# Patient Record
Sex: FEMALE | Race: BLACK OR AFRICAN AMERICAN | NOT HISPANIC OR LATINO | Employment: UNEMPLOYED | URBAN - METROPOLITAN AREA
[De-identification: names, ages, dates, MRNs, and addresses within clinical notes are randomized per-mention and may not be internally consistent; named-entity substitution may affect disease eponyms.]

---

## 2023-09-15 PROBLEM — M85.80 OSTEOPENIA: Status: ACTIVE | Noted: 2023-09-15

## 2023-09-15 PROBLEM — E66.01 MORBID OBESITY (MULTI): Status: ACTIVE | Noted: 2023-09-15

## 2023-09-15 PROBLEM — R06.02 SHORTNESS OF BREATH: Status: ACTIVE | Noted: 2023-09-15

## 2023-09-15 PROBLEM — M17.11 OSTEOARTHRITIS OF RIGHT KNEE: Status: ACTIVE | Noted: 2023-09-15

## 2023-09-15 PROBLEM — M10.9 GOUT: Status: ACTIVE | Noted: 2022-09-12

## 2023-09-15 PROBLEM — R00.2 PALPITATIONS: Status: ACTIVE | Noted: 2023-09-15

## 2023-09-15 PROBLEM — G47.33 OBSTRUCTIVE SLEEP APNEA SYNDROME: Status: ACTIVE | Noted: 2022-09-13

## 2023-09-15 PROBLEM — I48.91 ATRIAL FIBRILLATION (MULTI): Status: ACTIVE | Noted: 2023-09-15

## 2023-09-15 PROBLEM — N18.4 CKD (CHRONIC KIDNEY DISEASE), STAGE IV (MULTI): Status: ACTIVE | Noted: 2023-09-15

## 2023-09-15 PROBLEM — E87.6 HYPOKALEMIA: Status: ACTIVE | Noted: 2023-09-15

## 2023-09-15 PROBLEM — E78.5 HYPERLIPIDEMIA: Status: ACTIVE | Noted: 2023-09-15

## 2023-09-15 PROBLEM — I42.9 CARDIOMYOPATHY (MULTI): Status: ACTIVE | Noted: 2022-09-06

## 2023-09-15 PROBLEM — D72.819 LEUKOPENIA: Status: ACTIVE | Noted: 2023-09-15

## 2023-09-15 PROBLEM — I50.22 CHRONIC SYSTOLIC HEART FAILURE (MULTI): Status: ACTIVE | Noted: 2023-09-15

## 2023-09-15 PROBLEM — I61.9 HEMORRHAGIC CEREBROVASCULAR ACCIDENT (CVA) (MULTI): Status: ACTIVE | Noted: 2023-08-26

## 2023-09-15 PROBLEM — R29.898 BILATERAL LEG WEAKNESS: Status: ACTIVE | Noted: 2023-09-15

## 2023-09-15 PROBLEM — G47.00 INSOMNIA: Status: ACTIVE | Noted: 2023-09-15

## 2023-09-15 PROBLEM — I63.9 CVA (CEREBRAL VASCULAR ACCIDENT) (MULTI): Status: ACTIVE | Noted: 2023-09-15

## 2023-09-15 PROBLEM — K58.1 IRRITABLE BOWEL SYNDROME WITH CONSTIPATION: Status: ACTIVE | Noted: 2023-09-15

## 2023-09-15 PROBLEM — N25.81 HYPERPARATHYROIDISM, SECONDARY (MULTI): Status: ACTIVE | Noted: 2023-09-15

## 2023-09-15 PROBLEM — R32 URINARY INCONTINENCE: Status: ACTIVE | Noted: 2023-09-15

## 2023-09-15 PROBLEM — E04.1 THYROID NODULE: Status: ACTIVE | Noted: 2023-09-15

## 2023-09-15 PROBLEM — R53.82 CHRONIC FATIGUE: Status: ACTIVE | Noted: 2023-09-15

## 2023-09-15 PROBLEM — I35.9 AORTIC VALVE DISORDER: Status: ACTIVE | Noted: 2023-09-15

## 2023-09-15 PROBLEM — M25.50 ARTHRALGIA: Status: ACTIVE | Noted: 2023-03-20

## 2023-09-15 PROBLEM — M25.569 KNEE PAIN: Status: ACTIVE | Noted: 2023-09-15

## 2023-09-15 PROBLEM — Z95.810 CARDIAC DEFIBRILLATOR IN PLACE: Status: ACTIVE | Noted: 2023-09-15

## 2023-09-15 RX ORDER — ALBUTEROL SULFATE 90 UG/1
2 AEROSOL, METERED RESPIRATORY (INHALATION) EVERY 4 HOURS PRN
COMMUNITY
Start: 2021-06-03 | End: 2023-12-19 | Stop reason: SDUPTHER

## 2023-09-15 RX ORDER — AMIODARONE HYDROCHLORIDE 200 MG/1
1 TABLET ORAL DAILY
COMMUNITY
End: 2023-12-12 | Stop reason: SDUPTHER

## 2023-09-15 RX ORDER — ALLOPURINOL 300 MG/1
1 TABLET ORAL DAILY
COMMUNITY
Start: 2015-08-24 | End: 2023-12-19 | Stop reason: SDUPTHER

## 2023-09-15 RX ORDER — TORSEMIDE 20 MG/1
20 TABLET ORAL DAILY
COMMUNITY
Start: 2022-03-30 | End: 2024-04-17 | Stop reason: SDUPTHER

## 2023-09-15 RX ORDER — SPIRONOLACTONE 25 MG/1
1 TABLET ORAL DAILY
COMMUNITY
Start: 2015-03-18 | End: 2023-12-19 | Stop reason: SDUPTHER

## 2023-09-15 RX ORDER — CALCITRIOL 0.25 UG/1
0.25 CAPSULE ORAL DAILY
COMMUNITY
End: 2024-04-23 | Stop reason: SDUPTHER

## 2023-09-15 RX ORDER — WARFARIN SODIUM 5 MG/1
TABLET ORAL
COMMUNITY
Start: 2016-07-14 | End: 2023-12-19 | Stop reason: SDUPTHER

## 2023-09-15 RX ORDER — LIDOCAINE 50 MG/G
1 OINTMENT TOPICAL 3 TIMES DAILY PRN
COMMUNITY
Start: 2023-02-23

## 2023-09-15 RX ORDER — METOPROLOL SUCCINATE 100 MG/1
1 TABLET, EXTENDED RELEASE ORAL DAILY
COMMUNITY
Start: 2022-11-14 | End: 2023-12-19 | Stop reason: SDUPTHER

## 2023-09-15 RX ORDER — SACUBITRIL AND VALSARTAN 24; 26 MG/1; MG/1
1 TABLET, FILM COATED ORAL 2 TIMES DAILY
COMMUNITY
Start: 2018-03-05 | End: 2024-01-17 | Stop reason: DRUGHIGH

## 2023-09-15 RX ORDER — SIMVASTATIN 20 MG/1
20 TABLET, FILM COATED ORAL NIGHTLY
COMMUNITY
Start: 2015-08-24 | End: 2023-12-19 | Stop reason: SDUPTHER

## 2023-09-20 DIAGNOSIS — I48.91 ATRIAL FIBRILLATION, UNSPECIFIED TYPE (MULTI): Primary | ICD-10-CM

## 2023-09-20 LAB
INR IN PPP BY COAGULATION ASSAY EXTERNAL: 2.6
PROTHROMBIN TIME (PT) IN PPP BY COAGULATION ASSAY EXTERNAL: NORMAL SECONDS

## 2023-09-27 ENCOUNTER — ANTICOAGULATION - WARFARIN VISIT (OUTPATIENT)
Dept: CARDIOLOGY | Facility: CLINIC | Age: 68
End: 2023-09-27
Payer: MEDICAID

## 2023-09-29 ENCOUNTER — HOSPITAL ENCOUNTER (EMERGENCY)
Dept: DATA CONVERSION | Facility: HOSPITAL | Age: 68
Discharge: HOME | End: 2023-09-30
Payer: MEDICAID

## 2023-10-10 ENCOUNTER — TELEPHONE (OUTPATIENT)
Dept: NEPHROLOGY | Facility: HOSPITAL | Age: 68
End: 2023-10-10
Payer: MEDICAID

## 2023-10-11 ENCOUNTER — TELEPHONE (OUTPATIENT)
Dept: CARDIOLOGY | Facility: HOSPITAL | Age: 68
End: 2023-10-11
Payer: MEDICAID

## 2023-10-11 DIAGNOSIS — I50.22 CHRONIC SYSTOLIC HEART FAILURE (MULTI): ICD-10-CM

## 2023-10-11 DIAGNOSIS — Z01.818 PRE-OPERATIVE CLEARANCE: ICD-10-CM

## 2023-10-11 NOTE — TELEPHONE ENCOUNTER
Spoke with patient. Patient confirming 10/30/2023 as her choice of procedure date. Pre-procedural labs ordered.

## 2023-10-12 ENCOUNTER — LAB (OUTPATIENT)
Dept: LAB | Facility: LAB | Age: 68
End: 2023-10-12
Payer: MEDICAID

## 2023-10-12 DIAGNOSIS — N18.30 CHRONIC KIDNEY DISEASE, STAGE 3 UNSPECIFIED (MULTI): Primary | ICD-10-CM

## 2023-10-12 DIAGNOSIS — I50.22 CHRONIC SYSTOLIC HEART FAILURE (MULTI): ICD-10-CM

## 2023-10-12 DIAGNOSIS — N18.30 CHRONIC KIDNEY DISEASE, STAGE 3 UNSPECIFIED (MULTI): ICD-10-CM

## 2023-10-12 DIAGNOSIS — Z01.818 PRE-OPERATIVE CLEARANCE: ICD-10-CM

## 2023-10-12 LAB
25(OH)D3 SERPL-MCNC: 53 NG/ML (ref 30–100)
ALBUMIN SERPL BCP-MCNC: 4.2 G/DL (ref 3.4–5)
ANION GAP SERPL CALC-SCNC: 11 MMOL/L (ref 10–20)
ANION GAP SERPL CALC-SCNC: 15 MMOL/L (ref 10–20)
APPEARANCE UR: ABNORMAL
BILIRUB UR STRIP.AUTO-MCNC: NEGATIVE MG/DL
BUN SERPL-MCNC: 25 MG/DL (ref 6–23)
BUN SERPL-MCNC: 27 MG/DL (ref 6–23)
CALCIUM SERPL-MCNC: 10 MG/DL (ref 8.6–10.6)
CALCIUM SERPL-MCNC: 9.9 MG/DL (ref 8.6–10.6)
CALCIUM UR-MCNC: 4.2 MG/DL
CALCIUM/CREATINE (MG/G) IN URINE: 51 MG/G CREAT (ref 0–209)
CHLORIDE SERPL-SCNC: 104 MMOL/L (ref 98–107)
CHLORIDE SERPL-SCNC: 105 MMOL/L (ref 98–107)
CO2 SERPL-SCNC: 29 MMOL/L (ref 21–32)
CO2 SERPL-SCNC: 31 MMOL/L (ref 21–32)
COLOR UR: YELLOW
CREAT SERPL-MCNC: 1.77 MG/DL (ref 0.5–1.05)
CREAT SERPL-MCNC: 1.92 MG/DL (ref 0.5–1.05)
CREAT UR-MCNC: 81.8 MG/DL (ref 20–320)
ERYTHROCYTE [DISTWIDTH] IN BLOOD BY AUTOMATED COUNT: 13.2 % (ref 11.5–14.5)
ERYTHROCYTE [DISTWIDTH] IN BLOOD BY AUTOMATED COUNT: 13.3 % (ref 11.5–14.5)
GFR SERPL CREATININE-BSD FRML MDRD: 28 ML/MIN/1.73M*2
GFR SERPL CREATININE-BSD FRML MDRD: 31 ML/MIN/1.73M*2
GLUCOSE SERPL-MCNC: 106 MG/DL (ref 74–99)
GLUCOSE SERPL-MCNC: 109 MG/DL (ref 74–99)
GLUCOSE UR STRIP.AUTO-MCNC: NEGATIVE MG/DL
HCT VFR BLD AUTO: 40.8 % (ref 36–46)
HCT VFR BLD AUTO: 42.7 % (ref 36–46)
HGB BLD-MCNC: 12.9 G/DL (ref 12–16)
HGB BLD-MCNC: 13.6 G/DL (ref 12–16)
KETONES UR STRIP.AUTO-MCNC: NEGATIVE MG/DL
LEUKOCYTE ESTERASE UR QL STRIP.AUTO: ABNORMAL
MCH RBC QN AUTO: 29.3 PG (ref 26–34)
MCH RBC QN AUTO: 29.7 PG (ref 26–34)
MCHC RBC AUTO-ENTMCNC: 31.6 G/DL (ref 32–36)
MCHC RBC AUTO-ENTMCNC: 31.9 G/DL (ref 32–36)
MCV RBC AUTO: 93 FL (ref 80–100)
MCV RBC AUTO: 93 FL (ref 80–100)
MICROALBUMIN UR-MCNC: 7.6 MG/L
MICROALBUMIN/CREAT UR: 9.3 UG/MG CREAT
NITRITE UR QL STRIP.AUTO: NEGATIVE
NRBC BLD-RTO: 0 /100 WBCS (ref 0–0)
NRBC BLD-RTO: 0 /100 WBCS (ref 0–0)
PH UR STRIP.AUTO: 7 [PH]
PHOSPHATE SERPL-MCNC: 3.6 MG/DL (ref 2.5–4.9)
PLATELET # BLD AUTO: 159 X10*3/UL (ref 150–450)
PLATELET # BLD AUTO: 164 X10*3/UL (ref 150–450)
PMV BLD AUTO: 12 FL (ref 7.5–11.5)
PMV BLD AUTO: 12.1 FL (ref 7.5–11.5)
POTASSIUM SERPL-SCNC: 4.2 MMOL/L (ref 3.5–5.3)
POTASSIUM SERPL-SCNC: 4.6 MMOL/L (ref 3.5–5.3)
PROT UR STRIP.AUTO-MCNC: NEGATIVE MG/DL
PROT UR-ACNC: 10 MG/DL (ref 5–24)
PROT/CREAT UR: 0.12 MG/MG CREAT (ref 0–0.17)
PTH-INTACT SERPL-MCNC: 132.1 PG/ML (ref 18.5–88)
RBC # BLD AUTO: 4.4 X10*6/UL (ref 4–5.2)
RBC # BLD AUTO: 4.58 X10*6/UL (ref 4–5.2)
RBC # UR STRIP.AUTO: NEGATIVE /UL
RBC #/AREA URNS AUTO: ABNORMAL /HPF
SODIUM SERPL-SCNC: 143 MMOL/L (ref 136–145)
SODIUM SERPL-SCNC: 143 MMOL/L (ref 136–145)
SP GR UR STRIP.AUTO: 1.01
SQUAMOUS #/AREA URNS AUTO: ABNORMAL /HPF
UROBILINOGEN UR STRIP.AUTO-MCNC: 2 MG/DL
WBC # BLD AUTO: 3.7 X10*3/UL (ref 4.4–11.3)
WBC # BLD AUTO: 3.9 X10*3/UL (ref 4.4–11.3)
WBC #/AREA URNS AUTO: ABNORMAL /HPF

## 2023-10-12 PROCEDURE — 36415 COLL VENOUS BLD VENIPUNCTURE: CPT

## 2023-10-12 PROCEDURE — 82570 ASSAY OF URINE CREATININE: CPT

## 2023-10-12 PROCEDURE — 85027 COMPLETE CBC AUTOMATED: CPT

## 2023-10-12 PROCEDURE — 82043 UR ALBUMIN QUANTITATIVE: CPT

## 2023-10-12 PROCEDURE — 83970 ASSAY OF PARATHORMONE: CPT

## 2023-10-12 PROCEDURE — 80069 RENAL FUNCTION PANEL: CPT

## 2023-10-12 PROCEDURE — 82306 VITAMIN D 25 HYDROXY: CPT

## 2023-10-12 PROCEDURE — 80048 BASIC METABOLIC PNL TOTAL CA: CPT

## 2023-10-12 PROCEDURE — 82340 ASSAY OF CALCIUM IN URINE: CPT

## 2023-10-12 PROCEDURE — 81001 URINALYSIS AUTO W/SCOPE: CPT

## 2023-10-12 PROCEDURE — 84156 ASSAY OF PROTEIN URINE: CPT

## 2023-10-13 DIAGNOSIS — I42.0 DILATED CARDIOMYOPATHY (MULTI): Primary | ICD-10-CM

## 2023-10-19 ENCOUNTER — TELEMEDICINE (OUTPATIENT)
Dept: NEPHROLOGY | Facility: CLINIC | Age: 68
End: 2023-10-19
Payer: MEDICAID

## 2023-10-19 ENCOUNTER — APPOINTMENT (OUTPATIENT)
Dept: NEPHROLOGY | Facility: CLINIC | Age: 68
End: 2023-10-19
Payer: MEDICAID

## 2023-10-19 DIAGNOSIS — N18.32 HYPERTENSIVE KIDNEY DISEASE WITH STAGE 3B CHRONIC KIDNEY DISEASE (MULTI): Primary | ICD-10-CM

## 2023-10-19 DIAGNOSIS — I13.0 BENIGN HYPERTENSIVE HEART AND KIDNEY DISEASE WITH COMBINED SYSTOLIC AND DIASTOLIC CHF, NYHA CLASS 1 AND CKD STAGE 3 (MULTI): ICD-10-CM

## 2023-10-19 DIAGNOSIS — N18.30 BENIGN HYPERTENSIVE HEART AND KIDNEY DISEASE WITH COMBINED SYSTOLIC AND DIASTOLIC CHF, NYHA CLASS 1 AND CKD STAGE 3 (MULTI): ICD-10-CM

## 2023-10-19 DIAGNOSIS — I50.40 BENIGN HYPERTENSIVE HEART AND KIDNEY DISEASE WITH COMBINED SYSTOLIC AND DIASTOLIC CHF, NYHA CLASS 1 AND CKD STAGE 3 (MULTI): ICD-10-CM

## 2023-10-19 DIAGNOSIS — I12.9 HYPERTENSIVE KIDNEY DISEASE WITH STAGE 3B CHRONIC KIDNEY DISEASE (MULTI): Primary | ICD-10-CM

## 2023-10-19 NOTE — PROGRESS NOTES
Virtual phone visit with patient's request.    Heart procedure 10.30 defibrillator placement  No specific complaints  Denies nausea, vomiting, chest pain, dyspnea  No urinary symptoms    Not checking bps   Weight stable  NAD  No edema      CKD stage 3b   HTN unclear home control, controlled at other doctor's visits  Proteinuria, none significant  CHF stable   Morbid obesity, unchanged    Labs and follow up in 3 months  Discussed risks/benefits/indications of SGLT2 I  [facility] pharmacy consultation for farxiga    10 minutes spent in virtual visit

## 2023-10-21 ENCOUNTER — ANTICOAGULATION - WARFARIN VISIT (OUTPATIENT)
Dept: CARDIOLOGY | Facility: CLINIC | Age: 68
End: 2023-10-21
Payer: MEDICAID

## 2023-10-21 DIAGNOSIS — I48.91 ATRIAL FIBRILLATION, UNSPECIFIED TYPE (MULTI): ICD-10-CM

## 2023-10-21 LAB
POC INR: 2.3
POC PROTHROMBIN TIME: NORMAL

## 2023-10-21 PROCEDURE — 85610 PROTHROMBIN TIME: CPT | Mod: QW | Performed by: INTERNAL MEDICINE

## 2023-10-21 PROCEDURE — 99211 OFF/OP EST MAY X REQ PHY/QHP: CPT | Mod: 27 | Performed by: INTERNAL MEDICINE

## 2023-10-21 NOTE — PROGRESS NOTES
Patient identification verified with 2 identifiers.    Location: [facility] [address] [address] [phone] option #1     Referring Physician: JOCELYNE  Enrollment/ Re-enrollment date: 23   INR Goal: 2.0-3.0  INR monitoring is per [facility] protocol.  Anticoagulation Medication: warfarin  Indication: atrial fibrillation    Subjective   Bleeding signs/symptoms: No    Bruising: No   Major bleeding event: No  Thrombosis signs/symptoms: No  Thromboembolic event: No  Missed doses: No  Extra doses: No  Medication changes: No  Dietary changes: No  Change in health: No  Change in activity: No  Alcohol: No  Other concerns: No    Upcoming Surgeries:  Does the Patient Have any upcoming surgeries that require interruption in anticoagulation therapy? no  Does the patient require bridging? no      Anticoagulation Summary  As of 10/21/2023      INR goal:  2.0-3.0   TTR:  100.0 % (3 wk)   INR used for dosin.30 (10/21/2023)   Weekly warfarin total:  35 mg               Assessment/Plan   Therapeutic     1. New dose: no change    2. Next INR: 2 weeks      Education provided to patient during the visit:  Patient instructed to call in interim with questions, concerns and changes.

## 2023-10-27 ENCOUNTER — PHARMACY VISIT (OUTPATIENT)
Dept: PHARMACY | Facility: CLINIC | Age: 68
End: 2023-10-27
Payer: MEDICAID

## 2023-10-27 ENCOUNTER — TELEMEDICINE (OUTPATIENT)
Dept: PHARMACY | Facility: HOSPITAL | Age: 68
End: 2023-10-27
Payer: MEDICAID

## 2023-10-27 DIAGNOSIS — I12.9 HYPERTENSIVE KIDNEY DISEASE WITH STAGE 3B CHRONIC KIDNEY DISEASE (MULTI): ICD-10-CM

## 2023-10-27 DIAGNOSIS — N18.32 HYPERTENSIVE KIDNEY DISEASE WITH STAGE 3B CHRONIC KIDNEY DISEASE (MULTI): ICD-10-CM

## 2023-10-27 DIAGNOSIS — I50.40 BENIGN HYPERTENSIVE HEART AND KIDNEY DISEASE WITH COMBINED SYSTOLIC AND DIASTOLIC CHF, NYHA CLASS 1 AND CKD STAGE 3 (MULTI): ICD-10-CM

## 2023-10-27 DIAGNOSIS — I13.0 BENIGN HYPERTENSIVE HEART AND KIDNEY DISEASE WITH COMBINED SYSTOLIC AND DIASTOLIC CHF, NYHA CLASS 1 AND CKD STAGE 3 (MULTI): ICD-10-CM

## 2023-10-27 DIAGNOSIS — N18.30 BENIGN HYPERTENSIVE HEART AND KIDNEY DISEASE WITH COMBINED SYSTOLIC AND DIASTOLIC CHF, NYHA CLASS 1 AND CKD STAGE 3 (MULTI): ICD-10-CM

## 2023-10-27 PROCEDURE — RXMED WILLOW AMBULATORY MEDICATION CHARGE

## 2023-10-27 RX ORDER — DAPAGLIFLOZIN 5 MG/1
5 TABLET, FILM COATED ORAL DAILY
Qty: 30 TABLET | Refills: 5 | Status: SHIPPED | OUTPATIENT
Start: 2023-10-27 | End: 2024-03-26

## 2023-10-27 NOTE — PROGRESS NOTES
Subjective   Patient ID: Trinidad Hines is a 68 y.o. female who presents for No chief complaint on file..    Referring Provider: Minda Ricardo MD     HPI  HPI: CKD stage 3b/4. last EGFR 28 sCr 1.92    HTN: controlled at last ov  /83  Medications  Spironolactone 25mg every day  Entresto 24-26 bid  Toprol xl 100mg every day    Glucose: well controlled and monitored    HLD: controlled  On statin? Simvastatin 20mg qd    Medications reviewed for appropriate dosing in setting of CKD  Recommended changes:  - no adjustments needed at this time      Objective     There were no vitals taken for this visit.     Labs  Lab Results   Component Value Date    BILITOT 1.2 03/20/2023    CALCIUM 10.0 10/12/2023    CO2 29 10/12/2023     10/12/2023    CREATININE 1.77 (H) 10/12/2023    GLUCOSE 106 (H) 10/12/2023    ALKPHOS 45 03/20/2023    K 4.6 10/12/2023    PROT 6.6 03/20/2023     10/12/2023    AST 20 03/20/2023    ALT 10 03/20/2023    BUN 27 (H) 10/12/2023    ANIONGAP 15 10/12/2023    MG 2.34 03/20/2023    PHOS 3.6 10/12/2023    ALBUMIN 4.2 10/12/2023    LIPASE 77 03/05/2021    GFRF 31 (A) 03/20/2023     Lab Results   Component Value Date    TRIG 62 01/19/2022    CHOL 187 01/19/2022    HDL 68.3 01/19/2022     Lab Results   Component Value Date    HGBA1C 5.7 (A) 11/02/2022       Current Outpatient Medications on File Prior to Visit   Medication Sig Dispense Refill    albuterol 90 mcg/actuation inhaler Inhale 2 puffs every 4 hours if needed for wheezing.      allopurinol (Zyloprim) 300 mg tablet Take 1 tablet (300 mg) by mouth once daily.      amiodarone (Pacerone) 200 mg tablet Take 1 tablet (200 mg) by mouth once daily.      calcitriol (Rocaltrol) 0.25 mcg capsule Take 1 capsule (0.25 mcg) by mouth once daily.      lidocaine (Xylocaine) 5 % ointment Apply topically 3 times a day.      metoprolol succinate XL (Toprol-XL) 100 mg 24 hr tablet Take 1 tablet (100 mg) by mouth once daily.       sacubitriL-valsartan (Entresto) 24-26 mg tablet Take 1 tablet by mouth 2 times a day.      simvastatin (Zocor) 20 mg tablet Take 1 tablet (20 mg) by mouth once daily at bedtime.      spironolactone (Aldactone) 25 mg tablet Take 1 tablet (25 mg) by mouth once daily.      torsemide (Demadex) 20 mg tablet Take 2 tablets (40 mg) by mouth once daily.      warfarin (Coumadin) 5 mg tablet Take by mouth once daily.       No current facility-administered medications on file prior to visit.        Assessment/Plan   PATIENT EDUCATION/DISCUSSION:  - Counseled patient on MOA, expectations, side effects, duration of therapy, contraindications, administration, and monitoring parameters  - Answered all patient questions and concerns  - regular monthly copay $0  _______________________________________________________________________  PLAN  1. START farxiga 5mg qd  2. Prescription sent to [facility] pharmacy for assistance on authorization and copay. Medication will be mailed to patient.    F/up: 11/28 1030a  Ron Hightower, PharmD    Continue all meds under the continuation of care with the referring provider and clinical pharmacy team.

## 2023-10-30 ENCOUNTER — DOCUMENTATION (OUTPATIENT)
Dept: CARDIOLOGY | Facility: CLINIC | Age: 68
End: 2023-10-30

## 2023-10-30 ENCOUNTER — APPOINTMENT (OUTPATIENT)
Dept: RADIOLOGY | Facility: HOSPITAL | Age: 68
End: 2023-10-30
Payer: MEDICAID

## 2023-10-30 ENCOUNTER — APPOINTMENT (OUTPATIENT)
Dept: CARDIOLOGY | Facility: CLINIC | Age: 68
End: 2023-10-30
Payer: MEDICAID

## 2023-10-30 ENCOUNTER — ANESTHESIA EVENT (OUTPATIENT)
Dept: CARDIOLOGY | Facility: HOSPITAL | Age: 68
End: 2023-10-30
Payer: MEDICAID

## 2023-10-30 ENCOUNTER — HOSPITAL ENCOUNTER (OUTPATIENT)
Facility: HOSPITAL | Age: 68
Setting detail: OBSERVATION
Discharge: HOME | End: 2023-10-31
Attending: INTERNAL MEDICINE | Admitting: INTERNAL MEDICINE
Payer: MEDICAID

## 2023-10-30 ENCOUNTER — ANESTHESIA (OUTPATIENT)
Dept: CARDIOLOGY | Facility: HOSPITAL | Age: 68
End: 2023-10-30
Payer: MEDICAID

## 2023-10-30 DIAGNOSIS — E66.01 MORBID OBESITY (MULTI): ICD-10-CM

## 2023-10-30 DIAGNOSIS — E78.5 HYPERLIPIDEMIA: ICD-10-CM

## 2023-10-30 DIAGNOSIS — I42.0 DILATED CARDIOMYOPATHY (MULTI): Primary | ICD-10-CM

## 2023-10-30 DIAGNOSIS — D72.819 LEUKOPENIA: ICD-10-CM

## 2023-10-30 DIAGNOSIS — R29.898 BILATERAL LEG WEAKNESS: ICD-10-CM

## 2023-10-30 DIAGNOSIS — R32 URINARY INCONTINENCE: ICD-10-CM

## 2023-10-30 DIAGNOSIS — M25.569 KNEE PAIN: ICD-10-CM

## 2023-10-30 DIAGNOSIS — I48.91 ATRIAL FIBRILLATION (MULTI): ICD-10-CM

## 2023-10-30 DIAGNOSIS — I63.9 CVA (CEREBRAL VASCULAR ACCIDENT) (MULTI): ICD-10-CM

## 2023-10-30 DIAGNOSIS — I35.9 AORTIC VALVE DISORDER: ICD-10-CM

## 2023-10-30 DIAGNOSIS — Z95.810 CARDIAC DEFIBRILLATOR IN PLACE: ICD-10-CM

## 2023-10-30 DIAGNOSIS — R00.2 PALPITATIONS: ICD-10-CM

## 2023-10-30 DIAGNOSIS — M25.50 ARTHRALGIA: ICD-10-CM

## 2023-10-30 DIAGNOSIS — N18.4 CKD (CHRONIC KIDNEY DISEASE), STAGE IV (MULTI): ICD-10-CM

## 2023-10-30 DIAGNOSIS — R53.82 CHRONIC FATIGUE: ICD-10-CM

## 2023-10-30 DIAGNOSIS — I61.9 HEMORRHAGIC CEREBROVASCULAR ACCIDENT (CVA) (MULTI): ICD-10-CM

## 2023-10-30 DIAGNOSIS — K58.1 IRRITABLE BOWEL SYNDROME WITH CONSTIPATION: ICD-10-CM

## 2023-10-30 DIAGNOSIS — M10.9 GOUT: ICD-10-CM

## 2023-10-30 DIAGNOSIS — M85.80 OSTEOPENIA: ICD-10-CM

## 2023-10-30 DIAGNOSIS — M17.11 OSTEOARTHRITIS OF RIGHT KNEE: ICD-10-CM

## 2023-10-30 DIAGNOSIS — R06.02 SHORTNESS OF BREATH: ICD-10-CM

## 2023-10-30 DIAGNOSIS — G47.00 INSOMNIA: ICD-10-CM

## 2023-10-30 DIAGNOSIS — G47.33 OBSTRUCTIVE SLEEP APNEA SYNDROME: ICD-10-CM

## 2023-10-30 DIAGNOSIS — I50.22 CHRONIC SYSTOLIC HEART FAILURE (MULTI): ICD-10-CM

## 2023-10-30 DIAGNOSIS — E87.6 HYPOKALEMIA: ICD-10-CM

## 2023-10-30 DIAGNOSIS — E04.1 THYROID NODULE: ICD-10-CM

## 2023-10-30 DIAGNOSIS — N25.81 HYPERPARATHYROIDISM, SECONDARY (MULTI): ICD-10-CM

## 2023-10-30 DIAGNOSIS — I42.9 CARDIOMYOPATHY (MULTI): ICD-10-CM

## 2023-10-30 DIAGNOSIS — I10 BENIGN HYPERTENSION: ICD-10-CM

## 2023-10-30 DIAGNOSIS — Z95.810 PRESENCE OF AUTOMATIC (IMPLANTABLE) CARDIAC DEFIBRILLATOR: ICD-10-CM

## 2023-10-30 PROCEDURE — 2500000001 HC RX 250 WO HCPCS SELF ADMINISTERED DRUGS (ALT 637 FOR MEDICARE OP): Performed by: INTERNAL MEDICINE

## 2023-10-30 PROCEDURE — 0JH609Z INSERTION OF CARDIAC RESYNCHRONIZATION DEFIBRILLATOR PULSE GENERATOR INTO CHEST SUBCUTANEOUS TISSUE AND FASCIA, OPEN APPROACH: ICD-10-PCS | Performed by: FAMILY MEDICINE

## 2023-10-30 PROCEDURE — 2750000001 HC OR 275 NO HCPCS: Performed by: INTERNAL MEDICINE

## 2023-10-30 PROCEDURE — 2720000007 HC OR 272 NO HCPCS: Performed by: INTERNAL MEDICINE

## 2023-10-30 PROCEDURE — 71046 X-RAY EXAM CHEST 2 VIEWS: CPT | Mod: FY

## 2023-10-30 PROCEDURE — 33225 L VENTRIC PACING LEAD ADD-ON: CPT | Performed by: INTERNAL MEDICINE

## 2023-10-30 PROCEDURE — C1893 INTRO/SHEATH, FIXED,NON-PEEL: HCPCS | Performed by: INTERNAL MEDICINE

## 2023-10-30 PROCEDURE — 33263 RMVL & RPLCMT DFB GEN 2 LEAD: CPT | Performed by: INTERNAL MEDICINE

## 2023-10-30 PROCEDURE — C1892 INTRO/SHEATH,FIXED,PEEL-AWAY: HCPCS | Performed by: INTERNAL MEDICINE

## 2023-10-30 PROCEDURE — 02H43KZ INSERTION OF DEFIBRILLATOR LEAD INTO CORONARY VEIN, PERCUTANEOUS APPROACH: ICD-10-PCS | Performed by: FAMILY MEDICINE

## 2023-10-30 PROCEDURE — 2780000003 HC OR 278 NO HCPCS: Performed by: INTERNAL MEDICINE

## 2023-10-30 PROCEDURE — C1727 CATH, BAL TIS DIS, NON-VAS: HCPCS | Performed by: INTERNAL MEDICINE

## 2023-10-30 PROCEDURE — 7100000009 HC PHASE TWO TIME - INITIAL BASE CHARGE: Performed by: INTERNAL MEDICINE

## 2023-10-30 PROCEDURE — 71046 X-RAY EXAM CHEST 2 VIEWS: CPT | Performed by: RADIOLOGY

## 2023-10-30 PROCEDURE — 93295 DEV INTERROG REMOTE 1/2/MLT: CPT | Performed by: INTERNAL MEDICINE

## 2023-10-30 PROCEDURE — 7100000010 HC PHASE TWO TIME - EACH INCREMENTAL 1 MINUTE: Performed by: INTERNAL MEDICINE

## 2023-10-30 PROCEDURE — G0378 HOSPITAL OBSERVATION PER HR: HCPCS

## 2023-10-30 PROCEDURE — C1769 GUIDE WIRE: HCPCS | Performed by: INTERNAL MEDICINE

## 2023-10-30 PROCEDURE — 2500000002 HC RX 250 W HCPCS SELF ADMINISTERED DRUGS (ALT 637 FOR MEDICARE OP, ALT 636 FOR OP/ED): Performed by: INTERNAL MEDICINE

## 2023-10-30 PROCEDURE — C1882 AICD, OTHER THAN SING/DUAL: HCPCS | Performed by: INTERNAL MEDICINE

## 2023-10-30 PROCEDURE — 3700000002 HC GENERAL ANESTHESIA TIME - EACH INCREMENTAL 1 MINUTE: Performed by: INTERNAL MEDICINE

## 2023-10-30 PROCEDURE — C1900 LEAD, CORONARY VENOUS: HCPCS | Performed by: INTERNAL MEDICINE

## 2023-10-30 PROCEDURE — 2550000001 HC RX 255 CONTRASTS: Performed by: INTERNAL MEDICINE

## 2023-10-30 PROCEDURE — 2500000004 HC RX 250 GENERAL PHARMACY W/ HCPCS (ALT 636 FOR OP/ED): Performed by: INTERNAL MEDICINE

## 2023-10-30 PROCEDURE — 1100000001 HC PRIVATE ROOM DAILY

## 2023-10-30 PROCEDURE — 93296 REM INTERROG EVL PM/IDS: CPT | Mod: CCI

## 2023-10-30 PROCEDURE — C1894 INTRO/SHEATH, NON-LASER: HCPCS | Performed by: INTERNAL MEDICINE

## 2023-10-30 PROCEDURE — 2500000004 HC RX 250 GENERAL PHARMACY W/ HCPCS (ALT 636 FOR OP/ED): Performed by: NURSE ANESTHETIST, CERTIFIED REGISTERED

## 2023-10-30 PROCEDURE — 3700000001 HC GENERAL ANESTHESIA TIME - INITIAL BASE CHARGE: Performed by: INTERNAL MEDICINE

## 2023-10-30 DEVICE — LEFT HEART LEAD
Type: IMPLANTABLE DEVICE | Status: FUNCTIONAL
Brand: QUARTET™

## 2023-10-30 DEVICE — CARDIAC RESYNCHRONISATION THERAPY DEFIBRILLATOR
Type: IMPLANTABLE DEVICE | Status: FUNCTIONAL
Brand: GALLANT™

## 2023-10-30 RX ORDER — MIDAZOLAM HYDROCHLORIDE 1 MG/ML
INJECTION, SOLUTION INTRAMUSCULAR; INTRAVENOUS AS NEEDED
Status: DISCONTINUED | OUTPATIENT
Start: 2023-10-30 | End: 2023-10-30

## 2023-10-30 RX ORDER — WARFARIN SODIUM 5 MG/1
5 TABLET ORAL DAILY
Status: DISCONTINUED | OUTPATIENT
Start: 2023-10-30 | End: 2023-10-31 | Stop reason: HOSPADM

## 2023-10-30 RX ORDER — MIDAZOLAM HYDROCHLORIDE 1 MG/ML
INJECTION INTRAMUSCULAR; INTRAVENOUS AS NEEDED
Status: DISCONTINUED | OUTPATIENT
Start: 2023-10-30 | End: 2023-10-30

## 2023-10-30 RX ORDER — ALBUTEROL SULFATE 90 UG/1
2 AEROSOL, METERED RESPIRATORY (INHALATION) EVERY 4 HOURS PRN
Status: DISCONTINUED | OUTPATIENT
Start: 2023-10-30 | End: 2023-10-31 | Stop reason: HOSPADM

## 2023-10-30 RX ORDER — ALLOPURINOL 100 MG/1
300 TABLET ORAL DAILY
Status: DISCONTINUED | OUTPATIENT
Start: 2023-10-30 | End: 2023-10-31 | Stop reason: HOSPADM

## 2023-10-30 RX ORDER — FENTANYL CITRATE 50 UG/ML
INJECTION, SOLUTION INTRAMUSCULAR; INTRAVENOUS AS NEEDED
Status: DISCONTINUED | OUTPATIENT
Start: 2023-10-30 | End: 2023-10-30

## 2023-10-30 RX ORDER — VANCOMYCIN HYDROCHLORIDE 500 MG/10ML
INJECTION, POWDER, LYOPHILIZED, FOR SOLUTION INTRAVENOUS AS NEEDED
Status: DISCONTINUED | OUTPATIENT
Start: 2023-10-30 | End: 2023-10-30 | Stop reason: HOSPADM

## 2023-10-30 RX ORDER — PROPOFOL 10 MG/ML
INJECTION, EMULSION INTRAVENOUS CONTINUOUS PRN
Status: DISCONTINUED | OUTPATIENT
Start: 2023-10-30 | End: 2023-10-30

## 2023-10-30 RX ORDER — TORSEMIDE 20 MG/1
40 TABLET ORAL 2 TIMES DAILY
Status: DISCONTINUED | OUTPATIENT
Start: 2023-10-30 | End: 2023-10-31 | Stop reason: HOSPADM

## 2023-10-30 RX ORDER — BUPIVACAINE HYDROCHLORIDE 5 MG/ML
INJECTION, SOLUTION EPIDURAL; INTRACAUDAL AS NEEDED
Status: DISCONTINUED | OUTPATIENT
Start: 2023-10-30 | End: 2023-10-30 | Stop reason: HOSPADM

## 2023-10-30 RX ORDER — AMIODARONE HYDROCHLORIDE 200 MG/1
200 TABLET ORAL DAILY
Status: DISCONTINUED | OUTPATIENT
Start: 2023-10-30 | End: 2023-10-31 | Stop reason: HOSPADM

## 2023-10-30 RX ORDER — ACETAMINOPHEN 325 MG/1
650 TABLET ORAL EVERY 4 HOURS PRN
Status: DISCONTINUED | OUTPATIENT
Start: 2023-10-30 | End: 2023-10-31 | Stop reason: HOSPADM

## 2023-10-30 RX ORDER — METOPROLOL SUCCINATE 50 MG/1
100 TABLET, EXTENDED RELEASE ORAL DAILY
Status: DISCONTINUED | OUTPATIENT
Start: 2023-10-30 | End: 2023-10-31 | Stop reason: HOSPADM

## 2023-10-30 RX ORDER — DAPAGLIFLOZIN 5 MG/1
5 TABLET, FILM COATED ORAL DAILY
Status: DISCONTINUED | OUTPATIENT
Start: 2023-10-30 | End: 2023-10-31 | Stop reason: HOSPADM

## 2023-10-30 RX ORDER — SIMVASTATIN 20 MG/1
20 TABLET, FILM COATED ORAL NIGHTLY
Status: DISCONTINUED | OUTPATIENT
Start: 2023-10-30 | End: 2023-10-31 | Stop reason: HOSPADM

## 2023-10-30 RX ORDER — VANCOMYCIN 1.5 G/300ML
INJECTION, SOLUTION INTRAVENOUS AS NEEDED
Status: DISCONTINUED | OUTPATIENT
Start: 2023-10-30 | End: 2023-10-30

## 2023-10-30 RX ORDER — CEFAZOLIN SODIUM 2 G/100ML
INJECTION, SOLUTION INTRAVENOUS AS NEEDED
Status: DISCONTINUED | OUTPATIENT
Start: 2023-10-30 | End: 2023-10-30

## 2023-10-30 RX ORDER — CALCITRIOL 0.25 UG/1
0.25 CAPSULE ORAL DAILY
Status: DISCONTINUED | OUTPATIENT
Start: 2023-10-30 | End: 2023-10-31 | Stop reason: HOSPADM

## 2023-10-30 RX ORDER — SPIRONOLACTONE 25 MG/1
25 TABLET ORAL DAILY
Status: DISCONTINUED | OUTPATIENT
Start: 2023-10-30 | End: 2023-10-31 | Stop reason: HOSPADM

## 2023-10-30 RX ADMIN — ALLOPURINOL 300 MG: 100 TABLET ORAL at 13:07

## 2023-10-30 RX ADMIN — MIDAZOLAM HYDROCHLORIDE 0.5 MG: 1 INJECTION, SOLUTION INTRAMUSCULAR; INTRAVENOUS at 10:10

## 2023-10-30 RX ADMIN — FENTANYL CITRATE 25 MCG: 50 INJECTION, SOLUTION INTRAMUSCULAR; INTRAVENOUS at 08:32

## 2023-10-30 RX ADMIN — MIDAZOLAM HYDROCHLORIDE 1 MG: 1 INJECTION, SOLUTION INTRAMUSCULAR; INTRAVENOUS at 08:01

## 2023-10-30 RX ADMIN — SACUBITRIL AND VALSARTAN 1 TABLET: 24; 26 TABLET, FILM COATED ORAL at 13:07

## 2023-10-30 RX ADMIN — VANCOMYCIN 2000 MG: 1.5 INJECTION, SOLUTION INTRAVENOUS at 08:09

## 2023-10-30 RX ADMIN — ACETAMINOPHEN 650 MG: 325 TABLET ORAL at 17:59

## 2023-10-30 RX ADMIN — SACUBITRIL AND VALSARTAN 1 TABLET: 24; 26 TABLET, FILM COATED ORAL at 20:24

## 2023-10-30 RX ADMIN — WARFARIN SODIUM 5 MG: 5 TABLET ORAL at 17:59

## 2023-10-30 RX ADMIN — MIDAZOLAM HYDROCHLORIDE 0.5 MG: 1 INJECTION, SOLUTION INTRAMUSCULAR; INTRAVENOUS at 08:30

## 2023-10-30 RX ADMIN — SODIUM CHLORIDE: 9 INJECTION, SOLUTION INTRAVENOUS at 07:50

## 2023-10-30 RX ADMIN — FENTANYL CITRATE 25 MCG: 50 INJECTION, SOLUTION INTRAMUSCULAR; INTRAVENOUS at 10:05

## 2023-10-30 RX ADMIN — TORSEMIDE 20 MG: 20 TABLET ORAL at 16:08

## 2023-10-30 RX ADMIN — SPIRONOLACTONE 25 MG: 25 TABLET, FILM COATED ORAL at 13:07

## 2023-10-30 RX ADMIN — CALCITRIOL CAPSULES 0.25 MCG 0.25 MCG: 0.25 CAPSULE ORAL at 16:08

## 2023-10-30 RX ADMIN — CEFAZOLIN SODIUM 3 G: 2 INJECTION, SOLUTION INTRAVENOUS at 08:07

## 2023-10-30 RX ADMIN — PROPOFOL 20 MCG/KG/MIN: 10 INJECTION, EMULSION INTRAVENOUS at 10:14

## 2023-10-30 RX ADMIN — AMIODARONE HYDROCHLORIDE 200 MG: 200 TABLET ORAL at 13:07

## 2023-10-30 RX ADMIN — SIMVASTATIN 20 MG: 20 TABLET, FILM COATED ORAL at 20:24

## 2023-10-30 RX ADMIN — FENTANYL CITRATE 25 MCG: 50 INJECTION, SOLUTION INTRAMUSCULAR; INTRAVENOUS at 09:42

## 2023-10-30 RX ADMIN — TORSEMIDE 40 MG: 20 TABLET ORAL at 20:25

## 2023-10-30 RX ADMIN — FENTANYL CITRATE 25 MCG: 50 INJECTION, SOLUTION INTRAMUSCULAR; INTRAVENOUS at 10:10

## 2023-10-30 RX ADMIN — DAPAGLIFLOZIN 5 MG: 5 TABLET, FILM COATED ORAL at 16:08

## 2023-10-30 SDOH — SOCIAL STABILITY: SOCIAL INSECURITY: HAS ANYONE EVER THREATENED TO HURT YOUR FAMILY OR YOUR PETS?: NO

## 2023-10-30 SDOH — SOCIAL STABILITY: SOCIAL INSECURITY: ARE YOU OR HAVE YOU BEEN THREATENED OR ABUSED PHYSICALLY, EMOTIONALLY, OR SEXUALLY BY ANYONE?: NO

## 2023-10-30 SDOH — SOCIAL STABILITY: SOCIAL INSECURITY: DO YOU FEEL UNSAFE GOING BACK TO THE PLACE WHERE YOU ARE LIVING?: NO

## 2023-10-30 SDOH — SOCIAL STABILITY: SOCIAL INSECURITY: WERE YOU ABLE TO COMPLETE ALL THE BEHAVIORAL HEALTH SCREENINGS?: YES

## 2023-10-30 SDOH — SOCIAL STABILITY: SOCIAL INSECURITY: ARE THERE ANY APPARENT SIGNS OF INJURIES/BEHAVIORS THAT COULD BE RELATED TO ABUSE/NEGLECT?: NO

## 2023-10-30 SDOH — SOCIAL STABILITY: SOCIAL INSECURITY: ABUSE: ADULT

## 2023-10-30 SDOH — SOCIAL STABILITY: SOCIAL INSECURITY: HAVE YOU HAD THOUGHTS OF HARMING ANYONE ELSE?: NO

## 2023-10-30 SDOH — SOCIAL STABILITY: SOCIAL INSECURITY: DOES ANYONE TRY TO KEEP YOU FROM HAVING/CONTACTING OTHER FRIENDS OR DOING THINGS OUTSIDE YOUR HOME?: NO

## 2023-10-30 SDOH — SOCIAL STABILITY: SOCIAL INSECURITY: DO YOU FEEL ANYONE HAS EXPLOITED OR TAKEN ADVANTAGE OF YOU FINANCIALLY OR OF YOUR PERSONAL PROPERTY?: NO

## 2023-10-30 ASSESSMENT — ACTIVITIES OF DAILY LIVING (ADL)
ADEQUATE_TO_COMPLETE_ADL: YES
FEEDING YOURSELF: INDEPENDENT
GROOMING: INDEPENDENT
WALKS IN HOME: INDEPENDENT
JUDGMENT_ADEQUATE_SAFELY_COMPLETE_DAILY_ACTIVITIES: YES
HEARING - RIGHT EAR: FUNCTIONAL
PATIENT'S MEMORY ADEQUATE TO SAFELY COMPLETE DAILY ACTIVITIES?: YES
BATHING: INDEPENDENT
HEARING - LEFT EAR: FUNCTIONAL
LACK_OF_TRANSPORTATION: NO
DRESSING YOURSELF: INDEPENDENT
ADEQUATE_TO_COMPLETE_ADL: YES
TOILETING: INDEPENDENT
ASSISTIVE_DEVICE: WALKER;CANE

## 2023-10-30 ASSESSMENT — LIFESTYLE VARIABLES
AUDIT-C TOTAL SCORE: 0
HOW OFTEN DO YOU HAVE 6 OR MORE DRINKS ON ONE OCCASION: NEVER
SKIP TO QUESTIONS 9-10: 1
PRESCIPTION_ABUSE_PAST_12_MONTHS: NO
AUDIT-C TOTAL SCORE: 0
HOW MANY STANDARD DRINKS CONTAINING ALCOHOL DO YOU HAVE ON A TYPICAL DAY: PATIENT DOES NOT DRINK
HOW OFTEN DO YOU HAVE A DRINK CONTAINING ALCOHOL: NEVER
SUBSTANCE_ABUSE_PAST_12_MONTHS: NO

## 2023-10-30 ASSESSMENT — COGNITIVE AND FUNCTIONAL STATUS - GENERAL
MOBILITY SCORE: 23
DAILY ACTIVITIY SCORE: 24
PATIENT BASELINE BEDBOUND: NO
CLIMB 3 TO 5 STEPS WITH RAILING: A LITTLE

## 2023-10-30 ASSESSMENT — PAIN SCALES - GENERAL
PAINLEVEL_OUTOF10: 3
PAINLEVEL_OUTOF10: 3
PAIN_LEVEL: 0

## 2023-10-30 ASSESSMENT — PATIENT HEALTH QUESTIONNAIRE - PHQ9
SUM OF ALL RESPONSES TO PHQ9 QUESTIONS 1 & 2: 0
2. FEELING DOWN, DEPRESSED OR HOPELESS: NOT AT ALL
1. LITTLE INTEREST OR PLEASURE IN DOING THINGS: NOT AT ALL
1. LITTLE INTEREST OR PLEASURE IN DOING THINGS: NOT AT ALL
2. FEELING DOWN, DEPRESSED OR HOPELESS: NOT AT ALL
SUM OF ALL RESPONSES TO PHQ9 QUESTIONS 1 & 2: 0

## 2023-10-30 ASSESSMENT — COLUMBIA-SUICIDE SEVERITY RATING SCALE - C-SSRS
1. IN THE PAST MONTH, HAVE YOU WISHED YOU WERE DEAD OR WISHED YOU COULD GO TO SLEEP AND NOT WAKE UP?: NO
6. HAVE YOU EVER DONE ANYTHING, STARTED TO DO ANYTHING, OR PREPARED TO DO ANYTHING TO END YOUR LIFE?: NO
2. HAVE YOU ACTUALLY HAD ANY THOUGHTS OF KILLING YOURSELF?: NO

## 2023-10-30 NOTE — Clinical Note
Patient Clipped and Prepped: left neck and left subclavian. Prepped with ChloraPrep, a minimum of 3 minute dry time, longer if needed, no pooling noted, patient draped in sterile fashion and Betadine and draped in sterile fashion.

## 2023-10-30 NOTE — H&P
History Of Present Illness  69 yo woman, with h/o persistent AF s/p remote stroke now anticoagulated. She had cardioversion and initiation of amiodarone in 2015. In the Fall 2022 we noted that the AF had recurred and was persistent. We recommend cardioversion which was successful. It lasted about one month. She stopped the amiodarone after the cardioversion although she is not sure why. So she has not been taking it for a while.She has been in persistent AF since Oct 2022. She reports that she is short of breath with exertion. She does report mild orthopnea but no PND. She does note some leg swelling.   REcent echo shows LVEF 30%. Percent RV pacing is 90%     Past Medical History  Past Medical History:   Diagnosis Date    Cardiomyopathy (CMS/HCC)     Chronic kidney disease, stage 3 unspecified (CMS/HCC) 11/02/2022    CKD (chronic kidney disease), stage III    Encounter for screening for depression 05/06/2022    Standardized adult depression screening tool completed    Encounter for screening for malignant neoplasm of colon 01/19/2022    Colon cancer screening    Nontraumatic intracerebral hemorrhage, unspecified (CMS/HCC)     Hemorrhagic stroke    Personal history of other diseases of the respiratory system 12/14/2021    History of acute bronchitis    Personal history of other diseases of urinary system 11/02/2022    History of chronic kidney disease    Personal history of urinary (tract) infections     History of urinary tract infection    Presence of automatic (implantable) cardiac defibrillator 05/06/2022    Cardiac defibrillator in place    Urinary tract infection, site not specified 11/07/2022    Acute UTI       Surgical History  Past Surgical History:   Procedure Laterality Date    AORTIC VALVE REPLACEMENT  02/05/2015    Aortic Valve Replacement    TOTAL KNEE ARTHROPLASTY  04/29/2015    Total Knee Arthroplasty        Social History  She reports that she has never smoked. She has never used smokeless tobacco.  She reports that she does not drink alcohol and does not use drugs.    Family History  No family history on file.     Allergies  Aspirin, Diltiazem hcl, Dofetilide, Lisinopril, and Phenytoin    Review of Systems   All other systems reviewed and are negative.       Physical Exam  Constitutional:       Appearance: Normal appearance.   Cardiovascular:      Rate and Rhythm: Normal rate.   Pulmonary:      Effort: Pulmonary effort is normal.      Breath sounds: Normal breath sounds.   Neurological:      Mental Status: She is alert.          Last Recorded Vitals  There were no vitals taken for this visit.    Relevant Results             Assessment/Plan   Principal Problem:    Cardiomyopathy (CMS/HCC)             I spent 20 minutes in the professional and overall care of this patient.      Lauren Palm MD

## 2023-10-30 NOTE — ANESTHESIA POSTPROCEDURE EVALUATION
Patient: Trinidad Hines    Procedure Summary       Date: 10/30/23 Room / Location: [facility] SWING / Virtual [facility] MAT 3529 Cardiac Cath Lab    Anesthesia Start: 0752 Anesthesia Stop: 1051    Procedure: ICD UPGRADE, DUAL TO BIV Diagnosis:       Dilated cardiomyopathy (CMS/HCC)      (same)    Providers: Kendra Hong MD Responsible Provider: Deonna Day MD    Anesthesia Type: MAC ASA Status: 3            Anesthesia Type: MAC    Vitals Value Taken Time   /87 10/30/23 1043   Temp 36 °C (96.8 °F) 10/30/23 1043   Pulse 70 10/30/23 1043   Resp 14 10/30/23 1043   SpO2 100 % 10/30/23 1043       Anesthesia Post Evaluation    Patient location during evaluation: PACU  Patient participation: complete - patient participated  Level of consciousness: awake and alert  Pain score: 0  Pain management: adequate  Airway patency: patent  Cardiovascular status: acceptable  Respiratory status: acceptable  Hydration status: acceptable        No notable events documented.

## 2023-10-30 NOTE — PROGRESS NOTES
Pharmacy Medication History Review    Trinidad Hines is a 68 y.o. female admitted for Cardiomyopathy (CMS/[facility]). Pharmacy reviewed the patient's [id number] medications and allergies for accuracy.    The list below reflects the updated PTA list. Please review each medication in order reconciliation for additional clarification and justification.  Medications Prior to Admission   Medication Sig Dispense Refill Last Dose    albuterol 90 mcg/actuation inhaler Inhale 2 puffs every 4 hours if needed for wheezing.   Unknown    allopurinol (Zyloprim) 300 mg tablet Take 1 tablet (300 mg) by mouth once daily.   10/29/2023    amiodarone (Pacerone) 200 mg tablet Take 1 tablet (200 mg) by mouth once daily.   10/29/2023    calcitriol (Rocaltrol) 0.25 mcg capsule Take 1 capsule (0.25 mcg) by mouth once daily.   10/29/2023    dapagliflozin propanediol (Farxiga) 5 mg Take 1 tablet (5 mg) by mouth once daily. 30 tablet 5 not started yet    lidocaine (Xylocaine) 5 % ointment Apply 1 Application topically 3 times a day as needed for mild pain (1 - 3).   Unknown    metoprolol succinate XL (Toprol-XL) 100 mg 24 hr tablet Take 1 tablet (100 mg) by mouth once daily.   10/29/2023    sacubitriL-valsartan (Entresto) 24-26 mg tablet Take 1 tablet by mouth 2 times a day.       simvastatin (Zocor) 20 mg tablet Take 1 tablet (20 mg) by mouth once daily at bedtime.   10/29/2023    spironolactone (Aldactone) 25 mg tablet Take 1 tablet (25 mg) by mouth once daily.   10/29/2023    torsemide (Demadex) 20 mg tablet Take 2 tablets (40 mg) by mouth 2 times a day.       warfarin (Coumadin) 5 mg tablet Take by mouth once daily.   10/29/2023        The list below reflectives the updated allergy list. Please review each documented allergy for additional clarification and justification.  Allergies  Reviewed by Soniya Aguillon, PharmD on 10/30/2023        Severity Reactions Comments    Aspirin Not Specified Unknown     Diltiazem Hcl Not Specified  Itching     Dofetilide Not Specified Unknown     Lisinopril Not Specified Cough, Dry mouth     Phenytoin Low Hives, Rash           Sources used: Pharmacy dispense history, OARRs, patient interview (dalia historian- provided medication list and additional medication history), coumadin clinic note from 10/21 and care everywhere document from 10/27 (Dr. Hightower)    Below are additional concerns with the patient's PTA list.  -Patient states that she has not received her farxiga (5 mg daily) in the mail from  yet, but anticipated to start after the procedure whenever she gets it  -states that she takes all her medication daily (including entresto) Please clarify and be advised  -per note from 10/27, pt was suppose to take 40 mg twice daily, but pt takes 1 tablet (20 mg) daily. Pt is aware that she is suppose to take more    Soniya Aguillon, PharmD  Transitions of Care Pharmacist  Medication reconciliation complete  Please reach out via [employer] secure chat for questions, or if no response call [employer] or [employer]  [facility]

## 2023-10-30 NOTE — ANESTHESIA PREPROCEDURE EVALUATION
Patient: Trinidad Hines    Procedure Information       Date/Time: 10/30/23 0730    Procedure: ICD UPGRADE, DUAL TO BIV    Location: [facility] / Virtual [facility] MAT 3529 Cardiac Cath Lab    Providers: Kendra Hong MD            Relevant Problems   Cardiovascular   (+) Aortic valve disorder   (+) Atrial fibrillation (CMS/HCC)   (+) Benign hypertension   (+) Chronic systolic heart failure (CMS/HCC)   (+) Hyperlipidemia      Endocrine   (+) Hyperparathyroidism, secondary (CMS/HCC)   (+) Morbid obesity (CMS/HCC)      GI   (+) Irritable bowel syndrome with constipation      /Renal   (+) CKD (chronic kidney disease), stage IV (CMS/HCC)      Neuro/Psych   (+) CVA (cerebral vascular accident) (CMS/HCC)      Pulmonary   (+) Obstructive sleep apnea syndrome      Musculoskeletal   (+) Osteoarthritis of right knee       Clinical information reviewed:                   NPO Detail:  No data recorded     Physical Exam    Airway  Mallampati: I  TM distance: >3 FB  Neck ROM: full     Cardiovascular    Dental    Pulmonary    Abdominal            Anesthesia Plan    ASA 3     MAC     Anesthetic plan and risks discussed with patient.    Plan discussed with CRNA and attending.

## 2023-10-30 NOTE — DISCHARGE INSTRUCTIONS
Discharge Instructions for your Cardiac Device   CARDIAC DEVICE CLINIC  [phone]              Incision:   1.  Keep your incision clean and dry for 1 week.  2. May shower 7 days after the procedure. Do not submerge the incision in a tub, pool, hot tub, or lake for 4 weeks.  3. Your incision should look better each day. If you notice unusual swelling, redness, drainage or fever greater than 100 degrees, please call the Device Clinic or your Doctor's office.  4. Avoid using deodorants, powders, creams, lotions, etc on your incision for 4 weeks.   5. There are no stitches to be removed.  If you received a “glue” closure this may appear purple-gray and does not get removed but wears away slowly on its own.  Steri-strips (small white bandages) may be removed in one week or they may fall off on their own earlier.  Pain:  1. It is normal for the area around the incision to be tender for a few weeks following surgery. Pain relievers such as Tylenol or Motrin (whichever you can take) are usually sufficient for pain relief. If the pain gets worse instead of better than please call the Device Clinic or your Doctor.  Activity:  1. If you have a new device and new leads placed than avoid raising your arm above shoulder level for 4 weeks. Do no  anything weighing more than 15 pounds.   2. Avoid exercising with the arm on the side of your pacemaker.  So no golf, swimming, tennis, bowling etc for 4 weeks.      3. Driving: If you were driving prior to your procedure, you may resume driving in 1 week. If you experienced a loss of consciousness prior to your procedure, you should verify with your Doctor when you are able to resume driving.  ID CARD:  1. It is important to carry your device ID card with you at all times.   2. Inform doctors and health care providers that you have a pacemaker or defibrillator.  Electromagnetic Interference:  1. Microwave ovens are safe to use.  2. Cellular phones should be held to the  opposite ear from your device. Do not carry your phone in your shirt pocket. Some i-phones that self-charge can interfere with your device so be sure to keep it away from your pacemaker/defibrillator.   3. Read the Patient Booklet for more information. You may call either the Device Clinic [phone]  or the patient services of the device  with questions about specific electrical appliances and interference problems.    IT IS YOUR RESPONSIBILITY TO MAKE AND KEEP APPOINTMENTS.   Please refer to your Device clinic handout.

## 2023-10-31 ENCOUNTER — APPOINTMENT (OUTPATIENT)
Dept: CARDIOLOGY | Facility: HOSPITAL | Age: 68
End: 2023-10-31
Payer: MEDICAID

## 2023-10-31 VITALS
SYSTOLIC BLOOD PRESSURE: 105 MMHG | BODY MASS INDEX: 45.86 KG/M2 | OXYGEN SATURATION: 97 % | WEIGHT: 275.57 LBS | DIASTOLIC BLOOD PRESSURE: 72 MMHG | RESPIRATION RATE: 16 BRPM | TEMPERATURE: 97.7 F | HEART RATE: 89 BPM

## 2023-10-31 PROCEDURE — G0378 HOSPITAL OBSERVATION PER HR: HCPCS

## 2023-10-31 PROCEDURE — 2500000001 HC RX 250 WO HCPCS SELF ADMINISTERED DRUGS (ALT 637 FOR MEDICARE OP): Performed by: INTERNAL MEDICINE

## 2023-10-31 PROCEDURE — 2500000002 HC RX 250 W HCPCS SELF ADMINISTERED DRUGS (ALT 637 FOR MEDICARE OP, ALT 636 FOR OP/ED): Performed by: INTERNAL MEDICINE

## 2023-10-31 PROCEDURE — 2500000004 HC RX 250 GENERAL PHARMACY W/ HCPCS (ALT 636 FOR OP/ED): Performed by: INTERNAL MEDICINE

## 2023-10-31 RX ADMIN — METOPROLOL SUCCINATE 100 MG: 50 TABLET, EXTENDED RELEASE ORAL at 09:09

## 2023-10-31 RX ADMIN — TORSEMIDE 40 MG: 20 TABLET ORAL at 09:10

## 2023-10-31 RX ADMIN — AMIODARONE HYDROCHLORIDE 200 MG: 200 TABLET ORAL at 09:09

## 2023-10-31 RX ADMIN — SPIRONOLACTONE 25 MG: 25 TABLET, FILM COATED ORAL at 09:09

## 2023-10-31 RX ADMIN — ALLOPURINOL 300 MG: 100 TABLET ORAL at 09:10

## 2023-10-31 RX ADMIN — SACUBITRIL AND VALSARTAN 1 TABLET: 24; 26 TABLET, FILM COATED ORAL at 09:09

## 2023-10-31 RX ADMIN — CALCITRIOL CAPSULES 0.25 MCG 0.25 MCG: 0.25 CAPSULE ORAL at 09:10

## 2023-10-31 RX ADMIN — ACETAMINOPHEN 650 MG: 325 TABLET ORAL at 03:07

## 2023-10-31 RX ADMIN — DAPAGLIFLOZIN 5 MG: 5 TABLET, FILM COATED ORAL at 09:10

## 2023-10-31 ASSESSMENT — PAIN SCALES - GENERAL: PAINLEVEL_OUTOF10: 0 - NO PAIN

## 2023-10-31 NOTE — NURSING NOTE
0800- Assumed care of patient at this time.  No complaints noted.  Patient reports that she expects to be discharged today.  Assessment as charted.      1152-  After visit summary reviewed with the patient, she verbalizes understanding.  Waiting for daughter to arrive for .

## 2023-10-31 NOTE — DISCHARGE SUMMARY
Discharge Diagnosis  Cardiomyopathy (CMS/HCC)    Issues Requiring Follow-Up   Will need to follow up with device clinic     Test Results Pending At Discharge  Pending Labs       No current pending labs.            Hospital Course   69 yo woman, with h/o persistent AF s/p remote stroke now anticoagulated. She had cardioversion and initiation of amiodarone in 2015. In the Fall 2022 we noted that the AF had recurred and was persistent. We recommend cardioversion which was successful. It lasted about one month. She stopped the amiodarone after the cardioversion although she is not sure why. So she has not been taking it for a while.She has been in persistent AF since Oct 2022. She reports that she is short of breath with exertion. She does report mild orthopnea but no PND. She does note some leg swelling.   REcent echo shows LVEF 30%. Percent RV pacing is 90%     Presented for BiV upgrade , s/p LV lead placement  with no complication , he is in Afib 99% of the time,  but she is BiV paced.   Redy to be discharge today. Site looks normal no hematoma or infection, CXR is normal , and device check is normal.           Pertinent Physical Exam At Time of Discharge  Physical Exam  /72 (BP Location: Right arm, Patient Position: Lying)   Pulse 89   Temp 36.5 °C (97.7 °F)   Resp 16   Wt 125 kg (275 lb 9.2 oz)   SpO2 97%   BMI 45.86 kg/m²     General Appearance:    Alert, cooperative, no distress, appears stated age                               Lungs:     Clear to auscultation bilaterally, respirations unlabored        Heart:    Regular rate and rhythm, S1 and S2 normal, no murmur, rub    or gallop       Abdomen:     Soft, non-tender, bowel sounds active all four quadrants,     no masses, no organomegaly                   Skin:    Site of implant looks normal  no hematoma or infection.               Home Medications     Medication List      CONTINUE taking these medications     albuterol 90 mcg/actuation inhaler    allopurinol 300 mg tablet; Commonly known as: Zyloprim   amiodarone 200 mg tablet; Commonly known as: Pacerone   calcitriol 0.25 mcg capsule; Commonly known as: Rocaltrol   Entresto 24-26 mg tablet; Generic drug: sacubitriL-valsartan   Farxiga 5 mg; Generic drug: dapagliflozin propanediol; Take 1 tablet (5   mg) by mouth once daily.   lidocaine 5 % ointment; Commonly known as: Xylocaine   metoprolol succinate  mg 24 hr tablet; Commonly known as:   Toprol-XL   simvastatin 20 mg tablet; Commonly known as: Zocor   spironolactone 25 mg tablet; Commonly known as: Aldactone   torsemide 20 mg tablet; Commonly known as: Demadex   warfarin 5 mg tablet; Commonly known as: Coumadin; Take as directed. If   you are unsure how to take this medication, talk to your nurse or doctor.       Outpatient Follow-Up  Future Appointments   Date Time Provider Department Center   11/1/2023 11:30 AM Minda Ricardo MD [id number] [facility]   [facility]23  [id number] [id number] [facility]   11/28/2023 10:40 AM Herberth Gandara MD [id number] [facility]   11/28/2023 11:00 AM PHARMACY [facility] NEPHRO RESOURCE [id number] [facility]   1/10/2024  9:40 AM Safia Gerber MD [id number] [facility]       Lauren Palm MD

## 2023-10-31 NOTE — CARE PLAN
Problem: Fall/Injury  Goal: Not fall by end of shift  Outcome: Met  Goal: Be free from injury by end of the shift  Outcome: Met  Goal: Verbalize understanding of personal risk factors for fall in the hospital  Outcome: Met  Goal: Verbalize understanding of risk factor reduction measures to prevent injury from fall in the home  Outcome: Met  Goal: Use assistive devices by end of the shift  Outcome: Met  Goal: Pace activities to prevent fatigue by end of the shift  Outcome: Met   The patient's goals for the shift include  Patient will remain safe during this admission  Patient instructed not to use her left arm for 24 hours post procedure.   Patient goals have been met.  Patient being discharged home today.

## 2023-11-01 ENCOUNTER — DOCUMENTATION (OUTPATIENT)
Dept: CARE COORDINATION | Facility: CLINIC | Age: 68
End: 2023-11-01

## 2023-11-02 ENCOUNTER — HOSPITAL ENCOUNTER (OUTPATIENT)
Dept: CARDIOLOGY | Facility: CLINIC | Age: 68
Discharge: HOME | End: 2023-11-02
Payer: MEDICAID

## 2023-11-02 DIAGNOSIS — M10.9 GOUT: ICD-10-CM

## 2023-11-02 DIAGNOSIS — M85.80 OSTEOPENIA: ICD-10-CM

## 2023-11-02 DIAGNOSIS — E78.5 HYPERLIPIDEMIA: ICD-10-CM

## 2023-11-02 DIAGNOSIS — Z95.810 CARDIAC DEFIBRILLATOR IN PLACE: ICD-10-CM

## 2023-11-02 DIAGNOSIS — I63.9 CVA (CEREBRAL VASCULAR ACCIDENT) (MULTI): ICD-10-CM

## 2023-11-02 DIAGNOSIS — I48.91 ATRIAL FIBRILLATION (MULTI): ICD-10-CM

## 2023-11-02 DIAGNOSIS — K58.1 IRRITABLE BOWEL SYNDROME WITH CONSTIPATION: ICD-10-CM

## 2023-11-02 DIAGNOSIS — M25.50 ARTHRALGIA: ICD-10-CM

## 2023-11-02 DIAGNOSIS — E04.1 THYROID NODULE: ICD-10-CM

## 2023-11-02 DIAGNOSIS — M25.569 KNEE PAIN: ICD-10-CM

## 2023-11-02 DIAGNOSIS — I42.0 DILATED CARDIOMYOPATHY (MULTI): ICD-10-CM

## 2023-11-02 DIAGNOSIS — I50.22 CHRONIC SYSTOLIC HEART FAILURE (MULTI): ICD-10-CM

## 2023-11-02 DIAGNOSIS — R06.02 SHORTNESS OF BREATH: ICD-10-CM

## 2023-11-02 DIAGNOSIS — I35.9 AORTIC VALVE DISORDER: ICD-10-CM

## 2023-11-02 DIAGNOSIS — R53.82 CHRONIC FATIGUE: ICD-10-CM

## 2023-11-02 DIAGNOSIS — G47.33 OBSTRUCTIVE SLEEP APNEA SYNDROME: ICD-10-CM

## 2023-11-02 DIAGNOSIS — E66.01 MORBID OBESITY (MULTI): ICD-10-CM

## 2023-11-02 DIAGNOSIS — N18.4 CKD (CHRONIC KIDNEY DISEASE), STAGE IV (MULTI): ICD-10-CM

## 2023-11-02 DIAGNOSIS — R00.2 PALPITATIONS: ICD-10-CM

## 2023-11-02 DIAGNOSIS — I61.9 HEMORRHAGIC CEREBROVASCULAR ACCIDENT (CVA) (MULTI): ICD-10-CM

## 2023-11-02 DIAGNOSIS — I10 BENIGN HYPERTENSION: ICD-10-CM

## 2023-11-02 DIAGNOSIS — E87.6 HYPOKALEMIA: ICD-10-CM

## 2023-11-02 DIAGNOSIS — R32 URINARY INCONTINENCE: ICD-10-CM

## 2023-11-02 DIAGNOSIS — G47.00 INSOMNIA: ICD-10-CM

## 2023-11-02 DIAGNOSIS — D72.819 LEUKOPENIA: ICD-10-CM

## 2023-11-02 DIAGNOSIS — M17.11 OSTEOARTHRITIS OF RIGHT KNEE: ICD-10-CM

## 2023-11-02 DIAGNOSIS — N25.81 HYPERPARATHYROIDISM, SECONDARY (MULTI): ICD-10-CM

## 2023-11-02 DIAGNOSIS — I42.9 CARDIOMYOPATHY (MULTI): ICD-10-CM

## 2023-11-02 DIAGNOSIS — Z95.810 PRESENCE OF AUTOMATIC (IMPLANTABLE) CARDIAC DEFIBRILLATOR: ICD-10-CM

## 2023-11-02 DIAGNOSIS — R29.898 BILATERAL LEG WEAKNESS: ICD-10-CM

## 2023-11-03 ENCOUNTER — HOSPITAL ENCOUNTER (OUTPATIENT)
Dept: CARDIOLOGY | Facility: CLINIC | Age: 68
Discharge: HOME | End: 2023-11-03
Payer: MEDICAID

## 2023-11-03 DIAGNOSIS — R32 URINARY INCONTINENCE: ICD-10-CM

## 2023-11-03 DIAGNOSIS — I48.91 ATRIAL FIBRILLATION (MULTI): ICD-10-CM

## 2023-11-03 DIAGNOSIS — M25.569 KNEE PAIN: ICD-10-CM

## 2023-11-03 DIAGNOSIS — Z95.810 PRESENCE OF AUTOMATIC (IMPLANTABLE) CARDIAC DEFIBRILLATOR: ICD-10-CM

## 2023-11-03 DIAGNOSIS — E66.01 MORBID OBESITY (MULTI): ICD-10-CM

## 2023-11-03 DIAGNOSIS — I50.22 CHRONIC SYSTOLIC HEART FAILURE (MULTI): ICD-10-CM

## 2023-11-03 DIAGNOSIS — I63.9 CVA (CEREBRAL VASCULAR ACCIDENT) (MULTI): ICD-10-CM

## 2023-11-03 DIAGNOSIS — G47.00 INSOMNIA: ICD-10-CM

## 2023-11-03 DIAGNOSIS — M85.80 OSTEOPENIA: ICD-10-CM

## 2023-11-03 DIAGNOSIS — R53.82 CHRONIC FATIGUE: ICD-10-CM

## 2023-11-03 DIAGNOSIS — E04.1 THYROID NODULE: ICD-10-CM

## 2023-11-03 DIAGNOSIS — I61.9 HEMORRHAGIC CEREBROVASCULAR ACCIDENT (CVA) (MULTI): ICD-10-CM

## 2023-11-03 DIAGNOSIS — M25.50 ARTHRALGIA: ICD-10-CM

## 2023-11-03 DIAGNOSIS — I35.9 AORTIC VALVE DISORDER: ICD-10-CM

## 2023-11-03 DIAGNOSIS — Z95.810 CARDIAC DEFIBRILLATOR IN PLACE: ICD-10-CM

## 2023-11-03 DIAGNOSIS — I10 BENIGN HYPERTENSION: ICD-10-CM

## 2023-11-03 DIAGNOSIS — K58.1 IRRITABLE BOWEL SYNDROME WITH CONSTIPATION: ICD-10-CM

## 2023-11-03 DIAGNOSIS — M10.9 GOUT: ICD-10-CM

## 2023-11-03 DIAGNOSIS — N25.81 HYPERPARATHYROIDISM, SECONDARY (MULTI): ICD-10-CM

## 2023-11-03 DIAGNOSIS — I42.0 DILATED CARDIOMYOPATHY (MULTI): ICD-10-CM

## 2023-11-03 DIAGNOSIS — R00.2 PALPITATIONS: ICD-10-CM

## 2023-11-03 DIAGNOSIS — R06.02 SHORTNESS OF BREATH: ICD-10-CM

## 2023-11-03 DIAGNOSIS — G47.33 OBSTRUCTIVE SLEEP APNEA SYNDROME: ICD-10-CM

## 2023-11-03 DIAGNOSIS — D72.819 LEUKOPENIA: ICD-10-CM

## 2023-11-03 DIAGNOSIS — R29.898 BILATERAL LEG WEAKNESS: ICD-10-CM

## 2023-11-03 DIAGNOSIS — N18.4 CKD (CHRONIC KIDNEY DISEASE), STAGE IV (MULTI): ICD-10-CM

## 2023-11-03 DIAGNOSIS — M17.11 OSTEOARTHRITIS OF RIGHT KNEE: ICD-10-CM

## 2023-11-03 DIAGNOSIS — I42.9 CARDIOMYOPATHY (MULTI): ICD-10-CM

## 2023-11-03 DIAGNOSIS — E78.5 HYPERLIPIDEMIA: ICD-10-CM

## 2023-11-03 DIAGNOSIS — E87.6 HYPOKALEMIA: ICD-10-CM

## 2023-11-04 ENCOUNTER — ANTICOAGULATION - WARFARIN VISIT (OUTPATIENT)
Dept: CARDIOLOGY | Facility: CLINIC | Age: 68
End: 2023-11-04
Payer: MEDICAID

## 2023-11-04 DIAGNOSIS — I48.91 ATRIAL FIBRILLATION, UNSPECIFIED TYPE (MULTI): Primary | ICD-10-CM

## 2023-11-04 DIAGNOSIS — I48.91 ATRIAL FIBRILLATION, UNSPECIFIED TYPE (MULTI): ICD-10-CM

## 2023-11-04 LAB
POC INR: 1.6
POC PROTHROMBIN TIME: NORMAL

## 2023-11-04 PROCEDURE — 85610 PROTHROMBIN TIME: CPT | Mod: QW | Performed by: INTERNAL MEDICINE

## 2023-11-04 PROCEDURE — 99211 OFF/OP EST MAY X REQ PHY/QHP: CPT | Performed by: INTERNAL MEDICINE

## 2023-11-04 NOTE — PROGRESS NOTES
Patient identification verified with 2 identifiers.    Location: [facility] at [facility] [address] [address] [phone] option #1     Referring Physician: JOCELYNE  Enrollment/ Re-enrollment date: 23   INR Goal: 2.0-3.0  INR monitoring is per [facility] protocol.  Anticoagulation Medication: warfarin  Indication: Atrial Fibrillation/Atrial Flutter    Subjective   Bleeding signs/symptoms: No    Bruising: No   Major bleeding event: No  Thrombosis signs/symptoms: No  Thromboembolic event: No  Missed doses: No  HELD 2 DOSE PRIOR TO SUREGRY LAST WEEK. HAS ONLY BEEN BACK ON WARFARIN FOR 4 DAYS  Extra doses: No  Medication changes: No  Dietary changes: No  Change in health: No  Change in activity: No  Alcohol: No  Other concerns: No    Upcoming Surgeries:  Does the Patient Have any upcoming surgeries that require interruption in anticoagulation therapy? no  Does the patient require bridging? no      Anticoagulation Summary  As of 2023      INR goal:  2.0-3.0   TTR:  100.0 % (3 wk)   INR used for dosin.60 (2023)   Weekly warfarin total:  35 mg               Assessment/Plan   Subtherapeutic     1. New dose: no change    2. Next INR:  6 DAYS      Education provided to patient during the visit:  Patient instructed to call in interim with questions, concerns and changes.

## 2023-11-05 PROBLEM — I48.91 ATRIAL FIBRILLATION, UNSPECIFIED TYPE (MULTI): Status: ACTIVE | Noted: 2023-11-05

## 2023-11-06 ENCOUNTER — HOSPITAL ENCOUNTER (OUTPATIENT)
Dept: CARDIOLOGY | Facility: CLINIC | Age: 68
Discharge: HOME | End: 2023-11-06
Payer: MEDICAID

## 2023-11-06 DIAGNOSIS — I48.91 ATRIAL FIBRILLATION (MULTI): ICD-10-CM

## 2023-11-06 DIAGNOSIS — I50.22 CHRONIC SYSTOLIC HEART FAILURE (MULTI): ICD-10-CM

## 2023-11-06 DIAGNOSIS — R06.02 SHORTNESS OF BREATH: ICD-10-CM

## 2023-11-06 DIAGNOSIS — E78.5 HYPERLIPIDEMIA: ICD-10-CM

## 2023-11-06 DIAGNOSIS — R00.2 PALPITATIONS: ICD-10-CM

## 2023-11-06 DIAGNOSIS — E87.6 HYPOKALEMIA: ICD-10-CM

## 2023-11-06 DIAGNOSIS — M25.569 KNEE PAIN: ICD-10-CM

## 2023-11-06 DIAGNOSIS — G47.33 OBSTRUCTIVE SLEEP APNEA SYNDROME: ICD-10-CM

## 2023-11-06 DIAGNOSIS — N18.4 CKD (CHRONIC KIDNEY DISEASE), STAGE IV (MULTI): ICD-10-CM

## 2023-11-06 DIAGNOSIS — M17.11 OSTEOARTHRITIS OF RIGHT KNEE: ICD-10-CM

## 2023-11-06 DIAGNOSIS — Z95.810 PRESENCE OF AUTOMATIC (IMPLANTABLE) CARDIAC DEFIBRILLATOR: ICD-10-CM

## 2023-11-06 DIAGNOSIS — N25.81 HYPERPARATHYROIDISM, SECONDARY (MULTI): ICD-10-CM

## 2023-11-06 DIAGNOSIS — E04.1 THYROID NODULE: ICD-10-CM

## 2023-11-06 DIAGNOSIS — M25.50 ARTHRALGIA: ICD-10-CM

## 2023-11-06 DIAGNOSIS — E66.01 MORBID OBESITY (MULTI): ICD-10-CM

## 2023-11-06 DIAGNOSIS — R53.82 CHRONIC FATIGUE: ICD-10-CM

## 2023-11-06 DIAGNOSIS — R29.898 BILATERAL LEG WEAKNESS: ICD-10-CM

## 2023-11-06 DIAGNOSIS — I63.9 CVA (CEREBRAL VASCULAR ACCIDENT) (MULTI): ICD-10-CM

## 2023-11-06 DIAGNOSIS — I35.9 AORTIC VALVE DISORDER: ICD-10-CM

## 2023-11-06 DIAGNOSIS — R32 URINARY INCONTINENCE: ICD-10-CM

## 2023-11-06 DIAGNOSIS — K58.1 IRRITABLE BOWEL SYNDROME WITH CONSTIPATION: ICD-10-CM

## 2023-11-06 DIAGNOSIS — Z95.810 CARDIAC DEFIBRILLATOR IN PLACE: ICD-10-CM

## 2023-11-06 DIAGNOSIS — M10.9 GOUT: ICD-10-CM

## 2023-11-06 DIAGNOSIS — I61.9 HEMORRHAGIC CEREBROVASCULAR ACCIDENT (CVA) (MULTI): ICD-10-CM

## 2023-11-06 DIAGNOSIS — I42.0 DILATED CARDIOMYOPATHY (MULTI): ICD-10-CM

## 2023-11-06 DIAGNOSIS — M85.80 OSTEOPENIA: ICD-10-CM

## 2023-11-06 DIAGNOSIS — G47.00 INSOMNIA: ICD-10-CM

## 2023-11-06 DIAGNOSIS — I10 BENIGN HYPERTENSION: ICD-10-CM

## 2023-11-06 DIAGNOSIS — I42.9 CARDIOMYOPATHY (MULTI): ICD-10-CM

## 2023-11-06 DIAGNOSIS — D72.819 LEUKOPENIA: ICD-10-CM

## 2023-11-09 ENCOUNTER — HOSPITAL ENCOUNTER (OUTPATIENT)
Dept: CARDIOLOGY | Facility: CLINIC | Age: 68
Discharge: HOME | End: 2023-11-09
Payer: MEDICAID

## 2023-11-09 DIAGNOSIS — I50.22 CHRONIC SYSTOLIC HEART FAILURE (MULTI): ICD-10-CM

## 2023-11-09 DIAGNOSIS — Z95.810 PRESENCE OF AUTOMATIC (IMPLANTABLE) CARDIAC DEFIBRILLATOR: ICD-10-CM

## 2023-11-09 DIAGNOSIS — I42.0 DILATED CARDIOMYOPATHY (MULTI): ICD-10-CM

## 2023-11-10 ENCOUNTER — HOSPITAL ENCOUNTER (OUTPATIENT)
Dept: CARDIOLOGY | Facility: CLINIC | Age: 68
Discharge: HOME | End: 2023-11-10
Payer: MEDICAID

## 2023-11-10 ENCOUNTER — APPOINTMENT (OUTPATIENT)
Dept: CARDIOLOGY | Facility: CLINIC | Age: 68
End: 2023-11-10
Payer: MEDICAID

## 2023-11-10 DIAGNOSIS — M25.569 KNEE PAIN: ICD-10-CM

## 2023-11-10 DIAGNOSIS — R53.82 CHRONIC FATIGUE: ICD-10-CM

## 2023-11-10 DIAGNOSIS — R00.2 PALPITATIONS: ICD-10-CM

## 2023-11-10 DIAGNOSIS — Z95.810 PRESENCE OF AUTOMATIC (IMPLANTABLE) CARDIAC DEFIBRILLATOR: ICD-10-CM

## 2023-11-10 DIAGNOSIS — I42.0 DILATED CARDIOMYOPATHY (MULTI): ICD-10-CM

## 2023-11-10 DIAGNOSIS — M10.9 GOUT: ICD-10-CM

## 2023-11-10 DIAGNOSIS — I61.9 HEMORRHAGIC CEREBROVASCULAR ACCIDENT (CVA) (MULTI): ICD-10-CM

## 2023-11-10 DIAGNOSIS — D72.819 LEUKOPENIA: ICD-10-CM

## 2023-11-10 DIAGNOSIS — G47.33 OBSTRUCTIVE SLEEP APNEA SYNDROME: ICD-10-CM

## 2023-11-10 DIAGNOSIS — R32 URINARY INCONTINENCE: ICD-10-CM

## 2023-11-10 DIAGNOSIS — N25.81 HYPERPARATHYROIDISM, SECONDARY (MULTI): ICD-10-CM

## 2023-11-10 DIAGNOSIS — I48.91 ATRIAL FIBRILLATION (MULTI): ICD-10-CM

## 2023-11-10 DIAGNOSIS — M17.11 OSTEOARTHRITIS OF RIGHT KNEE: ICD-10-CM

## 2023-11-10 DIAGNOSIS — I35.9 AORTIC VALVE DISORDER: ICD-10-CM

## 2023-11-10 DIAGNOSIS — R29.898 BILATERAL LEG WEAKNESS: ICD-10-CM

## 2023-11-10 DIAGNOSIS — E78.5 HYPERLIPIDEMIA: ICD-10-CM

## 2023-11-10 DIAGNOSIS — E66.01 MORBID OBESITY (MULTI): ICD-10-CM

## 2023-11-10 DIAGNOSIS — E87.6 HYPOKALEMIA: ICD-10-CM

## 2023-11-10 DIAGNOSIS — I42.9 CARDIOMYOPATHY (MULTI): ICD-10-CM

## 2023-11-10 DIAGNOSIS — I10 BENIGN HYPERTENSION: ICD-10-CM

## 2023-11-10 DIAGNOSIS — G47.00 INSOMNIA: ICD-10-CM

## 2023-11-10 DIAGNOSIS — Z95.810 CARDIAC DEFIBRILLATOR IN PLACE: ICD-10-CM

## 2023-11-10 DIAGNOSIS — N18.4 CKD (CHRONIC KIDNEY DISEASE), STAGE IV (MULTI): ICD-10-CM

## 2023-11-10 DIAGNOSIS — I50.22 CHRONIC SYSTOLIC HEART FAILURE (MULTI): ICD-10-CM

## 2023-11-10 DIAGNOSIS — I63.9 CVA (CEREBRAL VASCULAR ACCIDENT) (MULTI): ICD-10-CM

## 2023-11-10 DIAGNOSIS — K58.1 IRRITABLE BOWEL SYNDROME WITH CONSTIPATION: ICD-10-CM

## 2023-11-10 DIAGNOSIS — R06.02 SHORTNESS OF BREATH: ICD-10-CM

## 2023-11-10 DIAGNOSIS — M25.50 ARTHRALGIA: ICD-10-CM

## 2023-11-10 DIAGNOSIS — E04.1 THYROID NODULE: ICD-10-CM

## 2023-11-10 DIAGNOSIS — M85.80 OSTEOPENIA: ICD-10-CM

## 2023-11-13 ENCOUNTER — HOSPITAL ENCOUNTER (OUTPATIENT)
Dept: CARDIOLOGY | Facility: CLINIC | Age: 68
Discharge: HOME | End: 2023-11-13
Payer: MEDICAID

## 2023-11-13 DIAGNOSIS — I42.0 DILATED CARDIOMYOPATHY (MULTI): ICD-10-CM

## 2023-11-13 DIAGNOSIS — I10 BENIGN HYPERTENSION: ICD-10-CM

## 2023-11-13 DIAGNOSIS — Z95.810 CARDIAC DEFIBRILLATOR IN PLACE: ICD-10-CM

## 2023-11-13 DIAGNOSIS — I61.9 HEMORRHAGIC CEREBROVASCULAR ACCIDENT (CVA) (MULTI): ICD-10-CM

## 2023-11-13 DIAGNOSIS — K58.1 IRRITABLE BOWEL SYNDROME WITH CONSTIPATION: ICD-10-CM

## 2023-11-13 DIAGNOSIS — I63.9 CVA (CEREBRAL VASCULAR ACCIDENT) (MULTI): ICD-10-CM

## 2023-11-13 DIAGNOSIS — I48.91 ATRIAL FIBRILLATION (MULTI): ICD-10-CM

## 2023-11-13 DIAGNOSIS — R53.82 CHRONIC FATIGUE: ICD-10-CM

## 2023-11-13 DIAGNOSIS — M85.80 OSTEOPENIA: ICD-10-CM

## 2023-11-13 DIAGNOSIS — M25.50 ARTHRALGIA: ICD-10-CM

## 2023-11-13 DIAGNOSIS — I42.9 CARDIOMYOPATHY (MULTI): ICD-10-CM

## 2023-11-13 DIAGNOSIS — N18.4 CKD (CHRONIC KIDNEY DISEASE), STAGE IV (MULTI): ICD-10-CM

## 2023-11-13 DIAGNOSIS — G47.33 OBSTRUCTIVE SLEEP APNEA SYNDROME: ICD-10-CM

## 2023-11-13 DIAGNOSIS — R00.2 PALPITATIONS: ICD-10-CM

## 2023-11-13 DIAGNOSIS — E87.6 HYPOKALEMIA: ICD-10-CM

## 2023-11-13 DIAGNOSIS — E78.5 HYPERLIPIDEMIA: ICD-10-CM

## 2023-11-13 DIAGNOSIS — I50.22 CHRONIC SYSTOLIC HEART FAILURE (MULTI): ICD-10-CM

## 2023-11-13 DIAGNOSIS — D72.819 LEUKOPENIA: ICD-10-CM

## 2023-11-13 DIAGNOSIS — Z95.810 PRESENCE OF AUTOMATIC (IMPLANTABLE) CARDIAC DEFIBRILLATOR: ICD-10-CM

## 2023-11-13 DIAGNOSIS — M10.9 GOUT: ICD-10-CM

## 2023-11-13 DIAGNOSIS — E66.01 MORBID OBESITY (MULTI): ICD-10-CM

## 2023-11-13 DIAGNOSIS — N25.81 HYPERPARATHYROIDISM, SECONDARY (MULTI): ICD-10-CM

## 2023-11-13 DIAGNOSIS — I35.9 AORTIC VALVE DISORDER: ICD-10-CM

## 2023-11-13 DIAGNOSIS — R29.898 BILATERAL LEG WEAKNESS: ICD-10-CM

## 2023-11-13 DIAGNOSIS — G47.00 INSOMNIA: ICD-10-CM

## 2023-11-13 DIAGNOSIS — E04.1 THYROID NODULE: ICD-10-CM

## 2023-11-13 DIAGNOSIS — M17.11 OSTEOARTHRITIS OF RIGHT KNEE: ICD-10-CM

## 2023-11-13 DIAGNOSIS — R06.02 SHORTNESS OF BREATH: ICD-10-CM

## 2023-11-13 DIAGNOSIS — R32 URINARY INCONTINENCE: ICD-10-CM

## 2023-11-13 DIAGNOSIS — M25.569 KNEE PAIN: ICD-10-CM

## 2023-11-14 ENCOUNTER — PATIENT OUTREACH (OUTPATIENT)
Dept: CARE COORDINATION | Facility: CLINIC | Age: 68
End: 2023-11-14
Payer: MEDICAID

## 2023-11-14 NOTE — PROGRESS NOTES
Follow up call after hospitalization. Patient did not make it to PCP appt as scheduled. Patient has PCP appt on 12/19/23.  At time of outreach call the patient feels as if their condition has (improved) since last visit.  Reviewed the PCP appointment with the pt and addressed any questions or concerns.  Patient with questions regarding showering, reviewed per discharge instructions with patient. Patient verbalizes understanding.

## 2023-11-16 ENCOUNTER — HOSPITAL ENCOUNTER (OUTPATIENT)
Dept: CARDIOLOGY | Facility: CLINIC | Age: 68
Discharge: HOME | End: 2023-11-16
Payer: MEDICAID

## 2023-11-16 DIAGNOSIS — I42.9 CARDIOMYOPATHY (MULTI): ICD-10-CM

## 2023-11-16 DIAGNOSIS — I35.9 AORTIC VALVE DISORDER: ICD-10-CM

## 2023-11-16 DIAGNOSIS — I42.0 DILATED CARDIOMYOPATHY (MULTI): ICD-10-CM

## 2023-11-16 DIAGNOSIS — R00.2 PALPITATIONS: ICD-10-CM

## 2023-11-16 DIAGNOSIS — M10.9 GOUT: ICD-10-CM

## 2023-11-16 DIAGNOSIS — R32 URINARY INCONTINENCE: ICD-10-CM

## 2023-11-16 DIAGNOSIS — G47.33 OBSTRUCTIVE SLEEP APNEA SYNDROME: ICD-10-CM

## 2023-11-16 DIAGNOSIS — R06.02 SHORTNESS OF BREATH: ICD-10-CM

## 2023-11-16 DIAGNOSIS — M25.50 ARTHRALGIA: ICD-10-CM

## 2023-11-16 DIAGNOSIS — E87.6 HYPOKALEMIA: ICD-10-CM

## 2023-11-16 DIAGNOSIS — I61.9 HEMORRHAGIC CEREBROVASCULAR ACCIDENT (CVA) (MULTI): ICD-10-CM

## 2023-11-16 DIAGNOSIS — N18.4 CKD (CHRONIC KIDNEY DISEASE), STAGE IV (MULTI): ICD-10-CM

## 2023-11-16 DIAGNOSIS — R53.82 CHRONIC FATIGUE: ICD-10-CM

## 2023-11-16 DIAGNOSIS — Z95.810 CARDIAC DEFIBRILLATOR IN PLACE: ICD-10-CM

## 2023-11-16 DIAGNOSIS — I63.9 CVA (CEREBRAL VASCULAR ACCIDENT) (MULTI): ICD-10-CM

## 2023-11-16 DIAGNOSIS — I10 BENIGN HYPERTENSION: ICD-10-CM

## 2023-11-16 DIAGNOSIS — G47.00 INSOMNIA: ICD-10-CM

## 2023-11-16 DIAGNOSIS — M17.11 OSTEOARTHRITIS OF RIGHT KNEE: ICD-10-CM

## 2023-11-16 DIAGNOSIS — K58.1 IRRITABLE BOWEL SYNDROME WITH CONSTIPATION: ICD-10-CM

## 2023-11-16 DIAGNOSIS — R29.898 BILATERAL LEG WEAKNESS: ICD-10-CM

## 2023-11-16 DIAGNOSIS — M25.569 KNEE PAIN: ICD-10-CM

## 2023-11-16 DIAGNOSIS — N25.81 HYPERPARATHYROIDISM, SECONDARY (MULTI): ICD-10-CM

## 2023-11-16 DIAGNOSIS — Z95.810 PRESENCE OF AUTOMATIC (IMPLANTABLE) CARDIAC DEFIBRILLATOR: ICD-10-CM

## 2023-11-16 DIAGNOSIS — D72.819 LEUKOPENIA: ICD-10-CM

## 2023-11-16 DIAGNOSIS — E04.1 THYROID NODULE: ICD-10-CM

## 2023-11-16 DIAGNOSIS — I48.91 ATRIAL FIBRILLATION (MULTI): ICD-10-CM

## 2023-11-16 DIAGNOSIS — I50.22 CHRONIC SYSTOLIC HEART FAILURE (MULTI): ICD-10-CM

## 2023-11-16 DIAGNOSIS — M85.80 OSTEOPENIA: ICD-10-CM

## 2023-11-16 DIAGNOSIS — E78.5 HYPERLIPIDEMIA: ICD-10-CM

## 2023-11-16 DIAGNOSIS — E66.01 MORBID OBESITY (MULTI): ICD-10-CM

## 2023-11-17 ENCOUNTER — ANTICOAGULATION - WARFARIN VISIT (OUTPATIENT)
Dept: CARDIOLOGY | Facility: CLINIC | Age: 68
End: 2023-11-17
Payer: MEDICAID

## 2023-11-17 DIAGNOSIS — I48.91 ATRIAL FIBRILLATION, UNSPECIFIED TYPE (MULTI): ICD-10-CM

## 2023-11-17 LAB
POC INR: 2
POC PROTHROMBIN TIME: NORMAL

## 2023-11-17 PROCEDURE — 99211 OFF/OP EST MAY X REQ PHY/QHP: CPT | Performed by: INTERNAL MEDICINE

## 2023-11-17 PROCEDURE — 85610 PROTHROMBIN TIME: CPT | Mod: QW | Performed by: INTERNAL MEDICINE

## 2023-11-22 ENCOUNTER — HOSPITAL ENCOUNTER (OUTPATIENT)
Dept: CARDIOLOGY | Facility: CLINIC | Age: 68
Discharge: HOME | End: 2023-11-22
Payer: MEDICAID

## 2023-11-22 DIAGNOSIS — E87.6 HYPOKALEMIA: ICD-10-CM

## 2023-11-22 DIAGNOSIS — I50.22 CHRONIC SYSTOLIC HEART FAILURE (MULTI): ICD-10-CM

## 2023-11-22 DIAGNOSIS — E66.01 MORBID OBESITY (MULTI): ICD-10-CM

## 2023-11-22 DIAGNOSIS — N25.81 HYPERPARATHYROIDISM, SECONDARY (MULTI): ICD-10-CM

## 2023-11-22 DIAGNOSIS — M85.80 OSTEOPENIA: ICD-10-CM

## 2023-11-22 DIAGNOSIS — M10.9 GOUT: ICD-10-CM

## 2023-11-22 DIAGNOSIS — D72.819 LEUKOPENIA: ICD-10-CM

## 2023-11-22 DIAGNOSIS — N18.4 CKD (CHRONIC KIDNEY DISEASE), STAGE IV (MULTI): ICD-10-CM

## 2023-11-22 DIAGNOSIS — R53.82 CHRONIC FATIGUE: ICD-10-CM

## 2023-11-22 DIAGNOSIS — E78.5 HYPERLIPIDEMIA: ICD-10-CM

## 2023-11-22 DIAGNOSIS — I48.91 ATRIAL FIBRILLATION (MULTI): ICD-10-CM

## 2023-11-22 DIAGNOSIS — Z95.810 PRESENCE OF AUTOMATIC (IMPLANTABLE) CARDIAC DEFIBRILLATOR: ICD-10-CM

## 2023-11-22 DIAGNOSIS — R00.2 PALPITATIONS: ICD-10-CM

## 2023-11-22 DIAGNOSIS — R32 URINARY INCONTINENCE: ICD-10-CM

## 2023-11-22 DIAGNOSIS — I42.0 DILATED CARDIOMYOPATHY (MULTI): ICD-10-CM

## 2023-11-22 DIAGNOSIS — M25.50 ARTHRALGIA: ICD-10-CM

## 2023-11-22 DIAGNOSIS — K58.1 IRRITABLE BOWEL SYNDROME WITH CONSTIPATION: ICD-10-CM

## 2023-11-22 DIAGNOSIS — Z95.810 CARDIAC DEFIBRILLATOR IN PLACE: ICD-10-CM

## 2023-11-22 DIAGNOSIS — G47.33 OBSTRUCTIVE SLEEP APNEA SYNDROME: ICD-10-CM

## 2023-11-22 DIAGNOSIS — G47.00 INSOMNIA: ICD-10-CM

## 2023-11-22 DIAGNOSIS — I35.9 AORTIC VALVE DISORDER: ICD-10-CM

## 2023-11-22 DIAGNOSIS — I10 BENIGN HYPERTENSION: ICD-10-CM

## 2023-11-22 DIAGNOSIS — I42.9 CARDIOMYOPATHY (MULTI): ICD-10-CM

## 2023-11-22 DIAGNOSIS — E04.1 THYROID NODULE: ICD-10-CM

## 2023-11-22 DIAGNOSIS — M25.569 KNEE PAIN: ICD-10-CM

## 2023-11-22 DIAGNOSIS — I63.9 CVA (CEREBRAL VASCULAR ACCIDENT) (MULTI): ICD-10-CM

## 2023-11-22 DIAGNOSIS — R06.02 SHORTNESS OF BREATH: ICD-10-CM

## 2023-11-22 DIAGNOSIS — R29.898 BILATERAL LEG WEAKNESS: ICD-10-CM

## 2023-11-22 DIAGNOSIS — I61.9 HEMORRHAGIC CEREBROVASCULAR ACCIDENT (CVA) (MULTI): ICD-10-CM

## 2023-11-22 DIAGNOSIS — M17.11 OSTEOARTHRITIS OF RIGHT KNEE: ICD-10-CM

## 2023-11-28 ENCOUNTER — APPOINTMENT (OUTPATIENT)
Dept: CARDIOLOGY | Facility: CLINIC | Age: 68
End: 2023-11-28
Payer: MEDICAID

## 2023-11-28 ENCOUNTER — TELEMEDICINE (OUTPATIENT)
Dept: PHARMACY | Facility: HOSPITAL | Age: 68
End: 2023-11-28
Payer: MEDICAID

## 2023-11-28 ENCOUNTER — PHARMACY VISIT (OUTPATIENT)
Dept: PHARMACY | Facility: CLINIC | Age: 68
End: 2023-11-28
Payer: MEDICAID

## 2023-11-28 ENCOUNTER — TELEPHONE (OUTPATIENT)
Dept: CARDIOLOGY | Facility: CLINIC | Age: 68
End: 2023-11-28

## 2023-11-28 DIAGNOSIS — N18.4 CKD (CHRONIC KIDNEY DISEASE), STAGE IV (MULTI): Primary | ICD-10-CM

## 2023-11-28 PROCEDURE — RXMED WILLOW AMBULATORY MEDICATION CHARGE

## 2023-11-28 NOTE — PROGRESS NOTES
Subjective   Patient ID: Trinidad Hines is a 68 y.o. female who presents for Chronic Kidney Disease (Sglt2 follow up/).    Referring Provider: Dr Smith    HPI  HPI: CKD stage 3b/4. last EGFR 28 sCr 1.92. now 1 month on farxiga. Tolerating well. No issues with dizziness/lightheadedness. No painful difficult urination. Will continue current dose until follow up with dr smith in January.    HTN: controlled at last ov  /83  Medications  Spironolactone 25mg every day  Entresto 24-26 bid  Toprol xl 100mg every day    Glucose: well controlled and monitored    HLD: controlled  On statin? Simvastatin 20mg qd    Medications reviewed for appropriate dosing in setting of CKD  Recommended changes:  - no adjustments needed at this time      Objective     There were no vitals taken for this visit.     Labs  Lab Results   Component Value Date    BILITOT 1.2 03/20/2023    CALCIUM 10.0 10/12/2023    CO2 29 10/12/2023     10/12/2023    CREATININE 1.77 (H) 10/12/2023    GLUCOSE 106 (H) 10/12/2023    ALKPHOS 45 03/20/2023    K 4.6 10/12/2023    PROT 6.6 03/20/2023     10/12/2023    AST 20 03/20/2023    ALT 10 03/20/2023    BUN 27 (H) 10/12/2023    ANIONGAP 15 10/12/2023    MG 2.34 03/20/2023    PHOS 3.6 10/12/2023    ALBUMIN 4.2 10/12/2023    LIPASE 77 03/05/2021    GFRF 31 (A) 03/20/2023     Lab Results   Component Value Date    TRIG 62 01/19/2022    CHOL 187 01/19/2022    HDL 68.3 01/19/2022     Lab Results   Component Value Date    HGBA1C 5.7 (A) 11/02/2022       Current Outpatient Medications on File Prior to Visit   Medication Sig Dispense Refill    albuterol 90 mcg/actuation inhaler Inhale 2 puffs every 4 hours if needed for wheezing.      allopurinol (Zyloprim) 300 mg tablet Take 1 tablet (300 mg) by mouth once daily.      amiodarone (Pacerone) 200 mg tablet Take 1 tablet (200 mg) by mouth once daily.      calcitriol (Rocaltrol) 0.25 mcg capsule Take 1 capsule (0.25 mcg) by mouth once daily.       dapagliflozin propanediol (Farxiga) 5 mg Take 1 tablet (5 mg) by mouth once daily. 30 tablet 5    lidocaine (Xylocaine) 5 % ointment Apply 1 Application topically 3 times a day as needed for mild pain (1 - 3).      metoprolol succinate XL (Toprol-XL) 100 mg 24 hr tablet Take 1 tablet (100 mg) by mouth once daily.      sacubitriL-valsartan (Entresto) 24-26 mg tablet Take 1 tablet by mouth 2 times a day.      simvastatin (Zocor) 20 mg tablet Take 1 tablet (20 mg) by mouth once daily at bedtime.      spironolactone (Aldactone) 25 mg tablet Take 1 tablet (25 mg) by mouth once daily.      torsemide (Demadex) 20 mg tablet Take 2 tablets (40 mg) by mouth 2 times a day.      warfarin (Coumadin) 5 mg tablet Take by mouth once daily.       No current facility-administered medications on file prior to visit.        Assessment/Plan   PATIENT EDUCATION/DISCUSSION:  - Counseled patient on MOA, expectations, side effects, duration of therapy, contraindications, administration, and monitoring parameters  - Answered all patient questions and concerns  - regular monthly copay $0  _______________________________________________________________________  PLAN  1. CONTINUE farxiga 5mg qd  2. Prescription sent to [facility] pharmacy for assistance on authorization and copay. Medication will be mailed to patient.      Ron Hightower, PharmD    Continue all meds under the continuation of care with the referring provider and clinical pharmacy team.

## 2023-11-30 ENCOUNTER — PATIENT OUTREACH (OUTPATIENT)
Dept: PRIMARY CARE | Facility: CLINIC | Age: 68
End: 2023-11-30
Payer: MEDICAID

## 2023-11-30 NOTE — PROGRESS NOTES
Call placed regarding one month post discharge follow up call.  At time of outreach call the patient feels as if their condition has improved since initial visit with PCP or specialist.    Spoke with Trinidad.  States doing good.  No questions or concerns at this time.

## 2023-12-02 ENCOUNTER — APPOINTMENT (OUTPATIENT)
Dept: CARDIOLOGY | Facility: CLINIC | Age: 68
End: 2023-12-02
Payer: MEDICAID

## 2023-12-02 ENCOUNTER — ANTICOAGULATION - WARFARIN VISIT (OUTPATIENT)
Dept: CARDIOLOGY | Facility: CLINIC | Age: 68
End: 2023-12-02
Payer: MEDICAID

## 2023-12-02 DIAGNOSIS — I48.91 ATRIAL FIBRILLATION, UNSPECIFIED TYPE (MULTI): ICD-10-CM

## 2023-12-02 LAB
POC INR: 2.5
POC PROTHROMBIN TIME: NORMAL

## 2023-12-02 PROCEDURE — 99211 OFF/OP EST MAY X REQ PHY/QHP: CPT | Performed by: INTERNAL MEDICINE

## 2023-12-02 PROCEDURE — 85610 PROTHROMBIN TIME: CPT | Mod: QW

## 2023-12-02 NOTE — PROGRESS NOTES
Patient identification verified with 2 identifiers.    Location: [facility] at [facility] [address] [address] [phone] option #1     Referring Physician: JOCELYNE  Enrollment/ Re-enrollment date: 24  INR Goal: 2.0-3.0  INR monitoring is per [facility] protocol.  Anticoagulation Medication: warfarin  Indication: Atrial Fibrillation/Atrial Flutter    Subjective   Bleeding signs/symptoms: No    Bruising: No   Major bleeding event: No  Thrombosis signs/symptoms: No  Thromboembolic event: No  Missed doses: No  Extra doses: No  Medication changes: No  Dietary changes: No  Change in health: No  Change in activity: No  Alcohol: No  Other concerns: No    Upcoming Surgeries:  Does the Patient Have any upcoming surgeries that require interruption in anticoagulation therapy? no  Does the patient require bridging? no      Anticoagulation Summary  As of 2023      INR goal:  2.0-3.0   TTR:  66.9 % (2.1 mo)   INR used for dosin.50 (2023)   Weekly warfarin total:  35 mg               Assessment/Plan   Subtherapeutic     1. New dose: no change    2. Next INR: 4 WEEKS      Education provided to patient during the visit:  Patient instructed to call in interim with questions, concerns and changes.

## 2023-12-06 ENCOUNTER — HOSPITAL ENCOUNTER (OUTPATIENT)
Dept: CARDIOLOGY | Facility: CLINIC | Age: 68
Discharge: HOME | End: 2023-12-06
Payer: MEDICAID

## 2023-12-06 DIAGNOSIS — I42.0 DILATED CARDIOMYOPATHY (MULTI): ICD-10-CM

## 2023-12-12 DIAGNOSIS — I48.91 ATRIAL FIBRILLATION, UNSPECIFIED TYPE (MULTI): Primary | ICD-10-CM

## 2023-12-12 RX ORDER — AMIODARONE HYDROCHLORIDE 200 MG/1
200 TABLET ORAL DAILY
Qty: 90 TABLET | Refills: 3 | Status: SHIPPED | OUTPATIENT
Start: 2023-12-12 | End: 2024-04-17 | Stop reason: SDUPTHER

## 2023-12-19 ENCOUNTER — OFFICE VISIT (OUTPATIENT)
Dept: PRIMARY CARE | Facility: CLINIC | Age: 68
End: 2023-12-19
Payer: MEDICAID

## 2023-12-19 VITALS
TEMPERATURE: 98 F | SYSTOLIC BLOOD PRESSURE: 101 MMHG | HEART RATE: 71 BPM | WEIGHT: 286 LBS | DIASTOLIC BLOOD PRESSURE: 67 MMHG | BODY MASS INDEX: 47.59 KG/M2

## 2023-12-19 DIAGNOSIS — E78.00 PURE HYPERCHOLESTEROLEMIA: ICD-10-CM

## 2023-12-19 DIAGNOSIS — I42.9 CARDIOMYOPATHY, UNSPECIFIED TYPE (MULTI): ICD-10-CM

## 2023-12-19 DIAGNOSIS — Z12.11 COLON CANCER SCREENING: ICD-10-CM

## 2023-12-19 DIAGNOSIS — M10.00 IDIOPATHIC GOUT, UNSPECIFIED CHRONICITY, UNSPECIFIED SITE: ICD-10-CM

## 2023-12-19 DIAGNOSIS — Z78.0 ASYMPTOMATIC MENOPAUSE: ICD-10-CM

## 2023-12-19 DIAGNOSIS — Z11.4 ENCOUNTER FOR SCREENING FOR HIV: ICD-10-CM

## 2023-12-19 DIAGNOSIS — G89.29 CHRONIC PAIN OF BOTH KNEES: ICD-10-CM

## 2023-12-19 DIAGNOSIS — Z13.1 DIABETES MELLITUS SCREENING: ICD-10-CM

## 2023-12-19 DIAGNOSIS — Z12.31 VISIT FOR SCREENING MAMMOGRAM: ICD-10-CM

## 2023-12-19 DIAGNOSIS — Z11.59 ENCOUNTER FOR HEPATITIS C SCREENING TEST FOR LOW RISK PATIENT: ICD-10-CM

## 2023-12-19 DIAGNOSIS — I48.91 ATRIAL FIBRILLATION, UNSPECIFIED TYPE (MULTI): ICD-10-CM

## 2023-12-19 DIAGNOSIS — Z00.00 HEALTH CARE MAINTENANCE: Primary | ICD-10-CM

## 2023-12-19 DIAGNOSIS — Z13.220 SCREENING CHOLESTEROL LEVEL: ICD-10-CM

## 2023-12-19 DIAGNOSIS — M25.562 CHRONIC PAIN OF BOTH KNEES: ICD-10-CM

## 2023-12-19 DIAGNOSIS — Z23 IMMUNIZATION DUE: ICD-10-CM

## 2023-12-19 DIAGNOSIS — M85.89 OSTEOPENIA OF MULTIPLE SITES: ICD-10-CM

## 2023-12-19 DIAGNOSIS — M25.561 CHRONIC PAIN OF BOTH KNEES: ICD-10-CM

## 2023-12-19 PROCEDURE — 99397 PER PM REEVAL EST PAT 65+ YR: CPT | Performed by: FAMILY MEDICINE

## 2023-12-19 PROCEDURE — 3008F BODY MASS INDEX DOCD: CPT | Performed by: FAMILY MEDICINE

## 2023-12-19 PROCEDURE — 1126F AMNT PAIN NOTED NONE PRSNT: CPT | Performed by: FAMILY MEDICINE

## 2023-12-19 PROCEDURE — 1036F TOBACCO NON-USER: CPT | Performed by: FAMILY MEDICINE

## 2023-12-19 PROCEDURE — 1159F MED LIST DOCD IN RCRD: CPT | Performed by: FAMILY MEDICINE

## 2023-12-19 PROCEDURE — 3078F DIAST BP <80 MM HG: CPT | Performed by: FAMILY MEDICINE

## 2023-12-19 PROCEDURE — 3074F SYST BP LT 130 MM HG: CPT | Performed by: FAMILY MEDICINE

## 2023-12-19 RX ORDER — SIMVASTATIN 20 MG/1
20 TABLET, FILM COATED ORAL NIGHTLY
Qty: 90 TABLET | Refills: 1 | Status: SHIPPED | OUTPATIENT
Start: 2023-12-19 | End: 2024-04-17 | Stop reason: SDUPTHER

## 2023-12-19 RX ORDER — ALLOPURINOL 300 MG/1
300 TABLET ORAL DAILY
Qty: 90 TABLET | Refills: 1 | Status: SHIPPED | OUTPATIENT
Start: 2023-12-19

## 2023-12-19 RX ORDER — SPIRONOLACTONE 25 MG/1
25 TABLET ORAL DAILY
Qty: 90 TABLET | Refills: 1 | Status: SHIPPED | OUTPATIENT
Start: 2023-12-19 | End: 2024-04-17 | Stop reason: SDUPTHER

## 2023-12-19 RX ORDER — ALBUTEROL SULFATE 90 UG/1
2 AEROSOL, METERED RESPIRATORY (INHALATION) EVERY 4 HOURS PRN
Qty: 18 G | Refills: 2 | Status: SHIPPED | OUTPATIENT
Start: 2023-12-19 | End: 2024-03-14

## 2023-12-19 RX ORDER — METOPROLOL SUCCINATE 100 MG/1
100 TABLET, EXTENDED RELEASE ORAL DAILY
Qty: 90 TABLET | Refills: 1 | Status: SHIPPED | OUTPATIENT
Start: 2023-12-19 | End: 2024-04-17 | Stop reason: SDUPTHER

## 2023-12-19 RX ORDER — VIT C/E/ZN/COPPR/LUTEIN/ZEAXAN 250MG-90MG
25 CAPSULE ORAL DAILY
Qty: 90 CAPSULE | Refills: 3
Start: 2023-12-19 | End: 2023-12-19 | Stop reason: WASHOUT

## 2023-12-19 RX ORDER — WARFARIN SODIUM 5 MG/1
5 TABLET ORAL SEE ADMIN INSTRUCTIONS
Qty: 90 TABLET | Refills: 1 | Status: SHIPPED | OUTPATIENT
Start: 2023-12-19 | End: 2024-01-12

## 2023-12-19 NOTE — PROGRESS NOTES
"Subjective   Patient ID: Trinidad Hines is a 68 y.o. female who presents for Annual Exam.  HPI    The patient is a 67-year-old female with morbid obesity, paroxysmal atrial fibrillation on anticoagulation, dual-chamber ICD,non ischemic cardiomyopathy, heart failure with reduced EF, nonobstructive CAD, CVA \"94, CKD, HTN, QI on CPAP machine, who is here for a CPE.    -mammogram 2/22/2022: wnl.  -blood test due.  -colonoscopy 4/2/21: repeat in 5y.   -dexa scan 2/9/2022: osteopenia.  Ca w/ D recommended.   -Immunization: Shingrix, COVID 19, Flu recommended.   -eye exam was 2 months ago - 2023  -Hearing screening recommended.      A review of system was completed.  All systems were reviewed and were normal, except for the ones that are listed in the HPI.    Objective   Physical Exam    Assessment/Plan   Problem List Items Addressed This Visit       Knee pain    Relevant Orders    Disability Placard    Cardiomyopathy (CMS/HCC)    Relevant Medications    albuterol 90 mcg/actuation inhaler    metoprolol succinate XL (Toprol-XL) 100 mg 24 hr tablet    simvastatin (Zocor) 20 mg tablet    spironolactone (Aldactone) 25 mg tablet    Other Relevant Orders    TSH with reflex to Free T4 if abnormal    Comprehensive Metabolic Panel    Disability Placard    Gout    Relevant Medications    allopurinol (Zyloprim) 300 mg tablet    Hyperlipidemia    Osteopenia    Atrial fibrillation, unspecified type (CMS/HCC)    Relevant Medications    metoprolol succinate XL (Toprol-XL) 100 mg 24 hr tablet    warfarin (Coumadin) 5 mg tablet    Health care maintenance - Primary     -mammogram 2/22/2022: wnl.  Ordered.   -blood test due.  -colonoscopy 4/2/21: repeat in 5y.   -dexa scan 2/9/2022: osteopenia.  Ca w/ D recommended.   -Immunization: Shingrix, COVID 19, Flu recommended.  Ordered.   -eye exam was 2 months ago - 2023  -Hearing screening recommended.         Visit for screening mammogram    Relevant Orders    BI mammo bilateral screening " tomosynthesis    Colon cancer screening    Encounter for screening for HIV    Encounter for hepatitis C screening test for low risk patient    Asymptomatic menopause    Relevant Orders    Hepatitis C Antibody    Screening cholesterol level    Relevant Orders    Lipid Panel    Diabetes mellitus screening    Relevant Orders    Hemoglobin A1C    Immunization due    Relevant Medications    flu vacc [id number], 65yr up,-PF (Fluzone High-Dose Quad) syringe    zoster vaccine-recombinant adjuvanted (Shingrix) 50 mcg/0.5 mL vaccine      Patient to return to office in 6 months.

## 2023-12-19 NOTE — ASSESSMENT & PLAN NOTE
-mammogram 2/22/2022: wnl.  Ordered.   -blood test due.  -colonoscopy 4/2/21: repeat in 5y.   -dexa scan 2/9/2022: osteopenia.  Ca w/ D recommended.   -Immunization: Shingrix, COVID 19, Flu recommended.  Ordered.   -eye exam was 2 months ago - 2023  -Hearing screening recommended.

## 2023-12-30 ENCOUNTER — ANTICOAGULATION - WARFARIN VISIT (OUTPATIENT)
Dept: CARDIOLOGY | Facility: CLINIC | Age: 68
End: 2023-12-30
Payer: MEDICAID

## 2023-12-30 DIAGNOSIS — I48.91 ATRIAL FIBRILLATION, UNSPECIFIED TYPE (MULTI): ICD-10-CM

## 2023-12-30 LAB
POC INR: 2.5
POC PROTHROMBIN TIME: NORMAL

## 2023-12-30 PROCEDURE — 99211 OFF/OP EST MAY X REQ PHY/QHP: CPT | Performed by: INTERNAL MEDICINE

## 2023-12-30 PROCEDURE — 85610 PROTHROMBIN TIME: CPT | Mod: QW

## 2023-12-30 NOTE — PROGRESS NOTES
Patient identification verified with 2 identifiers.    Location: [facility] at [facility] [address] [address] [phone] option #1     Referring Physician: JOCELYNE  Enrollment/ Re-enrollment date: 24  INR Goal: 2.0-3.0  INR monitoring is per [facility] protocol.  Anticoagulation Medication: warfarin  Indication: Atrial Fibrillation/Atrial Flutter    Subjective   Bleeding signs/symptoms: No    Bruising: No   Major bleeding event: No  Thrombosis signs/symptoms: No  Thromboembolic event: No  Missed doses: No  Extra doses: No  Medication changes: No  Dietary changes: No  Change in health: No  Change in activity: No  Alcohol: No  Other concerns: No    Upcoming Surgeries:  Does the Patient Have any upcoming surgeries that require interruption in anticoagulation therapy? no  Does the patient require bridging? no      Anticoagulation Summary  As of 2023      INR goal:  2.0-3.0   TTR:  77.0 % (3 mo)   INR used for dosin.50 (2023)   Weekly warfarin total:  35 mg               Assessment/Plan   Subtherapeutic     1. New dose: no change    2. Next INR: 4 WEEKS      Education provided to patient during the visit:  Patient instructed to call in interim with questions, concerns and changes.

## 2023-12-31 ENCOUNTER — ANCILLARY PROCEDURE (OUTPATIENT)
Dept: EMERGENCY MEDICINE | Facility: HOSPITAL | Age: 68
End: 2023-12-31
Payer: MEDICAID

## 2023-12-31 ENCOUNTER — APPOINTMENT (OUTPATIENT)
Dept: RADIOLOGY | Facility: HOSPITAL | Age: 68
End: 2023-12-31
Payer: MEDICAID

## 2023-12-31 ENCOUNTER — HOSPITAL ENCOUNTER (EMERGENCY)
Facility: HOSPITAL | Age: 68
Discharge: HOME | End: 2023-12-31
Attending: STUDENT IN AN ORGANIZED HEALTH CARE EDUCATION/TRAINING PROGRAM
Payer: MEDICAID

## 2023-12-31 VITALS
OXYGEN SATURATION: 94 % | HEART RATE: 85 BPM | BODY MASS INDEX: 45.98 KG/M2 | WEIGHT: 276 LBS | RESPIRATION RATE: 18 BRPM | HEIGHT: 65 IN | DIASTOLIC BLOOD PRESSURE: 69 MMHG | SYSTOLIC BLOOD PRESSURE: 109 MMHG | TEMPERATURE: 98.6 F

## 2023-12-31 DIAGNOSIS — J11.1 INFLUENZA: Primary | ICD-10-CM

## 2023-12-31 DIAGNOSIS — N39.0 UTI (URINARY TRACT INFECTION), UNCOMPLICATED: ICD-10-CM

## 2023-12-31 LAB
ALBUMIN SERPL BCP-MCNC: 3.9 G/DL (ref 3.4–5)
ALP SERPL-CCNC: 45 U/L (ref 33–136)
ALT SERPL W P-5'-P-CCNC: 8 U/L (ref 7–45)
ANION GAP SERPL CALC-SCNC: 13 MMOL/L (ref 10–20)
APPEARANCE UR: ABNORMAL
AST SERPL W P-5'-P-CCNC: 15 U/L (ref 9–39)
BACTERIA #/AREA URNS AUTO: ABNORMAL /HPF
BASOPHILS # BLD AUTO: 0.01 X10*3/UL (ref 0–0.1)
BASOPHILS NFR BLD AUTO: 0.2 %
BILIRUB SERPL-MCNC: 1.6 MG/DL (ref 0–1.2)
BILIRUB UR STRIP.AUTO-MCNC: NEGATIVE MG/DL
BUN SERPL-MCNC: 25 MG/DL (ref 6–23)
CALCIUM SERPL-MCNC: 9.3 MG/DL (ref 8.6–10.6)
CHLORIDE SERPL-SCNC: 108 MMOL/L (ref 98–107)
CO2 SERPL-SCNC: 23 MMOL/L (ref 21–32)
COLOR UR: YELLOW
CREAT SERPL-MCNC: 1.83 MG/DL (ref 0.5–1.05)
EOSINOPHIL # BLD AUTO: 0.02 X10*3/UL (ref 0–0.7)
EOSINOPHIL NFR BLD AUTO: 0.4 %
ERYTHROCYTE [DISTWIDTH] IN BLOOD BY AUTOMATED COUNT: 13.2 % (ref 11.5–14.5)
FLUAV RNA RESP QL NAA+PROBE: DETECTED
FLUBV RNA RESP QL NAA+PROBE: NOT DETECTED
GFR SERPL CREATININE-BSD FRML MDRD: 30 ML/MIN/1.73M*2
GLUCOSE SERPL-MCNC: 87 MG/DL (ref 74–99)
GLUCOSE UR STRIP.AUTO-MCNC: ABNORMAL MG/DL
HCT VFR BLD AUTO: 35.2 % (ref 36–46)
HGB BLD-MCNC: 12 G/DL (ref 12–16)
HOLD SPECIMEN: NORMAL
IMM GRANULOCYTES # BLD AUTO: 0.01 X10*3/UL (ref 0–0.7)
IMM GRANULOCYTES NFR BLD AUTO: 0.2 % (ref 0–0.9)
KETONES UR STRIP.AUTO-MCNC: ABNORMAL MG/DL
LEUKOCYTE ESTERASE UR QL STRIP.AUTO: ABNORMAL
LYMPHOCYTES # BLD AUTO: 0.58 X10*3/UL (ref 1.2–4.8)
LYMPHOCYTES NFR BLD AUTO: 13 %
MCH RBC QN AUTO: 29.6 PG (ref 26–34)
MCHC RBC AUTO-ENTMCNC: 34.1 G/DL (ref 32–36)
MCV RBC AUTO: 87 FL (ref 80–100)
MONOCYTES # BLD AUTO: 0.63 X10*3/UL (ref 0.1–1)
MONOCYTES NFR BLD AUTO: 14.2 %
NEUTROPHILS # BLD AUTO: 3.2 X10*3/UL (ref 1.2–7.7)
NEUTROPHILS NFR BLD AUTO: 72 %
NITRITE UR QL STRIP.AUTO: NEGATIVE
NRBC BLD-RTO: 0 /100 WBCS (ref 0–0)
PH UR STRIP.AUTO: 7 [PH]
PLATELET # BLD AUTO: 95 X10*3/UL (ref 150–450)
POTASSIUM SERPL-SCNC: 3.7 MMOL/L (ref 3.5–5.3)
PROT SERPL-MCNC: 6.2 G/DL (ref 6.4–8.2)
PROT UR STRIP.AUTO-MCNC: NEGATIVE MG/DL
RBC # BLD AUTO: 4.06 X10*6/UL (ref 4–5.2)
RBC # UR STRIP.AUTO: NEGATIVE /UL
RBC #/AREA URNS AUTO: ABNORMAL /HPF
RBC MORPH BLD: NORMAL
SARS-COV-2 RNA RESP QL NAA+PROBE: NOT DETECTED
SODIUM SERPL-SCNC: 140 MMOL/L (ref 136–145)
SP GR UR STRIP.AUTO: 1.01
SQUAMOUS #/AREA URNS AUTO: ABNORMAL /HPF
TARGETS BLD QL SMEAR: NORMAL
URATE CRY #/AREA UR COMP ASSIST: ABNORMAL /HPF
UROBILINOGEN UR STRIP.AUTO-MCNC: 4 MG/DL
WBC # BLD AUTO: 4.5 X10*3/UL (ref 4.4–11.3)
WBC #/AREA URNS AUTO: >50 /HPF

## 2023-12-31 PROCEDURE — 99284 EMERGENCY DEPT VISIT MOD MDM: CPT | Performed by: STUDENT IN AN ORGANIZED HEALTH CARE EDUCATION/TRAINING PROGRAM

## 2023-12-31 PROCEDURE — 81001 URINALYSIS AUTO W/SCOPE: CPT

## 2023-12-31 PROCEDURE — 84132 ASSAY OF SERUM POTASSIUM: CPT

## 2023-12-31 PROCEDURE — 99283 EMERGENCY DEPT VISIT LOW MDM: CPT | Mod: 25

## 2023-12-31 PROCEDURE — 85025 COMPLETE CBC W/AUTO DIFF WBC: CPT

## 2023-12-31 PROCEDURE — 93010 ELECTROCARDIOGRAM REPORT: CPT | Performed by: STUDENT IN AN ORGANIZED HEALTH CARE EDUCATION/TRAINING PROGRAM

## 2023-12-31 PROCEDURE — 71046 X-RAY EXAM CHEST 2 VIEWS: CPT

## 2023-12-31 PROCEDURE — 71046 X-RAY EXAM CHEST 2 VIEWS: CPT | Mod: FOREIGN READ | Performed by: RADIOLOGY

## 2023-12-31 PROCEDURE — 93005 ELECTROCARDIOGRAM TRACING: CPT

## 2023-12-31 PROCEDURE — 36415 COLL VENOUS BLD VENIPUNCTURE: CPT

## 2023-12-31 PROCEDURE — 2500000001 HC RX 250 WO HCPCS SELF ADMINISTERED DRUGS (ALT 637 FOR MEDICARE OP): Mod: SE

## 2023-12-31 PROCEDURE — 80053 COMPREHEN METABOLIC PANEL: CPT

## 2023-12-31 PROCEDURE — 87636 SARSCOV2 & INF A&B AMP PRB: CPT

## 2023-12-31 RX ORDER — ACETAMINOPHEN 325 MG/1
975 TABLET ORAL ONCE
Status: COMPLETED | OUTPATIENT
Start: 2023-12-31 | End: 2023-12-31

## 2023-12-31 RX ORDER — CEPHALEXIN 250 MG/1
500 CAPSULE ORAL ONCE
Status: COMPLETED | OUTPATIENT
Start: 2023-12-31 | End: 2023-12-31

## 2023-12-31 RX ORDER — OSELTAMIVIR PHOSPHATE 75 MG/1
75 CAPSULE ORAL 2 TIMES DAILY
Qty: 10 CAPSULE | Refills: 0 | Status: SHIPPED | OUTPATIENT
Start: 2023-12-31 | End: 2024-01-05

## 2023-12-31 RX ORDER — CEPHALEXIN 500 MG/1
500 CAPSULE ORAL 2 TIMES DAILY
Qty: 14 CAPSULE | Refills: 0 | Status: SHIPPED | OUTPATIENT
Start: 2023-12-31 | End: 2024-01-07

## 2023-12-31 RX ADMIN — ACETAMINOPHEN 975 MG: 325 TABLET ORAL at 16:29

## 2023-12-31 RX ADMIN — CEPHALEXIN 500 MG: 250 CAPSULE ORAL at 18:15

## 2023-12-31 ASSESSMENT — COLUMBIA-SUICIDE SEVERITY RATING SCALE - C-SSRS
2. HAVE YOU ACTUALLY HAD ANY THOUGHTS OF KILLING YOURSELF?: NO
6. HAVE YOU EVER DONE ANYTHING, STARTED TO DO ANYTHING, OR PREPARED TO DO ANYTHING TO END YOUR LIFE?: NO
1. IN THE PAST MONTH, HAVE YOU WISHED YOU WERE DEAD OR WISHED YOU COULD GO TO SLEEP AND NOT WAKE UP?: NO

## 2023-12-31 ASSESSMENT — LIFESTYLE VARIABLES
HAVE YOU EVER FELT YOU SHOULD CUT DOWN ON YOUR DRINKING: NO
HAVE PEOPLE ANNOYED YOU BY CRITICIZING YOUR DRINKING: NO
EVER FELT BAD OR GUILTY ABOUT YOUR DRINKING: NO
REASON UNABLE TO ASSESS: NO
EVER HAD A DRINK FIRST THING IN THE MORNING TO STEADY YOUR NERVES TO GET RID OF A HANGOVER: NO

## 2023-12-31 ASSESSMENT — PAIN SCALES - GENERAL: PAINLEVEL_OUTOF10: 6

## 2023-12-31 ASSESSMENT — PAIN - FUNCTIONAL ASSESSMENT: PAIN_FUNCTIONAL_ASSESSMENT: 0-10

## 2023-12-31 NOTE — Clinical Note
Trinidad Hines was seen and treated in our emergency department on 12/31/2023.  She may return to work on 01/05/2024.       If you have any questions or concerns, please don't hesitate to call.      Emmy Garcia MD

## 2023-12-31 NOTE — ED PROVIDER NOTES
CC: Shortness of Breath     HPI: Trinidad Hines is an 68 y.o. female with history including persistent a fib s/p stroke on anticoagulation, CKD stage 3,  presenting to the emergency department for viral symptoms.  patient reports having fevers, chills and back diffuse body pain.  They report that it started for the last 2 days.  They also note they have been having a burning and sending this patient for therapy urination.  They also state having incontinence that started earlier this afternoon.  Has not taken any medication for a UTI.  Also has not taken any medication for fevers.  Denies back pain.  Denies abdominal tenderness.    Limitations to History: None  Additional History Obtained from: Patient    PMHx/PSHx:  Per HPI.   - has a past medical history of Cardiomyopathy (CMS/HCC), Chronic kidney disease, stage 3 unspecified (CMS/HCC) (11/02/2022), Encounter for screening for depression (05/06/2022), Encounter for screening for malignant neoplasm of colon (01/19/2022), Nontraumatic intracerebral hemorrhage, unspecified (CMS/HCC), Personal history of other diseases of the respiratory system (12/14/2021), Personal history of other diseases of urinary system (11/02/2022), Personal history of urinary (tract) infections, Presence of automatic (implantable) cardiac defibrillator (05/06/2022), and Urinary tract infection, site not specified (11/07/2022).  - has a past surgical history that includes Aortic valve replacement (02/05/2015); Total knee arthroplasty (04/29/2015); and Cardiac electrophysiology procedure (N/A, 10/30/2023).    Social History:  - Tobacco:  reports that she has never smoked. She has never used smokeless tobacco.   - Alcohol:  reports no history of alcohol use.   - Drugs:  reports no history of drug use.     Medications: Reviewed in EMR.     Allergies:  Aspirin, Diltiazem hcl, Dofetilide, Lisinopril, and Phenytoin    ???????????????????????????????????????????????????????????????  Triage  Vitals:  T 38 °C (100.4 °F)  HR 95  /71  RR 18  O2 95 % None (Room air)    Physical Exam  Constitutional:       General: She is not in acute distress.  HENT:      Head: Normocephalic.   Cardiovascular:      Rate and Rhythm: Normal rate.   Pulmonary:      Effort: Pulmonary effort is normal. No respiratory distress.      Breath sounds: No decreased breath sounds, wheezing or rhonchi.   Chest:      Chest wall: No tenderness.   Abdominal:      General: Abdomen is flat.      Tenderness: There is no abdominal tenderness.      Comments: No CVA tenderness   Musculoskeletal:         General: Normal range of motion.   Skin:     General: Skin is dry.   Neurological:      Mental Status: She is alert and oriented to person, place, and time. Mental status is at baseline.       ???????????????????????????????????????????????????????????????  EKG (per my interpretation):  EKG was independently interpreted and shows atrial fibrillation at a normal rate. No ST segment elevations.     ED Course/Medical Decision Making:    ED Course as of 01/01/24 1845   Sun Dec 31, 2023   1654 Cr elevated at patient's baseline [LUCÍA]   1654 T Bili was elevated however, patient had no abdominal tenderness [LUCÍA]   1732 Flu positive [LUCÍA]      ED Course User Index  [LUCÍA] Emmy Garcia MD         Diagnoses as of 01/01/24 1845   Influenza   UTI (urinary tract infection), uncomplicated        Patient is a 68-year-old female with past medical history of bursitis of A-fib, CKD stage III who is presenting today for fevers, chills, body aches and urinary symptoms.  Differential at this time included a UTI with the urinary symptoms. Differential includes viral infection vs pneumonia. Chest xray was obtained and negative for a pneumonia. Viral testing and basic lab work was sent.  Viral testing was significant for influenza.  Lab work was otherwise unremarkable.  UA was positive for UTI.  Patient was given initial dose of Keflex and Tylenol in the ED.   Patient's fever improved after the dose of Tylenol.  Patient was started on Tamiflu and Keflex.  Patient was discharged home in stable condition.    External records reviewed: recent inpatient, clinic, and prior ED notes  Diagnostic imaging independently reviewed/interpreted by me (as reflected in MDM) includes: chest xray  Social Determinants Affecting Care:   Discussion of management with other providers: none  Prescription Drug Consideration: tamiflu, keflex  Escalation of Care: none    Impression:   Influenza  UTi    Disposition: discharge      Procedures ? SmartLinks last updated 12/31/2023 6:03 PM        Emmy Garcia MD  Resident  01/01/24 [id number]

## 2023-12-31 NOTE — DISCHARGE INSTRUCTIONS
You were seen today for a fever. You were found to have a flu and UTI. Please take antibiotics as prescribed. Return if having increased work of breathing.

## 2023-12-31 NOTE — ED TRIAGE NOTES
Pt arrived to ED via EMS from home with c/o SOB/cough x2 days, headache, and burning with urination. Per pt started new med 3 days ago, was told can cause UTI.

## 2024-01-01 LAB
HOLD SPECIMEN: NORMAL
Q ONSET: 218 MS
QRS COUNT: 16 BEATS
QRS DURATION: 112 MS
QT INTERVAL: 380 MS
QTC CALCULATION(BAZETT): 477 MS
QTC FREDERICIA: 442 MS
R AXIS: 0 DEGREES
T AXIS: 179 DEGREES
T OFFSET: 408 MS
VENTRICULAR RATE: 95 BPM

## 2024-01-02 ENCOUNTER — HOSPITAL ENCOUNTER (OUTPATIENT)
Dept: CARDIOLOGY | Facility: CLINIC | Age: 69
Discharge: HOME | End: 2024-01-02
Payer: MEDICAID

## 2024-01-02 DIAGNOSIS — I42.0 DILATED CARDIOMYOPATHY (MULTI): ICD-10-CM

## 2024-01-02 LAB
ANION GAP BLDV CALCULATED.4IONS-SCNC: 7 MMOL/L (ref 10–25)
BASE EXCESS BLDV CALC-SCNC: 3.1 MMOL/L (ref -2–3)
BODY TEMPERATURE: 37 DEGREES CELSIUS
CA-I BLDV-SCNC: 1.22 MMOL/L (ref 1.1–1.33)
CHLORIDE BLDV-SCNC: 107 MMOL/L (ref 98–107)
GLUCOSE BLDV-MCNC: 90 MG/DL (ref 74–99)
HCO3 BLDV-SCNC: 27 MMOL/L (ref 22–26)
HCT VFR BLD EST: 38 % (ref 36–46)
HGB BLDV-MCNC: 12.6 G/DL (ref 12–16)
LACTATE BLDV-SCNC: 0.8 MMOL/L (ref 0.4–2)
OXYHGB MFR BLDV: 90.6 % (ref 45–75)
PCO2 BLDV: 38 MM HG (ref 41–51)
PH BLDV: 7.46 PH (ref 7.33–7.43)
PO2 BLDV: 63 MM HG (ref 35–45)
POTASSIUM BLDV-SCNC: 3.8 MMOL/L (ref 3.5–5.3)
SAO2 % BLDV: 93 % (ref 45–75)
SODIUM BLDV-SCNC: 137 MMOL/L (ref 136–145)

## 2024-01-08 ENCOUNTER — HOSPITAL ENCOUNTER (OUTPATIENT)
Dept: CARDIOLOGY | Facility: CLINIC | Age: 69
Discharge: HOME | End: 2024-01-08
Payer: MEDICAID

## 2024-01-08 DIAGNOSIS — I42.0 DILATED CARDIOMYOPATHY (MULTI): ICD-10-CM

## 2024-01-11 DIAGNOSIS — I48.91 ATRIAL FIBRILLATION, UNSPECIFIED TYPE (MULTI): ICD-10-CM

## 2024-01-12 RX ORDER — WARFARIN SODIUM 5 MG/1
TABLET ORAL
Qty: 135 TABLET | Refills: 1 | Status: SHIPPED | OUTPATIENT
Start: 2024-01-12

## 2024-01-15 ENCOUNTER — HOSPITAL ENCOUNTER (OUTPATIENT)
Dept: CARDIOLOGY | Facility: CLINIC | Age: 69
Discharge: HOME | End: 2024-01-15
Payer: MEDICAID

## 2024-01-15 DIAGNOSIS — I42.0 DILATED CARDIOMYOPATHY (MULTI): ICD-10-CM

## 2024-01-16 PROBLEM — J11.1 INFLUENZA: Status: ACTIVE | Noted: 2024-01-16

## 2024-01-16 PROBLEM — S20.219A CONTUSION OF RIB: Status: ACTIVE | Noted: 2024-01-16

## 2024-01-16 PROBLEM — I12.9 HYPERTENSIVE RENAL DISEASE: Status: ACTIVE | Noted: 2023-03-20
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
## 2024-01-16 RX ORDER — TRAMADOL HYDROCHLORIDE 50 MG/1
50 TABLET ORAL EVERY 6 HOURS PRN
COMMUNITY
Start: 2024-01-13

## 2024-01-17 ENCOUNTER — ANTICOAGULATION - WARFARIN VISIT (OUTPATIENT)
Dept: CARDIOLOGY | Facility: CLINIC | Age: 69
End: 2024-01-17
Payer: MEDICAID

## 2024-01-17 ENCOUNTER — OFFICE VISIT (OUTPATIENT)
Dept: CARDIOLOGY | Facility: CLINIC | Age: 69
End: 2024-01-17
Payer: MEDICAID

## 2024-01-17 VITALS
SYSTOLIC BLOOD PRESSURE: 111 MMHG | BODY MASS INDEX: 43.43 KG/M2 | HEIGHT: 65 IN | WEIGHT: 260.7 LBS | OXYGEN SATURATION: 98 % | HEART RATE: 78 BPM | DIASTOLIC BLOOD PRESSURE: 78 MMHG

## 2024-01-17 DIAGNOSIS — I48.91 ATRIAL FIBRILLATION, UNSPECIFIED TYPE (MULTI): ICD-10-CM

## 2024-01-17 DIAGNOSIS — I10 BENIGN HYPERTENSION: Primary | ICD-10-CM

## 2024-01-17 LAB
POC INR: 3.4
POC PROTHROMBIN TIME: NORMAL

## 2024-01-17 PROCEDURE — 85610 PROTHROMBIN TIME: CPT | Mod: QW

## 2024-01-17 PROCEDURE — 3008F BODY MASS INDEX DOCD: CPT | Performed by: INTERNAL MEDICINE

## 2024-01-17 PROCEDURE — 1159F MED LIST DOCD IN RCRD: CPT | Performed by: INTERNAL MEDICINE

## 2024-01-17 PROCEDURE — 3074F SYST BP LT 130 MM HG: CPT | Performed by: INTERNAL MEDICINE

## 2024-01-17 PROCEDURE — 1036F TOBACCO NON-USER: CPT | Performed by: INTERNAL MEDICINE

## 2024-01-17 PROCEDURE — 99214 OFFICE O/P EST MOD 30 MIN: CPT | Performed by: INTERNAL MEDICINE

## 2024-01-17 PROCEDURE — 3078F DIAST BP <80 MM HG: CPT | Performed by: INTERNAL MEDICINE

## 2024-01-17 PROCEDURE — 99211 OFF/OP EST MAY X REQ PHY/QHP: CPT | Performed by: INTERNAL MEDICINE

## 2024-01-17 PROCEDURE — 1126F AMNT PAIN NOTED NONE PRSNT: CPT | Performed by: INTERNAL MEDICINE

## 2024-01-17 ASSESSMENT — PATIENT HEALTH QUESTIONNAIRE - PHQ9
1. LITTLE INTEREST OR PLEASURE IN DOING THINGS: NOT AT ALL
2. FEELING DOWN, DEPRESSED OR HOPELESS: NOT AT ALL
SUM OF ALL RESPONSES TO PHQ9 QUESTIONS 1 AND 2: 0

## 2024-01-17 ASSESSMENT — ENCOUNTER SYMPTOMS
LOSS OF SENSATION IN FEET: 0
OCCASIONAL FEELINGS OF UNSTEADINESS: 1
DEPRESSION: 0

## 2024-01-17 ASSESSMENT — COLUMBIA-SUICIDE SEVERITY RATING SCALE - C-SSRS
2. HAVE YOU ACTUALLY HAD ANY THOUGHTS OF KILLING YOURSELF?: NO
1. IN THE PAST MONTH, HAVE YOU WISHED YOU WERE DEAD OR WISHED YOU COULD GO TO SLEEP AND NOT WAKE UP?: NO
6. HAVE YOU EVER DONE ANYTHING, STARTED TO DO ANYTHING, OR PREPARED TO DO ANYTHING TO END YOUR LIFE?: NO

## 2024-01-17 ASSESSMENT — PAIN SCALES - GENERAL: PAINLEVEL: 0-NO PAIN

## 2024-01-17 NOTE — PROGRESS NOTES
Patient identification verified with 2 identifiers.    Location: [facility] at [facility] - [address] [address] [phone] option #1     Referring Physician: JOCELYNE  Enrollment/ Re-enrollment date: 11/4/24  INR Goal: 2.0-3.0  INR monitoring is per [facility] protocol.  Anticoagulation Medication: warfarin  Indication: Atrial Fibrillation/Atrial Flutter    Subjective   Bleeding signs/symptoms: No    Bruising: No   Major bleeding event: No  Thrombosis signs/symptoms: No  Thromboembolic event: No  Missed doses: No  Extra doses: No  Medication changes: Yes  JUST FINISHED ANTIBIOTICS  Dietary changes: No  Change in health: No  Change in activity: No  Alcohol: No  Other concerns: No    Upcoming Surgeries:  Does the Patient Have any upcoming surgeries that require interruption in anticoagulation therapy? no  Does the patient require bridging? no      Anticoagulation Summary  As of 1/17/2024      INR goal:  2.0-3.0   TTR:  73.5 % (3.6 mo)   INR used for dosing:  3.40 (1/17/2024)   Weekly warfarin total:  35 mg               Assessment/Plan   Supra therapeutic     1. New dose: will hold today's dose then no change  as pt was on antibiotics and finished them 4 days ago.  2. Next INR: 10 days pt requests a Saturday appointment      Education provided to patient during the visit:  Patient instructed to call in interim with questions, concerns and changes.

## 2024-01-17 NOTE — PATIENT INSTRUCTIONS
To your cardiology visit today.  You are doing well.  Please get your updated labs at your convenience.  I would like to increase her Entresto dosing, the prescription has been sent to your pharmacy just started when your current dosing runs out.  Please make an appointment to see me in 3 months.

## 2024-01-17 NOTE — PROGRESS NOTES
Primary Care Physician: Minda Ricardo MD  Date of Visit: 01/17/2024 11:20 AM EST  [place] [facility] [place] [address]     Chief Complaint:   Chief Complaint   Patient presents with    Follow-up        HPI / Summary:   Trinidad Hines is a 68 y.o. female presents for followup.     History of LV dysfunction last echo in August EF of 30% LV end-diastolic dimension of 5.4 cm, serial left atrial enlargement, prosthetic aortic valve no deterioration mean gradient of 3 mmHg, trace MR, moderate TR RVSP 41, RV mildly enlarged with reduced function.  Recent upgrade to BiV ICD in October of last year  Initial cardioversion and initiation of amiodarone in 2015, full 2022 had A-fib recurrence that was persistent had a cardioversion she stopped the amiodarone.  Has been in persistent A-fib.  History of stroke.  ER visit with viral symptoms at the end of December.  Flu positive.    Less winded post biV upgrade.  Weight  is done around 20 pounds.  Watches diet poorly.  Recent UTI, opted for Farxiga 5 mg.    Specialty Problems          Cardiology Problems    Benign hypertension    Cardiomyopathy (CMS/HCC)     Comment on above: Added by Problem List Migration; 2013-7-21; Moved to Suppressed Nov 29 2013 9:07PM;         Aortic valve disorder    Atrial fibrillation (CMS/HCC)    Cardiac defibrillator in place    Chronic systolic heart failure (CMS/HCC)    Hyperlipidemia    Palpitations    Dilated cardiomyopathy (CMS/HCC)    Screening cholesterol level    Atrial fibrillation, unspecified type (CMS/HCC)        Past Medical History:   Diagnosis Date    Cardiomyopathy (CMS/HCC)     Chronic kidney disease, stage 3 unspecified (CMS/HCC) 11/02/2022    CKD (chronic kidney disease), stage III    Encounter for screening for depression 05/06/2022    Standardized adult depression screening tool completed    Encounter for screening for malignant neoplasm of colon 01/19/2022    Colon cancer screening    Nontraumatic intracerebral hemorrhage,  unspecified (CMS/[facility])     Hemorrhagic stroke    Personal history of other diseases of the respiratory system 12/14/2021    History of acute bronchitis    Personal history of other diseases of urinary system 11/02/2022    History of chronic kidney disease    Personal history of urinary (tract) infections     History of urinary tract infection    Presence of automatic (implantable) cardiac defibrillator 05/06/2022    Cardiac defibrillator in place    Urinary tract infection, site not specified 11/07/2022    Acute UTI          Past Surgical History:   Procedure Laterality Date    AORTIC VALVE REPLACEMENT  02/05/2015    Aortic Valve Replacement    CARDIAC ELECTROPHYSIOLOGY PROCEDURE N/A 10/30/2023    Procedure: ICD UPGRADE, DUAL TO BIV;  Surgeon: Kendra Hong MD;  Location: [place] [address] Cardiac Cath Lab;  Service: Electrophysiology;  Laterality: N/A;    TOTAL KNEE ARTHROPLASTY  04/29/2015    Total Knee Arthroplasty          Social History:   reports that she has never smoked. She has never used smokeless tobacco. She reports that she does not drink alcohol and does not use drugs.      Allergies:  Allergies   Allergen Reactions    Aspirin Unknown    Diltiazem Hcl Itching    Dofetilide Unknown    Lisinopril Cough and Dry mouth    Phenytoin Hives and Rash       Outpatient Medications:  Current Outpatient Medications   Medication Instructions    albuterol 90 mcg/actuation inhaler 2 puffs, inhalation, Every 4 hours PRN    allopurinol (ZYLOPRIM) 300 mg, oral, Daily    amiodarone (PACERONE) 200 mg, oral, Daily    calcitriol (ROCALTROL) 0.25 mcg, oral, Daily    dapagliflozin propanediol (FARXIGA) 5 mg, oral, Daily    lidocaine (Xylocaine) 5 % ointment 1 Application, Topical, 3 times daily PRN    metoprolol succinate XL (TOPROL-XL) 100 mg, oral, Daily    sacubitriL-valsartan (Entresto) 24-26 mg tablet 1 tablet, oral, 2 times daily    simvastatin (ZOCOR) 20 mg, oral, Nightly    spironolactone (ALDACTONE) 25 mg, oral, Daily  "   torsemide (DEMADEX) 40 mg, oral, 2 times daily    traMADol (ULTRAM) 50 mg, oral, Every 6 hours PRN    warfarin (Coumadin) 5 mg tablet TAKE 1.5 TABLET DAILY AS DIRECTED BY COUMADIN CLINIC       ROS     Physical Exam:  Vitals:    01/17/24 1140   BP: 111/78   BP Location: Right arm   Patient Position: Sitting   BP Cuff Size: Large adult   Pulse: 78   SpO2: 98%   Weight: 118 kg (260 lb 11.2 oz)   Height: 1.651 m (5' 5\")     Wt Readings from Last 5 Encounters:   01/17/24 118 kg (260 lb 11.2 oz)   12/31/23 125 kg (276 lb)   12/19/23 130 kg (286 lb)   10/30/23 125 kg (275 lb 9.2 oz)   08/02/23 125 kg (276 lb 3 oz)     Body mass index is 43.38 kg/m².   Female in no acute distress.  No distention of neck veins.  Distant heart sounds.  Clear lungs.  Soft abdomen.  No dependent edema     Last Labs:  CMP:  Recent Labs     12/31/23  1626 10/12/23  1150 10/12/23  1116 03/20/23  0346 02/03/23  1105    143 143 141 143   K 3.7 4.6 4.2 4.8 4.2   * 104 105 104 106   CO2 23 29 31 29 32   ANIONGAP 13 15 11 13 9*   BUN 25* 27* 25* 21 28*   CREATININE 1.83* 1.77* 1.92* 1.74* 1.91*   EGFR 30* 31* 28*  --   --    GLUCOSE 87 106* 109* 105* 103*     Recent Labs     12/31/23  1626 10/12/23  1116 03/20/23  0346 12/24/22  2045 11/02/22  1321 09/05/22  2132 01/19/22  1223 03/05/21  2221 11/08/19  1501 08/06/19  1111   ALBUMIN 3.9 4.2 4.2 4.6 4.1 3.4 4.0 3.8   < > 4.2   ALKPHOS 45  --  45  --  55 40 51 72   < > 78   ALT 8  --  10  --  11 9 10 62*   < > 14   AST 15  --  20  --  11 11 16 51*   < > 23   BILITOT 1.6*  --  1.2  --  1.0 0.8 0.9 0.7   < > 1.6*   LIPASE  --   --   --   --   --   --   --  77  --  15    < > = values in this interval not displayed.     CBC:  Recent Labs     12/31/23  1626 10/12/23  1150 10/12/23  1116 03/20/23  0346 02/03/23  1105   WBC 4.5 3.9* 3.7* 3.4* 2.5*   HGB 12.0 13.6 12.9 12.2 11.9*   HCT 35.2* 42.7 40.8 36.9 37.7   PLT 95* 159 164 87* 124*   MCV 87 93 93 90 94     COAG:   Recent Labs     " 01/17/24  1111 12/30/23  0941 12/02/23  1122 11/17/23  0950 11/04/23  0941   INR 3.40 2.50 2.50 2.00 1.60     HEME/ENDO:  Recent Labs     11/02/22  1321 01/19/22  1223 10/25/20  0746 03/25/20  0708 08/10/19  1015   TSH 4.17* 2.72  --  4.63* 3.06   HGBA1C 5.7*  --  5.6  --   --       CARDIAC:   Recent Labs     03/20/23  1919 03/20/23  0545 03/20/23  0346 09/05/22  2214 09/05/22  2133 07/20/21  1039 02/25/21  1159 10/25/20  0746 10/24/20  1435   TROPHS CANCELED  CANCELED 12 13 11 13  --   --   --   --    BNP  --   --  380*  --   --  223* 181* 381* 209*     Recent Labs     01/19/22  1223 03/14/19  1228   CHOL 187 146   LDLF 106* 77   HDL 68.3 55.3   TRIG 62 68       Last Cardiology Tests:  ECG:      Echo:  Echo Results:  No results found for this or any previous visit from the past 3650 days.       Cath:      Stress Test:  Stress Results:  No results found for this or any previous visit from the past 365 days.         Cardiac Imaging:  ECG 12 lead  Atrial fibrillation  Left ventricular hypertrophy with repolarization abnormality ( R in aVL , [place] Ari product )  Abnormal ECG  When compared with ECG of 20-SEP-2023 08:50,  Atrial fibrillation has replaced Electronic ventricular pacemaker  See ED provider note for full interpretation and clinical correlation  Confirmed by Saige Bergeron ([id number]) on 1/1/2024 2:26:25 PM        Assessment/Plan       We will uptitrate her Entresto, at her next 3-month follow-up anticipate increasing Farxiga, no change in other drugs for now.  Might benefit from phase 2 cardiac rehab.  Overall seems to have improved after BiV upgrade.  Thank you for your referral    Orders:  No orders of the defined types were placed in this encounter.     Followup Appts:  Future Appointments   Date Time Provider Department Center   1/27/2024  9:30 AM [facility] [id number] CARD1 [facility]AG CLINIC [id number]   2/12/2024 10:45 AM [facility] 5 [id number] U [facility]   2/19/2024 10:00 AM [facility] ROOM 2 [id number]  Academic   5/8/2024 10:00 AM Safia Gerber MD [id number] [facility]   6/19/2024  9:00 AM Minda Ricardo MD [id number] [facility]           ____________________________________________________________  Herberth Gandara MD    Senior Attending Physician  [facility]  [facility]

## 2024-01-27 ENCOUNTER — APPOINTMENT (OUTPATIENT)
Dept: CARDIOLOGY | Facility: CLINIC | Age: 69
End: 2024-01-27
Payer: MEDICAID

## 2024-01-27 ENCOUNTER — ANTICOAGULATION - WARFARIN VISIT (OUTPATIENT)
Dept: CARDIOLOGY | Facility: CLINIC | Age: 69
End: 2024-01-27
Payer: MEDICAID

## 2024-01-27 DIAGNOSIS — I48.91 ATRIAL FIBRILLATION, UNSPECIFIED TYPE (MULTI): ICD-10-CM

## 2024-01-27 LAB
POC INR: 2.6
POC PROTHROMBIN TIME: NORMAL

## 2024-01-27 PROCEDURE — 99211 OFF/OP EST MAY X REQ PHY/QHP: CPT | Performed by: INTERNAL MEDICINE

## 2024-01-27 PROCEDURE — 85610 PROTHROMBIN TIME: CPT | Mod: QW

## 2024-01-27 NOTE — PROGRESS NOTES
Patient identification verified with 2 identifiers.    Location: [facility] - [address] [address] [phone] option #1     Referring Physician: JOCELYNE  Enrollment/ Re-enrollment date: 24  INR Goal: 2.0-3.0  INR monitoring is per [facility] protocol.  Anticoagulation Medication: warfarin  Indication: Atrial Fibrillation/Atrial Flutter    Subjective   Bleeding signs/symptoms:      Bruising:     Major bleeding event:    Thrombosis signs/symptoms:    Thromboembolic event:    Missed doses:    Extra doses:    Medication changes:    Dietary changes:    Change in health:    Change in activity:    Alcohol:    Other concerns:      Upcoming Surgeries:  Does the Patient Have any upcoming surgeries that require interruption in anticoagulation therapy? no  Does the patient require bridging? no      Anticoagulation Summary  As of 2024      INR goal:  2.0-3.0   TTR:  71.5 % (4 mo)   INR used for dosin.60 (2024)   Weekly warfarin total:  35 mg               Assessment/Plan    therapeutic     1. New dose:  no change    2. Next INR: 2 weeks      Education provided to patient during the visit:  Patient instructed to call in interim with questions, concerns and changes.

## 2024-01-29 ENCOUNTER — PATIENT OUTREACH (OUTPATIENT)
Dept: PRIMARY CARE | Facility: CLINIC | Age: 69
End: 2024-01-29
Payer: MEDICAID

## 2024-01-29 NOTE — PROGRESS NOTES
Patient has met target of no readmission for (90) days post hospital discharge and is graduated from Transitional Care Management program at this time.    Spoke with adelina.  States doing good.  Aware to follow up with PCP as needed/directed.

## 2024-02-05 ENCOUNTER — HOSPITAL ENCOUNTER (OUTPATIENT)
Dept: CARDIOLOGY | Facility: CLINIC | Age: 69
Discharge: HOME | End: 2024-02-05
Payer: MEDICAID

## 2024-02-05 DIAGNOSIS — I42.0 DILATED CARDIOMYOPATHY (MULTI): ICD-10-CM

## 2024-02-09 ENCOUNTER — ANTICOAGULATION - WARFARIN VISIT (OUTPATIENT)
Dept: CARDIOLOGY | Facility: CLINIC | Age: 69
End: 2024-02-09
Payer: MEDICAID

## 2024-02-09 DIAGNOSIS — I48.91 ATRIAL FIBRILLATION, UNSPECIFIED TYPE (MULTI): ICD-10-CM

## 2024-02-10 ENCOUNTER — APPOINTMENT (OUTPATIENT)
Dept: CARDIOLOGY | Facility: CLINIC | Age: 69
End: 2024-02-10
Payer: MEDICAID

## 2024-02-12 ENCOUNTER — APPOINTMENT (OUTPATIENT)
Dept: RADIOLOGY | Facility: CLINIC | Age: 69
End: 2024-02-12
Payer: MEDICAID

## 2024-02-14 ENCOUNTER — ANTICOAGULATION - WARFARIN VISIT (OUTPATIENT)
Dept: CARDIOLOGY | Facility: CLINIC | Age: 69
End: 2024-02-14
Payer: MEDICAID

## 2024-02-14 DIAGNOSIS — I48.91 ATRIAL FIBRILLATION, UNSPECIFIED TYPE (MULTI): ICD-10-CM

## 2024-02-14 DIAGNOSIS — Z00.6 RESEARCH STUDY PATIENT: ICD-10-CM

## 2024-02-14 LAB
POC INR: 2.6
POC PROTHROMBIN TIME: NORMAL

## 2024-02-14 PROCEDURE — 85610 PROTHROMBIN TIME: CPT | Mod: QW

## 2024-02-14 NOTE — PROGRESS NOTES
Patient identification verified with 2 identifiers.    Location: [facility] [address] [place] [address] [phone] option #1     Referring Physician: JOCELYNE  Enrollment/ Re-enrollment date: 24  INR Goal: 2.0-3.0  INR monitoring is per [facility] protocol.  Anticoagulation Medication: warfarin  Indication: Atrial Fibrillation/Atrial Flutter    Subjective   Bleeding signs/symptoms: No    Bruising: No   Major bleeding event: No  Thrombosis signs/symptoms: No  Thromboembolic event: No  Missed doses: No  Extra doses: No  Medication changes: No  Dietary changes: No  Change in health: No  Change in activity: No  Alcohol: No  Other concerns: No    Upcoming Surgeries:  Does the Patient Have any upcoming surgeries that require interruption in anticoagulation therapy? no  Does the patient require bridging? no      Anticoagulation Summary  As of 2024      INR goal:  2.0-3.0   TTR:  75.2 % (4.6 mo)   INR used for dosin.60 (2024)   Weekly warfarin total:  35 mg               Assessment/Plan    therapeutic     1. New dose:  no change    2. Next INR: 4 weeks      Education provided to patient during the visit:  Patient instructed to call in interim with questions, concerns and changes.

## 2024-02-19 ENCOUNTER — HOSPITAL ENCOUNTER (OUTPATIENT)
Dept: RESEARCH | Facility: HOSPITAL | Age: 69
Discharge: HOME | End: 2024-02-19
Payer: MEDICAID

## 2024-02-19 ENCOUNTER — LAB (OUTPATIENT)
Dept: LAB | Facility: LAB | Age: 69
End: 2024-02-19
Payer: MEDICAID

## 2024-02-19 VITALS
DIASTOLIC BLOOD PRESSURE: 64 MMHG | WEIGHT: 258.16 LBS | BODY MASS INDEX: 42.96 KG/M2 | SYSTOLIC BLOOD PRESSURE: 85 MMHG | HEART RATE: 67 BPM

## 2024-02-19 DIAGNOSIS — Z00.6 RESEARCH STUDY PATIENT: ICD-10-CM

## 2024-02-19 LAB
CREAT SERPL-MCNC: 2.14 MG/DL (ref 0.5–1.05)
EGFRCR SERPLBLD CKD-EPI 2021: 25 ML/MIN/1.73M*2

## 2024-02-19 PROCEDURE — 82565 ASSAY OF CREATININE: CPT

## 2024-02-19 PROCEDURE — 36415 COLL VENOUS BLD VENIPUNCTURE: CPT

## 2024-02-19 NOTE — PROGRESS NOTES
Ppt arrived alone today for Year 20 [facility] Study visit and was transported via w/c to [facility] to be consented to Phase 5 protocol.  A copy of the consent form had been sent to the ppt prior to today's visit, to read at his/her leisure.  Consent form was reviewed today with participant.  Informed Consent Documentation Checklist  Protocol Title:  Chronic Renal Insufficiency Cohort (CRIC) Study Phase 5      IRB Number:  [id number]         :  Timothy Santana MD    Patient Name & MRN: Trinidad Hines [id number] Study Visit Date: 02/19/2024                                   Date of first contact with participant regarding study:      Informed Consent (ICF) Obtained by: Maria Teresa French RN     Date Signed: 02/19/2024 (Month/Day/Year)  Time Signed: 1059 ( time format)    Individuals present during the informed consent process: participant only    Date and Time research activities began: 02/19/2024 1106    Did the participant verbalize an understanding of the main purpose of the study, procedures, follow-up, risks, etc.) Yes   *If no, what additional procedures were used to ensure understanding?   (Please list):        The participant was consented in a private location, e.g. exam room, private office. Yes          *If no, what additional procedures were used to ensure privacy?   (Please list):             Were the participant's questions answered to his/her satisfaction? Yes     Were others involved in the decision making? No      If yes, who? / Relationship:      Was the participant given a current, stamped copy of the ICF? Yes     Version Date/Number: 1.1  Approval Date: 02/09/2024    ADDITIONAL DOCUMENTATION     The participant signed and dated the ICF before any study procedures were performed. Yes     The participant was given adequate time to review the ICF and ask questions. Yes         The participant was entered on the Enrollment Log. Yes     A copy of the consent form is filed in the   medical record. Yes           The current [facility] IRB consent template version is being used and appropriate signature blocks are present (i.e., LAR, next of kin, one parent, two parents, etc.) Yes              FORM COMPLETED BY: Maria Teresa French RN  DATE: 02/19/2024         Study procedures were initiated.  Contact info, HCP info and Medications were reviewed and updated.  Med Hx and Events questionnaires were completed.  Four events occurred since last study contact. Blood pressures and weight were obtained. Marcell  did not take BP meds prior to obtaining BP readings.     Vitals:    02/19/24 1212 02/19/24 1214 02/19/24 1216 02/19/24 1220   BP: 113/81 112/78 110/76 85/64   BP Location: Right arm Right arm Right arm Right arm   Patient Position: Sitting Sitting Sitting Standing   BP Cuff Size: Large adult long Large adult long Large adult long Large adult long   Pulse: 78 77 78 67   Weight:    117 kg (258 lb 2.5 oz)        Urine sample (approx. 140ml) was obtained at 1145 and placed on ice. Marcell commented about changes in her urine appearance.  She stated her toilet turns black whenever she voids.  She denies any burning or itching.  When asked if marcell was on any new medication, she did state that she recently was placed on Farxiga by her nephrologist.  Dr. Gerber was contacted and recommended the ppt see her PCP and to stop taking Farxiga until further notice.      Marcell signed medical SHER and was compensated (via cash) for today's visit.  Marcell was given token study gift, and then escorted to the outpatient lab for study bloodwork. S. creatinine and 5ml red top research tube were drawn at 1253. Marcell's last food consumption was at 2100 on 02/18/2024. Marcell was then discharged home and [facility]

## 2024-02-21 ENCOUNTER — OFFICE VISIT (OUTPATIENT)
Dept: PRIMARY CARE | Facility: CLINIC | Age: 69
End: 2024-02-21
Payer: MEDICAID

## 2024-02-21 VITALS
HEART RATE: 84 BPM | BODY MASS INDEX: 43.43 KG/M2 | WEIGHT: 261 LBS | SYSTOLIC BLOOD PRESSURE: 108 MMHG | DIASTOLIC BLOOD PRESSURE: 73 MMHG | TEMPERATURE: 96 F

## 2024-02-21 DIAGNOSIS — Z95.810 CARDIAC DEFIBRILLATOR IN PLACE: ICD-10-CM

## 2024-02-21 DIAGNOSIS — I48.91 ATRIAL FIBRILLATION, UNSPECIFIED TYPE (MULTI): ICD-10-CM

## 2024-02-21 DIAGNOSIS — R82.90 ABNORMAL URINE: Primary | ICD-10-CM

## 2024-02-21 LAB
POC APPEARANCE, URINE: ABNORMAL
POC BILIRUBIN, URINE: NEGATIVE
POC BLOOD, URINE: ABNORMAL
POC COLOR, URINE: YELLOW
POC GLUCOSE, URINE: ABNORMAL MG/DL
POC KETONES, URINE: NEGATIVE MG/DL
POC LEUKOCYTES, URINE: ABNORMAL
POC NITRITE,URINE: NEGATIVE
POC PH, URINE: 7 PH
POC PROTEIN, URINE: NEGATIVE MG/DL
POC SPECIFIC GRAVITY, URINE: 1.01
POC UROBILINOGEN, URINE: 1 EU/DL

## 2024-02-21 PROCEDURE — 1159F MED LIST DOCD IN RCRD: CPT | Performed by: FAMILY MEDICINE

## 2024-02-21 PROCEDURE — 1126F AMNT PAIN NOTED NONE PRSNT: CPT | Performed by: FAMILY MEDICINE

## 2024-02-21 PROCEDURE — 99214 OFFICE O/P EST MOD 30 MIN: CPT | Performed by: FAMILY MEDICINE

## 2024-02-21 PROCEDURE — 3074F SYST BP LT 130 MM HG: CPT | Performed by: FAMILY MEDICINE

## 2024-02-21 PROCEDURE — 1036F TOBACCO NON-USER: CPT | Performed by: FAMILY MEDICINE

## 2024-02-21 PROCEDURE — 87186 SC STD MICRODIL/AGAR DIL: CPT

## 2024-02-21 PROCEDURE — 3078F DIAST BP <80 MM HG: CPT | Performed by: FAMILY MEDICINE

## 2024-02-21 PROCEDURE — 87086 URINE CULTURE/COLONY COUNT: CPT

## 2024-02-21 PROCEDURE — 3008F BODY MASS INDEX DOCD: CPT | Performed by: FAMILY MEDICINE

## 2024-02-21 PROCEDURE — 81003 URINALYSIS AUTO W/O SCOPE: CPT | Performed by: FAMILY MEDICINE

## 2024-02-21 ASSESSMENT — PAIN SCALES - GENERAL: PAINLEVEL: 0-NO PAIN

## 2024-02-21 NOTE — PROGRESS NOTES
"Subjective   Patient ID: Trinidad Hines is a 68 y.o. female who presents for a urine check.    HPI    The patient is a 67-year-old female with morbid obesity, paroxysmal atrial fibrillation on anticoagulation, dual-chamber ICD,non ischemic cardiomyopathy, heart failure with reduced EF, nonobstructive CAD, CVA \"94, CKD, HTN, QI on CPAP machine, who is here for the evaluation of her urine.  She states that the urine looks weird according to her.  No associated foul smell.     A review of system was completed.  All systems were reviewed and were normal, except for the ones that are listed in the HPI.    Objective   Physical Exam  Constitutional:       Appearance: Normal appearance.   HENT:      Head: Normocephalic and atraumatic.      Right Ear: Tympanic membrane, ear canal and external ear normal.      Left Ear: Tympanic membrane, ear canal and external ear normal.      Nose: Nose normal.      Mouth/Throat:      Mouth: Mucous membranes are moist.      Pharynx: Oropharynx is clear.   Eyes:      Extraocular Movements: Extraocular movements intact.      Conjunctiva/sclera: Conjunctivae normal.      Pupils: Pupils are equal, round, and reactive to light.   Cardiovascular:      Rate and Rhythm: Normal rate and regular rhythm.      Pulses: Normal pulses.   Pulmonary:      Effort: Pulmonary effort is normal.      Breath sounds: Normal breath sounds.   Abdominal:      General: Abdomen is flat. Bowel sounds are normal.      Palpations: Abdomen is soft.   Musculoskeletal:         General: Normal range of motion.      Cervical back: Normal range of motion and neck supple.   Skin:     General: Skin is warm.   Neurological:      General: No focal deficit present.      Mental Status: She is alert and oriented to person, place, and time. Mental status is at baseline.   Psychiatric:         Mood and Affect: Mood normal.         Behavior: Behavior normal.         Thought Content: Thought content normal.         Judgment: Judgment " normal.     Assessment/Plan   Problem List Items Addressed This Visit       Atrial fibrillation (CMS/[facility])    Cardiac defibrillator in place     -Letter stating that the patient has a defibrillator in place given to her today.         Abnormal urine - Primary     -UA with C+S ordered.          Relevant Orders    POCT UA Automated manually resulted    Urine Culture      Patient to return to office IN 4- 6 MONTHS FOR ROUTINE CARE.

## 2024-02-23 ENCOUNTER — HOSPITAL ENCOUNTER (EMERGENCY)
Facility: HOSPITAL | Age: 69
End: 2024-02-23
Payer: MEDICAID

## 2024-02-23 ENCOUNTER — HOSPITAL ENCOUNTER (OUTPATIENT)
Dept: RADIOLOGY | Facility: CLINIC | Age: 69
Discharge: HOME | End: 2024-02-23
Payer: MEDICAID

## 2024-02-23 VITALS — BODY MASS INDEX: 43.34 KG/M2 | WEIGHT: 260.14 LBS | HEIGHT: 65 IN

## 2024-02-23 DIAGNOSIS — I50.22 CHRONIC SYSTOLIC HEART FAILURE (MULTI): Primary | ICD-10-CM

## 2024-02-23 DIAGNOSIS — I48.19 PERSISTENT ATRIAL FIBRILLATION (MULTI): ICD-10-CM

## 2024-02-23 DIAGNOSIS — Z12.31 VISIT FOR SCREENING MAMMOGRAM: ICD-10-CM

## 2024-02-23 LAB — BACTERIA UR CULT: ABNORMAL

## 2024-02-23 PROCEDURE — 77067 SCR MAMMO BI INCL CAD: CPT | Performed by: RADIOLOGY

## 2024-02-23 PROCEDURE — 77063 BREAST TOMOSYNTHESIS BI: CPT | Performed by: RADIOLOGY

## 2024-02-23 PROCEDURE — 77063 BREAST TOMOSYNTHESIS BI: CPT

## 2024-02-25 DIAGNOSIS — R82.90 ABNORMAL URINE: Primary | ICD-10-CM

## 2024-02-25 RX ORDER — CIPROFLOXACIN 250 MG/1
250 TABLET, FILM COATED ORAL 2 TIMES DAILY
Qty: 14 TABLET | Refills: 0 | Status: SHIPPED | OUTPATIENT
Start: 2024-02-25 | End: 2024-03-03

## 2024-02-26 DIAGNOSIS — R92.8 ABNORMAL MAMMOGRAM OF RIGHT BREAST: Primary | ICD-10-CM

## 2024-02-26 DIAGNOSIS — R92.8 ABNORMAL MAMMOGRAM: ICD-10-CM

## 2024-02-27 ENCOUNTER — TELEPHONE (OUTPATIENT)
Dept: PRIMARY CARE | Facility: CLINIC | Age: 69
End: 2024-02-27
Payer: MEDICAID

## 2024-02-27 DIAGNOSIS — N30.00 ACUTE CYSTITIS WITHOUT HEMATURIA: Primary | ICD-10-CM

## 2024-02-27 NOTE — TELEPHONE ENCOUNTER
Patient called in stating that her insurance wouldn't cover ciprofloxacin and wanted to know if she can get something her insurance would cover. Thank you

## 2024-02-28 PROBLEM — N30.00 ACUTE CYSTITIS WITHOUT HEMATURIA: Status: ACTIVE | Noted: 2024-02-28

## 2024-02-28 RX ORDER — NITROFURANTOIN 25; 75 MG/1; MG/1
100 CAPSULE ORAL 2 TIMES DAILY
Qty: 20 CAPSULE | Refills: 0 | Status: SHIPPED | OUTPATIENT
Start: 2024-02-28 | End: 2024-03-09

## 2024-03-01 ENCOUNTER — APPOINTMENT (OUTPATIENT)
Dept: RADIOLOGY | Facility: CLINIC | Age: 69
End: 2024-03-01
Payer: MEDICAID

## 2024-03-01 ENCOUNTER — HOSPITAL ENCOUNTER (OUTPATIENT)
Dept: RADIOLOGY | Facility: CLINIC | Age: 69
Discharge: HOME | End: 2024-03-01
Payer: MEDICAID

## 2024-03-01 DIAGNOSIS — R92.8 ABNORMAL MAMMOGRAM OF RIGHT BREAST: ICD-10-CM

## 2024-03-01 PROCEDURE — 76642 ULTRASOUND BREAST LIMITED: CPT | Mod: RIGHT SIDE | Performed by: STUDENT IN AN ORGANIZED HEALTH CARE EDUCATION/TRAINING PROGRAM

## 2024-03-01 PROCEDURE — 76642 ULTRASOUND BREAST LIMITED: CPT | Mod: RT

## 2024-03-01 PROCEDURE — 77065 DX MAMMO INCL CAD UNI: CPT | Mod: RIGHT SIDE | Performed by: STUDENT IN AN ORGANIZED HEALTH CARE EDUCATION/TRAINING PROGRAM

## 2024-03-01 PROCEDURE — 76982 USE 1ST TARGET LESION: CPT | Mod: RT

## 2024-03-01 PROCEDURE — G0279 TOMOSYNTHESIS, MAMMO: HCPCS | Mod: RIGHT SIDE | Performed by: STUDENT IN AN ORGANIZED HEALTH CARE EDUCATION/TRAINING PROGRAM

## 2024-03-01 PROCEDURE — 77061 BREAST TOMOSYNTHESIS UNI: CPT | Mod: RT

## 2024-03-04 ENCOUNTER — HOSPITAL ENCOUNTER (OUTPATIENT)
Dept: CARDIOLOGY | Facility: CLINIC | Age: 69
Discharge: HOME | End: 2024-03-04
Payer: MEDICAID

## 2024-03-04 ENCOUNTER — ANTICOAGULATION - WARFARIN VISIT (OUTPATIENT)
Dept: CARDIOLOGY | Facility: CLINIC | Age: 69
End: 2024-03-04
Payer: MEDICAID

## 2024-03-04 DIAGNOSIS — I48.19 PERSISTENT ATRIAL FIBRILLATION (MULTI): ICD-10-CM

## 2024-03-04 DIAGNOSIS — I50.22 CHRONIC SYSTOLIC HEART FAILURE (MULTI): ICD-10-CM

## 2024-03-04 DIAGNOSIS — I48.91 ATRIAL FIBRILLATION, UNSPECIFIED TYPE (MULTI): Primary | ICD-10-CM

## 2024-03-04 LAB
AORTIC VALVE MEAN GRADIENT: 3.9 MMHG
AORTIC VALVE PEAK VELOCITY: 1.38 M/S
AV PEAK GRADIENT: 7.6 MMHG
AVA (PEAK VEL): 2.2 CM2
AVA (VTI): 2.07 CM2
EJECTION FRACTION APICAL 4 CHAMBER: 31.5
EJECTION FRACTION: 34 %
LEFT ATRIUM VOLUME AREA LENGTH INDEX BSA: 41.2 ML/M2
LEFT VENTRICLE INTERNAL DIMENSION DIASTOLE: 5.47 CM (ref 3.5–6)
LEFT VENTRICULAR OUTFLOW TRACT DIAMETER: 2.11 CM
MITRAL VALVE E/A RATIO: 3.69
MITRAL VALVE E/E' RATIO: 10.04
POC INR: 2.7
POC PROTHROMBIN TIME: NORMAL
RIGHT VENTRICLE FREE WALL PEAK S': 9 CM/S
RIGHT VENTRICLE PEAK SYSTOLIC PRESSURE: 39.9 MMHG
TRICUSPID ANNULAR PLANE SYSTOLIC EXCURSION: 1.6 CM

## 2024-03-04 PROCEDURE — 93306 TTE W/DOPPLER COMPLETE: CPT | Performed by: INTERNAL MEDICINE

## 2024-03-04 PROCEDURE — 93306 TTE W/DOPPLER COMPLETE: CPT

## 2024-03-04 PROCEDURE — 85610 PROTHROMBIN TIME: CPT | Mod: QW

## 2024-03-04 PROCEDURE — 99211 OFF/OP EST MAY X REQ PHY/QHP: CPT | Performed by: INTERNAL MEDICINE

## 2024-03-04 NOTE — PROGRESS NOTES
Patient identification verified with 2 identifiers.    Location: [facility] - [address] [address] [phone] option #1     Referring Physician: JOCELYNE  Enrollment/ Re-enrollment date: 24  INR Goal: 2.0-3.0  INR monitoring is per [facility] protocol.  Anticoagulation Medication: warfarin  Indication: Atrial Fibrillation/Atrial Flutter    Subjective   Bleeding signs/symptoms: No    Bruising: No   Major bleeding event: No  Thrombosis signs/symptoms: No  Thromboembolic event: No  Missed doses: No  Extra doses: No  Medication changes: Yes  started antibiotic 4 days ago.  Dietary changes: No  Change in health: No  Change in activity: No  Alcohol: No  Other concerns: No    Upcoming Surgeries:  Does the Patient Have any upcoming surgeries that require interruption in anticoagulation therapy? no  Does the patient require bridging? no      Anticoagulation Summary  As of 3/4/2024      INR goal:  2.0-3.0   TTR:  78.2 % (5.2 mo)   INR used for dosin.70 (3/4/2024)   Weekly warfarin total:  35 mg               Assessment/Plan    therapeutic     Bloodshot eye. No injury. No visual disturbance. No pain. Seeing eye doctor tomorrow.  1. New dose:  no change    2. Next INR: 2 weeks  Pt prefers a Saturday appointment      Education provided to patient during the visit:  Patient instructed to call in interim with questions, concerns and changes.

## 2024-03-07 ENCOUNTER — TELEPHONE (OUTPATIENT)
Dept: PRIMARY CARE | Facility: CLINIC | Age: 69
End: 2024-03-07

## 2024-03-09 ENCOUNTER — APPOINTMENT (OUTPATIENT)
Dept: CARDIOLOGY | Facility: CLINIC | Age: 69
End: 2024-03-09
Payer: MEDICAID

## 2024-03-11 DIAGNOSIS — I42.9 CARDIOMYOPATHY, UNSPECIFIED TYPE (MULTI): ICD-10-CM

## 2024-03-14 RX ORDER — ALBUTEROL SULFATE 90 UG/1
AEROSOL, METERED RESPIRATORY (INHALATION)
Qty: 18 G | Refills: 2 | Status: SHIPPED | OUTPATIENT
Start: 2024-03-14

## 2024-03-21 DIAGNOSIS — N18.32 HYPERTENSIVE KIDNEY DISEASE WITH STAGE 3B CHRONIC KIDNEY DISEASE (MULTI): ICD-10-CM

## 2024-03-21 DIAGNOSIS — I12.9 HYPERTENSIVE KIDNEY DISEASE WITH STAGE 3B CHRONIC KIDNEY DISEASE (MULTI): ICD-10-CM

## 2024-03-23 ENCOUNTER — ANTICOAGULATION - WARFARIN VISIT (OUTPATIENT)
Dept: CARDIOLOGY | Facility: CLINIC | Age: 69
End: 2024-03-23
Payer: MEDICAID

## 2024-03-23 DIAGNOSIS — I48.91 ATRIAL FIBRILLATION, UNSPECIFIED TYPE (MULTI): ICD-10-CM

## 2024-03-23 LAB
POC INR: 2.3
POC PROTHROMBIN TIME: NORMAL

## 2024-03-23 PROCEDURE — 85610 PROTHROMBIN TIME: CPT | Mod: QW

## 2024-03-23 NOTE — PROGRESS NOTES
Patient identification verified with 2 identifiers.    Location: [facility] - [address] [address] [phone] option #1     Referring Physician: DR. KASH CASANOVA  Enrollment/ Re-enrollment date: 2024   INR Goal: 2.0-3.0  INR monitoring is per [facility] protocol.  Anticoagulation Medication: warfarin  Indication: Atrial Fibrillation/Atrial Flutter    Subjective   Bleeding signs/symptoms: No    Bruising: No   Major bleeding event: No  Thrombosis signs/symptoms: No  Thromboembolic event: No  Missed doses: No  Extra doses: No  Medication changes: No  Dietary changes: No  Change in health: No  Change in activity: No  Alcohol: No  Other concerns: No    Upcoming Procedures:  Does the Patient Have any upcoming procedures that require interruption in anticoagulation therapy? no  Does the patient require bridging? no      Anticoagulation Summary  As of 3/23/2024      INR goal:  2.0-3.0   TTR:  80.5 % (5.8 mo)   INR used for dosin.30 (3/23/2024)   Weekly warfarin total:  35 mg               Assessment/Plan   Therapeutic     1. New dose: no change    2. Next INR: 1 month      Education provided to patient during the visit:  Patient instructed to call in interim with questions, concerns and changes.   Patient educated on compliance with dosing, follow up appointments, and prescribed plan of care.

## 2024-03-25 NOTE — PROGRESS NOTES
[facility]  Trinidad Hines female   1955 69 y.o.  [id number]      Chief Complaint  New patient, biopsy consultation.    History Of Present Illness  Trinidad Hines is a very pleasant 69 y.o. AA female seen in the breast center for biopsy consultation. She denies breast surgery or biopsy. She has no family history of breast cancer.     BREAST IMAGING: 3/7/2024 Right diagnostic mammogram with ultrasound, indicates BI-RADS Category 4. Suspicious right breast mass without axillary lymphadenopathy. Further evaluation with surgical consultation and ultrasound-guided biopsy is recommended.     Probably benign right breast calcifications, for which six-month mammographic follow-up is recommended.    REPRODUCTIVE HISTORY: menarche age 12, , first birth age 14, did not breastfeed, OCP's x 0-5 years, natural menopause age 39, no HRT, scattered fibroglandular tissue    FAMILY CANCER HISTORY:   Daughter: cancer unknown type, age 38  Sister: cancer unknown type  Brother: cancer unknown type  Niece: cancer unknown type    Review of Systems  Constitutional:  Negative for appetite change, fatigue, fever and unexpected weight change.   HENT:  Negative for ear pain, hearing loss, nosebleeds, sore throat and trouble swallowing.    Eyes:  Negative for discharge, itching and visual disturbance.   Breast: As stated in HPI.  Respiratory:  Negative for cough, chest tightness and shortness of breath.    Cardiovascular:  Negative for chest pain, palpitations and leg swelling.   Gastrointestinal:  Negative for abdominal pain, constipation, diarrhea and nausea.   Endocrine: Negative for cold intolerance and heat intolerance.   Genitourinary:  Negative for dysuria, frequency, hematuria, pelvic pain and vaginal bleeding.   Musculoskeletal:  Negative for arthralgias, back pain, gait problem, joint swelling and myalgias.   Skin:  Negative for color change and rash.   Allergic/Immunologic: Negative for environmental  allergies and food allergies.   Neurological:  Negative for dizziness, tremors, speech difficulty, weakness, numbness and headaches.   Hematological:  Does not bruise/bleed easily.   Psychiatric/Behavioral:  Negative for agitation, dysphoric mood and sleep disturbance. The patient is not nervous/anxious.       Past Medical History  She has a past medical history of Cardiomyopathy (CMS/[facility]), Chronic kidney disease, stage 3 unspecified (CMS/[facility]) (11/02/2022), Encounter for screening for depression (05/06/2022), Encounter for screening for malignant neoplasm of colon (01/19/2022), Nontraumatic intracerebral hemorrhage, unspecified (CMS/[facility]), Personal history of other diseases of the respiratory system (12/14/2021), Personal history of other diseases of urinary system (11/02/2022), Personal history of urinary (tract) infections, Presence of automatic (implantable) cardiac defibrillator (05/06/2022), and Urinary tract infection, site not specified (11/07/2022).    Surgical History  She has a past surgical history that includes Aortic valve replacement (02/05/2015); Total knee arthroplasty (04/29/2015); and Cardiac electrophysiology procedure (N/A, 10/30/2023).    Family History  Cancer-related family history is not on file.     Social History  She reports that she has never smoked. She has never used smokeless tobacco. She reports that she does not drink alcohol and does not use drugs.    Allergies  Aspirin, Diltiazem hcl, Dofetilide, Phenytoin, and Lisinopril    Medications  Current Outpatient Medications   Medication Instructions    albuterol 90 mcg/actuation inhaler INAHEL 2 PUFFS EVERY 4 HOURS AS NEEDED FOR WHEEZING    allopurinol (ZYLOPRIM) 300 mg, oral, Daily    amiodarone (PACERONE) 200 mg, oral, Daily    calcitriol (ROCALTROL) 0.25 mcg, oral, Daily    Farxiga 5 mg, oral, Daily    lidocaine (Xylocaine) 5 % ointment 1 Application, Topical, 3 times daily PRN    metoprolol succinate XL (TOPROL-XL) 100 mg, oral, Daily     sacubitriL-valsartan (Entresto) 49-51 mg tablet 1 tablet, oral, 2 times daily    simvastatin (ZOCOR) 20 mg, oral, Nightly    spironolactone (ALDACTONE) 25 mg, oral, Daily    torsemide (DEMADEX) 20 mg, oral, Daily    traMADol (ULTRAM) 50 mg, oral, Every 6 hours PRN    warfarin (Coumadin) 5 mg tablet TAKE 1.5 TABLET DAILY AS DIRECTED BY COUMADIN CLINIC       Last Recorded Vitals  Vitals:    03/27/24 0850   BP: 116/80   Pulse: 78       Physical Exam  Patient is alert and oriented x3 and in a relaxed and appropriate mood. Her gait is unsteady and uses a wheeled walker. Sclera is clear. The breasts are nearly symmetrical. The tissue is soft without palpable abnormalities, discrete nodules or masses. The skin and nipples appear normal. There is no cervical, supraclavicular or axillary lymphadenopathy. Heart rate and rhythm normal, S1 and S2 appreciated. The lungs are clear to auscultation bilaterally. Abdomen is soft and non-tender.      Relevant Results and Imaging  Study Result    Narrative & Impression   Interpreted By:  Paty Montes,  and Duarte Fuller   STUDY:  BI US BREAST LIMITED RIGHT; BI MAMMO RIGHT DIAGNOSTIC TOMOSYNTHESIS;  3/1/2024 10:54 am; 3/1/2024 10:14 am      ACCESSION NUMBER(S):  [id number]; [id number]      ORDERING CLINICIAN:  ALLA ANGELES      INDICATION:  Patient was recalled from screening mammogram dated 02/23/2024 for a  right breast mass and right breast calcifications.      COMPARISON:  02/23/2024, 2/2/2022.      FINDINGS:  MAMMOGRAPHY: 2D and tomosynthesis images of the right breast were  reviewed at 1 mm slice thickness.      Density:  There are areas of scattered fibroglandular tissue.      A spiculated mass is noted in the inferolateral right breast at mid  depth.      Grouped calcifications are noted of the mid central right breast at  mid to posterior depth, which appear coarse on additional spot  magnification views. These calcifications appear slightly  increased  when compared to screening mammogram in 2022.      ULTRASOUND: Targeted ultrasound was performed of the inferolateral  right breast by a registered sonographer with elastography. At the 8  o'clock position 5 cm from the nipple, an irregular, anti parallel,  microlobulated mass is noted with posterior shadowing. It measures  0.4 x 0.4 x 0.4 cm. It is hard on elastography and has a small amount  of internal vascularity.      Targeted ultrasound of the right axilla demonstrates 2  morphologically normal lymph nodes.      IMPRESSION:  1. Suspicious right breast mass without axillary lymphadenopathy.  Further evaluation with surgical consultation and ultrasound-guided  biopsy is recommended. Dr. Paty Montes explained the findings and  recommendations to the patient and the patient's daughter at the time  of exam. A message was sent to the referring practitioner at the time  of this dictation regarding these findings using the epic critical  findings reporting system. A pre-procedure form was filled out.  2. Probably benign right breast calcifications, for which six-month  mammographic follow-up is recommended.      BI-RADS CATEGORY:      BI-RADS Category:  4 Suspicious.  Recommendation:  Surgical Consultation and Biopsy.  Recommended Date:  Immediate.  Laterality:  Right.      For any future breast imaging appointments, please call [phone] ([phone]).      I personally reviewed the images/study and Dr. Ramachandran's  interpretation and I agree with the findings as stated. This study  was interpreted at [facility],  [place], [place].      Method of Detection: Category Sdbt - 3D Screening      MACRO:  Critical Finding:  See findings. Notification was initiated on  3/1/2024 at 11:28 am by Dr. Alina Ramachandran.  (**-YCF-**)  Instructions:  See Impression for specific recommendations.      Signed by: Paty Montes 3/1/2024 11:42 AM     I explained the results in  depth, along with suggested explanation for follow up recommendations based on the testing results. BI-RADS Category 4    Visit Diagnosis  1. Abnormal finding on breast imaging          Assessment/Plan  Abnormal mammogram, indeterminate right breast mass, probably benign right breast calcifications, no breast surgery or biopsy, no family history of breast cancer, scattered fibroglandular tissue    Plan:  Right breast stereotactic guided core biopsy. Return September 2024 for right diagnostic mammogram and office visit.     Patient Discussion/Summary  Proceed to biopsy. A breast radiology physician will perform the biopsy. Results are usually available in about 7 business days. I will call patient with results and instruct on next steps and plan.     IMPORTANT INFORMATION REGARDING YOUR RESULTS    If you receive medical information from My [facility] Personal Health Record (online chart) your results will be released into your chart. This means you may view or see results of your biopsy or procedure before I contact you directly. If this occurs, please call the office and we will discuss your results over the phone.    You can see your health information, review clinical summaries from office visits & test results online when you follow your health with MY  Chart, a personal health record. To sign up go to [email or web address]. If you need assistance with signing up or trouble getting into your account call [employer] Patient Line 24/7 at [phone].    My office phone number is [phone]  if you need to get in touch with me or have additional questions or concerns. Thank you for choosing [facility] and trusting me as your healthcare provider. I look forward to seeing you again at your next office visit. I am honored to be a provider on your health care team and I remain dedicated to helping you achieve your health goals.       Crystal Washington, DOMINICK-CNP

## 2024-03-26 RX ORDER — DAPAGLIFLOZIN 5 MG/1
5 TABLET, FILM COATED ORAL DAILY
Qty: 90 TABLET | Refills: 1 | Status: SHIPPED | OUTPATIENT
Start: 2024-03-26 | End: 2024-04-17 | Stop reason: SDUPTHER

## 2024-03-27 ENCOUNTER — HOSPITAL ENCOUNTER (OUTPATIENT)
Dept: RADIOLOGY | Facility: CLINIC | Age: 69
Discharge: HOME | End: 2024-03-27
Payer: MEDICAID

## 2024-03-27 ENCOUNTER — OFFICE VISIT (OUTPATIENT)
Dept: SURGICAL ONCOLOGY | Facility: CLINIC | Age: 69
End: 2024-03-27
Payer: MEDICAID

## 2024-03-27 VITALS
HEART RATE: 78 BPM | BODY MASS INDEX: 45.73 KG/M2 | SYSTOLIC BLOOD PRESSURE: 116 MMHG | WEIGHT: 267.86 LBS | HEIGHT: 64 IN | DIASTOLIC BLOOD PRESSURE: 80 MMHG

## 2024-03-27 DIAGNOSIS — R92.8 OTHER ABNORMAL AND INCONCLUSIVE FINDINGS ON DIAGNOSTIC IMAGING OF BREAST: ICD-10-CM

## 2024-03-27 DIAGNOSIS — R92.8 ABNORMAL FINDING ON BREAST IMAGING: Primary | ICD-10-CM

## 2024-03-27 PROCEDURE — 19083 BX BREAST 1ST LESION US IMAG: CPT | Mod: RT

## 2024-03-27 PROCEDURE — 1036F TOBACCO NON-USER: CPT | Performed by: NURSE PRACTITIONER

## 2024-03-27 PROCEDURE — 1159F MED LIST DOCD IN RCRD: CPT | Performed by: NURSE PRACTITIONER

## 2024-03-27 PROCEDURE — 88305 TISSUE EXAM BY PATHOLOGIST: CPT | Performed by: PATHOLOGY

## 2024-03-27 PROCEDURE — 96372 THER/PROPH/DIAG INJ SC/IM: CPT | Performed by: STUDENT IN AN ORGANIZED HEALTH CARE EDUCATION/TRAINING PROGRAM

## 2024-03-27 PROCEDURE — C1819 TISSUE LOCALIZATION-EXCISION: HCPCS

## 2024-03-27 PROCEDURE — 88360 TUMOR IMMUNOHISTOCHEM/MANUAL: CPT | Mod: TC,AHULAB | Performed by: NURSE PRACTITIONER

## 2024-03-27 PROCEDURE — 88342 IMHCHEM/IMCYTCHM 1ST ANTB: CPT | Performed by: PATHOLOGY

## 2024-03-27 PROCEDURE — 3074F SYST BP LT 130 MM HG: CPT | Performed by: NURSE PRACTITIONER

## 2024-03-27 PROCEDURE — 3079F DIAST BP 80-89 MM HG: CPT | Performed by: NURSE PRACTITIONER

## 2024-03-27 PROCEDURE — 88341 IMHCHEM/IMCYTCHM EA ADD ANTB: CPT | Performed by: PATHOLOGY

## 2024-03-27 PROCEDURE — 99214 OFFICE O/P EST MOD 30 MIN: CPT | Mod: 25 | Performed by: NURSE PRACTITIONER

## 2024-03-27 PROCEDURE — 2720000007 HC OR 272 NO HCPCS

## 2024-03-27 PROCEDURE — 1126F AMNT PAIN NOTED NONE PRSNT: CPT | Performed by: NURSE PRACTITIONER

## 2024-03-27 PROCEDURE — 3008F BODY MASS INDEX DOCD: CPT | Performed by: NURSE PRACTITIONER

## 2024-03-27 PROCEDURE — A4648 IMPLANTABLE TISSUE MARKER: HCPCS

## 2024-03-27 PROCEDURE — 99204 OFFICE O/P NEW MOD 45 MIN: CPT | Performed by: NURSE PRACTITIONER

## 2024-03-27 PROCEDURE — 2500000005 HC RX 250 GENERAL PHARMACY W/O HCPCS: Performed by: STUDENT IN AN ORGANIZED HEALTH CARE EDUCATION/TRAINING PROGRAM

## 2024-03-27 PROCEDURE — 88360 TUMOR IMMUNOHISTOCHEM/MANUAL: CPT | Performed by: PATHOLOGY

## 2024-03-27 PROCEDURE — 77065 DX MAMMO INCL CAD UNI: CPT | Mod: RT

## 2024-03-27 PROCEDURE — 19083 BX BREAST 1ST LESION US IMAG: CPT | Mod: RIGHT SIDE | Performed by: STUDENT IN AN ORGANIZED HEALTH CARE EDUCATION/TRAINING PROGRAM

## 2024-03-27 PROCEDURE — 77065 DX MAMMO INCL CAD UNI: CPT | Mod: RIGHT SIDE | Performed by: STUDENT IN AN ORGANIZED HEALTH CARE EDUCATION/TRAINING PROGRAM

## 2024-03-27 RX ADMIN — Medication 10 ML: at 09:51

## 2024-03-27 ASSESSMENT — PAIN SCALES - GENERAL
PAINLEVEL_OUTOF10: 0 - NO PAIN
PAINLEVEL: 0-NO PAIN
PAINLEVEL_OUTOF10: 0 - NO PAIN

## 2024-03-27 NOTE — DISCHARGE INSTRUCTIONS
AFTER THE TEST  A steri-strip and bandage will be placed over the incision. You may shower after 24 hours. Remove bandage after 24 hours. Remove bandage after the shower. Leave the steri-strips in place to fall off on their own. If after 1 week the steri-strips are still on, you may remove them. Avoid swimming or soaking in tub for 3 days.     You may have mild discomfort at the test site. If needed, you may take Tylenol (Acetaminophen) for pain. Please avoid taking NSAIDs, Motrin, Advil, Aleve, or ibuprofen for 24 hours following the biopsy. After 24 hours you may resume NSAIDSs.     If you take aspirin, Plavix, Coumadin, Xarelto or Eliquis please tell us. If these medications were stopped by your provider, please ask them when to resume.     You may have some tenderness, bruising or slight bleeding at the site. Please apply ice packs to the site for 15 minutes on and 15 minutes off for a 2 hour minimum.     Most people can return to their usual routine after the procedure. Avoid Strenuous activity for 24 hours.     Sleep in a bra the night after your biopsy. Continue to do so for comfort.     Call your provider if you have any of the following symptoms :  Fever  Increased pain  Increased bleeding  Redness  Increased swelling  Yellowish drainage  Your provider will get the biopsy results within 5 - 7 days. Call your provider with any questions.     Patient education brochure and pain/comfort measures have been reviewed.   Phone number provided to contact Breast Center if problems arise.     Patient verbalized understanding of home going instructions.    Patient left breast center per walker accompanied by her daughter at 1025.

## 2024-04-03 ENCOUNTER — TELEPHONE (OUTPATIENT)
Dept: SURGICAL ONCOLOGY | Facility: HOSPITAL | Age: 69
End: 2024-04-03
Payer: MEDICAID

## 2024-04-03 LAB
LAB AP ASR DISCLAIMER: NORMAL
LABORATORY COMMENT REPORT: NORMAL
PATH REPORT.ADDENDUM SPEC: NORMAL
PATH REPORT.FINAL DX SPEC: NORMAL
PATH REPORT.GROSS SPEC: NORMAL
PATH REPORT.TOTAL CANCER: NORMAL

## 2024-04-03 NOTE — TELEPHONE ENCOUNTER
Result Communication    Spoke with Trinidad Hines and daughter Tessy regarding breast biopsy results showing cancer. Surgical consult is recommended and office will call to schedule the pt.       Resulted Orders   Surgical Pathology Exam   Result Value Ref Range    Case Report       Surgical Pathology                                Case: [id number]                                  Authorizing Provider:  MARCELO Ramirez    Collected:           03/27/2024 0958              Ordering Location:     [facility]       Received:            03/27/2024 1221                                     Center                                                                       Pathologist:           Sobeida Knight MD                                                           Specimen:    BREAST CORE BIOPSY RIGHT, Right Breast 8:00 5CM FN                                         FINAL DIAGNOSIS       A. Right breast mass, 8:00, 5 cm from nipple, core needle biopsy:  -- Invasive ductal carcinoma, grade 1, see note.    Note: Immnostains for p63 and SMMHC show absent myoepithelial cell layer in the invasive carcinoma, supporting the diagnosis. In this limited sample, the invasive carcinoma measures up to 3 mm in greatest dimension.  ER, GA and HER2 will be reported in an addendum.    This case was shown at the breast consensus conference via zoom meeting.                    By the signature on this report, the individual or group listed as making the Final Interpretation/Diagnosis certifies that they have reviewed this case.       Addendum       Surgical/Block Number: [id number]  Specimen Site: Right breast mass 8:00, 5 cm from nipple  Specimen Type: biopsy    Estrogen Receptor (clone SP1):  Positive         Percentage with nuclear staining: >95%        Intensity of staining: Strong    Progesterone Receptor (clone SP2): Positive                                                                         Percentage with  nuclear stainin%        Intensity of staining: Strong    HER2 (clone 4B5):    Negative (1+)          Comment:   Internal positive controls were identified in this specimen.   The time the tissue was put in formalin was not provided for this sample.        For ER: Ranges for interpretation: Invasive carcinoma cells exhibiting greater than or equal to 10% nuclear staining are considered POSITIVE. Invasive carcinoma cells exhibiting less than 10%, but greater than or equal to 1% are considered LOW POSITIVE. Invasive carcinoma cells exhibiting less than 1% staining are considered NEGATIVE. (Reference: Arch Pathol Lab Med. doi:10.5858/arpa. 2641-2189-7L) The stated steroid receptor activity for ER was derived from rabbit monoclonal antibody staining (clone SP1) on formalin fixed, paraffin embedded specimens, unless otherwise noted.  Each assay is performed using appropriate positive and negative internal controls.    For ND: Ranges for interpretation: Invasive carcinoma cells exhibiting greater than or equal to 1% nuclear staining are considered POSITIVE.  Invasive carcinoma cells exhibiting less than 1% staining are considered NEGATIVE. (Reference: Arch Pathol Lab Med. doi:10.5858/arpa. [id number]) The stated steroid receptor activity for ND was derived from rabbit monoclonal antibody staining (clone 1E2) on formalin fixed, paraffin embedded specimens, unless otherwise noted.  The method employed was a standard peroxidase labeled polymer detection system. Each assay is performed using appropriate positive and negative internal controls.    For HER2: Ranges for interpretation: POSITIVE (3+): greater than or equal to 10% tumor cells with intense and uniform staining; EQUIVOCAL (2+): weak to moderate complete immunoreactivity in >10% of tumor cells or circumferential intense staining in less than or equal to 10% of cells; and NEGATIVE (1+): Faint weak immunoreactivity in >10% of tumor cells, but only a portion of  "the membrane is positive; NEGATIVE (0):No immunoreactivity or immunoreactivity in less than or equal to 10% of tumor cells. All tests are performed using a [place] [employer] HER-2/yue (4B5) rabbit monoclonal primary antibody on formalin fixed, paraffin embedded tissue, unless otherwise noted. Only invasive carcinoma is evaluated using the ASCO/CAP scoring system (Arch Pathol Lab Med 2018; 142: 6078-2026) unless otherwise specified.  External cell culture and tissue controls stain appropriately.       Gross Description       A: Received in formalin, labeled with the patient´s name and hospital number and \" \"right breast 8:00 5 cm FN\", are multiple irregular/cylindrical segments of yellow-white fatty soft tissue aggregating to 1.7 x 0.7 x 0.2 cm.  The specimen is submitted in toto in one cassette.  DMB    Ischemia time: 3/27/2024 958  This specimen was placed into formalin at: 3/27/2024 no time provided on the requisition.         Disclaimer       One or more of the reagents used to perform assays on this specimen MAY have contained components considered to be analyte specific reagents (ASR's).  ASR's have not been cleared or approved by the U.S. Food and Drug Administration.  These assays were developed and their performance characteristics determined by the Department of Pathology at [facility] [place]. The FDA does not require this test to go through premarket FDA review. This test is used for clinical purposes. It should not be regarded as investigational or for research. This laboratory is certified under the Clinical Laboratory Improvement Amendments (CLIA) as qualified to perform high complexity clinical laboratory testing.  The assays were performed with appropriate positive and negative controls which stained appropriately.         2:09 PM    "

## 2024-04-15 NOTE — PROGRESS NOTES
Chief Complaint  New right breast cancer  Additional right breast calcifications    History of Present Illness    Referring Provider:   CLAUDIA Washington  PCP A Grey Granados    69-year-old -American female here with new right breast cancer  cT1 cN0 Grade 1 invasive ductal carcinoma.  ER greater than 95, MS 95, HER2 negative.   Additional right breast calcifications  Here w daughter.     History:  1) No abnormal mammograms, breast biopsies, or breast surgeries.  2) 2/23/2024.  Bilateral screening mammogram.  Left cardiac device.  Scattered fibroglandular tissue bilaterally.  Left breast negative.  Indeterminate right breast mass.  Indeterminate right breast calcifications.  BI-RADS 0.  3) March 1, 2024.  Right breast diagnostic imaging.  Right breast calcifications are probably benign.  6-month follow-up is recommended.  Right breast ultrasound.  At 8:00 5 cm from the nipple a 0.4 x 0.4 x 0.4 cm indeterminate mass is noted, BI-RADS 4.  Right axillary ultrasound is negative.  4) 3/27/2024.  Right breast mass 8:00 5 cm from the nipple biopsy.  Grade 1 invasive ductal carcinoma.  ER greater than 95, MS 95, HER2 negative.  Open coil HydroMARK.  5) reviewed films. Calcs are low suspicion. 6 mo FU vs can be localized for excision    She presents today for further management and surgical discussion.    Ob/gyn history:  Menarche 12  Menopause 39  G 3 P 3, age of first delivery 14  Brief OCPs, no fertility treatments, no HRT    Daughter with cancer unknown type  Sister with cancer  Brother with cancer  Niece with cancer      Review of systems  A comprehensive ROS was taken on the patient intake form and reviewed with the patient. This form is scanned into the electronic medical record.    Constitutional symptoms: Denies generalized fatigue. Denies weight change, fevers/chills, difficulty sleeping   Eyes: Denies double vision, glaucoma, cataracts.  Ear/nose/throat/mouth: Denies hearing changes, sore throat, sinus  problems.  Cardiovascular: No chest pain. Denies irregular heartbeat. Denies ankle swelling.  Respiratory: No wheezing, cough, or shortness of breath.  Gastrointestinal: No abdominal pain, No nausea/vomiting. No indigestion/heartburn. No change in bowel habits. No constipation or diarrhea.   Genitourinary: No urinary incontinence. No urinary frequency. No painful urination.  Musculoskeletal: No bone pain, no muscle pain, no joint pain.   Integumentary: No rash. No masses. No changes in moles. No easy bruising.  Neurological: No headaches. No tremors. No numbness/tingling.  Psychiatric: No anxiety. No depression.  Endocrine: No excessive thirst. Not too hot or too cold. Not tired or fatigued.   Hematological/lymphatic: No swollen glands or blood clotting problems. No bruising.     Physical exam  A chaperone was offered for all portions of the physical exam. The patient declined.     General appearance: appears stated age, alert and oriented x 3  Head: Normocephalic, atraumatic  Eyes: conjunctivae/corneas clear.  Ears: External ears are normal, hearing is grossly intact.  Lungs: normal breathing  Heart: irregular  Abdomen: Soft, nontender, nondistended.  Neurologic: grossly normal  Lymph nodes: No cervical, supraclavicular or axillary lymphadenopathy bilaterally    Breast: A comprehensive breast exam was performed in the seated and supine positions. Breasts are symmetrical. Bilateral nipples are everted. There are no skin changes on arm maneuvers. Bra size: 52D   Right: no masses   Left: no masses. Left upper chest wall port    Medial sternotomy incision.    Results  Imaging  All breast imaging personally reviewed by me.  See HPI    Pathology   3/27/2024.  Right breast mass 8:00 5 cm from the nipple biopsy.  Grade 1 invasive ductal carcinoma.  ER greater than 95, UT 95, HER2 negative.  Open coil HydroMARK.    Impression:   1) 69-year-old -American female here with new right breast cancer  cT1 cN0 Grade 1  invasive ductal carcinoma.  ER greater than 95, [place] 95, HER2 negative.   Additional right breast calcifications  2) extensive cardiac co morbidities include Afib. S/p AVR. Pacemaker in place.  EF 30%. Prior stroke. On coumadin  Non-smoker  3) FH of cancer      Plan:   I had a long discussion with Ms. Hines and her daughter  today. We reviewed her imaging and work-up to date and the results.  She has a new  right breast cancer.  There are additional probably benign right calcifications.  We reviewed the multidisciplinary treatment of breast cancer.  We reviewed the individualization of treatment based on patient preferences and co morbidities.     Her cancer can be effectively treated with lumpectomy.  We reviewed that lumpectomy with radiation treatment was equivalent to mastectomy in terms of overall survival and distant recurrence, with more than 20 years of follow-up data.  We discussed that lumpectomy with radiation has a slightly higher local recurrence rate, at about 5% in 10 years, versus mastectomy at 1% in 10 years.  The risks of lumpectomy including bleeding, infection, and need for reexcision of margins (approximately 20%), was discussed.  She was told that lumpectomy is usually a day surgery, and the postoperative recovery was discussed. She was informed that decisions regarding lumpectomy versus mastectomy would not impact need for chemotherapy.  We reviewed the need for preoperative mag seed placement as this is a nonpalpable lesion. If she opted for a lumpectomy, the additional area of calcs could be observed with imaging or localized and excised.     We discussed the requirements of radiation treatment, and the short term risks (mild burn, tiredness, swelling or shrinking of breast) and the long term risks (skin changes, angiosarcoma).    She is clinically node negative.  We discussed management of the axilla.  The rationale behind sentinel node biopsy was explained, including the smaller risk of  lymphedema.   Additional risks of surgery, including nerve damage, were discussed.  We reviewed choosing wisely guidelines.  In women over 70 with small low-grade hormone receptor positive breast cancers axillary staging can be safely omitted without impact on treatment recommendations and survival.  Given her comorbidities I do not think there is a role for axillary staging with surgery.    She has many medical comorbidities including cardiac comorbidities and an ejection fraction of 30%.  Though she could tolerate surgery she is high risk for anesthesia.  We could proceed with surgery requiring medical and cardiac clearance.  If she would prefer to avoid an invasive procedure this is a hormone receptor positive cancer and could consider endocrine therapy to avoid surgery    The patient would like to consider her options.  Again we discussed 1) right Magseed localized lumpectomy with separate right Magseed localized excision of calcifications 2) right Magseed localized lumpectomy with continued observation of right breast calcifications 3) endocrine therapy to avoid surgery    She will consider her options and talk to her family.  She will call us back and let us know what she decides.  She should call our office with any sooner concerns.

## 2024-04-16 PROBLEM — R07.9 CHEST PAIN: Status: ACTIVE | Noted: 2024-04-16

## 2024-04-16 PROBLEM — J06.9 ACUTE UPPER RESPIRATORY INFECTION: Status: ACTIVE | Noted: 2024-04-16

## 2024-04-16 PROBLEM — Z79.01 LONG TERM (CURRENT) USE OF ANTICOAGULANTS: Status: ACTIVE | Noted: 2024-04-16

## 2024-04-17 ENCOUNTER — OFFICE VISIT (OUTPATIENT)
Dept: CARDIOLOGY | Facility: CLINIC | Age: 69
End: 2024-04-17
Payer: MEDICAID

## 2024-04-17 ENCOUNTER — ANTICOAGULATION - WARFARIN VISIT (OUTPATIENT)
Dept: CARDIOLOGY | Facility: CLINIC | Age: 69
End: 2024-04-17
Payer: MEDICAID

## 2024-04-17 VITALS
SYSTOLIC BLOOD PRESSURE: 80 MMHG | BODY MASS INDEX: 43.82 KG/M2 | HEART RATE: 77 BPM | WEIGHT: 263 LBS | DIASTOLIC BLOOD PRESSURE: 50 MMHG | OXYGEN SATURATION: 98 % | HEIGHT: 65 IN

## 2024-04-17 DIAGNOSIS — I48.91 ATRIAL FIBRILLATION, UNSPECIFIED TYPE (MULTI): ICD-10-CM

## 2024-04-17 DIAGNOSIS — N18.32 HYPERTENSIVE KIDNEY DISEASE WITH STAGE 3B CHRONIC KIDNEY DISEASE (MULTI): ICD-10-CM

## 2024-04-17 DIAGNOSIS — I42.9 CARDIOMYOPATHY, UNSPECIFIED TYPE (MULTI): ICD-10-CM

## 2024-04-17 DIAGNOSIS — I12.9 HYPERTENSIVE KIDNEY DISEASE WITH STAGE 3B CHRONIC KIDNEY DISEASE (MULTI): ICD-10-CM

## 2024-04-17 DIAGNOSIS — I10 BENIGN HYPERTENSION: ICD-10-CM

## 2024-04-17 LAB
POC INR: 3.1
POC PROTHROMBIN TIME: NORMAL

## 2024-04-17 PROCEDURE — 99214 OFFICE O/P EST MOD 30 MIN: CPT | Performed by: INTERNAL MEDICINE

## 2024-04-17 PROCEDURE — 85610 PROTHROMBIN TIME: CPT

## 2024-04-17 PROCEDURE — 99211 OFF/OP EST MAY X REQ PHY/QHP: CPT

## 2024-04-17 RX ORDER — AMIODARONE HYDROCHLORIDE 200 MG/1
200 TABLET ORAL DAILY
Qty: 90 TABLET | Refills: 3 | Status: SHIPPED | OUTPATIENT
Start: 2024-04-17 | End: 2024-04-17

## 2024-04-17 RX ORDER — TORSEMIDE 20 MG/1
20 TABLET ORAL DAILY
Qty: 90 TABLET | Refills: 3 | Status: SHIPPED | OUTPATIENT
Start: 2024-04-17 | End: 2024-06-11 | Stop reason: SDUPTHER

## 2024-04-17 RX ORDER — DAPAGLIFLOZIN 5 MG/1
5 TABLET, FILM COATED ORAL DAILY
Qty: 90 TABLET | Refills: 1 | Status: SHIPPED | OUTPATIENT
Start: 2024-04-17 | End: 2024-05-08 | Stop reason: SDUPTHER

## 2024-04-17 RX ORDER — METOPROLOL SUCCINATE 100 MG/1
100 TABLET, EXTENDED RELEASE ORAL DAILY
Qty: 90 TABLET | Refills: 1 | Status: SHIPPED | OUTPATIENT
Start: 2024-04-17

## 2024-04-17 RX ORDER — SIMVASTATIN 20 MG/1
20 TABLET, FILM COATED ORAL NIGHTLY
Qty: 90 TABLET | Refills: 1 | Status: SHIPPED | OUTPATIENT
Start: 2024-04-17

## 2024-04-17 RX ORDER — SPIRONOLACTONE 25 MG/1
25 TABLET ORAL DAILY
Qty: 90 TABLET | Refills: 1 | Status: SHIPPED | OUTPATIENT
Start: 2024-04-17

## 2024-04-17 ASSESSMENT — ENCOUNTER SYMPTOMS
LOSS OF SENSATION IN FEET: 0
DEPRESSION: 0
OCCASIONAL FEELINGS OF UNSTEADINESS: 1

## 2024-04-17 ASSESSMENT — PATIENT HEALTH QUESTIONNAIRE - PHQ9
SUM OF ALL RESPONSES TO PHQ9 QUESTIONS 1 AND 2: 0
1. LITTLE INTEREST OR PLEASURE IN DOING THINGS: NOT AT ALL
2. FEELING DOWN, DEPRESSED OR HOPELESS: NOT AT ALL

## 2024-04-17 ASSESSMENT — PAIN SCALES - GENERAL: PAINLEVEL: 0-NO PAIN

## 2024-04-17 NOTE — PROGRESS NOTES
Patient identification verified with 2 identifiers.    Location: [facility] - [id number] [address] [phone] option #1     Referring Physician: DR. KASH CASANOVA  Enrollment/ Re-enrollment date: 11/4/2024   INR Goal: 2.0-3.0  INR monitoring is per [facility] protocol.  Anticoagulation Medication: warfarin  Indication: Atrial Fibrillation/Atrial Flutter    Subjective   Bleeding signs/symptoms: No    Bruising: No   Major bleeding event: No  Thrombosis signs/symptoms: No  Thromboembolic event: No  Missed doses: No  Extra doses: No  Medication changes: No  Dietary changes: No  PT WAS ON VACATION AND HADN'T EATEN ANY GREEN VEGETABLES  Change in health: No  Change in activity: No  Alcohol: No  Other concerns: No    Upcoming Procedures:  Does the Patient Have any upcoming procedures that require interruption in anticoagulation therapy? no  Does the patient require bridging? no      Anticoagulation Summary  As of 4/17/2024      INR goal:  2.0-3.0   TTR:  81.4% (6.7 mo)   INR used for dosing:  3.10 (4/17/2024)   Weekly warfarin total:  35 mg               Assessment/Plan   Supratherapeutic     1. New dose: no change  PT WILL GO BACK TO EATING HER USUAL AMOUNT OF GREEN VEGETABLES  2. Next INR: 2 weeks PT REQUESTS SATURDAY      Education provided to patient during the visit:  Patient instructed to call in interim with questions, concerns and changes.   Patient educated on dietary consistency in vitamin k consumption.

## 2024-04-17 NOTE — PROGRESS NOTES
.Primary Care Physician: Minda Ricardo MD  Date of Visit: 04/17/2024  9:00 AM EDT  Location of visit[place] [facility] [place] [address]     Chief Complaint:   Chief Complaint   Patient presents with    Follow-up    Atrial Fibrillation    Hypertension    Cardiomyopathy        HPI / Summary:   Trinidad Hines is a 69 y.o. female presents for followup.   New York Heart Association class II stage C HFrEF August 2023 EF 30% with LV end-diastolic mention 5.4 cm, prosthetic aortic valve function was normal mild RVSP elevation.  October 2023 BiV upgrade.  On Amio since 2015 last cardioversion in 2022 has been in persistent A-fib.  History of stroke.  Reasonable GDMT  Creatinine has been uptrending estimated GFR on February 19 was 25 creatinine 2.143 months earlier creatinine 1.83 estimated GFR 30    Elevated BMI.  Warfarin will need to be held 5 days before an anticipated breast biopsy. This worries her.Had an abnormal mammogram on right breast.      BNP last April of 380.    Follows w/nephro,   No c/o lightheadedness, CP or worsening MADRIGAL.  Transfers very slowly  No amio so removed from med list  Manages her on own meds  Clear CXR 12/2023INR ahs been in a good range, mostly    She has appointment with nephrology and heart failure in May  Specialty Problems          Cardiology Problems    Benign hypertension    Cardiomyopathy (Multi)     Comment on above: Added by Problem List Migration; 2013-7-21; Moved to [facility] Nov 29 2013 9:07PM;         Aortic valve disorder    Atrial fibrillation (Multi)    Cardiac defibrillator in place    Chronic systolic heart failure (Multi)    Hyperlipidemia    Palpitations    Dilated cardiomyopathy (Multi)    Screening cholesterol level    Chest pain     Comment on above: CHEST PAIN         Long term (current) use of anticoagulants    Atrial fibrillation, unspecified type (Multi)        Past Medical History:   Diagnosis Date    Cardiomyopathy (Multi)     Chronic kidney disease, stage 3 unspecified  (Multi) 11/02/2022    CKD (chronic kidney disease), stage III    Encounter for screening for depression 05/06/2022    Standardized adult depression screening tool completed    Encounter for screening for malignant neoplasm of colon 01/19/2022    Colon cancer screening    Nontraumatic intracerebral hemorrhage, unspecified (Multi)     Hemorrhagic stroke    Personal history of other diseases of the respiratory system 12/14/2021    History of acute bronchitis    Personal history of other diseases of urinary system 11/02/2022    History of chronic kidney disease    Personal history of urinary (tract) infections     History of urinary tract infection    Presence of automatic (implantable) cardiac defibrillator 05/06/2022    Cardiac defibrillator in place    Urinary tract infection, site not specified 11/07/2022    Acute UTI          Past Surgical History:   Procedure Laterality Date    AORTIC VALVE REPLACEMENT  02/05/2015    Aortic Valve Replacement    CARDIAC ELECTROPHYSIOLOGY PROCEDURE N/A 10/30/2023    Procedure: ICD UPGRADE, DUAL TO BIV;  Surgeon: Kendra Hong MD;  Location: [place] [address] Cardiac Cath Lab;  Service: Electrophysiology;  Laterality: N/A;    TOTAL KNEE ARTHROPLASTY  04/29/2015    Total Knee Arthroplasty          Social History:   reports that she has never smoked. She has never used smokeless tobacco. She reports that she does not drink alcohol and does not use drugs.      Allergies:  Allergies   Allergen Reactions    Aspirin Rash and Unknown    Diltiazem Hcl Itching    Dofetilide Other and Unknown    Phenytoin Hives and Rash    Lisinopril Cough and Dry mouth       Outpatient Medications:  Current Outpatient Medications   Medication Instructions    albuterol 90 mcg/actuation inhaler INAHEL 2 PUFFS EVERY 4 HOURS AS NEEDED FOR WHEEZING    allopurinol (ZYLOPRIM) 300 mg, oral, Daily    amiodarone (PACERONE) 200 mg, oral, Daily    calcitriol (ROCALTROL) 0.25 mcg, oral, Daily    Farxiga 5 mg, oral, Daily  "   lidocaine (Xylocaine) 5 % ointment 1 Application, Topical, 3 times daily PRN    metoprolol succinate XL (TOPROL-XL) 100 mg, oral, Daily    sacubitriL-valsartan (Entresto) 49-51 mg tablet 1 tablet, oral, 2 times daily    simvastatin (ZOCOR) 20 mg, oral, Nightly    spironolactone (ALDACTONE) 25 mg, oral, Daily    torsemide (DEMADEX) 20 mg, oral, Daily    traMADol (ULTRAM) 50 mg, oral, Every 6 hours PRN    warfarin (Coumadin) 5 mg tablet TAKE 1.5 TABLET DAILY AS DIRECTED BY COUMADIN CLINIC       ROS     Physical Exam:  Vitals:    04/17/24 0825   BP: 80/50   BP Location: Right arm   Patient Position: Sitting   BP Cuff Size: Large adult   Pulse: 77   SpO2: 98%   Weight: 119 kg (263 lb)   Height: 1.651 m (5' 5\")     Wt Readings from Last 5 Encounters:   04/17/24 119 kg (263 lb)   03/27/24 121 kg (267 lb 13.7 oz)   02/23/24 118 kg (260 lb 2.3 oz)   02/21/24 118 kg (261 lb)   02/19/24 117 kg (258 lb 2.5 oz)     Body mass index is 43.77 kg/m².   Estimated JVP is elevated.  Rhythm was regular but I could not appreciate gallop.  Her lungs are clear large pannus some dependent edema.       Last Labs:  CMP:  Recent Labs     02/19/24  1255 12/31/23  1626 10/12/23  1150 10/12/23  1116 03/20/23  0346 02/03/23  1105   NA  --  140 143 143 141 143   K  --  3.7 4.6 4.2 4.8 4.2   CL  --  108* 104 105 104 106   CO2  --  23 29 31 29 32   ANIONGAP  --  13 15 11 13 9*   BUN  --  25* 27* 25* 21 28*   CREATININE 2.14* 1.83* 1.77* 1.92* 1.74* 1.91*   EGFR 25* 30* 31* 28*  --   --    GLUCOSE  --  87 106* 109* 105* 103*     Recent Labs     12/31/23  1626 10/12/23  1116 03/20/23  0346 12/24/22  2045 11/02/22  1321 09/05/22  2132 01/19/22  1223 03/05/21  2221 11/08/19  1501 08/06/19  1111   ALBUMIN 3.9 4.2 4.2 4.6 4.1 3.4 4.0 3.8   < > 4.2   ALKPHOS 45  --  45  --  55 40 51 72   < > 78   ALT 8  --  10  --  11 9 10 62*   < > 14   AST 15  --  20  --  11 11 16 51*   < > 23   BILITOT 1.6*  --  1.2  --  1.0 0.8 0.9 0.7   < > 1.6*   LIPASE  --   --   " --   --   --   --   --  77  --  15    < > = values in this interval not displayed.     CBC:  Recent Labs     12/31/23  1626 10/12/23  1150 10/12/23  1116 03/20/23  0346 02/03/23  1105   WBC 4.5 3.9* 3.7* 3.4* 2.5*   HGB 12.0 13.6 12.9 12.2 11.9*   HCT 35.2* 42.7 40.8 36.9 37.7   PLT 95* 159 164 87* 124*   MCV 87 93 93 90 94     COAG:   Recent Labs     04/17/24  0000 03/23/24  0000 03/04/24  0930 02/14/24  0953 01/27/24  1310   INR 3.10 2.30 2.70 2.60 2.60     HEME/ENDO:  Recent Labs     11/02/22  1321 01/19/22  1223 10/25/20  0746 03/25/20  0708 08/10/19  1015   TSH 4.17* 2.72  --  4.63* 3.06   HGBA1C 5.7*  --  5.6  --   --       CARDIAC:   Recent Labs     03/20/23  1919 03/20/23  0545 03/20/23  0346 09/05/22  2214 09/05/22  2133 07/20/21  1039 02/25/21  1159 10/25/20  0746 10/24/20  1435   TROPHS CANCELED  CANCELED 12 13 11 13  --   --   --   --    BNP  --   --  380*  --   --  223* 181* 381* 209*     Recent Labs     01/19/22  1223 03/14/19  1228   CHOL 187 146   LDLF 106* 77   HDL 68.3 55.3   TRIG 62 68       Last Cardiology Tests:  ECG:      Echo:  Echo Results:  Transthoracic Echo (TTE) Complete 03/04/2024    [facility] at [place] [address]  Tel [phone] and Fax [phone]    TRANSTHORACIC ECHOCARDIOGRAM REPORT      Patient Name:      MALIK AGUILAR      Reading Physician:    [id number] Ulysses Alarcon MD  Study Date:        3/4/2024             Ordering Provider:    [id number] TOPETE  MRN/PID:           [id number]             Fellow:  Accession#:        [id number]         Nurse:  Date of Birth/Age: 1955 / 68 years Sonographer:          Belkys Perrin RDCS  Gender:            F                    Additional Staff:  Height:            165.10 cm            Admit Date:  Weight:            117.93 kg            Admission Status:     Outpatient  BSA / BMI:         2.21 m2 / 43.27      Encounter#:           [id number]  kg/m2  Department Location:   [facility]  Echo Lab  Blood Pressure: 108 /73 mmHg    Study Type:    TRANSTHORACIC ECHO (TTE) COMPLETE  Diagnosis/ICD: Chronic systolic (congestive) heart failure (CHF)-I50.22  Indication:    Congestive Heart Failure  CPT Code:      Echo Complete w Full Doppler-26206    Patient History:  BMI:               Obese >30  Pertinent History: A-Fib, Cardiomyopathy, HTN, CHF, CVA and Palpitations. AVR  1986 and 2007, #21 Bhaork-Shiley,CKD,QI,Rheurmatic  fever,Cardioversion.    Study Detail: The following Echo studies were performed: 2D, M-Mode, Doppler and  color flow. Technically challenging study due to body habitus.  Patient has a defibrillator.      PHYSICIAN INTERPRETATION:  Left Ventricle: The left ventricular systolic function is moderately decreased, with an estimated ejection fraction of 30-35%. The patient is in atrial fibrillation which may influence the estimate of left ventricular function and transvalvular flows. There is global hypokinesis of the left ventricle with minor regional variations. The left ventricular cavity size is mildly dilated. The left ventricular septal wall thickness is mildly increased. There is mild eccentric left ventricular hypertrophy. Abnormal (paradoxical) septal motion consistent with post-operative status. Left ventricular diastolic filling was indeterminate.  Left Atrium: The left atrium is mild to moderately dilated.  Right Ventricle: The right ventricle is slightly enlarged. There is mildly reduced right ventricular systolic function. A device is visualized in the right ventricle.  Right Atrium: The right atrium is moderately dilated.  Aortic Valve: There is a prosthetic aortic valve present. The aortic valve dimensionless index is 0.59. There is a Fernie-Shiley bioprosthetic aortic valve, with a 21 mm reported size. There is no aditi-prosthetic aortic valve regurgitation. Echo findings are consistent with normal aortic valve prosthesis structure and function. There is no  evidence of aortic valve regurgitation. The peak instantaneous gradient of the aortic valve is 7.6 mmHg. The mean gradient of the aortic valve is 3.9 mmHg.  Mitral Valve: The mitral valve is mildly thickened. There is mild mitral valve regurgitation.  Tricuspid Valve: The tricuspid valve is structurally normal. There is mild to moderate tricuspid regurgitation. The Doppler estimated RVSP is mildly elevated at 39.9 mmHg.  Pulmonic Valve: The pulmonic valve is not well visualized. The pulmonic valve regurgitation was not well visualized.  Pericardium: There is a trivial pericardial effusion.  Aorta: The aortic root is normal.  Systemic Veins: The inferior vena cava appears dilated. There is less than 50% IVC collapse with inspiration.  In comparison to the previous echocardiogram(s): Compared with study from 8/28/2023, no significant change.      CONCLUSIONS:  1. Left ventricular systolic function is moderately decreased with a 30-35% estimated ejection fraction.  2. Abnormal septal motion consistent with post-operative status.  3. There is mild eccentric left ventricular hypertrophy.  4. There is mildly reduced right ventricular systolic function.  5. The left atrium is mild to moderately dilated.  6. The right atrium is moderately dilated.  7. Mild to moderate tricuspid regurgitation visualized.  8. Mildly elevated RVSP.  9. There is a bioprosthetic aortic valve.  10. Patient refused Definity.  11. The patient is in atrial fibrillation which may influence the estimate of left ventricular function and transvalvular flows.  12. There is global hypokinesis of the left ventricle with minor regional variations.    QUANTITATIVE DATA SUMMARY:  2D MEASUREMENTS:  Normal Ranges:  IVSd:          1.25 cm    (0.6-1.1cm)  LVPWd:         0.91 cm    (0.6-1.1cm)  LVIDd:         5.47 cm    (3.9-5.9cm)  LVIDs:         4.68 cm  LV Mass Index: 105.7 g/m2  LV % FS        14.4 %    LA VOLUME:  Normal Ranges:  LA Vol A4C:        85.8 ml     (22+/-6mL/m2)  LA Vol A2C:        91.2 ml  LA Vol BP:         91.2 ml  LA Vol Index A4C:  38.8 ml/m2  LA Vol Index A2C:  41.2 ml/m2  LA Vol Index BP:   41.2 ml/m2  LA Area A4C:       26.0 cm2  LA Area A2C:       26.0 cm2  LA Major Axis A4C: 6.7 cm  LA Major Axis A2C: 6.3 cm  LA Volume Index:   40.0 ml/m2  LA Vol A4C:        76.4 ml  LA Vol A2C:        84.1 ml    RA VOLUME BY A/L METHOD:  Normal Ranges:  RA Vol A4C:        104.8 ml   (8.3-19.5ml)  RA Vol Index A4C:  47.4 ml/m2  RA Area A4C:       30.0 cm2  RA Major Axis A4C: 7.3 cm    M-MODE MEASUREMENTS:  Normal Ranges:  Ao Root: 2.90 cm (2.0-3.7cm)  LAs:     6.30 cm (2.7-4.0cm)    AORTA MEASUREMENTS:  Normal Ranges:  Ao Arch: 2.90 cm (2.0-3.6cm)    LV SYSTOLIC FUNCTION BY 2D PLANIMETRY (MOD):  Normal Ranges:  EF-A4C View: 31.5 % (>=55%)  EF-A2C View: 35.1 %  EF-Biplane:  33.6 %    LV DIASTOLIC FUNCTION:  Normal Ranges:  MV Peak E:    1.05 m/s    (0.7-1.2 m/s)  MV Peak A:    0.29 m/s    (0.42-0.7 m/s)  E/A Ratio:    3.69        (1.0-2.2)  MV e'         0.10 m/s    (>8.0)  MV lateral e' 0.10 m/s  MV medial e'  0.11 m/s  MV A Dur:     112.27 msec  E/e' Ratio:   10.04       (<8.0)    MITRAL VALVE:  Normal Ranges:  MV DT: 133 msec (150-240msec)    AORTIC VALVE:  Normal Ranges:  AoV Vmax:                1.38 m/s (<=1.7m/s)  AoV Peak P.6 mmHg (<20mmHg)  AoV Mean PG:             3.9 mmHg (1.7-11.5mmHg)  LVOT Max Wing:            0.86 m/s (<=1.1m/s)  AoV VTI:                 25.70 cm (18-25cm)  LVOT VTI:                15.19 cm  LVOT Diameter:           2.11 cm  (1.8-2.4cm)  AoV Area, VTI:           2.07 cm2 (2.5-5.5cm2)  AoV Area,Vmax:           2.20 cm2 (2.5-4.5cm2)  AoV Dimensionless Index: 0.59      RIGHT VENTRICLE:  RV Basal 4.20 cm  RV Mid   2.50 cm  RV Major 6.9 cm  TAPSE:   15.9 mm  RV s'    0.09 m/s    TRICUSPID VALVE/RVSP:  Normal Ranges:  Peak TR Velocity: 2.50 m/s  RV Syst Pressure: 39.9 mmHg (< 30mmHg)  IVC Diam:         2.90 cm    PULMONIC  VALVE:  Normal Ranges:  PV Accel Time: 94 msec  (>120ms)  PV Max Wing:    0.8 m/s  (0.6-0.9m/s)  PV Max P.5 mmHg      [id number] Ulysses Alarcon MD  Electronically signed on 3/4/2024 at 11:06:08 AM        ** Final **       Cath:      Stress Test:  Stress Results:  No results found for this or any previous visit from the past 365 days.         Cardiac Imaging:  BI US guided breast localization and biopsy right, BI breast biopsy clip imaging  Addendum: Interpreted By:  Leonidas Mann,    ADDENDUM:   The final pathology for right breast mass at 8 o'clock, 5 cm from the   nipple demonstrates invasive ductal carcinoma, grade 1. This is   concordant with the imaging findings.  Follow-up with patient's   surgical provider is recommended.        Estimated size of mass/extent of disease:  The mass measures 4 x 3 x   3 mm on US dated 2024.        Ipsilateral lymph nodes:  Were unremarkable on ultrasound of   2024.        MRI recommendation: At the discretion of the referring surgeon.        Method of Detection: Category Sdbt - 3D Screening        Signed by: Leonidas Mann 4/3/2024 12:54 PM        -------- ORIGINAL REPORT --------   Dictation workstation:   [id number]  Narrative: Interpreted By:  Leonidas Mann,   STUDY:  BI BREAST BIOPSY CLIP IMAGING; BI US GUIDED BREAST LOCALIZATION AND  BIOPSY RIGHT;  3/27/2024 10:21 am; 3/27/2024 10:11 am      ACCESSION NUMBER(S):  [id number]; [id number]      ORDERING CLINICIAN:  JOANIE BEAR      INDICATION:  Right breast mass.      FINDINGS:  PREPROCEDURAL CONSULTATION: The history and physical exam pertinent  to the procedure were reviewed and no updates were made.      The procedure was explained to the patient including the risks,  benefits, and alternatives. Medications were discussed, including any  prior use of blood thinning medications by the patient. Patient  allergies were reviewed. The risks, including but not limited to  infection and bleeding, were reviewed by the  performing physician and  the patient agreed to undergo the procedure.      Prior to the procedure, an audible timeout was done to verify patient  identification, site and type of procedure. Dr. Leonidas Mann, a  radiology nurse and an ultrasound technologist were present.      PROCEDURE: Scanning of the right breast redemonstrated the mass of  concern in the 8 o'clock position, 5 cm from the nipple. The right  breast was marked under ultrasound guidance and cleansed.  7 mL of  buffered 1% lidocaine was injected subcutaneously and then into the  deeper tissues surrounding the mass. A small incision was made.  3  tissue core specimens were then obtained with a 12-gauge  spring-loaded biopsy needle with minimal bleeding (estimated blood  loss less than 10 mL).  A open coil Hydromark tissue marker was then  placed into the biopsy site.  Hemostasis was achieved with manual  compression. The patient tolerated the procedure without difficulty.      The postbiopsy mammogram demonstrates satisfactory placement of the  tissue marker.      The patient experienced no complications during the procedure.  Home-going/follow-up instructions were reviewed with the patient  before she left the department.      Impression: Status post ultrasound guided core needle biopsy of a right breast  mass followed by tissue marker placement. Pathology is pending.      POST PROCEDURE MAMMOGRAM FOR MARKER PLACEMENT.          Signed by: Leonidas Mann 3/27/2024 1:17 PM  Dictation workstation:   [id number]        Assessment/Plan     Nonischemic cardiomyopathy with 30% EF based on last echo in March of this year, number 21 mm Fernie Shiley, AVR functioning normally, first AVR surgery 1986-second in 2007, history of rheumatic valve disease, multiple prior cardioversions now with permanent atrial fibrillation, advanced CKD stage IV, upgraded ICD to a BiV last year, she is on reasonable GDMT.  Although does appear somewhat volume up albeit  asymptomatic.  With tenuous renal function relatively low blood pressure I am hesitant to try to decongest her further just recommended continuing maintenance on her current med regimen.  May consider right heart cath at some point.  Will defer decision to her heart failure cardiologist.  I will see her back in 4 months.  I think she should be okay for having a breast biopsy assuming this is not done under general she will need to hold anticoagulation for 5 days.  Requested meds were renewed    Orders:  No orders of the defined types were placed in this encounter.     Followup Appts:  Future Appointments   Date Time Provider Department [place]   4/17/2024  9:00 AM Herberth Gandara MD [id number] [facility]   4/17/2024  3:15 PM Zoie Ortega RN [id number] [facility]   4/18/2024  8:00 AM Mary Sosa MD [id number] [facility]   5/4/2024  9:45 AM [facility] [facility] [id number] [facility] [id number] [facility]   5/8/2024 10:00 AM Safia Gerber MD [id number] [facility]   5/24/2024  9:00 AM Ranulfo Licona MD [facility] [id number] [facility]   6/19/2024  9:00 AM Minda Ricardo MD [id number] [facility]   9/27/2024 10:00 AM [facility]HU PROSPER 4 [facility]   10/2/2024 10:00 AM Crystal Washington APRN-CNP [id number] [facility]           ____________________________________________________________  Herberth Gandara MD    Senior Attending Physician  [facility]  [facility]

## 2024-04-18 ENCOUNTER — OFFICE VISIT (OUTPATIENT)
Dept: SURGICAL ONCOLOGY | Facility: CLINIC | Age: 69
End: 2024-04-18
Payer: MEDICAID

## 2024-04-18 VITALS
BODY MASS INDEX: 43.83 KG/M2 | HEART RATE: 90 BPM | WEIGHT: 263.4 LBS | RESPIRATION RATE: 18 BRPM | TEMPERATURE: 97 F | DIASTOLIC BLOOD PRESSURE: 82 MMHG | SYSTOLIC BLOOD PRESSURE: 112 MMHG

## 2024-04-18 DIAGNOSIS — Z17.0 MALIGNANT NEOPLASM OF LOWER-OUTER QUADRANT OF RIGHT BREAST OF FEMALE, ESTROGEN RECEPTOR POSITIVE (MULTI): Primary | ICD-10-CM

## 2024-04-18 DIAGNOSIS — C50.511 MALIGNANT NEOPLASM OF LOWER-OUTER QUADRANT OF RIGHT BREAST OF FEMALE, ESTROGEN RECEPTOR POSITIVE (MULTI): Primary | ICD-10-CM

## 2024-04-18 DIAGNOSIS — R92.1 CALCIFICATION OF RIGHT BREAST: ICD-10-CM

## 2024-04-18 PROCEDURE — 1159F MED LIST DOCD IN RCRD: CPT | Performed by: SURGERY

## 2024-04-18 PROCEDURE — 99215 OFFICE O/P EST HI 40 MIN: CPT | Performed by: SURGERY

## 2024-04-18 PROCEDURE — 1126F AMNT PAIN NOTED NONE PRSNT: CPT | Performed by: SURGERY

## 2024-04-18 PROCEDURE — 1160F RVW MEDS BY RX/DR IN RCRD: CPT | Performed by: SURGERY

## 2024-04-18 PROCEDURE — 3074F SYST BP LT 130 MM HG: CPT | Performed by: SURGERY

## 2024-04-18 PROCEDURE — 3079F DIAST BP 80-89 MM HG: CPT | Performed by: SURGERY

## 2024-04-18 PROCEDURE — 3008F BODY MASS INDEX DOCD: CPT | Performed by: SURGERY

## 2024-04-18 PROCEDURE — 99205 OFFICE O/P NEW HI 60 MIN: CPT | Performed by: SURGERY

## 2024-04-18 ASSESSMENT — PAIN SCALES - GENERAL: PAINLEVEL: 0-NO PAIN

## 2024-04-18 NOTE — PATIENT INSTRUCTIONS
You received your breast cancer binder today. Please review and contact the office if you have any questions or concerns.

## 2024-04-20 ENCOUNTER — APPOINTMENT (OUTPATIENT)
Dept: CARDIOLOGY | Facility: CLINIC | Age: 69
End: 2024-04-20
Payer: MEDICAID

## 2024-04-23 DIAGNOSIS — Z00.00 ENCOUNTER FOR GENERAL ADULT MEDICAL EXAMINATION WITHOUT ABNORMAL FINDINGS: ICD-10-CM

## 2024-04-23 DIAGNOSIS — N18.4 CKD (CHRONIC KIDNEY DISEASE), STAGE IV (MULTI): Primary | ICD-10-CM

## 2024-04-23 RX ORDER — CALCITRIOL 0.25 UG/1
0.25 CAPSULE ORAL DAILY
Qty: 90 CAPSULE | Refills: 3 | Status: SHIPPED | OUTPATIENT
Start: 2024-04-23 | End: 2024-06-03 | Stop reason: WASHOUT

## 2024-04-25 ENCOUNTER — TELEPHONE (OUTPATIENT)
Dept: SURGICAL ONCOLOGY | Facility: CLINIC | Age: 69
End: 2024-04-25
Payer: MEDICAID

## 2024-04-25 DIAGNOSIS — Z17.0 MALIGNANT NEOPLASM OF LOWER-OUTER QUADRANT OF RIGHT BREAST OF FEMALE, ESTROGEN RECEPTOR POSITIVE (MULTI): ICD-10-CM

## 2024-04-25 DIAGNOSIS — C50.511 MALIGNANT NEOPLASM OF LOWER-OUTER QUADRANT OF RIGHT BREAST OF FEMALE, ESTROGEN RECEPTOR POSITIVE (MULTI): ICD-10-CM

## 2024-04-25 NOTE — TELEPHONE ENCOUNTER
Spoke with Tessy Ms. Hines's daughter at her request. Patient would like to meet with medical oncology to discuss primary endocrine therapy and hold off on surgery. Referral placed for medical oncology. They understand we are available if they have any questions or concerns.

## 2024-05-03 ENCOUNTER — LAB (OUTPATIENT)
Dept: LAB | Facility: LAB | Age: 69
End: 2024-05-03
Payer: MEDICAID

## 2024-05-03 ENCOUNTER — OFFICE VISIT (OUTPATIENT)
Dept: HEMATOLOGY/ONCOLOGY | Facility: CLINIC | Age: 69
End: 2024-05-03
Payer: MEDICAID

## 2024-05-03 VITALS
DIASTOLIC BLOOD PRESSURE: 74 MMHG | BODY MASS INDEX: 43.66 KG/M2 | HEART RATE: 76 BPM | OXYGEN SATURATION: 99 % | SYSTOLIC BLOOD PRESSURE: 108 MMHG | WEIGHT: 262.35 LBS

## 2024-05-03 DIAGNOSIS — Z13.1 DIABETES MELLITUS SCREENING: ICD-10-CM

## 2024-05-03 DIAGNOSIS — Z17.0 MALIGNANT NEOPLASM OF LOWER-OUTER QUADRANT OF RIGHT BREAST OF FEMALE, ESTROGEN RECEPTOR POSITIVE (MULTI): ICD-10-CM

## 2024-05-03 DIAGNOSIS — C50.511 MALIGNANT NEOPLASM OF LOWER-OUTER QUADRANT OF RIGHT BREAST OF FEMALE, ESTROGEN RECEPTOR POSITIVE (MULTI): ICD-10-CM

## 2024-05-03 DIAGNOSIS — I42.9 CARDIOMYOPATHY, UNSPECIFIED TYPE (MULTI): ICD-10-CM

## 2024-05-03 DIAGNOSIS — Z78.0 ASYMPTOMATIC MENOPAUSE: ICD-10-CM

## 2024-05-03 DIAGNOSIS — N18.4 CHRONIC KIDNEY DISEASE, STAGE 4 (SEVERE) (MULTI): ICD-10-CM

## 2024-05-03 DIAGNOSIS — Z13.220 SCREENING CHOLESTEROL LEVEL: ICD-10-CM

## 2024-05-03 LAB
ALBUMIN SERPL BCP-MCNC: 4.1 G/DL (ref 3.4–5)
ALP SERPL-CCNC: 59 U/L (ref 33–136)
ALT SERPL W P-5'-P-CCNC: 8 U/L (ref 7–45)
ANION GAP SERPL CALC-SCNC: 15 MMOL/L (ref 10–20)
APPEARANCE UR: ABNORMAL
AST SERPL W P-5'-P-CCNC: 11 U/L (ref 9–39)
BACTERIA #/AREA URNS AUTO: ABNORMAL /HPF
BILIRUB SERPL-MCNC: 0.8 MG/DL (ref 0–1.2)
BILIRUB UR STRIP.AUTO-MCNC: NEGATIVE MG/DL
BUN SERPL-MCNC: 27 MG/DL (ref 6–23)
CALCIUM SERPL-MCNC: 9.8 MG/DL (ref 8.6–10.6)
CHLORIDE SERPL-SCNC: 107 MMOL/L (ref 98–107)
CHOLEST SERPL-MCNC: 158 MG/DL (ref 0–199)
CHOLESTEROL/HDL RATIO: 2.5
CO2 SERPL-SCNC: 30 MMOL/L (ref 21–32)
COLOR UR: ABNORMAL
CREAT SERPL-MCNC: 2.64 MG/DL (ref 0.5–1.05)
CREAT UR-MCNC: 120.9 MG/DL (ref 20–320)
CREAT UR-MCNC: 120.9 MG/DL (ref 20–320)
EGFRCR SERPLBLD CKD-EPI 2021: 19 ML/MIN/1.73M*2
ERYTHROCYTE [DISTWIDTH] IN BLOOD BY AUTOMATED COUNT: 13.4 % (ref 11.5–14.5)
EST. AVERAGE GLUCOSE BLD GHB EST-MCNC: 120 MG/DL
GLUCOSE SERPL-MCNC: 96 MG/DL (ref 74–99)
GLUCOSE UR STRIP.AUTO-MCNC: ABNORMAL MG/DL
HBA1C MFR BLD: 5.8 %
HCT VFR BLD AUTO: 39.9 % (ref 36–46)
HCV AB SER QL: NONREACTIVE
HDLC SERPL-MCNC: 62.8 MG/DL
HGB BLD-MCNC: 12.9 G/DL (ref 12–16)
HYALINE CASTS #/AREA URNS AUTO: ABNORMAL /LPF
KETONES UR STRIP.AUTO-MCNC: NEGATIVE MG/DL
LDLC SERPL CALC-MCNC: 73 MG/DL
LEUKOCYTE ESTERASE UR QL STRIP.AUTO: ABNORMAL
MCH RBC QN AUTO: 29.9 PG (ref 26–34)
MCHC RBC AUTO-ENTMCNC: 32.3 G/DL (ref 32–36)
MCV RBC AUTO: 93 FL (ref 80–100)
MICROALBUMIN UR-MCNC: 31.9 MG/L
MICROALBUMIN/CREAT UR: 26.4 UG/MG CREAT
MUCOUS THREADS #/AREA URNS AUTO: ABNORMAL /LPF
NITRITE UR QL STRIP.AUTO: NEGATIVE
NON HDL CHOLESTEROL: 95 MG/DL (ref 0–149)
NRBC BLD-RTO: 0 /100 WBCS (ref 0–0)
PH UR STRIP.AUTO: 6 [PH]
PHOSPHATE SERPL-MCNC: 3.7 MG/DL (ref 2.5–4.9)
PLATELET # BLD AUTO: 169 X10*3/UL (ref 150–450)
POTASSIUM SERPL-SCNC: 4.7 MMOL/L (ref 3.5–5.3)
PROT SERPL-MCNC: 6.5 G/DL (ref 6.4–8.2)
PROT UR STRIP.AUTO-MCNC: NEGATIVE MG/DL
PROT UR-ACNC: 26 MG/DL (ref 5–24)
PROT/CREAT UR: 0.22 MG/MG CREAT (ref 0–0.17)
RBC # BLD AUTO: 4.31 X10*6/UL (ref 4–5.2)
RBC # UR STRIP.AUTO: ABNORMAL /UL
RBC #/AREA URNS AUTO: ABNORMAL /HPF
SODIUM SERPL-SCNC: 147 MMOL/L (ref 136–145)
SP GR UR STRIP.AUTO: 1.01
SQUAMOUS #/AREA URNS AUTO: ABNORMAL /HPF
T4 FREE SERPL-MCNC: 1.42 NG/DL (ref 0.78–1.48)
TRIGL SERPL-MCNC: 109 MG/DL (ref 0–149)
TSH SERPL-ACNC: 4.92 MIU/L (ref 0.44–3.98)
UROBILINOGEN UR STRIP.AUTO-MCNC: ABNORMAL MG/DL
VLDL: 22 MG/DL (ref 0–40)
WBC # BLD AUTO: 3.9 X10*3/UL (ref 4.4–11.3)
WBC #/AREA URNS AUTO: >50 /HPF
WBC CLUMPS #/AREA URNS AUTO: ABNORMAL /HPF

## 2024-05-03 PROCEDURE — 81001 URINALYSIS AUTO W/SCOPE: CPT

## 2024-05-03 PROCEDURE — 80053 COMPREHEN METABOLIC PANEL: CPT

## 2024-05-03 PROCEDURE — 85027 COMPLETE CBC AUTOMATED: CPT

## 2024-05-03 PROCEDURE — 1160F RVW MEDS BY RX/DR IN RCRD: CPT | Performed by: INTERNAL MEDICINE

## 2024-05-03 PROCEDURE — 3074F SYST BP LT 130 MM HG: CPT | Performed by: INTERNAL MEDICINE

## 2024-05-03 PROCEDURE — 99215 OFFICE O/P EST HI 40 MIN: CPT | Performed by: INTERNAL MEDICINE

## 2024-05-03 PROCEDURE — 3008F BODY MASS INDEX DOCD: CPT | Performed by: INTERNAL MEDICINE

## 2024-05-03 PROCEDURE — 84156 ASSAY OF PROTEIN URINE: CPT

## 2024-05-03 PROCEDURE — 83036 HEMOGLOBIN GLYCOSYLATED A1C: CPT

## 2024-05-03 PROCEDURE — 99205 OFFICE O/P NEW HI 60 MIN: CPT | Performed by: INTERNAL MEDICINE

## 2024-05-03 PROCEDURE — 84439 ASSAY OF FREE THYROXINE: CPT

## 2024-05-03 PROCEDURE — 1159F MED LIST DOCD IN RCRD: CPT | Performed by: INTERNAL MEDICINE

## 2024-05-03 PROCEDURE — 84100 ASSAY OF PHOSPHORUS: CPT

## 2024-05-03 PROCEDURE — 36415 COLL VENOUS BLD VENIPUNCTURE: CPT

## 2024-05-03 PROCEDURE — 82570 ASSAY OF URINE CREATININE: CPT

## 2024-05-03 PROCEDURE — 86803 HEPATITIS C AB TEST: CPT

## 2024-05-03 PROCEDURE — 82043 UR ALBUMIN QUANTITATIVE: CPT

## 2024-05-03 PROCEDURE — 84443 ASSAY THYROID STIM HORMONE: CPT

## 2024-05-03 PROCEDURE — 3078F DIAST BP <80 MM HG: CPT | Performed by: INTERNAL MEDICINE

## 2024-05-03 PROCEDURE — 80061 LIPID PANEL: CPT

## 2024-05-03 RX ORDER — CALCITRIOL 0.25 UG/1
0.25 CAPSULE ORAL DAILY
Qty: 30 CAPSULE | Refills: 5 | Status: SHIPPED | OUTPATIENT
Start: 2024-05-03

## 2024-05-03 NOTE — PROGRESS NOTES
New Patient Visit Information:   Patient name: Trinidad Hines  : 1955  Date of Service: 5/3/2024  Referring Provider: Dr. Mary Sosa    Visit Type: New Visit      Cancer History:          Breast         AJCC Edition: 8th (AJCC), Diagnosis Date:          Cancer Staging   Malignant neoplasm of lower-outer quadrant of right breast of female, estrogen receptor positive (Multi), Staging form: Breast, AJCC 8th Edition, Clinical stage from 2024: Stage IA (cT1, cN0, cM0, G1, ER+, NC+, HER2-) - Signed by Mary Sosa MD on 2024     Treatment Synopsis:       Trinidad Hines is a 69 y.o. post-menopausal AA female with right-sided invasive ductal carcinoma, stage IA (cT1, cN0, cM0, G1, ER+, NC+, HER2-).  The patient's breast cancer was diagnosed on 3/27/2024 and is grade 1, estrogen receptor positive at >95%, progesterone receptor positive at 95%, and HER-2/yue negative.    PMHx - A-fib, Cadiomyopathy (EF 30%), s/p AVR, CKD (stage 3), non-traumatic intracerebral hemorrhage, depression, cardiac defibrillator.      CURRENT THERAPY: None (Pending after surgery)       ONCOLOGIC HISTORY:  Malignant neoplasm of right/left breast in female, Pathologic: Stage IA (cT1, cN0, cM0, G1, ER+, NC+, HER2-).       Details of her breast cancer history are as follows:     1.  No abnormal mammograms, breast biopsies, or breast surgeries.    2.  2024 - B/L screening mammogram.  Left cardiac device.  Scattered fibroglandular tissue bilaterally.  Left breast negative.  Indeterminate right breast mass.  Indeterminate right breast calcifications.  BI-RADS 0.    3. 3/1/2024 -  Right breast dx imaging.  Right breast calcifications are probably benign.  6-month follow-up is recommended.  Right breast ultrasound.  At 8:00 5 cm from the nipple a 0.4 x 0.4 x 0.4 cm indeterminate mass is noted, BI-RADS 4.  Right axillary ultrasound is negative.    4. 3/27/2024 - Right breast mass 8:00 5 cm from the nipple biopsy.  Grade 1 invasive  ductal carcinoma.  ER greater than 95, SC 95, HER2 negative.     5. 4/18/2024 - Office visit with Dr. Sosa. Per Dr. Sosa's note on 4/18/2024 - Calcs are low suspicion. 6 mo FU vs can be localized for excision. Lumpectomy +/- RT was discussed        History of Present Illness: Patient ID:   Trinidad Hines is a 69 y.o. y.o. female.    Chief Complaint and/or reason for visit: New patient. Here for discussion of her recent breast cancer diagnosis and future plan for systemic therapies. Because of multiple significant co-morbidities, patient and daughter would like to discuss an option of primary endocrine therapy.     Trinidad Hines is a pleasant  69 y.o.  y.o. woman with history of newly diagnosed invasive ductal carcinoma of the right breast. She reports no new pain today. Her arthritis pain (mainly in her knees) is chronic and stable (pain upon moving). Her baseline SOA (over 10 yrs) is stable. Overall doing well with no fever, CP, headaches, abdominal or joint pain, shortness of breath, N/V, diarrhea, constipation.       She is here for reviewing the current status of her [facility] cancer treatment plan as a new patient.   Her daughter accompanies with her in clinic today.     Performance:   ECOG Performance Status: 3  - Capable of limited self-care     Review of Systems:   A 14-point review of system was completed and was negative except for what is noted in HPI.      Allergies and Intolerances:       Allergies:   Allergies   Allergen Reactions    Aspirin Rash and Unknown    Diltiazem Hcl Itching    Dofetilide Other and Unknown    Phenytoin Hives and Rash    Lisinopril Cough and Dry mouth             Outpatient Medication Profile:   Current Outpatient Medications on File Prior to Visit   Medication Sig Dispense Refill    albuterol 90 mcg/actuation inhaler INAHEL 2 PUFFS EVERY 4 HOURS AS NEEDED FOR WHEEZING 18 g 2    allopurinol (Zyloprim) 300 mg tablet Take 1 tablet (300 mg) by mouth once daily. 90 tablet 1    calcitriol  (Rocaltrol) 0.25 mcg capsule Take 1 capsule (0.25 mcg) by mouth once daily. 90 capsule 3    dapagliflozin propanediol (Farxiga) 5 mg Take 1 tablet (5 mg) by mouth once daily. 90 tablet 1    lidocaine (Xylocaine) 5 % ointment Apply 1 Application topically 3 times a day as needed for mild pain (1 - 3).      metoprolol succinate XL (Toprol-XL) 100 mg 24 hr tablet Take 1 tablet (100 mg) by mouth once daily. 90 tablet 1    sacubitriL-valsartan (Entresto) 49-51 mg tablet Take 1 tablet by mouth 2 times a day. 180 tablet 3    simvastatin (Zocor) 20 mg tablet Take 1 tablet (20 mg) by mouth once daily at bedtime. 90 tablet 1    spironolactone (Aldactone) 25 mg tablet Take 1 tablet (25 mg) by mouth once daily. 90 tablet 1    torsemide (Demadex) 20 mg tablet Take 1 tablet (20 mg) by mouth once daily. 90 tablet 3    traMADol (Ultram) 50 mg tablet Take 1 tablet (50 mg) by mouth every 6 hours if needed for moderate pain (4 - 6).      warfarin (Coumadin) 5 mg tablet TAKE 1.5 TABLET DAILY AS DIRECTED BY COUMADIN CLINIC (Patient taking differently: Take 1 tablet (5 mg) by mouth once daily.) 135 tablet 1     No current facility-administered medications on file prior to visit.        Medical History:    Past Medical History:   Diagnosis Date    Cardiomyopathy (Multi)     Chronic kidney disease, stage 3 unspecified (Multi) 11/02/2022    CKD (chronic kidney disease), stage III    Encounter for screening for depression 05/06/2022    Standardized adult depression screening tool completed    Encounter for screening for malignant neoplasm of colon 01/19/2022    Colon cancer screening    Nontraumatic intracerebral hemorrhage, unspecified (Multi)     Hemorrhagic stroke    Personal history of other diseases of the respiratory system 12/14/2021    History of acute bronchitis    Personal history of other diseases of urinary system 11/02/2022    History of chronic kidney disease    Personal history of urinary (tract) infections     History of  urinary tract infection    Presence of automatic (implantable) cardiac defibrillator 2022    Cardiac defibrillator in place    Urinary tract infection, site not specified 2022    Acute UTI       Surgical History:   Past Surgical History:   Procedure Laterality Date    AORTIC VALVE REPLACEMENT  2015    Aortic Valve Replacement    CARDIAC ELECTROPHYSIOLOGY PROCEDURE N/A 10/30/2023    Procedure: ICD UPGRADE, DUAL TO BIV;  Surgeon: Kendra Hong MD;  Location: [place] [address] Cardiac Cath Lab;  Service: Electrophysiology;  Laterality: N/A;    TOTAL KNEE ARTHROPLASTY  2015    Total Knee Arthroplasty       Social Substance History:   Social History     Tobacco Use    Smoking status: Never    Smokeless tobacco: Never   Substance Use Topics    Alcohol use: Never     Social History     Substance and Sexual Activity   Drug Use Never       Breast Cancer Risk Factors:  Age of menarche was 12. Last menstrual period was at age 39. Patient denies ever using hormonal therapy. Patient is , Age of first live birth was 14. Patient did not breast feed. Patient denies nipple discharge. Patient denies having a personal history of breast cancer prior to this diagnosis.  Birth control medications in her 30s (for brief period).    Genetic testing - never but interested  Ovaries and uterus intact     Family History:   No family history on file.    Family Oncology History:  Cancer-related family history includes:  Sister - Breat cancer in 40s  Brother - Stomach cancer in 40s  Niece- Breast cancer in 40s  Daughter - rare abdominal cancer Dx 42    OBJECTIVE:     Visit Vitals      2024    12:20 PM 2024    10:25 AM 2024    11:44 AM 3/27/2024     8:50 AM 2024     8:25 AM 2024     8:06 AM 5/3/2024     8:08 AM   Vitals   Systolic 85 108  116 80 112 108   Diastolic 64 73  80 50 82 74   Heart Rate 67 84  78 77 90 76   Temp  35.6 °C (96 °F)    36.1 °C (97 °F)    Resp      18    Height (in)   1.651 m  "(5' 5\") 1.626 m (5' 4\") 1.651 m (5' 5\")     Weight (lb) 258.16 261 260.14 267.86 263 263.4 262.35   BMI 42.96 kg/m2 43.43 kg/m2 43.29 kg/m2 45.98 kg/m2 43.77 kg/m2 43.83 kg/m2 43.66 kg/m2   BSA (m2) 2.32 m2 2.33 m2 2.33 m2 2.35 m2 2.34 m2 2.34 m2 2.34 m2   Visit Report  Report  Report Report Report Report       Pain Scale: 0 (at rest)      The ECOG performance scale today is 3      Physical Exam:      Constitutional: Well developed, awake/alert/oriented  x3, no distress, alert and cooperative   Eyes: PERRL, EOMI, clear sclera   ENMT: mucous membranes moist, no apparent injury,  no lesions seen   Head/Neck: Neck supple, no apparent injury, thyroid  without mass or tenderness, No JVD, trachea midline, no bruits   Respiratory/Thorax: Patent airways, CTAB, normal  breath sounds with good chest expansion, thorax symmetric   Cardiovascular: Regular, rate and rhythm, no murmurs,  2+ equal pulses of the extremities, normal S 1and S 2   Gastrointestinal: Nondistended, soft, non-tender,  no rebound tenderness or guarding, no masses palpable, no organomegaly, +BS, no bruits   Musculoskeletal: ROM intact, no joint swelling, normal  strength   Extremities: Left upper extremity with hyperpigmentation  tracking along a vein starting in antecubital fossa   Neurological: alert and oriented x3, intact senses,  motor, response and reflexes, normal strength   Breast: s/p rt sided mastectomy with well  healed surgical incision. No palpable mass in the left breast. No palpable axillary or supraclavicular lymphadenopathies bilaterally. No swelling of either upper extremity which had free range of motion.   Lymphatic: No significant lymphadenopathy   Psychological: Appropriate mood and behavior   Skin: Warm and dry, no lesions, no rashes        LAB RESULTS    Lab Results   Component Value Date    WBC 4.5 12/31/2023    HGB 12.0 12/31/2023    HCT 35.2 (L) 12/31/2023    MCV 87 12/31/2023    PLT 95 (L) 12/31/2023     Lab Results   Component " "Value Date    NEUTROABS 3.20 2023       No results for input(s): \"CREATININE\", \"BUN\", \"NA\", \"K\", \"CL\", \"CO2\" in the last 72 hours.  No components found for: \"CALCGFR\"  Lab Results   Component Value Date    GLUCOSE 87 2023     Lab Results   Component Value Date     2023    K 3.7 2023     (H) 2023    CO2 23 2023    BUN 25 (H) 2023    CREATININE 2.14 (H) 2024    ALT 8 2023    AST 15 2023    ALKPHOS 45 2023    BILITOT 1.6 (H) 2023     No results found for: \"FOLATE\"  No results found for: \"[id number]\"  Lab Results   Component Value Date    TSH 4.17 (H) 2022       BIOPSY RESULTS:      3/27/2024: Core needle bipsy ([id number])  FINAL DIAGNOSIS   A. Right breast mass, 8:00, 5 cm from nipple, core needle biopsy:  -- Invasive ductal carcinoma, grade 1, see note.     Note: Immnostains for p63 and SMMHC show absent myoepithelial cell layer in the invasive carcinoma, supporting the diagnosis. In this limited sample, the invasive carcinoma measures up to 3 mm in greatest dimension.  ER, [place] and HER2 will be reported in an addendum.        Addendum   Surgical/Block Number:        [id number] A1  Specimen Site:         Right breast mass 8:00, 5 cm from nipple  Specimen Type:       biopsy     Estrogen Receptor (clone SP1):                     Positive                                                                           Percentage with nuclear staining: >95%                                                                          Intensity of staining: Strong     Progesterone Receptor (clone SP2): Positive                                                                         Percentage with nuclear stainin%                                                                          Intensity of staining: Strong     HER2 (clone 4B5):                                          Negative (1+)                                                      "                       Comment:   Internal positive controls were identified in this specimen.   The time the tissue was put in formalin was not provided for this sample.        Imaging:   No images are attached to the encounter.      Assessment and Plan:   Assessment     Trinidad Hines is a 69 y.o. post-menopausal AA female with right-sided invasive ductal carcinoma, stage IA (cT1, cN0, cM0, G1, ER+, [place]+, HER2-).  The patient's breast cancer was diagnosed on 3/27/2024 and is grade 1, estrogen receptor positive at >95%, progesterone receptor positive at 95%, and HER-2/yue negative.    Predict tool: Predicted overall survival at 5 yrs   Surgery only:  92%  + Hormone therapy : 93%  + Chemotherapy: 92%  + Bisphosphonate: 92%    PMHx - A-fib, Cadiomyopathy (EF 30%), s/p AVR, CKD (stage 3), non-traumatic intracerebral hemorrhage, depression, cardiac defibrillator.     I explained to her that the primary endocrine therapy (AI +/- zometa) will only control the pre-existing disease and won't cure it. Since this is a very small (3mm), slow growing breast cancer, it is difficult to say when the breast cancer becomes a problem to her health without surgery. I told her the only way to cure her breast cancer is to have surgery. With endocrine therapy without surgery, the disease will be controlled but won't eliminate her breast cancer: it slows it down but eventually progress and may affect more clinically. Patient and daughter expressed understanding and opted for proceeding with surgery.    Side effects of AI were discussed which included but were not limited to osteoporosis, and arthritis arthralgia.  I also recommended Zometa, if she has osteopenia/osteoporosis. Since benefits from Zometa in terms of breast cancer prevention is low, I will check early DEXA if she has no osteopenia/osteoporosis.  Note - she has no teeth left. Do not wear dentures, either.     I recommended AI (+/- Zometa if osteopenia/osteoporosis)  before/after/without surgery.      Plan     Pt to go back to surgery clinic (Dr. Mary Sosa)   Pt is going back to see me after surgery is complete or if she choose not to have surgery   Genetics referral placed  Will order DEXA at the next visit. If osteopenia/osteoporosis, will recommend Zometa.   MD visit AWA Middleton MD, PhD   Hematology/Oncology  [facility]

## 2024-05-03 NOTE — PATIENT INSTRUCTIONS
Go back to Dr. Sosa's office to discuss surgery  [phone]  Call Dr. Middleton (medical oncology) after surgery to start the hormone pill  [phone]  3.   Genetics (referral placed)

## 2024-05-04 ENCOUNTER — APPOINTMENT (OUTPATIENT)
Dept: CARDIOLOGY | Facility: CLINIC | Age: 69
End: 2024-05-04
Payer: MEDICAID

## 2024-05-04 ENCOUNTER — ANTICOAGULATION - WARFARIN VISIT (OUTPATIENT)
Dept: CARDIOLOGY | Facility: CLINIC | Age: 69
End: 2024-05-04
Payer: MEDICAID

## 2024-05-04 DIAGNOSIS — I48.91 ATRIAL FIBRILLATION, UNSPECIFIED TYPE (MULTI): Primary | ICD-10-CM

## 2024-05-06 ENCOUNTER — HOSPITAL ENCOUNTER (OUTPATIENT)
Dept: CARDIOLOGY | Facility: CLINIC | Age: 69
Discharge: HOME | End: 2024-05-06
Payer: MEDICAID

## 2024-05-06 DIAGNOSIS — I42.0 DILATED CARDIOMYOPATHY (MULTI): ICD-10-CM

## 2024-05-06 PROCEDURE — 93296 REM INTERROG EVL PM/IDS: CPT

## 2024-05-08 ENCOUNTER — OFFICE VISIT (OUTPATIENT)
Dept: NEPHROLOGY | Facility: CLINIC | Age: 69
End: 2024-05-08
Payer: MEDICAID

## 2024-05-08 ENCOUNTER — ANTICOAGULATION - WARFARIN VISIT (OUTPATIENT)
Dept: CARDIOLOGY | Facility: CLINIC | Age: 69
End: 2024-05-08
Payer: MEDICAID

## 2024-05-08 VITALS
DIASTOLIC BLOOD PRESSURE: 63 MMHG | HEIGHT: 65 IN | SYSTOLIC BLOOD PRESSURE: 91 MMHG | TEMPERATURE: 96.6 F | BODY MASS INDEX: 43.82 KG/M2 | HEART RATE: 78 BPM | WEIGHT: 263 LBS

## 2024-05-08 DIAGNOSIS — I12.9 HYPERTENSIVE KIDNEY DISEASE WITH STAGE 3B CHRONIC KIDNEY DISEASE (MULTI): ICD-10-CM

## 2024-05-08 DIAGNOSIS — I12.9 HYPERTENSIVE KIDNEY DISEASE WITH STAGE 4 CHRONIC KIDNEY DISEASE (MULTI): Primary | ICD-10-CM

## 2024-05-08 DIAGNOSIS — N18.32 HYPERTENSIVE KIDNEY DISEASE WITH STAGE 3B CHRONIC KIDNEY DISEASE (MULTI): ICD-10-CM

## 2024-05-08 DIAGNOSIS — N18.4 HYPERTENSIVE KIDNEY DISEASE WITH STAGE 4 CHRONIC KIDNEY DISEASE (MULTI): Primary | ICD-10-CM

## 2024-05-08 DIAGNOSIS — I48.91 ATRIAL FIBRILLATION, UNSPECIFIED TYPE (MULTI): Primary | ICD-10-CM

## 2024-05-08 LAB
POC INR: 3.4
POC PROTHROMBIN TIME: NORMAL

## 2024-05-08 PROCEDURE — 3008F BODY MASS INDEX DOCD: CPT | Performed by: INTERNAL MEDICINE

## 2024-05-08 PROCEDURE — 1036F TOBACCO NON-USER: CPT | Performed by: INTERNAL MEDICINE

## 2024-05-08 PROCEDURE — 1159F MED LIST DOCD IN RCRD: CPT | Performed by: INTERNAL MEDICINE

## 2024-05-08 PROCEDURE — 99213 OFFICE O/P EST LOW 20 MIN: CPT | Performed by: INTERNAL MEDICINE

## 2024-05-08 PROCEDURE — 99211 OFF/OP EST MAY X REQ PHY/QHP: CPT

## 2024-05-08 PROCEDURE — 3074F SYST BP LT 130 MM HG: CPT | Performed by: INTERNAL MEDICINE

## 2024-05-08 PROCEDURE — 3078F DIAST BP <80 MM HG: CPT | Performed by: INTERNAL MEDICINE

## 2024-05-08 PROCEDURE — 1160F RVW MEDS BY RX/DR IN RCRD: CPT | Performed by: INTERNAL MEDICINE

## 2024-05-08 PROCEDURE — 85610 PROTHROMBIN TIME: CPT | Mod: QW

## 2024-05-08 RX ORDER — DAPAGLIFLOZIN 10 MG/1
10 TABLET, FILM COATED ORAL DAILY
Qty: 90 TABLET | Refills: 3 | Status: SHIPPED | OUTPATIENT
Start: 2024-05-08 | End: 2025-05-08

## 2024-05-08 NOTE — PROGRESS NOTES
Follow up CKD    No new complaints  No recent hospitalizations  Denies nausea, vomiting, chest pain, dyspnea  No urinary symptoms     NAD  Sclera AI s inj  MMM, no sores  Deferred secondary to COVID  No edema  No tremor  No rash  Appropriate     Still taking amiodarone    Stage 4 CKD; variable sCr with hemodynamics  Minimal proteinuria  HTN well controlled, BP low in clinic today  CHF, well controlled    No changes to medications  Labs and follow up in 3 months  Increase SGLT2 to 10 mg daily  Reached out to Dr. Juliocesar Norris, ? If she still needs to be on amiodarone    Addendum  Patient getting surgery, chemo for breast cancer  Labs and follow up in 3 months

## 2024-05-08 NOTE — PROGRESS NOTES
Patient identification verified with 2 identifiers.    Location: [facility] at [facility] - [address] [place] [address] [phone] option #1     Referring Physician: DR. KASH CASANOVA  Enrollment/ Re-enrollment date: 11/4/2024   INR Goal: 2.0-3.0  INR monitoring is per [facility] protocol.  Anticoagulation Medication: warfarin  Indication: Atrial Fibrillation/Atrial Flutter    Subjective   Bleeding signs/symptoms: No    Bruising: Yes  bruising   Major bleeding event: No  Thrombosis signs/symptoms: No  Thromboembolic event: No  Missed doses: No  Extra doses: No  Medication changes: No  Dietary changes: No  Change in health: No  Change in activity: No  Alcohol: No  Other concerns: No    Upcoming Procedures:  Does the Patient Have any upcoming procedures that require interruption in anticoagulation therapy? no  Does the patient require bridging? no      Anticoagulation Summary  As of 5/8/2024      INR goal:  2.0-3.0   TTR:  73.5% (7.4 mo)   INR used for dosing:  3.40 (5/8/2024)   Weekly warfarin total:  32.5 mg               Assessment/Plan   Supratherapeutic   Decrease dose  RTC 5/18/24 as patient needs a Saturday appointment        Education provided to patient during the visit:  Patient instructed to call in interim with questions, concerns and changes.   Patient educated on dietary consistency in vitamin k consumption.

## 2024-05-09 ENCOUNTER — TELEPHONE (OUTPATIENT)
Dept: CARDIOLOGY | Facility: CLINIC | Age: 69
End: 2024-05-09
Payer: MEDICAID

## 2024-05-09 NOTE — TELEPHONE ENCOUNTER
----- Message from Herberth Gandara MD sent at 5/9/2024 12:21 PM EDT -----  Regarding: RE: [place] patient  We will make sure amio is not being given, I not sure why it was continued  [place] please make sure she knows not to be on amio  ----- Message -----  From: Kendra Hong MD  Sent: 5/8/2024   5:56 PM EDT  To: Herberth Gandara MD  Subject: RE: [place] patient                               Well I thought she was off it already as she is in Afib all of the time. And who is ordering it?   J  ----- Message -----  From: Herberth Gandara MD  Sent: 5/8/2024  11:25 AM EDT  To: Kendra Hong MD  Subject: FW: [place] patient                               I think this lady is persistently in afib, should we stop her amio, herberth guerra  ----- Message -----  From: Safia Gerber MD  Sent: 5/8/2024  10:27 AM EDT  To: Herberth Gandara MD  Subject: [place] patient                                   She also is wondering if she needs to be on the amiodarone 200 mg daily (she is still taking, don't know why it's not on her list)    Best,   Safia

## 2024-05-14 ENCOUNTER — TELEPHONE (OUTPATIENT)
Dept: PRIMARY CARE | Facility: CLINIC | Age: 69
End: 2024-05-14
Payer: MEDICAID

## 2024-05-17 ENCOUNTER — ANTICOAGULATION - WARFARIN VISIT (OUTPATIENT)
Dept: CARDIOLOGY | Facility: CLINIC | Age: 69
End: 2024-05-17
Payer: MEDICAID

## 2024-05-17 DIAGNOSIS — I48.91 ATRIAL FIBRILLATION, UNSPECIFIED TYPE (MULTI): Primary | ICD-10-CM

## 2024-05-17 LAB
POC INR: 2.1
POC PROTHROMBIN TIME: NORMAL

## 2024-05-17 PROCEDURE — 85610 PROTHROMBIN TIME: CPT | Mod: QW

## 2024-05-17 PROCEDURE — 99211 OFF/OP EST MAY X REQ PHY/QHP: CPT

## 2024-05-17 NOTE — PROGRESS NOTES
Patient identification verified with 2 identifiers.    Location: [facility] at [facility] - [address] [place] [address] [phone] option #1     Referring Physician: KASH CASANOVA  Enrollment/ Re-enrollment date: 2024   INR Goal: 2.0-3.0  INR monitoring is per [facility] protocol.  Anticoagulation Medication: warfarin  Indication: Atrial Fibrillation/Atrial Flutter    Subjective   Bleeding signs/symptoms: No    Bruising: No   Major bleeding event: No  Thrombosis signs/symptoms: No  Thromboembolic event: No  Missed doses: No  Extra doses: No  Medication changes: Yes  Dietary changes: No  Change in health: No  Change in activity: No  Alcohol: No  Other concerns: No    Upcoming Procedures:  Does the Patient Have any upcoming procedures that require interruption in anticoagulation therapy? no  Does the patient require bridging? no      Anticoagulation Summary  As of 2024      INR goal:  2.0-3.0   TTR:  73.4% (7.7 mo)   INR used for dosin.10 (2024)   Weekly warfarin total:  35 mg               Assessment/Plan   Therapeutic     1. New dose: INCREASE TO PRIOR DOSING LEVEL   2. Next INR: 1 week      Education provided to patient during the visit:  Patient instructed to call in interim with questions, concerns and changes.   Patient educated on interactions between medications and warfarin.   Patient educated on dietary consistency in vitamin k consumption.   Patient educated on affects of alcohol consumption while taking warfarin.   Patient educated on signs of bleeding/clotting.   Patient educated on compliance with dosing, follow up appointments, and prescribed plan of care.

## 2024-05-17 NOTE — PROGRESS NOTES
Chief Complaint  New right breast cancer  Additional right breast calcifications  Pre op    History of Present Illness    Referring Provider:   CLAUDIA Washington  PCP A Grey Granados    69-year-old -American female here with new right breast cancer  cT1 cN0 Grade 1 invasive ductal carcinoma.  ER greater than 95, [place] 95, HER2 negative.   Additional right breast calcifications  Here w daughter.     History:  1) No abnormal mammograms, breast biopsies, or breast surgeries.  2) 2/23/2024.  Bilateral screening mammogram.  Left cardiac device.  Scattered fibroglandular tissue bilaterally.  Left breast negative.  Indeterminate right breast mass.  Indeterminate right breast calcifications.  BI-RADS 0.  3) March 1, 2024.  Right breast diagnostic imaging.  Right breast calcifications are probably benign.  6-month follow-up is recommended.  Right breast ultrasound.  At 8:00 5 cm from the nipple a 0.4 x 0.4 x 0.4 cm indeterminate mass is noted, BI-RADS 4.  Right axillary ultrasound is negative.  4) 3/27/2024.  Right breast mass 8:00 5 cm from the nipple biopsy.  Grade 1 invasive ductal carcinoma.  ER greater than 95, [place] 95, HER2 negative.  Open coil HydroMARK.  5) reviewed films. Calcs are low suspicion. 6 mo FU vs can be localized for excision  6) patient wanted to avoid surgery and discuss endocrine therapy.  Saw medical oncologist and has returned to discuss surgery.    She presents today for further management and surgical discussion.    Wants to proceed with excision of right breast cancer only    Ob/gyn history:  Menarche 12  Menopause 39  G 3 P 3, age of first delivery 14  Brief OCPs, no fertility treatments, no HRT    Daughter with cancer unknown type  Sister with cancer  Brother with cancer  Niece with cancer      Review of systems  A comprehensive ROS was taken on the patient intake form and reviewed with the patient. This form is scanned into the electronic medical record.    Constitutional symptoms: Denies generalized  fatigue. Denies weight change, fevers/chills, difficulty sleeping   Eyes: Denies double vision, glaucoma, cataracts.  Ear/nose/throat/mouth: Denies hearing changes, sore throat, sinus problems.  Cardiovascular: No chest pain. Denies irregular heartbeat. Denies ankle swelling.  Respiratory: No wheezing, cough, or shortness of breath.  Gastrointestinal: No abdominal pain, No nausea/vomiting. No indigestion/heartburn. No change in bowel habits. No constipation or diarrhea.   Genitourinary: No urinary incontinence. No urinary frequency. No painful urination.  Musculoskeletal: No bone pain, no muscle pain, no joint pain.   Integumentary: No rash. No masses. No changes in moles. No easy bruising.  Neurological: No headaches. No tremors. No numbness/tingling.  Psychiatric: No anxiety. No depression.  Endocrine: No excessive thirst. Not too hot or too cold. Not tired or fatigued.   Hematological/lymphatic: No swollen glands or blood clotting problems. No bruising.     Physical exam  A chaperone was offered for all portions of the physical exam. The patient declined.     General appearance: appears stated age, alert and oriented x 3  Head: Normocephalic, atraumatic  Eyes: conjunctivae/corneas clear.  Ears: External ears are normal, hearing is grossly intact.  Lungs: normal breathing  Heart: irregular  Abdomen: Soft, nontender, nondistended.  Neurologic: grossly normal  Lymph nodes: No cervical, supraclavicular or axillary lymphadenopathy bilaterally    Breast: A comprehensive breast exam was performed in the seated and supine positions. Breasts are symmetrical. Bilateral nipples are everted. There are no skin changes on arm maneuvers. Bra size: 52D   Right: no masses   Left: no masses. Left upper chest wall port    Medial sternotomy incision.    Results  Imaging  All breast imaging personally reviewed by me.  See HPI    Pathology   3/27/2024.  Right breast mass 8:00 5 cm from the nipple biopsy.  Grade 1 invasive ductal  carcinoma.  ER greater than 95, [place] 95, HER2 negative.  Open coil HydroMARK.    Impression:   1) 69-year-old -American female here with new right breast cancer  cT1 cN0 Grade 1 invasive ductal carcinoma.  ER greater than 95, [place] 95, HER2 negative.   Additional right breast calcifications  2) extensive cardiac co morbidities include Afib. S/p AVR. Pacemaker in place.  EF 30%. Prior stroke. On coumadin  Non-smoker  3) FH of cancer. Referred to genetics.       Plan:   She is here with her daughter.  She met with the medical oncologist to discuss endocrine therapy and is returned to discuss surgery.  She is requesting excision of the right breast cancer alone.  Probably benign right breast calcifications can be followed.    The risks of lumpectomy including bleeding, infection, and need for reexcision of margins (approximately 20%), was discussed.  She was told that lumpectomy is usually a day surgery, and the postoperative recovery was discussed.     She is clinically node-negative.  She meets choosing wisely criteria and we will avoid axillary staging with sentinel lymph node biopsy.    She will be scheduled for right breast Magseed localized partial mastectomy.  She will need preoperative clearance.  She will need a preoperative Magseed placement.  She will follow-up about 2 weeks after surgery to review her pathology report.  She should call with any sooner concerns

## 2024-05-18 ENCOUNTER — APPOINTMENT (OUTPATIENT)
Dept: CARDIOLOGY | Facility: CLINIC | Age: 69
End: 2024-05-18
Payer: MEDICAID

## 2024-05-20 ENCOUNTER — OFFICE VISIT (OUTPATIENT)
Dept: SURGICAL ONCOLOGY | Facility: CLINIC | Age: 69
End: 2024-05-20
Payer: MEDICAID

## 2024-05-20 VITALS
OXYGEN SATURATION: 95 % | WEIGHT: 260.2 LBS | TEMPERATURE: 97.2 F | SYSTOLIC BLOOD PRESSURE: 100 MMHG | BODY MASS INDEX: 43.3 KG/M2 | DIASTOLIC BLOOD PRESSURE: 67 MMHG | HEART RATE: 78 BPM

## 2024-05-20 DIAGNOSIS — Z17.0 MALIGNANT NEOPLASM OF LOWER-OUTER QUADRANT OF RIGHT BREAST OF FEMALE, ESTROGEN RECEPTOR POSITIVE (MULTI): ICD-10-CM

## 2024-05-20 DIAGNOSIS — C50.511 MALIGNANT NEOPLASM OF LOWER-OUTER QUADRANT OF RIGHT BREAST OF FEMALE, ESTROGEN RECEPTOR POSITIVE (MULTI): ICD-10-CM

## 2024-05-20 DIAGNOSIS — C50.511 MALIGNANT NEOPLASM OF LOWER-OUTER QUADRANT OF RIGHT BREAST OF FEMALE, ESTROGEN RECEPTOR POSITIVE (MULTI): Primary | ICD-10-CM

## 2024-05-20 DIAGNOSIS — Z17.0 MALIGNANT NEOPLASM OF LOWER-OUTER QUADRANT OF RIGHT BREAST OF FEMALE, ESTROGEN RECEPTOR POSITIVE (MULTI): Primary | ICD-10-CM

## 2024-05-20 PROCEDURE — 1126F AMNT PAIN NOTED NONE PRSNT: CPT | Performed by: SURGERY

## 2024-05-20 PROCEDURE — 3078F DIAST BP <80 MM HG: CPT | Performed by: SURGERY

## 2024-05-20 PROCEDURE — 3074F SYST BP LT 130 MM HG: CPT | Performed by: SURGERY

## 2024-05-20 PROCEDURE — 1159F MED LIST DOCD IN RCRD: CPT | Performed by: SURGERY

## 2024-05-20 PROCEDURE — 3008F BODY MASS INDEX DOCD: CPT | Performed by: SURGERY

## 2024-05-20 PROCEDURE — 99214 OFFICE O/P EST MOD 30 MIN: CPT | Performed by: SURGERY

## 2024-05-20 PROCEDURE — 1160F RVW MEDS BY RX/DR IN RCRD: CPT | Performed by: SURGERY

## 2024-05-20 RX ORDER — SODIUM CHLORIDE 9 MG/ML
100 INJECTION, SOLUTION INTRAVENOUS CONTINUOUS
OUTPATIENT
Start: 2024-05-20

## 2024-05-20 RX ORDER — CEFAZOLIN SODIUM 2 G/100ML
2 INJECTION, SOLUTION INTRAVENOUS ONCE
OUTPATIENT
Start: 2024-05-20 | End: 2024-05-20

## 2024-05-20 ASSESSMENT — PAIN SCALES - GENERAL: PAINLEVEL: 0-NO PAIN

## 2024-05-24 ENCOUNTER — ANTICOAGULATION - WARFARIN VISIT (OUTPATIENT)
Dept: CARDIOLOGY | Facility: CLINIC | Age: 69
End: 2024-05-24
Payer: MEDICAID

## 2024-05-24 ENCOUNTER — HOSPITAL ENCOUNTER (OUTPATIENT)
Dept: CARDIOLOGY | Facility: CLINIC | Age: 69
Discharge: HOME | End: 2024-05-24
Payer: MEDICAID

## 2024-05-24 ENCOUNTER — OFFICE VISIT (OUTPATIENT)
Dept: CARDIOLOGY | Facility: CLINIC | Age: 69
End: 2024-05-24
Payer: MEDICAID

## 2024-05-24 DIAGNOSIS — I42.0 DILATED CARDIOMYOPATHY (MULTI): Primary | ICD-10-CM

## 2024-05-24 DIAGNOSIS — I48.19 PERSISTENT ATRIAL FIBRILLATION (MULTI): ICD-10-CM

## 2024-05-24 DIAGNOSIS — I42.9 CARDIOMYOPATHY, UNSPECIFIED TYPE (MULTI): ICD-10-CM

## 2024-05-24 DIAGNOSIS — I50.22 CHRONIC SYSTOLIC HEART FAILURE (MULTI): ICD-10-CM

## 2024-05-24 DIAGNOSIS — I42.0 DILATED CARDIOMYOPATHY (MULTI): ICD-10-CM

## 2024-05-24 DIAGNOSIS — I48.91 ATRIAL FIBRILLATION, UNSPECIFIED TYPE (MULTI): ICD-10-CM

## 2024-05-24 DIAGNOSIS — I50.22 CHRONIC SYSTOLIC HEART FAILURE (MULTI): Primary | ICD-10-CM

## 2024-05-24 LAB
POC INR: 2.5
POC PROTHROMBIN TIME: NORMAL

## 2024-05-24 PROCEDURE — 99214 OFFICE O/P EST MOD 30 MIN: CPT | Performed by: INTERNAL MEDICINE

## 2024-05-24 PROCEDURE — 99215 OFFICE O/P EST HI 40 MIN: CPT | Performed by: INTERNAL MEDICINE

## 2024-05-24 PROCEDURE — 93284 PRGRMG EVAL IMPLANTABLE DFB: CPT

## 2024-05-24 PROCEDURE — 85610 PROTHROMBIN TIME: CPT | Mod: QW

## 2024-05-24 PROCEDURE — 93005 ELECTROCARDIOGRAM TRACING: CPT | Mod: 59

## 2024-05-24 PROCEDURE — 99211 OFF/OP EST MAY X REQ PHY/QHP: CPT | Mod: 25

## 2024-05-24 ASSESSMENT — PATIENT HEALTH QUESTIONNAIRE - PHQ9
2. FEELING DOWN, DEPRESSED OR HOPELESS: NOT AT ALL
SUM OF ALL RESPONSES TO PHQ9 QUESTIONS 1 AND 2: 0
1. LITTLE INTEREST OR PLEASURE IN DOING THINGS: NOT AT ALL

## 2024-05-24 ASSESSMENT — COLUMBIA-SUICIDE SEVERITY RATING SCALE - C-SSRS
1. IN THE PAST MONTH, HAVE YOU WISHED YOU WERE DEAD OR WISHED YOU COULD GO TO SLEEP AND NOT WAKE UP?: NO
2. HAVE YOU ACTUALLY HAD ANY THOUGHTS OF KILLING YOURSELF?: NO
6. HAVE YOU EVER DONE ANYTHING, STARTED TO DO ANYTHING, OR PREPARED TO DO ANYTHING TO END YOUR LIFE?: NO

## 2024-05-24 ASSESSMENT — ENCOUNTER SYMPTOMS
DEPRESSION: 1
OCCASIONAL FEELINGS OF UNSTEADINESS: 1
LOSS OF SENSATION IN FEET: 0

## 2024-05-24 NOTE — PATIENT INSTRUCTIONS
It was nice to see you today!    WE assessed your current heart failure status and device function.  We determined that   The device is currently programmed at the best settings and is working well.  You have good mental capacity (cognitive function).  Your exercise was limited based on shortness of breath and knee issues.  Dr Licona reviewed and examined your medications - and you are on good medications however it seems that you may still have too much fluid on board.  To more accurately determine what adjustments may be helpful a right heart catheterization is being suggested.  This would decide whether you have too much fluid or if the heart muscle is weak.  Different medications are available for each of these issues.  Dr Gandara will be included in our assessment and if we all agree to proceed with right heart catheterization this can be scheduled at [facility] and you will receive a call.

## 2024-05-24 NOTE — PROGRESS NOTES
Returns for follow up visit for  persistent atrial fibrillation and HFrEF.        History Of Present Illness:    Trinidad Hines is a 69 y.o. year old female patient   68 yo woman, with h/o persistent AF s/p remote stroke now anticoagulated. She had cardioversion and initiation of amiodarone in 2015. In the Fall 2022 we noted that the AF had recurred and was persistent. We recommend cardioversion which was successful. It lasted about one month. She stopped the amiodarone after the cardioversion although she is not sure why. So she has not been taking it for a while.She has been in persistent AF since Oct 2022. She reports that she is short of breath with exertion. She does report mild orthopnea but no PND. She does note some leg swelling.   REcent echo shows LVEF 30%. Percent RV pacing is 90%    She then underwent upgrade to CRT in 10/2023  -  the lead was positioned in the posterolateral branch with excellent location.  She continues to experience shortness of breath       She has been following in cardiac device clinic and with Dr Gandara. She has a PMH of Rheumatic Heart Failure s/p aortic valve replacement 1986, redo in 2007 with bioprosthetic aortic valve, gout,HTN, hemorrhagic stroke. Admitted to [facility] on [place] 23/15 to AFib with RVR and systolic heart failure, failed NICOLLE CVN . Plan readmitted to [facility] for Tikosyn on February 6 th but on the fifth dose of Tikosyn, pt had a CVA. Pt was discharged on Feb 19/15 on Amiodarone 200 mg daily.     Last echo in 8/2023 showed EF 30 with biatrial dilation.   Other issues : CKD, HLD, obesity, gout, insomnia no QI.  Recent diagnosis of breath cancer and will be undergoing surgery on June 19, 2024.    Past Medical History:  Past Medical History:   Diagnosis Date    Cardiomyopathy (Multi)     Chronic kidney disease, stage 3 unspecified (Multi) 11/02/2022    CKD (chronic kidney disease), stage III    Encounter for screening for depression 05/06/2022    Standardized adult depression  screening tool completed    Encounter for screening for malignant neoplasm of colon 01/19/2022    Colon cancer screening    Nontraumatic intracerebral hemorrhage, unspecified (Multi)     Hemorrhagic stroke    Personal history of other diseases of the respiratory system 12/14/2021    History of acute bronchitis    Personal history of other diseases of urinary system 11/02/2022    History of chronic kidney disease    Personal history of urinary (tract) infections     History of urinary tract infection    Presence of automatic (implantable) cardiac defibrillator 05/06/2022    Cardiac defibrillator in place    Urinary tract infection, site not specified 11/07/2022    Acute UTI       Past Surgical History:  Past Surgical History:   Procedure Laterality Date    AORTIC VALVE REPLACEMENT  02/05/2015    Aortic Valve Replacement    CARDIAC ELECTROPHYSIOLOGY PROCEDURE N/A 10/30/2023    Procedure: ICD UPGRADE, DUAL TO BIV;  Surgeon: Kendra Hong MD;  Location: [place] [address] Cardiac Cath Lab;  Service: Electrophysiology;  Laterality: N/A;    TOTAL KNEE ARTHROPLASTY  04/29/2015    Total Knee Arthroplasty          Social History:    Cigarettes: former  ETOH: none  Drugs: none  Exercise: none  Occ: retired    Family History:  No family history on file.      Allergies:  Allergies   Allergen Reactions    Aspirin Rash and Unknown    Diltiazem Hcl Itching    Dofetilide Other and Unknown    Phenytoin Hives and Rash    Lisinopril Cough and Dry mouth        Outpatient Medications:  Current Outpatient Medications   Medication Instructions    albuterol 90 mcg/actuation inhaler INAHEL 2 PUFFS EVERY 4 HOURS AS NEEDED FOR WHEEZING    allopurinol (ZYLOPRIM) 300 mg, oral, Daily    calcitriol (ROCALTROL) 0.25 mcg, oral, Daily    calcitriol (ROCALTROL) 0.25 mcg, oral, Daily    dapagliflozin propanediol (FARXIGA) 10 mg, oral, Daily    lidocaine (Xylocaine) 5 % ointment 1 Application, Topical, 3 times daily PRN    metoprolol succinate XL  "(TOPROL-XL) 100 mg, oral, Daily    sacubitriL-valsartan (Entresto) 49-51 mg tablet 1 tablet, oral, 2 times daily    simvastatin (ZOCOR) 20 mg, oral, Nightly    spironolactone (ALDACTONE) 25 mg, oral, Daily    torsemide (DEMADEX) 20 mg, oral, Daily    traMADol (ULTRAM) 50 mg, oral, Every 6 hours PRN    warfarin (Coumadin) 5 mg tablet TAKE 1.5 TABLET DAILY AS DIRECTED BY COUMADIN CLINIC         Last Recorded Vitals:      2/23/2024    11:44 AM 3/27/2024     8:50 AM 4/17/2024     8:25 AM 4/18/2024     8:06 AM 5/3/2024     8:08 AM 5/8/2024    10:03 AM 5/20/2024     9:33 AM   Vitals   Systolic  116 80 112 108 91 100   Diastolic  80 50 82 74 63 67   Heart Rate  78 77 90 76 78 78   Temp    36.1 °C (97 °F)  35.9 °C (96.6 °F) 36.2 °C (97.2 °F)   Resp    18      Height (in) 1.651 m (5' 5\") 1.626 m (5' 4\") 1.651 m (5' 5\")   1.651 m (5' 5\")    Weight (lb) 260.14 267.86 263 263.4 262.35 263 260.2   BMI 43.29 kg/m2 45.98 kg/m2 43.77 kg/m2 43.83 kg/m2 43.66 kg/m2 43.77 kg/m2 43.3 kg/m2   BSA (m2) 2.33 m2 2.35 m2 2.34 m2 2.34 m2 2.34 m2 2.34 m2 2.33 m2   Visit Report  Report Report Report Report Report Report        Physical Exam:  ICD site well healed without swelling     Last Cardiology Tests:  ECG:    Presenting Atrial fibrillation BiV pacing  msec    V-V = 0    Intrinsic ECG: Atrial fibrillation VR 74 bpm  msec     LV first by 40 msec:  Atrial fibrillation QRS duration 162 msec                  Encounter Date: 12/31/23   ECG 12 lead   Result Value    Ventricular Rate 95    QRS Duration 112    QT Interval 380    QTC Calculation(Bazett) 477    R Axis 0    T Axis 179    QRS Count 16    Q Onset 218    T Offset 408    QTC Fredericia 442    Narrative    Atrial fibrillation  Left ventricular hypertrophy with repolarization abnormality ( R in aVL , Seth product )  Abnormal ECG  When compared with ECG of 20-SEP-2023 08:50,  Atrial fibrillation has replaced Electronic ventricular pacemaker  See ED provider note for full " interpretation and clinical correlation  Confirmed by Saige Bergeron ([id number]) on 1/1/2024 2:26:25 PM        Echo:  Transthoracic Echo (TTE) Complete    Result Date: 3/4/2024    [facility], [address] [place] [address]                      Tel [phone] and Fax [phone] TRANSTHORACIC ECHOCARDIOGRAM REPORT  Patient Name:      MALIK AGUILAR      Reading Physician:    [id number] Ulysses Alarcon MD Study Date:        3/4/2024             Ordering Provider:    [id number] ERIKA TOPETE MRN/PID:           [id number]             Fellow: Accession#:        [id number]         Nurse: Date of Birth/Age: 1955 / 68 years Sonographer:          Belkys Perrin RDCS Gender:            F                    Additional Staff: Height:            165.10 cm            Admit Date: Weight:            117.93 kg            Admission Status:     Outpatient BSA / BMI:         2.21 m2 / 43.27      Encounter#:           [id number]                    kg/m2                                         Department Location:  [facility]                                                               Echo Lab Blood Pressure: 108 /73 mmHg Study Type:    TRANSTHORACIC ECHO (TTE) COMPLETE Diagnosis/ICD: Chronic systolic (congestive) heart failure (CHF)-I50.22 Indication:    Congestive Heart Failure CPT Code:      Echo Complete w Full Doppler-[id number] Patient History: BMI:               Obese >30 Pertinent History: A-Fib, Cardiomyopathy, HTN, CHF, CVA and Palpitations. AVR                    1986 and 2007, #21 Bhaork-Shiley,CKD,QI,Rheurmatic                    fever,Cardioversion. Study Detail: The following Echo studies were performed: 2D, M-Mode, Doppler and               color flow. Technically challenging study due to body habitus.               Patient has a  defibrillator.  PHYSICIAN INTERPRETATION: Left Ventricle: The left ventricular systolic function is moderately decreased, with an estimated ejection fraction of 30-35%. The patient is in atrial fibrillation which may influence the estimate of left ventricular function and transvalvular flows. There is global hypokinesis of the left ventricle with minor regional variations. The left ventricular cavity size is mildly dilated. The left ventricular septal wall thickness is mildly increased. There is mild eccentric left ventricular hypertrophy. Abnormal (paradoxical) septal motion consistent with post-operative status. Left ventricular diastolic filling was indeterminate. Left Atrium: The left atrium is mild to moderately dilated. Right Ventricle: The right ventricle is slightly enlarged. There is mildly reduced right ventricular systolic function. A device is visualized in the right ventricle. Right Atrium: The right atrium is moderately dilated. Aortic Valve: There is a prosthetic aortic valve present. The aortic valve dimensionless index is 0.59. There is a Fernie-Shiley bioprosthetic aortic valve, with a 21 mm reported size. There is no aditi-prosthetic aortic valve regurgitation. Echo findings are consistent with normal aortic valve prosthesis structure and function. There is no evidence of aortic valve regurgitation. The peak instantaneous gradient of the aortic valve is 7.6 mmHg. The mean gradient of the aortic valve is 3.9 mmHg. Mitral Valve: The mitral valve is mildly thickened. There is mild mitral valve regurgitation. Tricuspid Valve: The tricuspid valve is structurally normal. There is mild to moderate tricuspid regurgitation. The Doppler estimated RVSP is mildly elevated at 39.9 mmHg. Pulmonic Valve: The pulmonic valve is not well visualized. The pulmonic valve regurgitation was not well visualized. Pericardium: There is a trivial pericardial effusion. Aorta: The aortic root is normal. Systemic Veins: The  inferior vena cava appears dilated. There is less than 50% IVC collapse with inspiration. In comparison to the previous echocardiogram(s): Compared with study from 8/28/2023, no significant change.  CONCLUSIONS:  1. Left ventricular systolic function is moderately decreased with a 30-35% estimated ejection fraction.  2. Abnormal septal motion consistent with post-operative status.  3. There is mild eccentric left ventricular hypertrophy.  4. There is mildly reduced right ventricular systolic function.  5. The left atrium is mild to moderately dilated.  6. The right atrium is moderately dilated.  7. Mild to moderate tricuspid regurgitation visualized.  8. Mildly elevated RVSP.  9. There is a bioprosthetic aortic valve. 10. Patient refused Definity. 11. The patient is in atrial fibrillation which may influence the estimate of left ventricular function and transvalvular flows. 12. There is global hypokinesis of the left ventricle with minor regional variations. QUANTITATIVE DATA SUMMARY: 2D MEASUREMENTS:                           Normal Ranges: IVSd:          1.25 cm    (0.6-1.1cm) LVPWd:         0.91 cm    (0.6-1.1cm) LVIDd:         5.47 cm    (3.9-5.9cm) LVIDs:         4.68 cm LV Mass Index: 105.7 g/m2 LV % FS        14.4 % LA VOLUME:                               Normal Ranges: LA Vol A4C:        85.8 ml    (22+/-6mL/m2) LA Vol A2C:        91.2 ml LA Vol BP:         91.2 ml LA Vol Index A4C:  38.8 ml/m2 LA Vol Index A2C:  41.2 ml/m2 LA Vol Index BP:   41.2 ml/m2 LA Area A4C:       26.0 cm2 LA Area A2C:       26.0 cm2 LA Major Axis A4C: 6.7 cm LA Major Axis A2C: 6.3 cm LA Volume Index:   40.0 ml/m2 LA Vol A4C:        76.4 ml LA Vol A2C:        84.1 ml RA VOLUME BY A/L METHOD:                               Normal Ranges: RA Vol A4C:        104.8 ml   (8.3-19.5ml) RA Vol Index A4C:  47.4 ml/m2 RA Area A4C:       30.0 cm2 RA Major Axis A4C: 7.3 cm M-MODE MEASUREMENTS:                  Normal Ranges: Ao Root: 2.90 cm  (2.0-3.7cm) LAs:     6.30 cm (2.7-4.0cm) AORTA MEASUREMENTS:                  Normal Ranges: Ao Arch: 2.90 cm (2.0-3.6cm) LV SYSTOLIC FUNCTION BY 2D PLANIMETRY (MOD):                     Normal Ranges: EF-A4C View: 31.5 % (>=55%) EF-A2C View: 35.1 % EF-Biplane:  33.6 % LV DIASTOLIC FUNCTION:                           Normal Ranges: MV Peak E:    1.05 m/s    (0.7-1.2 m/s) MV Peak A:    0.29 m/s    (0.42-0.7 m/s) E/A Ratio:    3.69        (1.0-2.2) MV e'         0.10 m/s    (>8.0) MV lateral e' 0.10 m/s MV medial e'  0.11 m/s MV A Dur:     112.27 msec E/e' Ratio:   10.04       (<8.0) MITRAL VALVE:                 Normal Ranges: MV DT: 133 msec (150-240msec) AORTIC VALVE:                                   Normal Ranges: AoV Vmax:                1.38 m/s (<=1.7m/s) AoV Peak P.6 mmHg (<20mmHg) AoV Mean PG:             3.9 mmHg (1.7-11.5mmHg) LVOT Max Wing:            0.86 m/s (<=1.1m/s) AoV VTI:                 25.70 cm (18-25cm) LVOT VTI:                15.19 cm LVOT Diameter:           2.11 cm  (1.8-2.4cm) AoV Area, VTI:           2.07 cm2 (2.5-5.5cm2) AoV Area,Vmax:           2.20 cm2 (2.5-4.5cm2) AoV Dimensionless Index: 0.59  RIGHT VENTRICLE: RV Basal 4.20 cm RV Mid   2.50 cm RV Major 6.9 cm TAPSE:   15.9 mm RV s'    0.09 m/s TRICUSPID VALVE/RVSP:                             Normal Ranges: Peak TR Velocity: 2.50 m/s RV Syst Pressure: 39.9 mmHg (< 30mmHg) IVC Diam:         2.90 cm PULMONIC VALVE:                         Normal Ranges: PV Accel Time: 94 msec  (>120ms) PV Max Wing:    0.8 m/s  (0.6-0.9m/s) PV Max P.5 mmHg  [id number] Ulysses Alarcon MD Electronically signed on 3/4/2024 at 11:06:08 AM  ** Final **         Cardiac Device Check:    RV-LV measurement with RV pacing    RV-P4  199  msec  RV-M3  200 msec  RV-M2 193 msec  RV-D1  202 msec    Currently programmed RV-D1        Lab review: I have Chemistry CMP:   Lab Results   Component Value Date    ALBUMIN 4.1 2024    CALCIUM 9.8 2024     CO2 30 05/03/2024    CREATININE 2.64 (H) 05/03/2024    GLUCOSE 96 05/03/2024    BILITOT 0.8 05/03/2024    PROT 6.5 05/03/2024    ALT 8 05/03/2024    AST 11 05/03/2024    ALKPHOS 59 05/03/2024   , Chemistry BMP   Lab Results   Component Value Date    GLUCOSE 96 05/03/2024    CALCIUM 9.8 05/03/2024    CO2 30 05/03/2024    CREATININE 2.64 (H) 05/03/2024   , and CBC:  Lab Results   Component Value Date    WBC 3.9 (L) 05/03/2024    RBC 4.31 05/03/2024    HGB 12.9 05/03/2024    HCT 39.9 05/03/2024    MCV 93 05/03/2024    MCH 29.9 05/03/2024    MCHC 32.3 05/03/2024    RDW 13.4 05/03/2024    MPV 12.1 (H) 10/12/2023    NRBC 0.0 05/03/2024       Assessment/Plan   Problem List Items Addressed This Visit    None  Atrial fibrillation  HFrEF        Kendra Hong MD

## 2024-05-24 NOTE — PROGRESS NOTES
Cardiac Physiologic Pacing-Heart failure (CPP-HF) Clinic     Trinidad Hines is a 69 y.o. female from [place], [place], here for multi-disciplinary visit at the  HF-CPP Clinic with Rocky Hong (EP) and Livan (HF).    She has a history of non-ischemic cardiomyopathy and HFrEF Stage C, and persistent AF.    She had a CRT upgrade on 10/13/2023 due to 90% RV pacing in setting of chronic atrial fibrillation.     Today she reports that her primary concerns are (1) significant dyspnea on exertion, and (2) significant knee pain with walking.      Testing done in clinic today:  The Mini-Co/5 (+cognition normal)  6MWT - 360 feet (poor)  SOF Frailty Index - +poor energy, +able to do chair stands, no significant weight loss (negative for frailty)  EQ5D: mobility - slight problems, self care - moderate problems, usual activities - slight problems, pain/discomfort - moderate pain (knees), anxiety/depression - none   EQ5D VAS: 50/100    Echocardiogram 3/4/2024   1. Left ventricular systolic function is moderately decreased with a 30-35% estimated ejection fraction.   2. Abnormal septal motion consistent with post-operative status.   3. There is mild eccentric left ventricular hypertrophy.   4. There is mildly reduced right ventricular systolic function.   5. The left atrium is mild to moderately dilated.   6. The right atrium is moderately dilated.   7. Mild to moderate tricuspid regurgitation visualized.   8. Mildly elevated RVSP.   9. There is a bioprosthetic aortic valve.  10. Patient refused Definity.  11. The patient is in atrial fibrillation which may influence the estimate of left ventricular function and transvalvular flows.  12. There is global hypokinesis of the left ventricle with minor regional variations.    Exam: BP 99/71, HR 90  +JVD  No LE edema  Irregular  Obese abdomen    Current Outpatient Medications   Medication Instructions    albuterol 90 mcg/actuation inhaler INAHEL 2 PUFFS EVERY 4 HOURS AS NEEDED  FOR WHEEZING    allopurinol (ZYLOPRIM) 300 mg, oral, Daily    calcitriol (ROCALTROL) 0.25 mcg, oral, Daily    calcitriol (ROCALTROL) 0.25 mcg, oral, Daily    dapagliflozin propanediol (FARXIGA) 10 mg, oral, Daily    lidocaine (Xylocaine) 5 % ointment 1 Application, Topical, 3 times daily PRN    metoprolol succinate XL (TOPROL-XL) 100 mg, oral, Daily    sacubitriL-valsartan (Entresto) 49-51 mg tablet 1 tablet, oral, 2 times daily    simvastatin (ZOCOR) 20 mg, oral, Nightly    spironolactone (ALDACTONE) 25 mg, oral, Daily    torsemide (DEMADEX) 20 mg, oral, Daily    traMADol (ULTRAM) 50 mg, oral, Every 6 hours PRN    warfarin (Coumadin) 5 mg tablet TAKE 1.5 TABLET DAILY AS DIRECTED BY COUMADIN CLINIC     Past medical history (non-cardiac):  -- Obesity  -- History of stroke  -- CKD stage 3  -- Gout  -- Insomnia    Cardiovascular history:  -- Persistent AF, prior cardioversion and amiodarone use  -- Non-ischemic cardiomyopathy  -- HFrEF Stage C, NYHA class III    Assessment: HFrEF Stage C and persistent functional capacity limitations. CRT-D functioning appropriately (See Dr. Hong's note).    Plan:  -- She may benefit from further characterization of her filling pressures with right heart catheterization. If elevated (difficult on exam) may consider increasing loop diuretic despite. Ideally this would be done prior to planned R breast surgery for ?cancer in mid June 2024. Will message Dr. Gandara.    Ranulfo Licona MD, MPH  Advanced Heart Failure and Transplant Cardiology  [facility]  [facility]

## 2024-05-24 NOTE — PROGRESS NOTES
Patient identification verified with 2 identifiers.    Location: [facility] - [address] [address] [phone] option #1      Referring Physician: KASH CASANOVA  Enrollment/ Re-enrollment date: 2024   INR Goal: 2.0-3.0  INR monitoring is per AMS protocol.  Anticoagulation Medication: warfarin  Indication: Atrial Fibrillation/Atrial Flutter    Subjective   Bleeding signs/symptoms: No    Bruising: No   Major bleeding event: No  Thrombosis signs/symptoms: No  Thromboembolic event: No  Missed doses: No  Extra doses: No  Medication changes: No  Dietary changes: No  Change in health: No  Change in activity: No  Alcohol: No  Other concerns: No    Upcoming Procedures:  Does the Patient Have any upcoming procedures that require interruption in anticoagulation therapy? no  Does the patient require bridging? no      Anticoagulation Summary  As of 2024      INR goal:  2.0-3.0   TTR:  74.2% (7.9 mo)   INR used for dosin.50 (2024)   Weekly warfarin total:  35 mg               Assessment/Plan   Therapeutic     1. New dose: no change    2. Next INR: 3 weeks      Education provided to patient during the visit:  Patient instructed to call in interim with questions, concerns and changes.   Patient educated on compliance with dosing, follow up appointments, and prescribed plan of care.

## 2024-05-24 NOTE — Clinical Note
Cindy Juan and I saw your patients Ms. Trinidad Hines in our CPP-HF Clinic today. Her device setting or optimized. She continues having HFrEF, and today told me that she has significant ongoing dyspnea on exertion. Also on exam it seemed her neck veins were distended. I advised her to get a RHC to assess filling status. It seemed to be conservative thing to do because (1) CKD, (2) BP in high 90s SBP mmHG at rest. Let me know your thoughts about RHC? She is due to get breast surgery due to localized cancer in mid June and we can try to get her in for RHC at [facility] before that. Ranulfo

## 2024-05-29 ENCOUNTER — TELEPHONE (OUTPATIENT)
Dept: PREADMISSION TESTING | Facility: HOSPITAL | Age: 69
End: 2024-05-29
Payer: MEDICAID

## 2024-05-29 ENCOUNTER — HOSPITAL ENCOUNTER (OUTPATIENT)
Dept: CARDIOLOGY | Facility: CLINIC | Age: 69
Discharge: HOME | End: 2024-05-29
Payer: MEDICAID

## 2024-05-29 DIAGNOSIS — I42.0 DILATED CARDIOMYOPATHY (MULTI): ICD-10-CM

## 2024-06-03 ENCOUNTER — APPOINTMENT (OUTPATIENT)
Dept: RADIOLOGY | Facility: CLINIC | Age: 69
End: 2024-06-03
Payer: MEDICAID

## 2024-06-03 ENCOUNTER — HOSPITAL ENCOUNTER (OUTPATIENT)
Dept: RADIOLOGY | Facility: CLINIC | Age: 69
Discharge: HOME | End: 2024-06-03
Payer: MEDICAID

## 2024-06-03 ENCOUNTER — PRE-ADMISSION TESTING (OUTPATIENT)
Dept: PREADMISSION TESTING | Facility: HOSPITAL | Age: 69
End: 2024-06-03
Payer: MEDICAID

## 2024-06-03 ENCOUNTER — LAB (OUTPATIENT)
Dept: LAB | Facility: LAB | Age: 69
End: 2024-06-03
Payer: MEDICAID

## 2024-06-03 VITALS
RESPIRATION RATE: 16 BRPM | WEIGHT: 260.36 LBS | OXYGEN SATURATION: 95 % | BODY MASS INDEX: 43.38 KG/M2 | DIASTOLIC BLOOD PRESSURE: 74 MMHG | HEIGHT: 65 IN | SYSTOLIC BLOOD PRESSURE: 111 MMHG | HEART RATE: 78 BPM | TEMPERATURE: 96.8 F

## 2024-06-03 DIAGNOSIS — I50.22 CHRONIC SYSTOLIC HEART FAILURE (MULTI): ICD-10-CM

## 2024-06-03 DIAGNOSIS — R92.8 ABNORMAL FINDING ON BREAST IMAGING: Primary | ICD-10-CM

## 2024-06-03 DIAGNOSIS — N18.4 HYPERTENSIVE KIDNEY DISEASE WITH STAGE 4 CHRONIC KIDNEY DISEASE (MULTI): ICD-10-CM

## 2024-06-03 DIAGNOSIS — I12.9 HYPERTENSIVE KIDNEY DISEASE WITH STAGE 4 CHRONIC KIDNEY DISEASE (MULTI): ICD-10-CM

## 2024-06-03 DIAGNOSIS — I42.0 DILATED CARDIOMYOPATHY (MULTI): ICD-10-CM

## 2024-06-03 DIAGNOSIS — Z01.818 PREOP TESTING: Primary | ICD-10-CM

## 2024-06-03 DIAGNOSIS — I48.19 PERSISTENT ATRIAL FIBRILLATION (MULTI): ICD-10-CM

## 2024-06-03 DIAGNOSIS — Z01.818 PREOP TESTING: ICD-10-CM

## 2024-06-03 LAB
25(OH)D3 SERPL-MCNC: 39 NG/ML (ref 30–100)
ALBUMIN SERPL BCP-MCNC: 4.2 G/DL (ref 3.4–5)
ANION GAP SERPL CALC-SCNC: 13 MMOL/L (ref 10–20)
BASOPHILS # BLD AUTO: 0.01 X10*3/UL (ref 0–0.1)
BASOPHILS NFR BLD AUTO: 0.4 %
BUN SERPL-MCNC: 26 MG/DL (ref 6–23)
CALCIUM SERPL-MCNC: 9.6 MG/DL (ref 8.6–10.3)
CHLORIDE SERPL-SCNC: 104 MMOL/L (ref 98–107)
CO2 SERPL-SCNC: 27 MMOL/L (ref 21–32)
CREAT SERPL-MCNC: 2.05 MG/DL (ref 0.5–1.05)
EGFRCR SERPLBLD CKD-EPI 2021: 26 ML/MIN/1.73M*2
EOSINOPHIL # BLD AUTO: 0.03 X10*3/UL (ref 0–0.7)
EOSINOPHIL NFR BLD AUTO: 1.1 %
ERYTHROCYTE [DISTWIDTH] IN BLOOD BY AUTOMATED COUNT: 13.6 % (ref 11.5–14.5)
GLUCOSE SERPL-MCNC: 93 MG/DL (ref 74–99)
HCT VFR BLD AUTO: 41.4 % (ref 36–46)
HGB BLD-MCNC: 13.2 G/DL (ref 12–16)
IMM GRANULOCYTES # BLD AUTO: 0 X10*3/UL (ref 0–0.7)
IMM GRANULOCYTES NFR BLD AUTO: 0 % (ref 0–0.9)
INR PPP: 2.2 (ref 0.9–1.1)
LYMPHOCYTES # BLD AUTO: 0.93 X10*3/UL (ref 1.2–4.8)
LYMPHOCYTES NFR BLD AUTO: 33.5 %
MCH RBC QN AUTO: 29.5 PG (ref 26–34)
MCHC RBC AUTO-ENTMCNC: 31.9 G/DL (ref 32–36)
MCV RBC AUTO: 93 FL (ref 80–100)
MONOCYTES # BLD AUTO: 0.23 X10*3/UL (ref 0.1–1)
MONOCYTES NFR BLD AUTO: 8.3 %
NEUTROPHILS # BLD AUTO: 1.58 X10*3/UL (ref 1.2–7.7)
NEUTROPHILS NFR BLD AUTO: 56.7 %
NRBC BLD-RTO: 0 /100 WBCS (ref 0–0)
PHOSPHATE SERPL-MCNC: 3.5 MG/DL (ref 2.5–4.9)
PLATELET # BLD AUTO: 129 X10*3/UL (ref 150–450)
POTASSIUM SERPL-SCNC: 4.3 MMOL/L (ref 3.5–5.3)
PROTHROMBIN TIME: 25.3 SECONDS (ref 9.8–12.8)
PTH-INTACT SERPL-MCNC: 115.6 PG/ML (ref 18.5–88)
RBC # BLD AUTO: 4.47 X10*6/UL (ref 4–5.2)
SODIUM SERPL-SCNC: 140 MMOL/L (ref 136–145)
WBC # BLD AUTO: 2.8 X10*3/UL (ref 4.4–11.3)

## 2024-06-03 PROCEDURE — 85025 COMPLETE CBC W/AUTO DIFF WBC: CPT

## 2024-06-03 PROCEDURE — 82306 VITAMIN D 25 HYDROXY: CPT

## 2024-06-03 PROCEDURE — 85610 PROTHROMBIN TIME: CPT

## 2024-06-03 PROCEDURE — 83970 ASSAY OF PARATHORMONE: CPT

## 2024-06-03 PROCEDURE — 80069 RENAL FUNCTION PANEL: CPT

## 2024-06-03 ASSESSMENT — ENCOUNTER SYMPTOMS
BRUISES/BLEEDS EASILY: 1
MYALGIAS: 0
DIARRHEA: 0
CONFUSION: 0
TROUBLE SWALLOWING: 0
UNEXPECTED WEIGHT CHANGE: 0
LIGHT-HEADEDNESS: 0
DYSPNEA AT REST: 0
PALPITATIONS: 0
DYSPNEA WITH EXERTION: 1
CONSTIPATION: 0
BLOOD IN STOOL: 0
SHORTNESS OF BREATH: 0
DIFFICULTY URINATING: 0
ABDOMINAL DISTENTION: 0
HEMOPTYSIS: 0
WHEEZING: 0
FEVER: 0
NUMBNESS: 0
DOUBLE VISION: 0
RHINORRHEA: 0
COUGH: 0
SKIN CHANGES: 0
NECK STIFFNESS: 0
WEAKNESS: 0
DYSURIA: 0
VOMITING: 0
EYE PAIN: 0
SINUS CONGESTION: 0
EYE DISCHARGE: 0
CHILLS: 0
WOUND: 0
ARTHRALGIAS: 1
NAUSEA: 0
LIMITED RANGE OF MOTION: 0
EXCESSIVE BLEEDING: 0
ABDOMINAL PAIN: 0
VISUAL CHANGE: 0
NECK PAIN: 0

## 2024-06-03 NOTE — CPM/PAT H&P
[facility] Evaluation       Name: Trinidad Hines (Trinidad Hines)  /Age: 1955/69 y.o.       Date of Consult: 24     Referring Provider: Dr. Mary Sosa    Surgery, Date, and Length: Right Breast Magseed Localized Partial Mastectomy - Right , 24, 90MIN    Trinidad Hines is a 69 year-old female who presents to the [facility] for perioperative risk assessment prior to surgery.    Patient presents with a primary diagnosis of right breast cancer.     2024. Right breast diagnostic imaging. Right breast calcifications are probably benign. 6-month follow-up is recommended. Right breast ultrasound. At 8:00 5 cm from the nipple a 0.4 x 0.4 x 0.4 cm indeterminate mass is noted, BI-RADS 4. Right axillary ultrasound is negative.     3/27/2024. Right breast mass 8:00 5 cm from the nipple biopsy. Grade 1 invasive ductal carcinoma. ER greater than 95, SC 95, HER2 negative.     Pt has sister and niece with breast cancer.     This note was created in part upon personal review of patient's medical records.      Patient is scheduled to have Right Breast Magseed Localized Partial Mastectomy - Right       Pt denies any past history of anesthetic complications such as PONV, awareness, prolonged sedation, dental damage, aspiration, cardiac arrest, difficult intubation, difficult I.V. access or unexpected hospital admissions.  NO malignant hyperthermia and or pseudocholinesterase deficiency.  +history of blood transfusions at time of cardiac valve repair    The patient is not a [facility] and will accept blood and blood products if medically indicated.   Type and screen NOT sent.     Past Medical History:   Diagnosis Date    Cardiology follow-up encounter 2024    Ranulfo Licona MD    Cardiomyopathy (Multi)     Chronic kidney disease, stage 3 unspecified (Multi)     Chronic systolic heart failure (Multi)     HFrEF    Dilated cardiomyopathy (Multi)     Encounter for pre-operative cardiovascular  clearance     Gout     H/O echocardiogram 02/23/2024    Hyperparathyroidism (Multi)     Hypertension     Insomnia     Malignant neoplasm of lower-outer quadrant of right breast of female, estrogen receptor negative (Multi)     OA (osteoarthritis)     Obesity     Persistent atrial fibrillation (Multi)     Presence of automatic (implantable) cardiac defibrillator     Cardiac defibrillator in place    Rheumatic heart failure (Multi)     s/p aortic valve replacement 1986    Sleep apnea     Stroke (Multi)     hemorrhagic stroke       Past Surgical History:   Procedure Laterality Date    AORTIC VALVE REPLACEMENT  1986    redo in 2007 with bioprosthetic aortic valve    CARDIAC CATHETERIZATION  05/24/2023    CARDIAC ELECTROPHYSIOLOGY PROCEDURE N/A 10/30/2023    Procedure: ICD UPGRADE, DUAL TO BIV;  Surgeon: Kendra Hong MD;  Location: [place] [id number] Cardiac Cath Lab;  Service: Electrophysiology;  Laterality: N/A;    CARDIOVERSION  2015    TOTAL KNEE ARTHROPLASTY  04/29/2015    Total Knee Arthroplasty       Patient Sexual activity questions deferred to the physician.    No family history on file.    Social History     Socioeconomic History    Marital status: Single     Spouse name: Not on file    Number of children: Not on file    Years of education: Not on file    Highest education level: Not on file   Occupational History    Not on file   Tobacco Use    Smoking status: Never    Smokeless tobacco: Never   Substance and Sexual Activity    Alcohol use: Never    Drug use: Never    Sexual activity: Defer   Other Topics Concern    Not on file   Social History Narrative    Not on file     Social Determinants of Health     Financial Resource Strain: Low Risk  (10/30/2023)    Overall Financial Resource Strain (CARDIA)     Difficulty of Paying Living Expenses: Not hard at all   Food Insecurity: Not on file   Transportation Needs: No Transportation Needs (10/30/2023)    PRAPARE - Transportation     Lack of Transportation  (Medical): No     Lack of Transportation (Non-Medical): No   Physical Activity: Not on file   Stress: Not on file   Social Connections: Not on file   Intimate Partner Violence: Not on file   Housing Stability: Unknown (10/30/2023)    Housing Stability Vital Sign     Unable to Pay for Housing in the Last Year: No     Number of Places Lived in the Last Year: Not on file     Unstable Housing in the Last Year: No      Allergies   Allergen Reactions    Aspirin Rash and Unknown    Diltiazem Hcl Itching    Dofetilide Other and Unknown    Phenytoin Hives and Rash    Lisinopril Cough and Dry mouth       Current Outpatient Medications:     allopurinol (Zyloprim) 300 mg tablet, Take 1 tablet (300 mg) by mouth once daily., Disp: 90 tablet, Rfl: 1    calcitriol (Rocaltrol) 0.25 mcg capsule, TAKE 1 CAPSULE BY MOUTH EVERY DAY, Disp: 30 capsule, Rfl: 5    dapagliflozin propanediol (Farxiga) 10 mg, Take 1 tablet (10 mg) by mouth once daily., Disp: 90 tablet, Rfl: 3    metoprolol succinate XL (Toprol-XL) 100 mg 24 hr tablet, Take 1 tablet (100 mg) by mouth once daily., Disp: 90 tablet, Rfl: 1    sacubitriL-valsartan (Entresto) 49-51 mg tablet, Take 1 tablet by mouth 2 times a day., Disp: 180 tablet, Rfl: 3    simvastatin (Zocor) 20 mg tablet, Take 1 tablet (20 mg) by mouth once daily at bedtime., Disp: 90 tablet, Rfl: 1    spironolactone (Aldactone) 25 mg tablet, Take 1 tablet (25 mg) by mouth once daily., Disp: 90 tablet, Rfl: 1    torsemide (Demadex) 20 mg tablet, Take 1 tablet (20 mg) by mouth once daily., Disp: 90 tablet, Rfl: 3    traMADol (Ultram) 50 mg tablet, Take 1 tablet (50 mg) by mouth every 6 hours if needed for moderate pain (4 - 6)., Disp: , Rfl:     warfarin (Coumadin) 5 mg tablet, TAKE 1.5 TABLET DAILY AS DIRECTED BY COUMADIN CLINIC (Patient taking differently: Take 1 tablet (5 mg) by mouth once daily.), Disp: 135 tablet, Rfl: 1    albuterol 90 mcg/actuation inhaler, INAHEL 2 PUFFS EVERY 4 HOURS AS NEEDED FOR  WHEEZING (Patient not taking: Reported on 6/3/2024), Disp: 18 g, Rfl: 2    lidocaine (Xylocaine) 5 % ointment, Apply 1 Application topically 3 times a day as needed for mild pain (1 - 3)., Disp: , Rfl:     PAT ROS:   Constitutional:    no fever   no chills   no unexpected weight change  Neuro/Psych:    no numbness   no weakness   no light-headedness   no confusion  Eyes:    no discharge   no pain   no vision loss   no diplopia   no visual disturbance  Ears:    no ear pain   no hearing loss   no tinnitus  Nose:    no nasal discharge   no sinus congestion   no epistaxis  Mouth:    no dental issues   no mouth pain   no oral bleeding   no mouth lesions  Throat:    no throat pain   no dysphagia  Neck:    no neck pain   no neck stiffness  Cardio:    Functional 3 Mets. Cannot go up stairs; can walk about the distance of an aisle at [facility] before she needs to rest   Cooking, cleaning and self care  Dr. Herberth Gandara has provided cardiac clearance   no chest pain   no palpitations   no peripheral edema   no dyspnea   MADRIGAL  Respiratory:    no cough   no wheezing   no hemoptysis   no shortness of breath  Endocrine:    no cold intolerance   no heat intolerance  GI:    no abdominal distention   no abdominal pain   no constipation   no diarrhea   no nausea   no vomiting   no blood in stool  :    no difficulty urinating   no dysuria   no oliguria  Musculoskeletal:    arthralgias (b/l knees, b/l shoulders)   no myalgias   no decreased ROM  Hematologic:    bruises/bleeds easily (coumadin)   no excessive bleeding   history of blood transfusion   no blood clots  Skin:   no skin changes   no sores/wound   no rash      Physical Exam  Constitutional:       General: She is not in acute distress.     Appearance: Normal appearance. She is not ill-appearing, toxic-appearing or diaphoretic.   HENT:      Head: Normocephalic and atraumatic.      Nose: Nose normal. No rhinorrhea.      Mouth/Throat:      Pharynx: No oropharyngeal exudate.  "  Eyes:      Extraocular Movements: Extraocular movements intact.      Conjunctiva/sclera: Conjunctivae normal.   Cardiovascular:      Rate and Rhythm: Normal rate and regular rhythm.      Heart sounds: No murmur heard.     No friction rub. No gallop.      Comments: Functional 4 Mets. Patient denies SOB walking up 2 flights of stairs     Pulmonary:      Effort: Pulmonary effort is normal. No respiratory distress.      Breath sounds: Normal breath sounds. No stridor. No wheezing or rhonchi.   Abdominal:      General: Bowel sounds are normal. There is no distension.      Palpations: Abdomen is soft. There is no mass.      Tenderness: There is no abdominal tenderness. There is no guarding or rebound.      Hernia: No hernia is present.   Musculoskeletal:         General: No swelling, tenderness, deformity or signs of injury. Normal range of motion.      Cervical back: Normal range of motion and neck supple. No rigidity or tenderness.   Skin:     General: Skin is warm and dry.      Coloration: Skin is not jaundiced or pale.      Findings: No bruising, erythema, lesion or rash.   Neurological:      General: No focal deficit present.      Mental Status: She is alert and oriented to person, place, and time.      Cranial Nerves: No cranial nerve deficit.      Sensory: No sensory deficit.      Motor: No weakness.      Coordination: Coordination normal.   Psychiatric:         Mood and Affect: Mood normal.         Behavior: Behavior normal.          PAT AIRWAY:   Airway:     Mallampati::  I    Neck ROM::  Full   No teeth      Visit Vitals  /74   Pulse 78   Temp 36 °C (96.8 °F)   Resp 16   Ht 1.651 m (5' 5\")   Wt 118 kg (260 lb 5.8 oz)   SpO2 95%   BMI 43.33 kg/m²   Smoking Status Never   BSA 2.33 m²           LABS:  Lab Results   Component Value Date    WBC 3.9 (L) 05/03/2024    HGB 12.9 05/03/2024    HCT 39.9 05/03/2024    MCV 93 05/03/2024     05/03/2024     Lab Results   Component Value Date    HGBA1C 5.8 (H) " 05/03/2024        Lab Results   Component Value Date    GLUCOSE 93 06/03/2024    CALCIUM 9.6 06/03/2024     06/03/2024    K 4.3 06/03/2024    CO2 27 06/03/2024     06/03/2024    BUN 26 (H) 06/03/2024    CREATININE 2.05 (H) 06/03/2024      PT/INR 6/3/24  Protime-INR  Order: [id number]   Status: Final result       Visible to patient: No (inaccessible in [facility])       Dx: Preop testing    0 Result Notes            Component  Ref Range & Units 10:20  (6/3/24) 1 yr ago  (3/20/23) 1 yr ago  (12/24/22) 1 yr ago  (9/5/22) 2 yr ago  (3/5/22) 2 yr ago  (7/20/21) 3 yr ago  (6/3/21)   Protime  9.8 - 12.8 seconds 25.3 High  30.3 High  R 28.0 High  R 30.0 High  R 22.7 High  R, CM 24.6 High  R 17.9 High  R   INR  0.9 - 1.1 2.2 High  2.6 High  2.4 High  2.6 High  1.9 High  2.1 High  1.5 High    Resulting Agency [facility]              Specimen Collected: 06/03/24 10:20           POCT INR manually resulted  Order: [id number]   Status: Final result       Visible to patient: No (inaccessible in [facility])       Next appt: 06/19/2024 at 08:45 AM in Radiology ([facility] SPECIMEN IMAGER 1)       Dx: Persistent atrial fibrillation (Multi)    0 Result Notes            Component  Ref Range & Units 09:56  (6/18/24) 3 wk ago  (5/24/24) 1 mo ago  (5/17/24) 1 mo ago  (5/8/24) 2 mo ago  (4/17/24) 2 mo ago  (3/23/24) 3 mo ago  (3/4/24)   POC INR 1.60 2.50 2.10 3.40 3.10 2.30 2.70   POC Prothrombin Time                     Specimen Collected: 06/18/24 09:56           EKG 12/31/23  Atrial fibrillation  Left ventricular hypertrophy with repolarization abnormality ( R in aVL , [place] product )  Abnormal ECG      RHC 6/6/24  CONCLUSIONS:   1. Access: R brachial 5F.   2. Hemo: RA 12, RVSP 40, PA 40/20 (26), W 18, 145/84(104).   3. CO/CI[Kenzie]: 5.92/2.68.   4. SVR: 1243 dynes, PVR 1.35 ZIMMER.   5. Mildly elevated biventricular filling pressures with preserved cardiac index by  assumed Kenzie.   6. Recommend augmenting diuretics in anticipation of surgical procedure.    ECHO 3/4/24  CONCLUSIONS:   1. Left ventricular systolic function is moderately decreased with a 30-35% estimated ejection fraction.   2. Abnormal septal motion consistent with post-operative status.   3. There is mild eccentric left ventricular hypertrophy.   4. There is mildly reduced right ventricular systolic function.   5. The left atrium is mild to moderately dilated.   6. The right atrium is moderately dilated.   7. Mild to moderate tricuspid regurgitation visualized.   8. Mildly elevated RVSP.   9. There is a bioprosthetic aortic valve.  10. Patient refused Definity.  11. The patient is in atrial fibrillation which may influence the estimate of left ventricular function and transvalvular flows.  12. There is global hypokinesis of the left ventricle with minor regional variations.      Assessment and Plan:       Patient is a 69-year-old female scheduled for a Right Breast Magseed Localized Partial Mastectomy - Right  with Dr. Mary Sosa on 6/19/24.    Patient has no active cardiac symptoms.   Patient denies any chest pain, tightness, heaviness, pressure, radiating pain, palpitations, irregular heartbeats, lightheadedness, cough, congestion, shortness of breath, MADRIGAL, PND, near syncope, weight loss or gain.     RCRI  2 (crt >2, s/p CVA)  , 10.1 % Risk of MACE    Cardiac:  HTN - hold sacubitril/valsartan 24 hours before surgery, cont spironolactone and torsemide on dos   Encouraged lifestyle modifications, low-sodium diet, and increase activity as tolerated.  Monitor BP and follow up with managing physician for readings sustaining >140/90.    HLD - cont statin on dos    A fib - hold coumadin 5 days per Dr. Gandara's recommedations; no bridging necessary; cont metoprolol on dos     Rheumatic heart disease - s/p bioprosthetic aortic valve replacement 2007 (had mechanical valve prior which was placed in her 30's [1986]). ECHO  "3/4/24 shows no aortic stenosis or regurgitation and \"normal aortic valve prosthesis structure and function. .     NICM - ICD placed 10/30/23    HFrEF - ECHO 3/2024 shows LVEF 30-35%    Cardiac clearance received from Dr. Herberth Gandara which states:  \"CV risk is ok\"    Of note, pt has upcoming right heart cath scheduled 6/6/24 per Dr. Porras which is recommended to be complete prior to surgery (see progress note 5/24/24).     Pulmonary:  QI - Known and treated  QI- Patient was instructed to bring CPAP machine the morning of surgery. Recommend prioritizing  nonopioid analgesic techniques (regional and local anesthesia, nonsteroidal medications, etc) before the administration of opioids and close monitoring for hypoventilation after surgery due to QI. Increased vigilance is recommended with the use of narcotics due to an increased risk for opioid induced respiratory depression     Renal:  CKDIV  2/19/24 Crt 2.14; GFR 25  5/3/24 Crt 2.64; GFR 19  6/3/24 Crt 2.05; GFR 26    Neuro:  CVA - hemorrhagic in 1990's; no ongoing deficits     Hematology:  Patient instructed to ambulate as soon as possible postoperatively to decrease thromboembolic risk.   Initiate mechanical DVT prophylaxis as soon as possible and initiate chemical prophylaxis when deemed safe from a bleeding standpoint post surgery.     LABS: BMP, PT/INR and future PT/INR (6/18/24) ordered. CBC reviewed from 5/3/24 and no need to repeat. INR 2.2 today; will repeat 6/18 as planned.  Otherwise unremarkable.     Followup: PT/INR pending for 6/18/24; right cardiac cath pending 6/6/24    Communication: Dr. Licona and Dr. Gandara were contacted regarding RHC results from 6/6/24. Dr. Licona has mentioned changes to diuretic regimen, but ok to proceed with surgery as planned.    Addendum 6/18/24:  INR remains elevated at 1.6; Dr. Christy, OR schedulers and Dr. Sosa notified via email this morning with rec's to repeat INR morning of surgery (tomorrow, 6/19). "     STOP BANG: +QI    Caprini: 7    Risk assessment complete.  Patient is scheduled for a low surgical risk procedure.        Preoperative medication instructions were provided and reviewed with the patient.  Any additional testing or evaluation was explained to the patient.  Nothing by mouth instructions were discussed and patient's questions were answered prior to conclusion to this encounter.  Patient verbalized understanding of preoperative instructions given in preadmission testing; discharge instructions available in EMR.    This note was dictated by a speech recognition.  Minor errors may have been detected in a speech recognition.

## 2024-06-03 NOTE — PREPROCEDURE INSTRUCTIONS
Medication List            Accurate as of Yomaira 3, 2024  9:17 AM. Always use your most recent med list.                albuterol 90 mcg/actuation inhaler  INAHEL 2 PUFFS EVERY 4 HOURS AS NEEDED FOR WHEEZING  Notes to patient: Ok to use morning of surgery if needed     allopurinol 300 mg tablet  Commonly known as: Zyloprim  Take 1 tablet (300 mg) by mouth once daily.  Medication Adjustments for Surgery: Take morning of surgery with sip of water, no other fluids     calcitriol 0.25 mcg capsule  Commonly known as: Rocaltrol  TAKE 1 CAPSULE BY MOUTH EVERY DAY  Medication Adjustments for Surgery: Continue until night before surgery     dapagliflozin propanediol 10 mg  Commonly known as: Farxiga  Take 1 tablet (10 mg) by mouth once daily.  Medication Adjustments for Surgery: Stop 3 days before surgery     lidocaine 5 % ointment  Commonly known as: Xylocaine  Medication Adjustments for Surgery: Continue until night before surgery     metoprolol succinate  mg 24 hr tablet  Commonly known as: Toprol-XL  Take 1 tablet (100 mg) by mouth once daily.  Medication Adjustments for Surgery: Take morning of surgery with sip of water, no other fluids     sacubitriL-valsartan 49-51 mg tablet  Commonly known as: Entresto  Take 1 tablet by mouth 2 times a day.  Medication Adjustments for Surgery: Stop 1 day before surgery     simvastatin 20 mg tablet  Commonly known as: Zocor  Take 1 tablet (20 mg) by mouth once daily at bedtime.  Medication Adjustments for Surgery: Take morning of surgery with sip of water, no other fluids     spironolactone 25 mg tablet  Commonly known as: Aldactone  Take 1 tablet (25 mg) by mouth once daily.  Medication Adjustments for Surgery: Take morning of surgery with sip of water, no other fluids     torsemide 20 mg tablet  Commonly known as: Demadex  Take 1 tablet (20 mg) by mouth once daily.  Medication Adjustments for Surgery: Take morning of surgery with sip of water, no other fluids     traMADol 50  mg tablet  Commonly known as: Ultram  Medication Adjustments for Surgery: Take morning of surgery with sip of water, no other fluids  Notes to patient: If needed     warfarin 5 mg tablet  Commonly known as: Coumadin  Take as directed by the anticoagulation clinic. If you are unsure how to take this medication, talk to your nurse or doctor.  Original instructions: TAKE 1.5 TABLET DAILY AS DIRECTED BY COUMADIN CLINIC  Notes to patient: Hold 5 days prior to surgery - per Dr. Gandara's recommendation (last day of coumadin will be 6/13/24)                The medication bridging plan has been discussed with the surgeon and the patient has been informed of the plan.            **Concerning above medication instructions, if medication is normally taken at night, continue normal schedule.**  **DO NOT TAKE NIGHT PRIOR AND MORNING OF SURGERY**    CONTACT SURGEON'S OFFICE IF YOU DEVELOP:  * Fever = 100.4 F   * New respiratory symptoms (e.g. cough, shortness of breath, respiratory distress, sore throat)  * Recent loss of taste or smell  *Flu like symptoms such as headache, fatigue or gastrointestinal symptoms  * You develop any open sores, shingles, burning or painful urination   AND/OR:  * You no longer wish to have the surgery.  * Any other personal circumstances change that may lead to the need to cancel or defer this surgery.  *You were admitted to any hospital within one week of your planned procedure.    SMOKING:  *Quitting smoking can make a huge difference to your health and recovery from surgery.    *If you need help with quitting, call 8-060-QUIT-NOW.    THE DAY BEFORE SURGERY:  *Do not eat any food after midnight the night before surgery.   *You are permitted to drink clear liquids (i.e. water, black coffee (no milk or cream), tea, apple juice and electrolyte drinks (gatorade)) up to 13.5 ounces, up to 2 hours before your arrival time.  *You may chew gum until 2 hours before your surgery    SURGICAL TIME  *You will be  contacted between 2 p.m. and 6 p.m. the business day before your surgery with your arrival time.  *If you haven't received a call by 6pm, call [phone].  *Scheduled surgery times may change and you will be notified if this occurs-check your personal voicemail for any updates.    ON THE MORNING OF SURGERY:  *Wear comfortable, loose fitting clothing.   *Do not use moisturizers, creams, lotions or perfume.  *All jewelry and valuables should be left at home.  *Prosthetic devices such as contact lenses, hearing aids, dentures, eyelash extensions, hairpins and body piercing must be removed before surgery.    BRING WITH YOU:  *Photo ID and insurance card  *Current list of medicines and allergies  *Pacemaker/Defibrillator/Heart stent cards  *CPAP machine and mask  *Slings/splints/crutches  *Copy of your complete Advanced Directive/DHPOA-if applicable  *Neurostimulator implant remote    PARKING AND ARRIVAL:  *Check in at the Main Entrance desk and let them know you are here for surgery.  *You will be directed to the 2nd floor surgical waiting area.    AFTER OUTPATIENT SURGERY:  *A responsible adult MUST accompany you at the time of discharge and stay with you for 24 hours after your surgery.  *You may NOT drive yourself home after surgery.  *You may use a taxi or ride sharing service (Lyft, Uber) to return home ONLY if you are accompanied by a friend or family member.  *Instructions for resuming your medications will be provided by your surgeon.

## 2024-06-06 ENCOUNTER — HOSPITAL ENCOUNTER (OUTPATIENT)
Facility: HOSPITAL | Age: 69
Setting detail: OUTPATIENT SURGERY
Discharge: HOME | End: 2024-06-06
Attending: STUDENT IN AN ORGANIZED HEALTH CARE EDUCATION/TRAINING PROGRAM | Admitting: STUDENT IN AN ORGANIZED HEALTH CARE EDUCATION/TRAINING PROGRAM
Payer: MEDICAID

## 2024-06-06 VITALS
SYSTOLIC BLOOD PRESSURE: 113 MMHG | RESPIRATION RATE: 16 BRPM | OXYGEN SATURATION: 97 % | BODY MASS INDEX: 43.34 KG/M2 | HEIGHT: 65 IN | WEIGHT: 260.14 LBS | HEART RATE: 78 BPM | DIASTOLIC BLOOD PRESSURE: 65 MMHG

## 2024-06-06 DIAGNOSIS — I42.0 DILATED CARDIOMYOPATHY (MULTI): Primary | ICD-10-CM

## 2024-06-06 DIAGNOSIS — I50.22 CHRONIC SYSTOLIC HEART FAILURE (MULTI): ICD-10-CM

## 2024-06-06 DIAGNOSIS — I48.19 PERSISTENT ATRIAL FIBRILLATION (MULTI): ICD-10-CM

## 2024-06-06 PROCEDURE — 76942 ECHO GUIDE FOR BIOPSY: CPT | Performed by: STUDENT IN AN ORGANIZED HEALTH CARE EDUCATION/TRAINING PROGRAM

## 2024-06-06 PROCEDURE — C1894 INTRO/SHEATH, NON-LASER: HCPCS | Performed by: STUDENT IN AN ORGANIZED HEALTH CARE EDUCATION/TRAINING PROGRAM

## 2024-06-06 PROCEDURE — 99222 1ST HOSP IP/OBS MODERATE 55: CPT | Performed by: NURSE PRACTITIONER

## 2024-06-06 PROCEDURE — C1887 CATHETER, GUIDING: HCPCS | Performed by: STUDENT IN AN ORGANIZED HEALTH CARE EDUCATION/TRAINING PROGRAM

## 2024-06-06 PROCEDURE — 2720000007 HC OR 272 NO HCPCS: Performed by: STUDENT IN AN ORGANIZED HEALTH CARE EDUCATION/TRAINING PROGRAM

## 2024-06-06 PROCEDURE — 99152 MOD SED SAME PHYS/QHP 5/>YRS: CPT | Performed by: STUDENT IN AN ORGANIZED HEALTH CARE EDUCATION/TRAINING PROGRAM

## 2024-06-06 PROCEDURE — 7100000010 HC PHASE TWO TIME - EACH INCREMENTAL 1 MINUTE: Performed by: STUDENT IN AN ORGANIZED HEALTH CARE EDUCATION/TRAINING PROGRAM

## 2024-06-06 PROCEDURE — 2500000004 HC RX 250 GENERAL PHARMACY W/ HCPCS (ALT 636 FOR OP/ED): Performed by: STUDENT IN AN ORGANIZED HEALTH CARE EDUCATION/TRAINING PROGRAM

## 2024-06-06 PROCEDURE — 2500000005 HC RX 250 GENERAL PHARMACY W/O HCPCS: Performed by: STUDENT IN AN ORGANIZED HEALTH CARE EDUCATION/TRAINING PROGRAM

## 2024-06-06 PROCEDURE — 7100000009 HC PHASE TWO TIME - INITIAL BASE CHARGE: Performed by: STUDENT IN AN ORGANIZED HEALTH CARE EDUCATION/TRAINING PROGRAM

## 2024-06-06 PROCEDURE — 93451 RIGHT HEART CATH: CPT | Performed by: STUDENT IN AN ORGANIZED HEALTH CARE EDUCATION/TRAINING PROGRAM

## 2024-06-06 PROCEDURE — 99153 MOD SED SAME PHYS/QHP EA: CPT | Performed by: STUDENT IN AN ORGANIZED HEALTH CARE EDUCATION/TRAINING PROGRAM

## 2024-06-06 PROCEDURE — 2500000004 HC RX 250 GENERAL PHARMACY W/ HCPCS (ALT 636 FOR OP/ED): Performed by: NURSE PRACTITIONER

## 2024-06-06 RX ORDER — ACETAMINOPHEN 160 MG/5ML
650 SOLUTION ORAL EVERY 6 HOURS PRN
Status: DISCONTINUED | OUTPATIENT
Start: 2024-06-06 | End: 2024-06-07 | Stop reason: HOSPADM

## 2024-06-06 RX ORDER — MIDAZOLAM HYDROCHLORIDE 1 MG/ML
INJECTION, SOLUTION INTRAMUSCULAR; INTRAVENOUS AS NEEDED
Status: DISCONTINUED | OUTPATIENT
Start: 2024-06-06 | End: 2024-06-06 | Stop reason: HOSPADM

## 2024-06-06 RX ORDER — SODIUM CHLORIDE 9 MG/ML
50 INJECTION, SOLUTION INTRAVENOUS CONTINUOUS
Status: DISCONTINUED | OUTPATIENT
Start: 2024-06-06 | End: 2024-06-06

## 2024-06-06 RX ORDER — ACETAMINOPHEN 650 MG/1
650 SUPPOSITORY RECTAL EVERY 6 HOURS PRN
Status: DISCONTINUED | OUTPATIENT
Start: 2024-06-06 | End: 2024-06-07 | Stop reason: HOSPADM

## 2024-06-06 RX ORDER — ACETAMINOPHEN 325 MG/1
650 TABLET ORAL EVERY 6 HOURS PRN
Status: DISCONTINUED | OUTPATIENT
Start: 2024-06-06 | End: 2024-06-07 | Stop reason: HOSPADM

## 2024-06-06 RX ORDER — LIDOCAINE HYDROCHLORIDE 20 MG/ML
INJECTION, SOLUTION INFILTRATION; PERINEURAL AS NEEDED
Status: DISCONTINUED | OUTPATIENT
Start: 2024-06-06 | End: 2024-06-06 | Stop reason: HOSPADM

## 2024-06-06 RX ORDER — FENTANYL CITRATE 50 UG/ML
INJECTION, SOLUTION INTRAMUSCULAR; INTRAVENOUS AS NEEDED
Status: DISCONTINUED | OUTPATIENT
Start: 2024-06-06 | End: 2024-06-06 | Stop reason: HOSPADM

## 2024-06-06 ASSESSMENT — ENCOUNTER SYMPTOMS
NEUROLOGICAL NEGATIVE: 1
ENDOCRINE NEGATIVE: 1
FATIGUE: 1
EYES NEGATIVE: 1
ALLERGIC/IMMUNOLOGIC NEGATIVE: 1
CARDIOVASCULAR NEGATIVE: 1
GASTROINTESTINAL NEGATIVE: 1
SHORTNESS OF BREATH: 1
PSYCHIATRIC NEGATIVE: 1
HEMATOLOGIC/LYMPHATIC NEGATIVE: 1
MUSCULOSKELETAL NEGATIVE: 1

## 2024-06-06 ASSESSMENT — COLUMBIA-SUICIDE SEVERITY RATING SCALE - C-SSRS
6. HAVE YOU EVER DONE ANYTHING, STARTED TO DO ANYTHING, OR PREPARED TO DO ANYTHING TO END YOUR LIFE?: NO
1. IN THE PAST MONTH, HAVE YOU WISHED YOU WERE DEAD OR WISHED YOU COULD GO TO SLEEP AND NOT WAKE UP?: NO
2. HAVE YOU ACTUALLY HAD ANY THOUGHTS OF KILLING YOURSELF?: NO

## 2024-06-06 ASSESSMENT — PAIN SCALES - GENERAL
PAINLEVEL_OUTOF10: 0 - NO PAIN

## 2024-06-06 ASSESSMENT — PAIN - FUNCTIONAL ASSESSMENT
PAIN_FUNCTIONAL_ASSESSMENT: 0-10

## 2024-06-06 NOTE — Clinical Note
Patient Clipped and Prepped: right brachial. Prepped with Betadine and draped in sterile fashion. English

## 2024-06-06 NOTE — DISCHARGE INSTRUCTIONS
Continue current medications. You may take your Warfarin (Coumadin) tonight.     Follow up with Dr. Gandara as scheduled.  Dr. Gu's staff will reach out to you about any medication adjustments based on the findings and/or follow up.

## 2024-06-11 DIAGNOSIS — N18.32 HYPERTENSIVE KIDNEY DISEASE WITH STAGE 3B CHRONIC KIDNEY DISEASE (MULTI): ICD-10-CM

## 2024-06-11 DIAGNOSIS — I12.9 HYPERTENSIVE KIDNEY DISEASE WITH STAGE 3B CHRONIC KIDNEY DISEASE (MULTI): ICD-10-CM

## 2024-06-11 DIAGNOSIS — I10 BENIGN HYPERTENSION: ICD-10-CM

## 2024-06-11 DIAGNOSIS — I48.91 ATRIAL FIBRILLATION, UNSPECIFIED TYPE (MULTI): ICD-10-CM

## 2024-06-11 DIAGNOSIS — I42.9 CARDIOMYOPATHY, UNSPECIFIED TYPE (MULTI): ICD-10-CM

## 2024-06-11 RX ORDER — TORSEMIDE 20 MG/1
20 TABLET ORAL SEE ADMIN INSTRUCTIONS
Qty: 40 TABLET | Refills: 11 | Status: SHIPPED | OUTPATIENT
Start: 2024-06-11 | End: 2025-06-11

## 2024-06-13 ENCOUNTER — HOSPITAL ENCOUNTER (OUTPATIENT)
Dept: RADIOLOGY | Facility: CLINIC | Age: 69
Discharge: HOME | End: 2024-06-13
Payer: MEDICAID

## 2024-06-13 DIAGNOSIS — Z17.0 MALIGNANT NEOPLASM OF LOWER-OUTER QUADRANT OF RIGHT BREAST OF FEMALE, ESTROGEN RECEPTOR POSITIVE (MULTI): ICD-10-CM

## 2024-06-13 DIAGNOSIS — C50.511 MALIGNANT NEOPLASM OF LOWER-OUTER QUADRANT OF RIGHT BREAST OF FEMALE, ESTROGEN RECEPTOR POSITIVE (MULTI): ICD-10-CM

## 2024-06-13 DIAGNOSIS — R92.8 OTHER ABNORMAL AND INCONCLUSIVE FINDINGS ON DIAGNOSTIC IMAGING OF BREAST: ICD-10-CM

## 2024-06-13 PROCEDURE — A4648 IMPLANTABLE TISSUE MARKER: HCPCS

## 2024-06-13 PROCEDURE — 19285 PERQ DEV BREAST 1ST US IMAG: CPT | Mod: RT

## 2024-06-13 PROCEDURE — 77065 DX MAMMO INCL CAD UNI: CPT

## 2024-06-13 PROCEDURE — 2500000005 HC RX 250 GENERAL PHARMACY W/O HCPCS: Performed by: STUDENT IN AN ORGANIZED HEALTH CARE EDUCATION/TRAINING PROGRAM

## 2024-06-13 PROCEDURE — 2780000003 HC OR 278 NO HCPCS

## 2024-06-13 NOTE — Clinical Note
Procedural steps explained and patient given opportunity to verbalize concerns and seek clarification.  Post procedure self-care and potential for bruising , hematoma, and pain reviewed.  Patient verbalizes understanding.      Patient offered aromatherapy, warm blankets and music. Guided imagery, touch and relaxation breathing to be used throughout the procedure.

## 2024-06-13 NOTE — DISCHARGE INSTRUCTIONS
Post procedure care reviewed with patient, ok to resume normal activity with no restrictions. Patient verbalized understanding and will follow up surgery as planned.   Patient left at 8828

## 2024-06-17 ENCOUNTER — TELEPHONE (OUTPATIENT)
Dept: PREADMISSION TESTING | Facility: HOSPITAL | Age: 69
End: 2024-06-17

## 2024-06-17 ENCOUNTER — DOCUMENTATION (OUTPATIENT)
Dept: PREADMISSION TESTING | Facility: HOSPITAL | Age: 69
End: 2024-06-17
Payer: MEDICAID

## 2024-06-17 ENCOUNTER — TELEPHONE (OUTPATIENT)
Dept: PREADMISSION TESTING | Facility: HOSPITAL | Age: 69
End: 2024-06-17
Payer: MEDICAID

## 2024-06-17 ENCOUNTER — ANTICOAGULATION - WARFARIN VISIT (OUTPATIENT)
Dept: CARDIOLOGY | Facility: CLINIC | Age: 69
End: 2024-06-17
Payer: MEDICAID

## 2024-06-17 DIAGNOSIS — I48.19 PERSISTENT ATRIAL FIBRILLATION (MULTI): Primary | ICD-10-CM

## 2024-06-18 ENCOUNTER — ANTICOAGULATION - WARFARIN VISIT (OUTPATIENT)
Dept: CARDIOLOGY | Facility: CLINIC | Age: 69
End: 2024-06-18
Payer: MEDICAID

## 2024-06-18 ENCOUNTER — APPOINTMENT (OUTPATIENT)
Dept: CARDIOLOGY | Facility: CLINIC | Age: 69
End: 2024-06-18
Payer: MEDICAID

## 2024-06-18 DIAGNOSIS — I48.19 PERSISTENT ATRIAL FIBRILLATION (MULTI): Primary | ICD-10-CM

## 2024-06-18 LAB
POC INR: 1.6
POC PROTHROMBIN TIME: NORMAL

## 2024-06-18 PROCEDURE — 85610 PROTHROMBIN TIME: CPT | Mod: QW

## 2024-06-18 PROCEDURE — 99211 OFF/OP EST MAY X REQ PHY/QHP: CPT

## 2024-06-18 NOTE — PROGRESS NOTES
Patient identification verified with 2 identifiers.    Location: [facility] - [address] [address] [phone] option #1      Referring Physician: KASH CASANOVA  Enrollment/ Re-enrollment date: 2024   INR Goal: 2.0-3.0  INR monitoring is per [facility] protocol.  Anticoagulation Medication: warfarin  Indication: Atrial Fibrillation/Atrial Flutter    Subjective   Bleeding signs/symptoms: No    Bruising: No   Major bleeding event: No  Thrombosis signs/symptoms: No  Thromboembolic event: No  Missed doses: Yes  Off Coumadin x 5 days for procedure  Extra doses: No  Medication changes: No  Dietary changes: No  Change in health: No  Change in activity: No  Alcohol: No  Other concerns: No    Upcoming Procedures:  Does the Patient Have any upcoming procedures that require interruption in anticoagulation therapy? yes  Does the patient require bridging? no      Anticoagulation Summary  As of 2024      INR goal:  2.0-3.0   TTR:  72.9% (8.7 mo)   INR used for dosin.60 (2024)   Weekly warfarin total:  35 mg               Assessment/Plan   Subtherapeutic     1. New dose: no change    2. Next INR:        Education provided to patient during the visit:  Patient instructed to call in interim with questions, concerns and changes.   Patient educated on interactions between medications and warfarin.   Patient educated on dietary consistency in vitamin k consumption.   Patient educated on affects of alcohol consumption while taking warfarin.   Patient educated on signs of bleeding/clotting.

## 2024-06-19 ENCOUNTER — ANESTHESIA EVENT (OUTPATIENT)
Dept: OPERATING ROOM | Facility: HOSPITAL | Age: 69
End: 2024-06-19
Payer: MEDICAID

## 2024-06-19 ENCOUNTER — ANESTHESIA (OUTPATIENT)
Dept: OPERATING ROOM | Facility: HOSPITAL | Age: 69
End: 2024-06-19
Payer: MEDICAID

## 2024-06-19 ENCOUNTER — HOSPITAL ENCOUNTER (OUTPATIENT)
Dept: RADIOLOGY | Facility: CLINIC | Age: 69
Discharge: HOME | End: 2024-06-19
Payer: MEDICAID

## 2024-06-19 ENCOUNTER — APPOINTMENT (OUTPATIENT)
Dept: CARDIOLOGY | Facility: HOSPITAL | Age: 69
End: 2024-06-19
Payer: MEDICAID

## 2024-06-19 ENCOUNTER — APPOINTMENT (OUTPATIENT)
Dept: PRIMARY CARE | Facility: CLINIC | Age: 69
End: 2024-06-19
Payer: MEDICAID

## 2024-06-19 ENCOUNTER — HOSPITAL ENCOUNTER (OUTPATIENT)
Facility: HOSPITAL | Age: 69
Setting detail: OUTPATIENT SURGERY
Discharge: HOME | End: 2024-06-19
Attending: SURGERY | Admitting: SURGERY
Payer: MEDICAID

## 2024-06-19 VITALS
DIASTOLIC BLOOD PRESSURE: 75 MMHG | SYSTOLIC BLOOD PRESSURE: 118 MMHG | HEIGHT: 65 IN | WEIGHT: 250.88 LBS | HEART RATE: 77 BPM | OXYGEN SATURATION: 96 % | TEMPERATURE: 97.5 F | RESPIRATION RATE: 16 BRPM | BODY MASS INDEX: 41.8 KG/M2

## 2024-06-19 DIAGNOSIS — Z17.0 MALIGNANT NEOPLASM OF LOWER-OUTER QUADRANT OF RIGHT BREAST OF FEMALE, ESTROGEN RECEPTOR POSITIVE (MULTI): Primary | ICD-10-CM

## 2024-06-19 DIAGNOSIS — R92.8 OTHER ABNORMAL AND INCONCLUSIVE FINDINGS ON DIAGNOSTIC IMAGING OF BREAST: ICD-10-CM

## 2024-06-19 DIAGNOSIS — C50.511 MALIGNANT NEOPLASM OF LOWER-OUTER QUADRANT OF RIGHT BREAST OF FEMALE, ESTROGEN RECEPTOR POSITIVE (MULTI): Primary | ICD-10-CM

## 2024-06-19 DIAGNOSIS — I50.22 CHRONIC SYSTOLIC HEART FAILURE (MULTI): ICD-10-CM

## 2024-06-19 DIAGNOSIS — Z95.810 CARDIAC DEFIBRILLATOR IN PLACE: ICD-10-CM

## 2024-06-19 LAB — BODY SURFACE AREA: 2.28 M2

## 2024-06-19 PROCEDURE — 2500000005 HC RX 250 GENERAL PHARMACY W/O HCPCS: Performed by: ANESTHESIOLOGIST ASSISTANT

## 2024-06-19 PROCEDURE — 2500000004 HC RX 250 GENERAL PHARMACY W/ HCPCS (ALT 636 FOR OP/ED): Performed by: SURGERY

## 2024-06-19 PROCEDURE — 93287 PERI-PX DEVICE EVAL & PRGR: CPT | Performed by: INTERNAL MEDICINE

## 2024-06-19 PROCEDURE — 2500000001 HC RX 250 WO HCPCS SELF ADMINISTERED DRUGS (ALT 637 FOR MEDICARE OP): Performed by: ANESTHESIOLOGY

## 2024-06-19 PROCEDURE — 7100000009 HC PHASE TWO TIME - INITIAL BASE CHARGE: Performed by: SURGERY

## 2024-06-19 PROCEDURE — 3600000004 HC OR TIME - INITIAL BASE CHARGE - PROCEDURE LEVEL FOUR: Performed by: SURGERY

## 2024-06-19 PROCEDURE — 3600000009 HC OR TIME - EACH INCREMENTAL 1 MINUTE - PROCEDURE LEVEL FOUR: Performed by: SURGERY

## 2024-06-19 PROCEDURE — 3700000002 HC GENERAL ANESTHESIA TIME - EACH INCREMENTAL 1 MINUTE: Performed by: SURGERY

## 2024-06-19 PROCEDURE — 93287 PERI-PX DEVICE EVAL & PRGR: CPT

## 2024-06-19 PROCEDURE — 2500000004 HC RX 250 GENERAL PHARMACY W/ HCPCS (ALT 636 FOR OP/ED): Performed by: ANESTHESIOLOGIST ASSISTANT

## 2024-06-19 PROCEDURE — 88307 TISSUE EXAM BY PATHOLOGIST: CPT | Performed by: SPECIALIST

## 2024-06-19 PROCEDURE — 7100000002 HC RECOVERY ROOM TIME - EACH INCREMENTAL 1 MINUTE: Performed by: SURGERY

## 2024-06-19 PROCEDURE — 19301 PARTIAL MASTECTOMY: CPT | Performed by: SURGERY

## 2024-06-19 PROCEDURE — 2500000004 HC RX 250 GENERAL PHARMACY W/ HCPCS (ALT 636 FOR OP/ED): Performed by: ANESTHESIOLOGY

## 2024-06-19 PROCEDURE — 2500000005 HC RX 250 GENERAL PHARMACY W/O HCPCS: Performed by: SURGERY

## 2024-06-19 PROCEDURE — 76098 X-RAY EXAM SURGICAL SPECIMEN: CPT

## 2024-06-19 PROCEDURE — 7100000001 HC RECOVERY ROOM TIME - INITIAL BASE CHARGE: Performed by: SURGERY

## 2024-06-19 PROCEDURE — 7100000010 HC PHASE TWO TIME - EACH INCREMENTAL 1 MINUTE: Performed by: SURGERY

## 2024-06-19 PROCEDURE — 88307 TISSUE EXAM BY PATHOLOGIST: CPT | Mod: TC,AHULAB | Performed by: SURGERY

## 2024-06-19 PROCEDURE — 2720000007 HC OR 272 NO HCPCS: Performed by: SURGERY

## 2024-06-19 PROCEDURE — 3700000001 HC GENERAL ANESTHESIA TIME - INITIAL BASE CHARGE: Performed by: SURGERY

## 2024-06-19 RX ORDER — PROMETHAZINE HYDROCHLORIDE 25 MG/ML
6.25 INJECTION, SOLUTION INTRAMUSCULAR; INTRAVENOUS ONCE AS NEEDED
Status: DISCONTINUED | OUTPATIENT
Start: 2024-06-19 | End: 2024-06-19 | Stop reason: HOSPADM

## 2024-06-19 RX ORDER — ONDANSETRON HYDROCHLORIDE 2 MG/ML
INJECTION, SOLUTION INTRAVENOUS AS NEEDED
Status: DISCONTINUED | OUTPATIENT
Start: 2024-06-19 | End: 2024-06-19

## 2024-06-19 RX ORDER — LIDOCAINE HYDROCHLORIDE 10 MG/ML
0.1 INJECTION, SOLUTION EPIDURAL; INFILTRATION; INTRACAUDAL; PERINEURAL ONCE
Status: DISCONTINUED | OUTPATIENT
Start: 2024-06-19 | End: 2024-06-19 | Stop reason: HOSPADM

## 2024-06-19 RX ORDER — SODIUM CHLORIDE 9 MG/ML
100 INJECTION, SOLUTION INTRAVENOUS CONTINUOUS
Status: DISCONTINUED | OUTPATIENT
Start: 2024-06-19 | End: 2024-06-19 | Stop reason: HOSPADM

## 2024-06-19 RX ORDER — CEFAZOLIN 1 G/1
INJECTION, POWDER, FOR SOLUTION INTRAVENOUS AS NEEDED
Status: DISCONTINUED | OUTPATIENT
Start: 2024-06-19 | End: 2024-06-19

## 2024-06-19 RX ORDER — MIDAZOLAM HYDROCHLORIDE 1 MG/ML
INJECTION INTRAMUSCULAR; INTRAVENOUS AS NEEDED
Status: DISCONTINUED | OUTPATIENT
Start: 2024-06-19 | End: 2024-06-19

## 2024-06-19 RX ORDER — SODIUM CHLORIDE, SODIUM LACTATE, POTASSIUM CHLORIDE, CALCIUM CHLORIDE 600; 310; 30; 20 MG/100ML; MG/100ML; MG/100ML; MG/100ML
100 INJECTION, SOLUTION INTRAVENOUS CONTINUOUS
Status: DISCONTINUED | OUTPATIENT
Start: 2024-06-19 | End: 2024-06-19 | Stop reason: HOSPADM

## 2024-06-19 RX ORDER — FENTANYL CITRATE 50 UG/ML
INJECTION, SOLUTION INTRAMUSCULAR; INTRAVENOUS CONTINUOUS PRN
Status: DISCONTINUED | OUTPATIENT
Start: 2024-06-19 | End: 2024-06-19

## 2024-06-19 RX ORDER — CEFAZOLIN SODIUM 2 G/100ML
2 INJECTION, SOLUTION INTRAVENOUS ONCE
Status: DISCONTINUED | OUTPATIENT
Start: 2024-06-19 | End: 2024-06-19 | Stop reason: HOSPADM

## 2024-06-19 RX ORDER — OXYCODONE HYDROCHLORIDE 5 MG/1
5 TABLET ORAL EVERY 4 HOURS PRN
Status: DISCONTINUED | OUTPATIENT
Start: 2024-06-19 | End: 2024-06-19 | Stop reason: HOSPADM

## 2024-06-19 RX ORDER — NORETHINDRONE AND ETHINYL ESTRADIOL 0.5-0.035
KIT ORAL AS NEEDED
Status: DISCONTINUED | OUTPATIENT
Start: 2024-06-19 | End: 2024-06-19

## 2024-06-19 RX ORDER — ACETAMINOPHEN 325 MG/1
650 TABLET ORAL EVERY 4 HOURS PRN
Status: DISCONTINUED | OUTPATIENT
Start: 2024-06-19 | End: 2024-06-19 | Stop reason: HOSPADM

## 2024-06-19 RX ORDER — LIDOCAINE HYDROCHLORIDE 20 MG/ML
INJECTION, SOLUTION INFILTRATION; PERINEURAL AS NEEDED
Status: DISCONTINUED | OUTPATIENT
Start: 2024-06-19 | End: 2024-06-19

## 2024-06-19 RX ORDER — PROPOFOL 10 MG/ML
INJECTION, EMULSION INTRAVENOUS AS NEEDED
Status: DISCONTINUED | OUTPATIENT
Start: 2024-06-19 | End: 2024-06-19

## 2024-06-19 RX ORDER — PHENYLEPHRINE HCL IN 0.9% NACL 1 MG/10 ML
SYRINGE (ML) INTRAVENOUS AS NEEDED
Status: DISCONTINUED | OUTPATIENT
Start: 2024-06-19 | End: 2024-06-19

## 2024-06-19 SDOH — HEALTH STABILITY: MENTAL HEALTH: CURRENT SMOKER: 0

## 2024-06-19 ASSESSMENT — PAIN - FUNCTIONAL ASSESSMENT
PAIN_FUNCTIONAL_ASSESSMENT: 0-10
PAIN_FUNCTIONAL_ASSESSMENT: 0-10
PAIN_FUNCTIONAL_ASSESSMENT: UNABLE TO SELF-REPORT
PAIN_FUNCTIONAL_ASSESSMENT: 0-10
PAIN_FUNCTIONAL_ASSESSMENT: UNABLE TO SELF-REPORT
PAIN_FUNCTIONAL_ASSESSMENT: 0-10

## 2024-06-19 ASSESSMENT — PATIENT HEALTH QUESTIONNAIRE - PHQ9
1. LITTLE INTEREST OR PLEASURE IN DOING THINGS: NOT AT ALL
SUM OF ALL RESPONSES TO PHQ9 QUESTIONS 1 AND 2: 0
2. FEELING DOWN, DEPRESSED OR HOPELESS: NOT AT ALL

## 2024-06-19 ASSESSMENT — PAIN SCALES - GENERAL
PAINLEVEL_OUTOF10: 6
PAINLEVEL_OUTOF10: 4
PAINLEVEL_OUTOF10: 4
PAINLEVEL_OUTOF10: 7
PAINLEVEL_OUTOF10: 3
PAINLEVEL_OUTOF10: 3
PAINLEVEL_OUTOF10: 4
PAINLEVEL_OUTOF10: 0 - NO PAIN
PAINLEVEL_OUTOF10: 6

## 2024-06-19 ASSESSMENT — ENCOUNTER SYMPTOMS
DEPRESSION: 0
OCCASIONAL FEELINGS OF UNSTEADINESS: 0
LOSS OF SENSATION IN FEET: 0

## 2024-06-19 ASSESSMENT — PAIN DESCRIPTION - LOCATION
LOCATION: BREAST
LOCATION: BREAST

## 2024-06-19 NOTE — OP NOTE
Right Breast Magseed Partial Mastectomy (R) Operative Note     Date: 2024  OR Location: [facility] OR    Name: Trinidad Hines, : 1955, Age: 69 y.o., MRN: [id number], Sex: female    Diagnosis  Pre-op Diagnosis     * Malignant neoplasm of lower-outer quadrant of right breast of female, estrogen receptor positive (Multi) [C50.511, Z17.0] Post-op Diagnosis     * Malignant neoplasm of lower-outer quadrant of right breast of female, estrogen receptor positive (Multi) [C50.511, Z17.0]     Procedures  Right Breast Magseed Partial Mastectomy  46712 - ME MASTECTOMY PARTIAL      Surgeons      * Mary Sosa - Primary    Resident/Fellow/Other Assistant:  Surgeons and Role:  * No surgeons found with a matching role *    Procedure Summary  Anesthesia: General  ASA: III  Anesthesia Staff: Anesthesiologist: Gerardo Murillo MD  C-AA: GIA Lackey  Estimated Blood Loss: 5 mL  Intra-op Medications:   Administrations occurring from 0845 to 1015 on 24:   Medication Name Total Dose   bupivacaine PF (Marcaine) 0.25 % (2.5 mg/mL) 30 mL, lidocaine (Xylocaine) 20 mL syringe 30 mL              Anesthesia Record               Intraprocedure I/O Totals       None           Specimen:   ID Type Source Tests Collected by Time   1 : RIGHT BREAST LOCALIZATION PARTIAL MASTECTOMY Tissue BREAST MASTECTOMY RIGHT SURGICAL PATHOLOGY EXAM Mary Sosa MD 2024 09   2 : RIGHT BREAST NEW SUPERIOR MARGIN Tissue BREAST MARGIN RIGHT SURGICAL PATHOLOGY EXAM Mary Sosa MD 2024 0927   3 : RIGHT BREAST NEW LATERAL MARGIN Tissue BREAST MARGIN RIGHT SURGICAL PATHOLOGY EXAM Mary Sosa MD 2024 0947   4 : RIGHT BREAST NEW MEDIAL MARGIN Tissue BREAST MARGIN RIGHT SURGICAL PATHOLOGY EXAM Mary Sosa MD 2024 0947   5 : RIGHT BREAST NEW ANTERIOR MARGIN Tissue BREAST MARGIN RIGHT SURGICAL PATHOLOGY EXAM Mary Sosa MD 2024 0947   6 : RIGHT BREAST NEW INFERIOR MARGIN Tissue BREAST MARGIN RIGHT SURGICAL PATHOLOGY EXAM Mary LIZ  MD Sheila 6/19/2024 0947   7 : RIGHT BREAST NEW POSTERIOR MARGIN Tissue BREAST MARGIN RIGHT SURGICAL PATHOLOGY EXAM Mary H MD Sheila 6/19/2024 0947        Staff:   Tioulator: Michelle  Circulator: Prachi Hollisub Person: Clarissa Hollisub Person: Estefani Cooper Circulator: Miladis Cooper Scrub: Neema         Drains and/or Catheters: * None in log *    Tourniquet Times:         Implants:     Findings: clip and mag seed in specimen xray    Indications: Trinidad Hines is an 69 y.o. female who is having surgery for Malignant neoplasm of lower-outer quadrant of right breast of female, estrogen receptor positive (Multi) [C50.511, Z17.0].     The patient was seen in the preoperative area. The risks, benefits, complications, treatment options, non-operative alternatives, expected recovery and outcomes were discussed with the patient. The possibilities of reaction to medication, pulmonary aspiration, injury to surrounding structures, bleeding, recurrent infection, the need for additional procedures, failure to diagnose a condition, and creating a complication requiring transfusion or operation were discussed with the patient. The patient concurred with the proposed plan, giving informed consent.  The site of surgery was properly noted/marked if necessary per policy. The patient has been actively warmed in preoperative area. Preoperative antibiotics have been ordered and given within 1 hours of incision. Venous thrombosis prophylaxis have been ordered including bilateral sequential compression devices    Procedure Details:   Informed consent was obtained. The surgical site marked and the Day of Surgery Update completed. The patient was taken to the operating room and positioned in a supine position on the operating room table. Anesthesia was induced without difficulty. SCDs were placed for DVT prophylaxis. Antibiotics were administered within 60 minutes prior to incision for surgical prophylaxis. Lower body Amadou Hugger was applied to  help maintain normothermia.     I reviewed my note and the appropriate films.  A surgical safety time-out was performed.     Using the sentimag probe, I approximated the location of the mag seed marking the lesion of concern.      The patient was sterilely prepped and draped in the usual fashion.       I then turned my attention to the right breast. The skin and surrounding tissue was anesthetized with local anesthetic. A  curvilinear skin incision was made with a 15 blade scalpel.  Subcutaneous tissues were sharply divided down to and into subcutaneous fat using Bovie cautery.  I dissected towards the mag seed. Mag seed location was confirmed throughout using the sentimag probe. I grasped the intended specimen with an Allis clamp.  The entire area of tissue surrounding the mag seed was excised en bloc with the mag seed intact. The specimen was oriented with a short stitch superiorly and a long stitch laterally, and labeled as right breast mag seed localized partial mastectomy. The specimen was inked per the vector kit.  Specimen radiograph was taken and I reviewed the film myself. The clip was identified in the specimen and the mag seed was intact.  Radiologically the margins appeared adequate.      I then excised 6 cavity shave margins using the bovie and oriented the new margin in with black ink    I placed clips around the perimeter of the lumpectomy cavity and 2 clips in the posterior margin. The cavity was irrigated with sterile saline solution.  Hemostasis was achieved and confirmed with cautery.         Hemostasis was achieved with bovie.  The deep dermal layer was closed with interrupted 3-0 vicryl sutures.  The skin was closed with a running 4-0 monocryl subcuticular stitch.          A sterile dressing was applied.  A surgical bra was used for light compression.     Anesthesia was reversed and the patient was brought to the recovery room in stable condition having tolerated the procedure well.  There were  no complications.  30 cc of 1% lidocaine/0.25% marcaine mixed was used throughout the case.      Complications:  None; patient tolerated the procedure well.    Disposition: PACU - hemodynamically stable.  Condition: stable     This procedure was not performed to treat breast cancer through sentinel node biopsy      Additional Details: posterior margin above pec    Attending Attestation: I was present and scrubbed for the key portions of the procedure.    Mary Sosa  Phone Number: [phone]

## 2024-06-19 NOTE — ANESTHESIA PROCEDURE NOTES
Airway  Date/Time: 6/19/2024 9:06 AM  Urgency: elective    Airway not difficult    Staffing  Performed: GIA   Authorized by: Gerardo Murillo MD    Performed by: GIA Lackey  Patient location during procedure: OR    Indications and Patient Condition  Indications for airway management: anesthesia  Spontaneous ventilation: present  Sedation level: moderate (conscious sedation)  Preoxygenated: yes  Mask difficulty assessment: 0 - not attempted  Planned trial extubation    Final Airway Details  Final airway type: supraglottic airway      Successful airway: Size 4     Number of attempts at approach: 1

## 2024-06-19 NOTE — ANESTHESIA PREPROCEDURE EVALUATION
Patient: Trinidad Hines    Procedure Information       Date/Time: 06/19/24 0845    Procedure: Right Breast Magseed Partial Mastectomy (Right: Breast)    Location: [facility] OR 03 / [facility] OR    Surgeons: Mary Sosa MD            Relevant Problems   Cardiac   (+) Aortic valve disorder   (+) Atrial fibrillation (Multi)   (+) Atrial fibrillation, unspecified type (Multi)   (+) Benign hypertension   (+) Chest pain   (+) Hyperlipidemia      Pulmonary   (+) Obstructive sleep apnea syndrome      Neuro   (+) CVA (cerebral vascular accident) (Multi)      GI   (+) Irritable bowel syndrome with constipation      /Renal   (+) Acute cystitis without hematuria      Endocrine   (+) Hyperparathyroidism, secondary (Multi)   (+) Morbid obesity (Multi)      Hematology   (+) Long term (current) use of anticoagulants      Musculoskeletal   (+) Osteoarthritis of right knee      ID   (+) Acute upper respiratory infection   (+) Influenza      GYN   (+) Malignant neoplasm of lower-outer quadrant of right breast of female, estrogen receptor positive (Multi)       Clinical information reviewed:                    Past Medical History:   Diagnosis Date    Cardiology follow-up encounter 05/24/2024    Ranulfo Licona MD    Cardiomyopathy (Multi)     CHF (congestive heart failure) (Multi)     Chronic kidney disease     Chronic kidney disease, stage 3 unspecified (Multi)     Chronic systolic heart failure (Multi)     HFrEF    Coronary artery disease     Dilated cardiomyopathy (Multi)     Encounter for pre-operative cardiovascular clearance     Gout     H/O echocardiogram 02/23/2024    Hyperlipidemia     Hyperparathyroidism (Multi)     Hypertension     Insomnia     Malignant neoplasm of lower-outer quadrant of right breast of female, estrogen receptor negative (Multi)     OA (osteoarthritis)     Obesity     Persistent atrial fibrillation (Multi)     Presence of automatic (implantable) cardiac defibrillator     Cardiac defibrillator in  place    Rheumatic heart failure (Multi)     s/p aortic valve replacement 1986    Sleep apnea     Sleep apnea     Stroke (Multi)     hemorrhagic stroke      Past Surgical History:   Procedure Laterality Date    AORTIC VALVE REPLACEMENT  1986    redo in 2007 with bioprosthetic aortic valve    CARDIAC CATHETERIZATION  05/24/2023    CARDIAC CATHETERIZATION N/A 6/6/2024    Procedure: Right Heart Cath;  Surgeon: Hieu Jack MD;  Location: [facility] Cardiac Cath Lab;  Service: Cardiovascular;  Laterality: N/A;    CARDIAC ELECTROPHYSIOLOGY PROCEDURE N/A 10/30/2023    Procedure: ICD UPGRADE, DUAL TO BIV;  Surgeon: Kendra Hong MD;  Location: [place] [address] Cardiac Cath Lab;  Service: Electrophysiology;  Laterality: N/A;    CARDIOVERSION  2015    TOTAL KNEE ARTHROPLASTY  04/29/2015    Total Knee Arthroplasty     Social History     Tobacco Use    Smoking status: Never    Smokeless tobacco: Never   Substance Use Topics    Alcohol use: Never    Drug use: Never      Current Outpatient Medications   Medication Instructions    albuterol 90 mcg/actuation inhaler INAHEL 2 PUFFS EVERY 4 HOURS AS NEEDED FOR WHEEZING    allopurinol (ZYLOPRIM) 300 mg, oral, Daily    calcitriol (ROCALTROL) 0.25 mcg, oral, Daily    dapagliflozin propanediol (FARXIGA) 10 mg, oral, Daily    lidocaine (Xylocaine) 5 % ointment 1 Application, Topical, 3 times daily PRN    metoprolol succinate XL (TOPROL-XL) 100 mg, oral, Daily    sacubitriL-valsartan (Entresto) 49-51 mg tablet 1 tablet, oral, 2 times daily    simvastatin (ZOCOR) 20 mg, oral, Nightly    spironolactone (ALDACTONE) 25 mg, oral, Daily    torsemide (DEMADEX) 20 mg, oral, See admin instructions, Please take 40mg on Monday, Wednesday, and Friday. Take 20mg other days of the week.    traMADol (ULTRAM) 50 mg, oral, Every 6 hours PRN    warfarin (Coumadin) 5 mg tablet TAKE 1.5 TABLET DAILY AS DIRECTED BY COUMADIN CLINIC      Allergies   Allergen Reactions    Aspirin Rash and Unknown    Diltiazem  "Hcl Itching    Dofetilide Other and Unknown    Phenytoin Hives and Rash    Lisinopril Cough and Dry mouth        Chemistry    Lab Results   Component Value Date/Time     06/03/2024 1020    K 4.3 06/03/2024 1020     06/03/2024 1020    CO2 27 06/03/2024 1020    BUN 26 (H) 06/03/2024 1020    CREATININE 2.05 (H) 06/03/2024 1020    Lab Results   Component Value Date/Time    CALCIUM 9.6 06/03/2024 1020    ALKPHOS 59 05/03/2024 0924    AST 11 05/03/2024 0924    ALT 8 05/03/2024 0924    BILITOT 0.8 05/03/2024 0924          Lab Results   Component Value Date    HGBA1C 5.8 (H) 05/03/2024     Lab Results   Component Value Date/Time    WBC 2.8 (L) 06/03/2024 1020    HGB 13.2 06/03/2024 1020    HCT 41.4 06/03/2024 1020     (L) 06/03/2024 1020     Lab Results   Component Value Date/Time    PROTIME 25.3 (H) 06/03/2024 1020    INR 1.60 06/18/2024 0956     No results found for: \"ABORH\"  Encounter Date: 05/24/24   ECG 12 lead (Clinic Performed)   Result Value    Ventricular Rate 87    Atrial Rate 61    QRS Duration 158    QT Interval 482    QTC Calculation(Bazett) 580    R Axis -61    T Axis 75    QRS Count 15    Q Onset 189    T Offset 430    QTC Fredericia 545    Narrative    AV sequential or dual chamber electronic pacemaker  When compared with ECG of 31-DEC-2023 16:11,  AV pacing is present  Confirmed by Kendra Hong ([id number]) on 6/7/2024 5:27:07 PM     No results found for this or any previous visit from the past 1095 days.       Visit Vitals  Smoking Status Never     No data recorded     Physical Exam    Airway  Mallampati: II  TM distance: >3 FB  Neck ROM: full     Cardiovascular - normal exam     Dental - normal exam     Pulmonary - normal exam     Abdominal - normal exam       Other findings: Afib hx           Anesthesia Plan    History of general anesthesia?: yes  History of complications of general anesthesia?: no    ASA 3     MAC     The patient is not a current smoker.    intravenous induction "   Postoperative administration of opioids is intended.  Anesthetic plan and risks discussed with patient.  Use of blood products discussed with patient who.    Plan discussed with CAA.

## 2024-06-19 NOTE — INTERVAL H&P NOTE
H&P reviewed. The patient was examined and there are no changes to the H&P.    INR 1.6 yesterday  Recheck today pending

## 2024-06-19 NOTE — PERIOPERATIVE NURSING NOTE
1006 Pt to bay 49 on SFM. SFM removed and pt placed on room air. Bedside report given to this rn by or rn and aa.    1017 Device nurse at bedside to turn AICD back on.    1020 Pt daughter called and updated via phone call.    1044 Pt medicated with 5mg Oxycodone. 0.5mg Dilaudid for pain. Pt eating melba crackers and sipping ginger ale.    1049 Pt provided with warm blankets for comfort.    1149 Pt daughter called and notified pt is ready for discharge. Daughter states she is on her way and eta is 1155 am.

## 2024-06-19 NOTE — DISCHARGE INSTRUCTIONS
POST-OP INSTRUCTIONS FOR BREAST SURGERY PATIENTS: DR. HERBERT SOSA    Activity:    Avoid direct impact to the surgical site.  No heavy lifting (greater than 10 lbs) or strenuous activity with the arm on the surgical side until evaluated at post-op appointment.    Wound care:    Ice pack, if desired, on and off in 15 minute intervals.  A supportive bra can be worn for comfort and support.  You may start showering normally tomorrow. Don't scrub and pat the surgical site dry.  No swimming or submerging the surgical site in a hot tub or bath for 2 weeks.  Outer dressing to be removed in 2 days.  Leave steri-strips intact for 1 week.  Observe the surgical site for signs of bleeding or infection. (Some swelling or bruising is anticipated.  A small amount of blood or an air bubble beneath the dressing is not a cause for concern.)    Call physician with abnormal findings: Progressive swelling, increasing pain, bright red skin discoloration, chills or fever (Temperature 101.5 or higher).    Follow-up:  post-op appointment with Dr. Sosa was previously scheduled.  Pathology report will be discussed at that visit.    Pain:    No RX  You may also use over-the-counter medication such as Tylenol (acetaminophen) or Advil (ibuprofen).  Remember that some prescription medications contain Tylenol (acetaminophen). If you are taking a prescription medication that contains Tylenol (acetaminophen), do not take additional over-the-counter Tylenol.  All pain medications can be constipating; remember to drink plenty of fluids and use stool softener and/or laxatives as needed. These are available over-the-counter.  Pain control is best when medication is taken before pain becomes severe.    Medication refills:    Medications cannot be refilled after business hours or on weekends.      Questions / Concerns:  Call Clinic [phone], option 2.

## 2024-06-19 NOTE — PERIOPERATIVE NURSING NOTE
Discharge instructions discussed with patient and daughter. Patient dressed without incident. Patient stable for discharge.

## 2024-06-19 NOTE — ANESTHESIA POSTPROCEDURE EVALUATION
Patient: Trinidad Hines    Procedure Summary       Date: 06/19/24 Room / Location: [facility] OR 03 / Virtual [facility]    Anesthesia Start: 0855 Anesthesia Stop: 1013    Procedure: Right Breast Magseed Partial Mastectomy (Right: Breast) Diagnosis:       Malignant neoplasm of lower-outer quadrant of right breast of female, estrogen receptor positive (Multi)      (Malignant neoplasm of lower-outer quadrant of right breast of female, estrogen receptor positive (Multi) [C50.511, Z17.0])    Surgeons: Mary Sosa MD Responsible Provider: Gerardo Murillo MD    Anesthesia Type: MAC ASA Status: 3            Anesthesia Type: MAC    Vitals Value Taken Time   /72 06/19/24 1031   Temp 36.3 °C (97.3 °F) 06/19/24 1006   Pulse 79 06/19/24 1031   Resp 18 06/19/24 1015   SpO2 97 % 06/19/24 1030   Vitals shown include unfiled device data.    Anesthesia Post Evaluation    Patient location during evaluation: PACU  Patient participation: complete - patient participated  Level of consciousness: awake  Pain management: adequate  Airway patency: patent  Cardiovascular status: acceptable  Respiratory status: acceptable  Hydration status: acceptable  Postoperative Nausea and Vomiting: none        No notable events documented.

## 2024-06-21 ENCOUNTER — APPOINTMENT (OUTPATIENT)
Dept: PRIMARY CARE | Facility: CLINIC | Age: 69
End: 2024-06-21
Payer: MEDICAID

## 2024-06-21 VITALS
DIASTOLIC BLOOD PRESSURE: 81 MMHG | SYSTOLIC BLOOD PRESSURE: 118 MMHG | HEART RATE: 97 BPM | TEMPERATURE: 96.8 F | BODY MASS INDEX: 43.27 KG/M2 | WEIGHT: 260 LBS

## 2024-06-21 DIAGNOSIS — Z17.0 MALIGNANT NEOPLASM OF LOWER-OUTER QUADRANT OF RIGHT BREAST OF FEMALE, ESTROGEN RECEPTOR POSITIVE (MULTI): Primary | ICD-10-CM

## 2024-06-21 DIAGNOSIS — Z09 HOSPITAL DISCHARGE FOLLOW-UP: ICD-10-CM

## 2024-06-21 DIAGNOSIS — C50.511 MALIGNANT NEOPLASM OF LOWER-OUTER QUADRANT OF RIGHT BREAST OF FEMALE, ESTROGEN RECEPTOR POSITIVE (MULTI): Primary | ICD-10-CM

## 2024-06-21 DIAGNOSIS — R21 SKIN RASH: Primary | ICD-10-CM

## 2024-06-21 RX ORDER — FLUOCINONIDE 0.5 MG/G
CREAM TOPICAL 2 TIMES DAILY
Qty: 15 G | Refills: 1 | Status: SHIPPED | OUTPATIENT
Start: 2024-06-21 | End: 2025-06-21

## 2024-06-21 ASSESSMENT — PAIN SCALES - GENERAL: PAINLEVEL: 2

## 2024-06-21 NOTE — ASSESSMENT & PLAN NOTE
-post hospitalization follow-up visit from 6/19/2024: she had a Right Breast Magseed Partial Mastectomy.  Healing well.  -under the care of breast surgery and oncology.

## 2024-06-21 NOTE — PROGRESS NOTES
Subjective   Patient ID: Trinidad Hines is a 69 y.o. female who presents for Follow-up.  HPI  The patient is a 67-year-old female with a history significant for persistent AF on Warfarin, HFrEF (3/2024 TTE: LVEF 30-35%, mildly reduced RV fx, LA mild to moderately dilated, RA moderately dilated, mild to moderate TR, mildly elevated RVSP 39.9 mmHg, normally fx bioprosthetic aortic valve, trivial pericardial effusion, LVIDd 5.47 cm, IVS 1.25 cm), ICD s/p CRT-D upgrade 10/2023, Rheumatic Heart Failure s/p aortic valve replacement 1986, redo in 2007 with bioprosthetic aortic valve, gout,HTN, hemorrhagic stroke, newly diagnosed right breast cancer, planned for right breast partial mastectomy 6/19/24. who is here for:    1- post hospitalization follow-up visit from 6/19/2024: she had a Right Breast Magseed Partial Mastectomy     2- left lateral mildly pruritic chest rash for 1 and 1/2 week.    A review of system was completed.  All systems were reviewed and were normal, except for the ones that are listed in the HPI.    Objective   Physical Exam  Constitutional:       Appearance: Normal appearance.   HENT:      Head: Normocephalic and atraumatic.      Right Ear: Tympanic membrane, ear canal and external ear normal.      Left Ear: Tympanic membrane, ear canal and external ear normal.      Nose: Nose normal.      Mouth/Throat:      Mouth: Mucous membranes are moist.      Pharynx: Oropharynx is clear.   Eyes:      Extraocular Movements: Extraocular movements intact.      Conjunctiva/sclera: Conjunctivae normal.      Pupils: Pupils are equal, round, and reactive to light.   Cardiovascular:      Rate and Rhythm: Normal rate and regular rhythm.      Pulses: Normal pulses.   Pulmonary:      Effort: Pulmonary effort is normal.      Breath sounds: Normal breath sounds.   Abdominal:      General: Abdomen is flat. Bowel sounds are normal.      Palpations: Abdomen is soft.   Musculoskeletal:         General: Normal range of motion.       Cervical back: Normal range of motion and neck supple.   Skin:     General: Skin is warm.   Neurological:      General: No focal deficit present.      Mental Status: She is alert and oriented to person, place, and time. Mental status is at baseline.   Psychiatric:         Mood and Affect: Mood normal.         Behavior: Behavior normal.         Thought Content: Thought content normal.         Judgment: Judgment normal.     Assessment/Plan   Problem List Items Addressed This Visit       Skin rash - Primary     -Fluocinonide cream BD PRN.         Relevant Medications    fluocinonide (Fluocinonide-E) 0.05 % cream    Hospital discharge follow-up     -post hospitalization follow-up visit from 6/19/2024: she had a Right Breast Magseed Partial Mastectomy.  Healing well.  -under the care of breast surgery and oncology.               Patient to return to office in 6 months for her MWV.

## 2024-06-24 ENCOUNTER — HOSPITAL ENCOUNTER (OUTPATIENT)
Dept: CARDIOLOGY | Facility: CLINIC | Age: 69
Discharge: HOME | End: 2024-06-24
Payer: MEDICAID

## 2024-06-24 ENCOUNTER — TELEPHONE (OUTPATIENT)
Dept: CARDIOLOGY | Facility: HOSPITAL | Age: 69
End: 2024-06-24

## 2024-06-24 ENCOUNTER — ANTICOAGULATION - WARFARIN VISIT (OUTPATIENT)
Dept: CARDIOLOGY | Facility: CLINIC | Age: 69
End: 2024-06-24
Payer: MEDICAID

## 2024-06-24 DIAGNOSIS — Z95.810 PRESENCE OF AUTOMATIC (IMPLANTABLE) CARDIAC DEFIBRILLATOR: ICD-10-CM

## 2024-06-24 DIAGNOSIS — I48.19 PERSISTENT ATRIAL FIBRILLATION (MULTI): Primary | ICD-10-CM

## 2024-06-24 DIAGNOSIS — I42.0 DILATED CARDIOMYOPATHY (MULTI): ICD-10-CM

## 2024-06-24 LAB
POC INR: 1.2
POC PROTHROMBIN TIME: NORMAL

## 2024-06-24 PROCEDURE — 85610 PROTHROMBIN TIME: CPT | Mod: QW

## 2024-06-24 PROCEDURE — 99211 OFF/OP EST MAY X REQ PHY/QHP: CPT

## 2024-06-24 NOTE — PROGRESS NOTES
Patient identification verified with 2 identifiers.    Location: [facility] at [facility] - [address] [address] [phone] option #1      Referring Physician: KASH CASANOVA  Enrollment/ Re-enrollment date: 2024   INR Goal: 2.0-3.0  INR monitoring is per [facility] protocol.  Anticoagulation Medication: warfarin  Indication: Atrial Fibrillation/Atrial Flutter    Subjective   Bleeding signs/symptoms: No    Bruising: No   Major bleeding event: No  Thrombosis signs/symptoms: No  Thromboembolic event: No  Missed doses: No  Extra doses: No  Medication changes: Yes  Restart Coumadin  after surgery  Dietary changes: No  Change in health: No  Change in activity: No  Alcohol: No  Other concerns: No    Upcoming Procedures:  Does the Patient Have any upcoming procedures that require interruption in anticoagulation therapy? no  Does the patient require bridging? no      Anticoagulation Summary  As of 2024      INR goal:  2.0-3.0   TTR:  71.2% (8.9 mo)   INR used for dosin.20 (2024)   Weekly warfarin total:  40 mg               Assessment/Plan   Subtherapeutic     1. New dose:  Extra 1/2 tab  and      2. Next INR: Sat       Education provided to patient during the visit:  Patient instructed to call in interim with questions, concerns and changes.   Patient educated on interactions between medications and warfarin.   Patient educated on dietary consistency in vitamin k consumption.   Patient educated on affects of alcohol consumption while taking warfarin.   Patient educated on signs of bleeding/clotting.

## 2024-06-27 ENCOUNTER — APPOINTMENT (OUTPATIENT)
Dept: HEMATOLOGY/ONCOLOGY | Facility: HOSPITAL | Age: 69
End: 2024-06-27
Payer: MEDICAID

## 2024-06-29 ENCOUNTER — ANTICOAGULATION - WARFARIN VISIT (OUTPATIENT)
Dept: CARDIOLOGY | Facility: CLINIC | Age: 69
End: 2024-06-29
Payer: MEDICAID

## 2024-06-29 DIAGNOSIS — I48.19 PERSISTENT ATRIAL FIBRILLATION (MULTI): Primary | ICD-10-CM

## 2024-06-29 LAB
POC INR: 1.8
POC PROTHROMBIN TIME: NORMAL

## 2024-06-29 PROCEDURE — 85610 PROTHROMBIN TIME: CPT | Mod: QW

## 2024-06-29 PROCEDURE — 99211 OFF/OP EST MAY X REQ PHY/QHP: CPT

## 2024-06-29 NOTE — PROGRESS NOTES
Patient identification verified with 2 identifiers.    Location: [facility] at [facility] - [address] [address] [phone] option #1      Referring Physician: KSAH CASANOVA  Enrollment/ Re-enrollment date: 2024   INR Goal: 2.0-3.0  INR monitoring is per [facility] protocol.  Anticoagulation Medication: warfarin  Indication: Atrial Fibrillation/Atrial Flutter    Subjective   Bleeding signs/symptoms: No    Bruising: No   Major bleeding event: No  Thrombosis signs/symptoms: No  Thromboembolic event: No  Missed doses: No  Extra doses: No  Medication changes: No  Dietary changes: No  Change in health: No  Change in activity: No  Alcohol: No  Other concerns: No    Upcoming Procedures:  Does the Patient Have any upcoming procedures that require interruption in anticoagulation therapy? no  Does the patient require bridging? no      Anticoagulation Summary  As of 2024      INR goal:  2.0-3.0   TTR:  69.9% (9.1 mo)   INR used for dosin.80 (2024)   Weekly warfarin total:  42.5 mg               Assessment/Plan   SUBTHERAPEUTIC  INCREASE TWD  RTC IN 6 DAYS    Education provided to patient during the visit:  Patient instructed to call in interim with questions, concerns and changes.   Patient educated on interactions between medications and warfarin.   Patient educated on dietary consistency in vitamin k consumption.   Patient educated on affects of alcohol consumption while taking warfarin.   Patient educated on signs of bleeding/clotting.

## 2024-07-05 ENCOUNTER — ANTICOAGULATION - WARFARIN VISIT (OUTPATIENT)
Dept: CARDIOLOGY | Facility: CLINIC | Age: 69
End: 2024-07-05
Payer: MEDICAID

## 2024-07-05 DIAGNOSIS — I48.19 PERSISTENT ATRIAL FIBRILLATION (MULTI): Primary | ICD-10-CM

## 2024-07-05 LAB
POC INR: 3.4
POC PROTHROMBIN TIME: NORMAL

## 2024-07-05 PROCEDURE — 85610 PROTHROMBIN TIME: CPT | Mod: QW

## 2024-07-05 PROCEDURE — 99211 OFF/OP EST MAY X REQ PHY/QHP: CPT

## 2024-07-05 NOTE — PROGRESS NOTES
Patient identification verified with 2 identifiers.    Location: [facility] at [facility] - [address] [address] [phone] option #1      Referring Physician: KASH CASANOVA  Enrollment/ Re-enrollment date: 11/04/2024   INR Goal: 2.0-3.0  INR monitoring is per [facility] protocol.  Anticoagulation Medication: warfarin  Indication: Atrial Fibrillation/Atrial Flutter    Subjective   Bleeding signs/symptoms: No    Bruising: No   Major bleeding event: No  Thrombosis signs/symptoms: No  Thromboembolic event: No  Missed doses: No  Extra doses: No  Medication changes: No  Dietary changes: No  Change in health: No  Change in activity: No  Alcohol: No  Other concerns: No    Upcoming Procedures:  Does the Patient Have any upcoming procedures that require interruption in anticoagulation therapy? no  Does the patient require bridging? no      Anticoagulation Summary  As of 7/5/2024      INR goal:  2.0-3.0   TTR:  69.8% (9.3 mo)   INR used for dosing:  3.40 (7/5/2024)   Weekly warfarin total:  40 mg               Assessment/Plan   SUPRA THERAPEUTIC  DECREASE TWD  RTC IN 8 DAYS PT REQUESTS A SATURDAY APPOINTMENT    Education provided to patient during the visit:  Patient instructed to call in interim with questions, concerns and changes.   Patient educated on interactions between medications and warfarin.   Patient educated on dietary consistency in vitamin k consumption.   Patient educated on affects of alcohol consumption while taking warfarin.   Patient educated on signs of bleeding/clotting.

## 2024-07-08 ENCOUNTER — TELEPHONE (OUTPATIENT)
Dept: SURGICAL ONCOLOGY | Facility: CLINIC | Age: 69
End: 2024-07-08
Payer: MEDICAID

## 2024-07-08 ENCOUNTER — APPOINTMENT (OUTPATIENT)
Dept: SURGICAL ONCOLOGY | Facility: CLINIC | Age: 69
End: 2024-07-08
Payer: MEDICAID

## 2024-07-08 LAB
LAB AP ASR DISCLAIMER: NORMAL
LAB AP BLOCK FOR ADDITIONAL STUDIES: NORMAL
LABORATORY COMMENT REPORT: NORMAL
PATH REPORT.FINAL DX SPEC: NORMAL
PATH REPORT.GROSS SPEC: NORMAL
PATH REPORT.RELEVANT HX SPEC: NORMAL
PATH REPORT.TOTAL CANCER: NORMAL
PATHOLOGY SYNOPTIC REPORT: NORMAL

## 2024-07-08 PROCEDURE — 88341 IMHCHEM/IMCYTCHM EA ADD ANTB: CPT | Performed by: SPECIALIST

## 2024-07-08 PROCEDURE — 88342 IMHCHEM/IMCYTCHM 1ST ANTB: CPT | Performed by: SPECIALIST

## 2024-07-08 NOTE — TELEPHONE ENCOUNTER
"Spoke with adelina and explained that the results were not back.  She was given the option of coming in or not.  She opted to wait until the results were back to come see Dr. Sosa.  She stated she is doing fine, occasional \"twinges\" of pain, but nothing bad.  Incisions are healing well at this time.  Adelina will wait for our office to call.   "

## 2024-07-13 ENCOUNTER — ANTICOAGULATION - WARFARIN VISIT (OUTPATIENT)
Dept: CARDIOLOGY | Facility: CLINIC | Age: 69
End: 2024-07-13
Payer: MEDICAID

## 2024-07-13 DIAGNOSIS — I48.19 PERSISTENT ATRIAL FIBRILLATION (MULTI): Primary | ICD-10-CM

## 2024-07-13 LAB
POC INR: 3.2
POC PROTHROMBIN TIME: NORMAL

## 2024-07-13 PROCEDURE — 85610 PROTHROMBIN TIME: CPT | Mod: QW

## 2024-07-13 PROCEDURE — 99211 OFF/OP EST MAY X REQ PHY/QHP: CPT

## 2024-07-13 NOTE — PROGRESS NOTES
Patient identification verified with 2 identifiers.    Location: [facility] at [facility] - [address] [address] [phone] option #1      Referring Physician: KASH CASANOVA  Enrollment/ Re-enrollment date: 11/04/2024   INR Goal: 2.0-3.0  INR monitoring is per [facility] protocol.  Anticoagulation Medication: warfarin  Indication: Atrial Fibrillation/Atrial Flutter    Subjective   Bleeding signs/symptoms: No    Bruising: No   Major bleeding event: No  Thrombosis signs/symptoms: NoMy Note        Note Details    Service:   Cardiology       Thromboembolic event: No  Missed doses: No  Extra doses: No  Medication changes: No  Dietary changes: No  Change in health: No  Change in activity: No  Alcohol: No  Other concerns: No    Upcoming Procedures:  Does the Patient Have any upcoming procedures that require interruption in anticoagulation therapy? no  Does the patient require bridging? no      Anticoagulation Summary  As of 7/13/2024      INR goal:  2.0-3.0   TTR:  67.8% (9.6 mo)   INR used for dosing:  3.20 (7/13/2024)   Weekly warfarin total:  37.5 mg               Assessment/Plan   SUPRA THERAPEUTIC  DECREASE DOSE  RTC IN 6 DAYS    Education provided to patient during the visit:  Patient instructed to call in interim with questions, concerns and changes.   Patient educated on interactions between medications and warfarin.   Patient educated on dietary consistency in vitamin k consumption.   Patient educated on affects of alcohol consumption while taking warfarin.   Patient educated on signs of bleeding/clotting.

## 2024-07-16 ENCOUNTER — OFFICE VISIT (OUTPATIENT)
Dept: SURGICAL ONCOLOGY | Facility: HOSPITAL | Age: 69
End: 2024-07-16
Payer: MEDICAID

## 2024-07-16 VITALS
SYSTOLIC BLOOD PRESSURE: 116 MMHG | OXYGEN SATURATION: 96 % | DIASTOLIC BLOOD PRESSURE: 77 MMHG | TEMPERATURE: 96.8 F | BODY MASS INDEX: 43.55 KG/M2 | RESPIRATION RATE: 18 BRPM | HEART RATE: 79 BPM | WEIGHT: 261.69 LBS

## 2024-07-16 DIAGNOSIS — C50.511 MALIGNANT NEOPLASM OF LOWER-OUTER QUADRANT OF RIGHT BREAST OF FEMALE, ESTROGEN RECEPTOR POSITIVE (MULTI): Primary | ICD-10-CM

## 2024-07-16 DIAGNOSIS — Z17.0 MALIGNANT NEOPLASM OF LOWER-OUTER QUADRANT OF RIGHT BREAST OF FEMALE, ESTROGEN RECEPTOR POSITIVE (MULTI): Primary | ICD-10-CM

## 2024-07-16 PROCEDURE — 1160F RVW MEDS BY RX/DR IN RCRD: CPT | Performed by: SURGERY

## 2024-07-16 PROCEDURE — 3078F DIAST BP <80 MM HG: CPT | Performed by: SURGERY

## 2024-07-16 PROCEDURE — 1126F AMNT PAIN NOTED NONE PRSNT: CPT | Performed by: SURGERY

## 2024-07-16 PROCEDURE — 99211 OFF/OP EST MAY X REQ PHY/QHP: CPT | Performed by: SURGERY

## 2024-07-16 PROCEDURE — 3008F BODY MASS INDEX DOCD: CPT | Performed by: SURGERY

## 2024-07-16 PROCEDURE — 3074F SYST BP LT 130 MM HG: CPT | Performed by: SURGERY

## 2024-07-16 PROCEDURE — 1036F TOBACCO NON-USER: CPT | Performed by: SURGERY

## 2024-07-16 PROCEDURE — 1159F MED LIST DOCD IN RCRD: CPT | Performed by: SURGERY

## 2024-07-16 ASSESSMENT — PAIN SCALES - GENERAL: PAINLEVEL: 0-NO PAIN

## 2024-07-16 ASSESSMENT — ENCOUNTER SYMPTOMS
DEPRESSION: 0
LOSS OF SENSATION IN FEET: 0
OCCASIONAL FEELINGS OF UNSTEADINESS: 0

## 2024-07-16 NOTE — PROGRESS NOTES
Chief Complaint  Post op    History of Present Illness    Referring Provider:   CLAUDIA Washington  PCP A Grey Granados    69-year-old -American female here for post op  Here w daughter.     History:  1) No abnormal mammograms, breast biopsies, or breast surgeries.  2) 2/23/2024.  Bilateral screening mammogram.  Left cardiac device.  Scattered fibroglandular tissue bilaterally.  Left breast negative.  Indeterminate right breast mass.  Indeterminate right breast calcifications.  BI-RADS 0.  3) March 1, 2024.  Right breast diagnostic imaging.  Right breast calcifications are probably benign.  6-month follow-up is recommended.  Right breast ultrasound.  At 8:00 5 cm from the nipple a 0.4 x 0.4 x 0.4 cm indeterminate mass is noted, BI-RADS 4.  Right axillary ultrasound is negative.  4) 3/27/2024.  Right breast mass 8:00 5 cm from the nipple biopsy.  Grade 1 invasive ductal carcinoma.  ER greater than 95, VA 95, HER2 negative.  Open coil HydroMARK.  5) reviewed films. Calcs are low suspicion. 6 mo FU vs can be localized for excision  6) patient wanted to avoid surgery and discuss endocrine therapy.  Saw medical oncologist and has returned to discuss surgery.  7) 6/19/2024.  Right partial mastectomy.  5.6 mm grade 1 invasive ductal carcinoma with associated 4.8 cm of DCIS.  DCIS is at the new superior margin. pT1b pNx +margin    She presents today for post op      Ob/gyn history:  Menarche 12  Menopause 39  G 3 P 3, age of first delivery 14  Brief OCPs, no fertility treatments, no HRT    Daughter with cancer unknown type  Sister with cancer  Brother with cancer  Niece with cancer      Review of systems  A comprehensive ROS was taken on the patient intake form and reviewed with the patient. This form is scanned into the electronic medical record.    Constitutional symptoms: Denies generalized fatigue. Denies weight change, fevers/chills, difficulty sleeping   Eyes: Denies double vision, glaucoma,  cataracts.  Ear/nose/throat/mouth: Denies hearing changes, sore throat, sinus problems.  Cardiovascular: No chest pain. Denies irregular heartbeat. Denies ankle swelling.  Respiratory: No wheezing, cough, or shortness of breath.  Gastrointestinal: No abdominal pain, No nausea/vomiting. No indigestion/heartburn. No change in bowel habits. No constipation or diarrhea.   Genitourinary: No urinary incontinence. No urinary frequency. No painful urination.  Musculoskeletal: No bone pain, no muscle pain, no joint pain.   Integumentary: No rash. No masses. No changes in moles. No easy bruising.  Neurological: No headaches. No tremors. No numbness/tingling.  Psychiatric: No anxiety. No depression.  Endocrine: No excessive thirst. Not too hot or too cold. Not tired or fatigued.   Hematological/lymphatic: No swollen glands or blood clotting problems. No bruising.     Physical exam  A chaperone was offered for all portions of the physical exam. The patient declined.     General appearance: appears stated age, alert and oriented x 3  Head: Normocephalic, atraumatic  Eyes: conjunctivae/corneas clear.  Ears: External ears are normal, hearing is grossly intact.  Lungs: normal breathing  Heart: irregular  Abdomen: Soft, nontender, nondistended.  Neurologic: grossly normal  Lymph nodes: No cervical, supraclavicular or axillary lymphadenopathy bilaterally    Breast: A comprehensive breast exam was performed in the seated and supine positions. Breasts are symmetrical. Bilateral nipples are everted. There are no skin changes on arm maneuvers. Bra size: 52D   Right: healing incision. No erythema   Left: no masses. Left upper chest wall port    Medial sternotomy incision.    Results  Imaging  All breast imaging personally reviewed by me.  See HPI    Pathology   3/27/2024.  Right breast mass 8:00 5 cm from the nipple biopsy.  Grade 1 invasive ductal carcinoma.  ER greater than 95, VA 95, HER2 negative.  Open coil HydroMARK.    Impression:    1) 69-year-old -American female here with new right breast cancer  cT1 cN0 Grade 1 invasive ductal carcinoma.  ER greater than 95, ND 95, HER2 negative.   Additional right breast calcifications  Now s/p right PM pT1b pNx focally positive superior margin  2) extensive cardiac co morbidities include Afib. S/p AVR. Pacemaker in place.  EF 30%. Prior stroke. On coumadin  Non-smoker  3) FH of cancer. Referred to genetics.       Plan:   She is here with her daughter.  She is recovering well from surgery.  She has no to restrictions.  Reviewed her pathology report and she was given a copy.  A small invasive cancer was noted with more extensive DCIS.  Unfortunately the superior margin is focally positive.    I advised reexcision of the superior margin.  When I perform a re-excision I explained that I take additional tissue at the area of the close or positive margin. At the time of surgery, once again, I cannot see or feel if there are any cancer cells at the edge of the tissue and it has to be examined under the microscope. Every time I perform a re-excision there are 3 possibilities: 1) no residual disease, 2) residual disease, but a better margin (I would prefer 2 mm or greater) or 3) residual disease, but the margin is still close/positive. The first two possibilities would mean that the disease is completely removed and we can proceed with additional treatments. If the final possibility occurs, we would have to determine if yet another re-excision could be done or consider mastectomy.    The patient is very hesitant to pursue additional surgery.  She is especially frustrated with perioperative management of her anticoagulation.  She would like to see a medical oncologist and a radiation oncologist before she makes a decision about additional surgery.  She has been referred.  We will follow-up with her after the above.  She should call with any sooner concerns.

## 2024-07-18 NOTE — PROGRESS NOTES
.Patient Visit Information:   Patient name: Trinidad Hines  : 1955  Date of Service: 2024    Visit Type: Follow-up      Cancer History:          Breast         AJCC Edition: 8th (AJCC), Diagnosis Date:           Cancer Staging   Malignant neoplasm of lower-outer quadrant of right breast of female, estrogen receptor positive (Multi), Staging form: Breast, AJCC 8th Edition, Clinical stage from 2024: Stage IA (cT1, cN0, cM0, G1, ER+, NE+, HER2-) - Signed by Mary Sosa MD on 2024  Malignant neoplasm of lower-outer quadrant of right breast of female, estrogen receptor positive (Multi), Staging form: Breast, AJCC 8th Edition, Pathologic stage from 2024: Stage Unknown (pT1b, pNX, cM0, G1, ER+, NE+, HER2-) - Signed by Mary Sosa MD on 2024     Treatment Synopsis:       Trinidad Hines is a 69 y.o. post-menopausal AA female with right-sided invasive ductal carcinoma, clinical stage IA (cT1, cN0, cM0, G1, ER+, NE+, HER2-).  The patient's breast cancer was diagnosed on 3/27/2024 and is grade 1, estrogen receptor positive at >95%, progesterone receptor positive at 95%, and HER-2/yue negative.     PMHx - A-fib, Cadiomyopathy (EF 30%), s/p AVR, CKD (stage 3), non-traumatic intracerebral hemorrhage, depression, cardiac defibrillator.      CURRENT THERAPY: Endocrine therapy planned     ONCOLOGIC HISTORY:  Details of her breast cancer history are as follows:     No abnormal mammograms, breast biopsies, or breast surgeries.  2024 - B/L screening mammogram.  Left cardiac device.  Scattered fibroglandular tissue bilaterally.  Left breast negative.  Indeterminate right breast mass.  Indeterminate right breast calcifications.  BI-RADS 0.  3/2024 -  Right breast dx imaging.  Right breast calcifications are probably benign.  6-month follow-up is recommended.  Right breast ultrasound.  At 8:00 5 cm from the nipple a 0.4 x 0.4 x 0.4 cm indeterminate mass is noted, BI-RADS 4.  Right axillary  ultrasound is negative.  3/27/2024 - Right breast mass 8:00 5 cm from the nipple biopsy.  Grade 1 invasive ductal carcinoma.  ER greater than 95, LA 95, HER2 negative.   4/18/2024 - Office visit with Dr. Sosa. Per Dr. Sosa's note on 4/18/2024 - Calcs are low suspicion. 6 mo FU vs can be localized for excision. Lumpectomy +/- RT was discussed  6/19/2024: Right-sided lumpectomy by Dr. Sosa. Final path showing IDC, grade 1. 5.6 mm. ER > 95%, LA 95%, HER2 negative (1+ IHC). pT1b. Axilla was not assessed.      History of Present Illness: Patient ID:  Trinidad Hines is a 69 y.o. female.     Chief Complaint and/or reason for visit: Here for a follow-up     Interval History:    Trinidad Hines is a pleasant  69 y.o. woman with history of newly diagnosed invasive ductal carcinoma of the right breast. Here for a follow-up.     In the interim, she had right-sided lumpectomy on 6/19/2016.   She states she is doing well and does not report any new symptoms.   She denies pain today.     She denies fever, chills, Wt loss, headaches, CP, SOB, N/V, abdominal pain, constipation, diarrhea, neuropathy, joint pain, extremity swelling, rashes. Appetite is good and she is drinking fine.     She is accompanied by her daughter today.    Review of Systems:   A 14-point review of system was completed and was negative except for what is noted in HPI.      Allergies and Intolerances:       Allergies:   Allergies   Allergen Reactions    Aspirin Rash and Unknown    Diltiazem Hcl Itching    Dofetilide Other and Unknown    Phenytoin Hives and Rash    Lisinopril Cough and Dry mouth             Outpatient Medication Profile:   Current Outpatient Medications on File Prior to Visit   Medication Sig Dispense Refill    albuterol 90 mcg/actuation inhaler INAHEL 2 PUFFS EVERY 4 HOURS AS NEEDED FOR WHEEZING (Patient not taking: Reported on 6/19/2024) 18 g 2    allopurinol (Zyloprim) 300 mg tablet Take 1 tablet (300 mg) by mouth once daily. 90 tablet 1     calcitriol (Rocaltrol) 0.25 mcg capsule TAKE 1 CAPSULE BY MOUTH EVERY DAY 30 capsule 5    dapagliflozin propanediol (Farxiga) 10 mg Take 1 tablet (10 mg) by mouth once daily. 90 tablet 3    fluocinonide (Fluocinonide-E) 0.05 % cream Apply topically 2 times a day. 15 g 1    lidocaine (Xylocaine) 5 % ointment Apply 1 Application topically 3 times a day as needed for mild pain (1 - 3).      metoprolol succinate XL (Toprol-XL) 100 mg 24 hr tablet Take 1 tablet (100 mg) by mouth once daily. 90 tablet 1    sacubitriL-valsartan (Entresto) 49-51 mg tablet Take 1 tablet by mouth 2 times a day. 180 tablet 3    simvastatin (Zocor) 20 mg tablet Take 1 tablet (20 mg) by mouth once daily at bedtime. 90 tablet 1    spironolactone (Aldactone) 25 mg tablet Take 1 tablet (25 mg) by mouth once daily. 90 tablet 1    torsemide (Demadex) 20 mg tablet Take 1 tablet (20 mg) by mouth see administration instructions. Please take 40mg on Monday, Wednesday, and Friday. Take 20mg other days of the week. 40 tablet 11    traMADol (Ultram) 50 mg tablet Take 1 tablet (50 mg) by mouth every 6 hours if needed for moderate pain (4 - 6).      warfarin (Coumadin) 5 mg tablet TAKE 1.5 TABLET DAILY AS DIRECTED BY COUMADIN CLINIC (Patient taking differently: Take 1 tablet (5 mg) by mouth once daily.) 135 tablet 1     No current facility-administered medications on file prior to visit.          Medical History:    Past Medical History:   Diagnosis Date    Cardiology follow-up encounter 05/24/2024    Ranulfo Licona MD    Cardiomyopathy (Multi)     CHF (congestive heart failure) (Multi)     Chronic kidney disease     Chronic kidney disease, stage 3 unspecified (Multi)     Chronic systolic heart failure (Multi)     HFrEF    Coronary artery disease     Dilated cardiomyopathy (Multi)     Encounter for pre-operative cardiovascular clearance     Gout     H/O echocardiogram 02/23/2024    Hyperlipidemia     Hyperparathyroidism (Multi)     Hypertension      Insomnia     Malignant neoplasm of lower-outer quadrant of right breast of female, estrogen receptor negative (Multi)     OA (osteoarthritis)     Obesity     Persistent atrial fibrillation (Multi)     Presence of automatic (implantable) cardiac defibrillator     Cardiac defibrillator in place    Rheumatic heart failure (Multi)     s/p aortic valve replacement     Sleep apnea     Sleep apnea     Stroke (Multi)     hemorrhagic stroke       Surgical History:   Past Surgical History:   Procedure Laterality Date    AORTIC VALVE REPLACEMENT  1986    redo in  with bioprosthetic aortic valve    CARDIAC CATHETERIZATION  2023    CARDIAC CATHETERIZATION N/A 2024    Procedure: Right Heart Cath;  Surgeon: Hieu Jack MD;  Location: [facility] [place] [facility];  Service: Cardiovascular;  Laterality: N/A;    CARDIAC ELECTROPHYSIOLOGY PROCEDURE N/A 10/30/2023    Procedure: ICD UPGRADE, DUAL TO BIV;  Surgeon: Kendra Hong MD;  Location: [place] [address] Cardiac Cath Lab;  Service: Electrophysiology;  Laterality: N/A;    CARDIOVERSION      TOTAL KNEE ARTHROPLASTY  2015    Total Knee Arthroplasty       Social Substance History:   Social History     Tobacco Use    Smoking status: Former     Current packs/day: 0.00     Average packs/day: 1 pack/day for 20.0 years (20.0 ttl pk-yrs)     Types: Cigarettes     Start date:      Quit date:      Years since quittin.5    Smokeless tobacco: Never   Substance Use Topics    Alcohol use: Never     Social History     Substance and Sexual Activity   Drug Use Never       Family History:   No family history on file.     OBJECTIVE:     Visit Vitals  Temp:  [36.7 °C (98 °F)] 36.7 °C (98 °F)  Heart Rate:  [86-96] 86  BP: (108)/(60) 108/60    Pain Scale: 0      The ECOG performance scale today is:      1 Restricted in physically strenuous activity but ambulatory and able to carry out work of a light or sedentary nature, e.g., light house work, office work    She  "uses a walker.       Physical Exam:      Constitutional: Well developed, awake/alert/oriented  x3, no distress, alert and cooperative   Eyes: PERRL, EOMI, clear sclera   ENMT: mucous membranes moist, no apparent injury,  no lesions seen   Head/Neck: Neck supple, no apparent injury, thyroid  without mass or tenderness, No JVD, trachea midline, no bruits   Respiratory/Thorax: Patent airways, CTAB, normal  breath sounds with good chest expansion, thorax symmetric   Cardiovascular: Regular, rate and rhythm, no murmurs,  2+ equal pulses of the extremities, normal S 1and S 2   Gastrointestinal: Nondistended, soft, non-tender,  no rebound tenderness or guarding, no masses palpable, no organomegaly, +BS, no bruits   Musculoskeletal: ROM intact, no joint swelling, normal  strength   Extremities: No LE edema   Neurological: alert and oriented x3, intact senses,  motor, response and reflexes, normal strength   Breast: s/p rt sided lumpectomy with well healed surgical incision. No palpable mass in either breast. No palpable axillary or supraclavicular lymphadenopathies bilaterally. No swelling of either upper extremity which had free range of motion.   Lymphatic: No significant lymphadenopathy   Psychological: Appropriate mood and behavior   Skin: Warm and dry, no lesions, no rashes          LAB RESULTS    Lab Results   Component Value Date    WBC 2.8 (L) 06/03/2024    HGB 13.2 06/03/2024    HCT 41.4 06/03/2024    MCV 93 06/03/2024     (L) 06/03/2024     Lab Results   Component Value Date    NEUTROABS 1.58 06/03/2024       No results for input(s): \"CREATININE\", \"BUN\", \"NA\", \"K\", \"CL\", \"CO2\" in the last 72 hours.  No components found for: \"CALCGFR\"  Lab Results   Component Value Date    GLUCOSE 93 06/03/2024     Lab Results   Component Value Date     06/03/2024    K 4.3 06/03/2024     06/03/2024    CO2 27 06/03/2024    BUN 26 (H) 06/03/2024    CREATININE 2.05 (H) 06/03/2024    ALT 8 05/03/2024    AST 11 " "05/03/2024    ALKPHOS 59 05/03/2024    BILITOT 0.8 05/03/2024     No results found for: \"FOLATE\"  No results found for: \"[id number]\"  Lab Results   Component Value Date    TSH 4.92 (H) 05/03/2024 6/19/2024: Lumpectomy path ([id number])     FINAL DIAGNOSIS   A. BREAST, RIGHT, LOWER OUTER QUADRANT, LUMPECTOMY:   -- Invasive ductal carcinoma, grade 1, see note and synoptic cancer summary.  -- Ductal carcinoma in situ, intermediate to high nuclear grade, cribriform, micropapillary and papillary patterns with comedonecrosis and microcalcifications.  -- Biopsy site changes.     Note: Immunohistochemical stains for SMMHC and p63 were performed which show the absence of myoepithelial cell layer in the focus of invasive ductal carcinoma.     B. BREAST MARGIN, RIGHT, NEW SUPERIOR, EXCISION:   -- Ductal carcinoma in situ, see note.  -- No invasive carcinoma identified.     Note: Ductal carcinoma in situ is focally present at the new inked margin.     C. BREAST MARGIN, RIGHT, NEW LATERAL, EXCISION:   -- Benign breast tissue.  -- No carcinoma identified.     D. BREAST MARGIN, RIGHT, NEW MEDIAL, EXCISION:   -- Benign breast tissue.  -- No carcinoma identified.     E. BREAST MARGIN, RIGHT, NEW ANTERIOR, EXCISION:   -- Benign breast tissue.  -- No carcinoma identified.     F. BREAST MARGIN, RIGHT, NEW INFERIOR, EXCISION:   -- Benign breast tissue with cautery artifacts.  -- No carcinoma identified.     Note: Deeper levels of sections were reviewed.     G. BREAST MARGIN, RIGHT, NEW POSTERIOR, EXCISION:      -- Benign breast tissue.  -- No carcinoma identified.        verall Grade  Grade 1 (scores of 3, 4 or 5)   Tumor Size  Greatest dimension of largest invasive focus (Millimeters): 5.6 mm   Tumor Focality  Single focus of invasive carcinoma   Ductal Carcinoma In Situ (DCIS)  Present     Positive for extensive intraductal component (EIC)   Size (Extent) of DCIS  Estimated size (extent) of DCIS is at least (Millimeters): 48 mm "     pT Category  pT1b     SPECIAL STUDIES        Estrogen Receptor (ER) Status  Positive (greater than 10% of cells demonstrate nuclear positivity)   Percentage of Cells with Nuclear Positivity  >95 %        Progesterone Receptor (PgR) Status  Positive   Percentage of Cells with Nuclear Positivity  95 %        HER2 (by immunohistochemistry)  Negative (Score 1+)   Testing Performed on Case Number  [id number]     Imaging:     No images are attached to the encounter.        Assessment and Plan:   Assessment     Trinidad Hines is a 69 y.o.  post-menopausal AA female with right-sided invasive ductal carcinoma, clinical stage IA (cT1, cN0, cM0, G1, ER+, OK+, HER2-).  The patient's breast cancer was diagnosed on 3/27/2024 and is grade 1, estrogen receptor positive at >95%, progesterone receptor positive at 95%, and HER-2/yue negative. S/p lumpectomy on 6/19/2016.     Predict tool: Predicted overall survival at 5 yrs   Surgery only:  92%  + Hormone therapy : 93%  + Chemotherapy: 92%  + Bisphosphonate: 92%      PMHx - A-fib, Cadiomyopathy (EF 30%), s/p AVR, CKD (stage 3), non-traumatic intracerebral hemorrhage, depression, cardiac defibrillator.      Previously her surgeon and myself had long discussions regarding primary endocrine therapy vs. upfront surgery. She elected to have surgery and had lumpectomy on 6/19/2024. No node sampling. Her clinical staging is IA. Her invasive component was small (5.6 mm) but DCIS component was extensive (48 mm) with focally close positive margin. I advised re-excision of this margin but she is very hesitant to pursue more surgery.  She saw radiation oncologist (Dr. Chu) today.     She comes in today to discuss adjuvant systemic endocrine treatment options.     Given patient's small tumor size, clinically node-negative disease and hormone receptor positivity, her risk of recurrence is low. Also, due to her multiple medical co-morbidities (mentioned above), I do not recommend  chemotherapy. She will however, benefit from systemic endocrine therapy to minimize her risk of recurrence.     I recommend AI x5 years.  Side effects that were discussed included but were not limited to osteoporosis, arthritis arthralgia, hot flashes, liver toxicity. After discussion of pros and cons of this recommendation,  patient elected to proceed treatment with Arimidex.     DEXA scan in 2022 showed osteopenia  (Ca and Vitamin D recommended)  I recommend Zometa if she has osteopenia/osteoporosis by DEXA.  She also needs dental clearance before Zometa is initiated.      Arimidex and Zometa information has to be given at the next visit (pt did not wish for lots of information at this visit).   They want to wait to learn more bout systemic treatments after surgery/radiation is complete.     Plan:   Plan and proceed with completion of local/regional therapies (surgery and/or radiation).   After completion of local therapies, will bring her back to start  Anastrozole 1mg po daily x5 years  Take Vitamin D and Calcium supplements  Check Vitamin D levels  DEXA to be ordered at the next visit. If DEXA is good, no zometa (low-risk BC and little benefit from zometa) but monitor DEXA yearly. Most likely DEXA will show osteopenia (based on 2022 scan).   If Zometa is indicated, she will need dental clearance.  Engage in at least 150 min of Aerobic exercise weekly which translates to 30 minutes of brisk walking cristian BURROUGHS visit at 3 months and sooner if needed (Pt and daughter's request to give enough time for completing local/eloy therapies)  RTC on 10/21/2024        Claudine Middleton MD, PhD   Hematology/Oncology  [facility]

## 2024-07-19 ENCOUNTER — APPOINTMENT (OUTPATIENT)
Dept: CARDIOLOGY | Facility: CLINIC | Age: 69
End: 2024-07-19
Payer: MEDICAID

## 2024-07-19 ENCOUNTER — ANTICOAGULATION - WARFARIN VISIT (OUTPATIENT)
Dept: CARDIOLOGY | Facility: CLINIC | Age: 69
End: 2024-07-19
Payer: MEDICAID

## 2024-07-19 DIAGNOSIS — I48.19 PERSISTENT ATRIAL FIBRILLATION (MULTI): Primary | ICD-10-CM

## 2024-07-21 NOTE — PROGRESS NOTES
Radiation Oncology Outpatient Consult    Patient Name:  Trinidad Hines  MRN:  [id number]  :  1955    Referring Provider: Mary Sosa MD  Primary Care Provider: Minda Ricardo MD  Care Team: Patient Care Team:  Minda Ricardo MD as PCP - General (Family Medicine)  Herberth Gandara MD as PCP - [facility] Medicaid PCP  Claudine Middleton MD as Medical Oncologist (Hematology and Oncology)    Date of Service: 2024     SUBJECTIVE  History of Present Illness:  Trinidad Hines is a 69 y.o. female who was referred by Mary Sosa MD, for a consultation to the [facility] Department of Radiation Oncology.  She is presenting for evaluation and management of her newly diagnosed right breast cancer.    Ms. Hines underwent a mammogram on 2024 which revealed a mass in the lateral right breast as well as indeterminate calcifications in the posterior right breast.  Diagnostic views performed 3/1/2024 revealed a spiculated mass in the lower outer quadrant of the right breast with grouped calcifications in the central right breast that had slightly increased in size.  Ultrasound directed at the 8 o'clock position, 5 cm from the nipple revealed a 0.4 x 0.4 x 0.4 cm irregular mass.  Axillary ultrasound revealed 2 normal-appearing lymph nodes.  On 3/27/2024 she underwent a right breast core biopsy which revealed invasive ductal carcinoma, grade 1.  Estrogen receptors stained positive at greater than 95%.  Progesterone receptors stained positive at 95%.  HER2/yue was 1+ on IHC.  On 2024 she underwent a right partial mastectomy.  Pathology revealed a 0.56 cm invasive ductal carcinoma, grade 1 in the background of a 4.8 cm grade 3 ductal carcinoma in situ of the cribriform, micropapillary and papillary subtypes, nuclear grade 3.  There was an element of extensive intraductal component.  No angiolymphatic invasion was present.  There was a positive superior margin of resection.   She is somewhat hesitant to undergo additional surgery.  I was asked to see Ms. Donny by Dr. Mary Sosa for a discussion regarding the role of radiation in the management of this newly diagnosed breast cancer.    Prior Radiotherapy:  No radiation treatments to show. (Treatments may have been administered in another system.)       Past Medical History:    Past Medical History:   Diagnosis Date    Cardiology follow-up encounter 05/24/2024    Ranulfo Licona MD    Cardiomyopathy (Multi)     CHF (congestive heart failure) (Multi)     Chronic kidney disease     Chronic kidney disease, stage 3 unspecified (Multi)     Chronic systolic heart failure (Multi)     HFrEF    Coronary artery disease     Dilated cardiomyopathy (Multi)     Encounter for pre-operative cardiovascular clearance     Gout     H/O echocardiogram 02/23/2024    Hyperlipidemia     Hyperparathyroidism (Multi)     Hypertension     Insomnia     Malignant neoplasm of lower-outer quadrant of right breast of female, estrogen receptor negative (Multi)     OA (osteoarthritis)     Obesity     Persistent atrial fibrillation (Multi)     Presence of automatic (implantable) cardiac defibrillator     Cardiac defibrillator in place    Rheumatic heart failure (Multi)     s/p aortic valve replacement 1986    Sleep apnea     Sleep apnea     Stroke (Multi)     hemorrhagic stroke        Past Surgical History:    Past Surgical History:   Procedure Laterality Date    AORTIC VALVE REPLACEMENT  1986    redo in 2007 with bioprosthetic aortic valve    CARDIAC CATHETERIZATION  05/24/2023    CARDIAC CATHETERIZATION N/A 6/6/2024    Procedure: Right Heart Cath;  Surgeon: Hieu Jack MD;  Location: [facility] Cardiac Cath Lab;  Service: Cardiovascular;  Laterality: N/A;    CARDIAC ELECTROPHYSIOLOGY PROCEDURE N/A 10/30/2023    Procedure: ICD UPGRADE, DUAL TO BIV;  Surgeon: Kendra Hong MD;  Location: [place] [address] Cardiac Cath Lab;  Service: Electrophysiology;  Laterality: N/A;     CARDIOVERSION      TOTAL KNEE ARTHROPLASTY  2015    Total Knee Arthroplasty        Family History:  Cancer-related family history is not on file.    Social History:    Social History     Tobacco Use    Smoking status: Former     Current packs/day: 0.00     Average packs/day: 1 pack/day for 20.0 years (20.0 ttl pk-yrs)     Types: Cigarettes     Start date:      Quit date:      Years since quittin.5    Smokeless tobacco: Never   Substance Use Topics    Alcohol use: Never    Drug use: Never     Gynecologic History: Menarche age 12.  .  She delivered her first child at the age of 14.  She went through menopause at the age of 39.  She took oral contraceptive pills for less than 1 year.  She denies any history of hormone replacement therapy.    Allergies:    Allergies   Allergen Reactions    Aspirin Rash and Unknown    Diltiazem Hcl Itching    Dofetilide Other and Unknown    Phenytoin Hives and Rash    Lisinopril Cough and Dry mouth        Medications:    Current Outpatient Medications:     albuterol 90 mcg/actuation inhaler, INAHEL 2 PUFFS EVERY 4 HOURS AS NEEDED FOR WHEEZING (Patient not taking: Reported on 2024), Disp: 18 g, Rfl: 2    allopurinol (Zyloprim) 300 mg tablet, Take 1 tablet (300 mg) by mouth once daily., Disp: 90 tablet, Rfl: 1    calcitriol (Rocaltrol) 0.25 mcg capsule, TAKE 1 CAPSULE BY MOUTH EVERY DAY, Disp: 30 capsule, Rfl: 5    dapagliflozin propanediol (Farxiga) 10 mg, Take 1 tablet (10 mg) by mouth once daily., Disp: 90 tablet, Rfl: 3    fluocinonide (Fluocinonide-E) 0.05 % cream, Apply topically 2 times a day., Disp: 15 g, Rfl: 1    lidocaine (Xylocaine) 5 % ointment, Apply 1 Application topically 3 times a day as needed for mild pain (1 - 3)., Disp: , Rfl:     metoprolol succinate XL (Toprol-XL) 100 mg 24 hr tablet, Take 1 tablet (100 mg) by mouth once daily., Disp: 90 tablet, Rfl: 1    sacubitriL-valsartan (Entresto) 49-51 mg tablet, Take 1 tablet by mouth 2  times a day., Disp: 180 tablet, Rfl: 3    simvastatin (Zocor) 20 mg tablet, Take 1 tablet (20 mg) by mouth once daily at bedtime., Disp: 90 tablet, Rfl: 1    spironolactone (Aldactone) 25 mg tablet, Take 1 tablet (25 mg) by mouth once daily., Disp: 90 tablet, Rfl: 1    torsemide (Demadex) 20 mg tablet, Take 1 tablet (20 mg) by mouth see administration instructions. Please take 40mg on Monday, Wednesday, and Friday. Take 20mg other days of the week., Disp: 40 tablet, Rfl: 11    traMADol (Ultram) 50 mg tablet, Take 1 tablet (50 mg) by mouth every 6 hours if needed for moderate pain (4 - 6)., Disp: , Rfl:     warfarin (Coumadin) 5 mg tablet, TAKE 1.5 TABLET DAILY AS DIRECTED BY COUMADIN CLINIC (Patient taking differently: Take 1 tablet (5 mg) by mouth once daily.), Disp: 135 tablet, Rfl: 1      Review of Systems:  Review of Systems   Constitutional: Negative.    HENT:  Negative.     Eyes: Negative.    Respiratory:  Positive for shortness of breath.    Cardiovascular:  Positive for chest pain and palpitations.   Endocrine: Negative.    Genitourinary: Negative.     Musculoskeletal:  Positive for myalgias.   Skin: Negative.    Neurological: Negative.    Hematological:  Bruises/bleeds easily.   Psychiatric/Behavioral: Negative.         Performance Status:  The Karnofsky performance scale today is 80, Normal activity with effort; some signs or symptoms of disease (ECOG equivalent 1).        OBJECTIVE  /60 (BP Location: Right arm, Patient Position: Sitting, BP Cuff Size: Large adult)   Pulse 96   Temp 36.7 °C (98 °F)   Wt 118 kg (259 lb 7.7 oz)   BMI 43.18 kg/m²    Physical Exam  Constitutional:       Appearance: Normal appearance.   HENT:      Head: Normocephalic and atraumatic.   Eyes:      Conjunctiva/sclera: Conjunctivae normal.   Chest:          Comments: Examination of the right breast reveals a well-healing incision in the lower outer aspect.  There are no suspicious nodules, nipple retraction, or nipple  discharge bilaterally.  Abdominal:      Palpations: Abdomen is soft.   Musculoskeletal:      Right lower leg: Edema present.      Left lower leg: Edema present.   Lymphadenopathy:      Upper Body:      Right upper body: No supraclavicular or axillary adenopathy.      Left upper body: No supraclavicular or axillary adenopathy.   Neurological:      Mental Status: She is alert.          Laboratory Review:  There are no laboratory contraindications to radiation therapy.      Pathology Review:  The pertinent pathology results were reviewed and discussed with the patient.   See history for details.    Imaging:  The pertinent imaging results were reviewed and discussed with the patient.  See history for details.       ASSESSMENT:   Trinidad Hines is a 69 y.o. female with Malignant neoplasm of lower-outer quadrant of right breast of female, estrogen receptor positive (Multi), Clinical: Stage IA (cT1, cN0, cM0, G1, ER+, RI+, HER2-)  Malignant neoplasm of lower-outer quadrant of right breast of female, estrogen receptor positive (Multi), Pathologic: Stage Unknown (pT1b, pNX, cM0, G1, ER+, RI+, HER2-).  She is status post right partial mastectomy.  She has a positive margin of resection.    PLAN:   I had a long conversation with the patient detailing the role of further surgery as well as radiation in the management of this large, grade 3 DCIS in the setting of a small grade 1 invasive cancer.  We did discuss that her local recurrence risk would be dictated by the DCIS component and not the invasive component.  I estimated a local recurrence risk of approximately 40% if she were to proceed with no further surgery and radiation.  I did tell her that this would be reduced to approximately 30% with further surgery.  Radiation would reduce these risks by about 20%.  The side effects of radiation discussed included but were not limited to skin irritation with possible breakdown, fatigue, possible poor cosmetic outcome, rib  fragility, pulmonary toxicity and the possibility of a secondary malignancy induced by the radiation.  She voices understanding and is interested in considering further surgery.  She will discuss this with Dr. Sosa and I will see her back either following surgery or in the absence of additional surgery in the near future for her radiation planning.  She is dependent on her pacemaker and we will perform the appropriate checks to ensure that safety during radiation.    NCCN Guidelines were applicable to guide this patients treatment plan.   Rula Chu MD

## 2024-07-22 ENCOUNTER — OFFICE VISIT (OUTPATIENT)
Dept: HEMATOLOGY/ONCOLOGY | Facility: CLINIC | Age: 69
End: 2024-07-22
Payer: MEDICAID

## 2024-07-22 ENCOUNTER — OFFICE VISIT (OUTPATIENT)
Dept: SURGICAL ONCOLOGY | Facility: CLINIC | Age: 69
End: 2024-07-22
Payer: MEDICAID

## 2024-07-22 ENCOUNTER — HOSPITAL ENCOUNTER (OUTPATIENT)
Dept: RADIATION ONCOLOGY | Facility: CLINIC | Age: 69
Discharge: HOME | End: 2024-07-22
Payer: MEDICAID

## 2024-07-22 VITALS
HEART RATE: 96 BPM | WEIGHT: 259.48 LBS | BODY MASS INDEX: 43.18 KG/M2 | TEMPERATURE: 98 F | DIASTOLIC BLOOD PRESSURE: 60 MMHG | SYSTOLIC BLOOD PRESSURE: 108 MMHG

## 2024-07-22 VITALS
HEART RATE: 86 BPM | WEIGHT: 259.48 LBS | BODY MASS INDEX: 44.3 KG/M2 | SYSTOLIC BLOOD PRESSURE: 108 MMHG | TEMPERATURE: 98 F | DIASTOLIC BLOOD PRESSURE: 60 MMHG | HEIGHT: 64 IN

## 2024-07-22 DIAGNOSIS — Z17.0 MALIGNANT NEOPLASM OF LOWER-OUTER QUADRANT OF RIGHT BREAST OF FEMALE, ESTROGEN RECEPTOR POSITIVE (MULTI): Primary | ICD-10-CM

## 2024-07-22 DIAGNOSIS — C50.511 MALIGNANT NEOPLASM OF LOWER-OUTER QUADRANT OF RIGHT BREAST OF FEMALE, ESTROGEN RECEPTOR POSITIVE (MULTI): Primary | ICD-10-CM

## 2024-07-22 PROCEDURE — 99214 OFFICE O/P EST MOD 30 MIN: CPT | Performed by: INTERNAL MEDICINE

## 2024-07-22 PROCEDURE — 1160F RVW MEDS BY RX/DR IN RCRD: CPT | Performed by: SURGERY

## 2024-07-22 PROCEDURE — 1126F AMNT PAIN NOTED NONE PRSNT: CPT | Performed by: INTERNAL MEDICINE

## 2024-07-22 PROCEDURE — 3074F SYST BP LT 130 MM HG: CPT | Performed by: INTERNAL MEDICINE

## 2024-07-22 PROCEDURE — 99215 OFFICE O/P EST HI 40 MIN: CPT | Performed by: RADIOLOGY

## 2024-07-22 PROCEDURE — 99205 OFFICE O/P NEW HI 60 MIN: CPT | Performed by: RADIOLOGY

## 2024-07-22 PROCEDURE — 3078F DIAST BP <80 MM HG: CPT | Performed by: INTERNAL MEDICINE

## 2024-07-22 PROCEDURE — 1036F TOBACCO NON-USER: CPT | Performed by: INTERNAL MEDICINE

## 2024-07-22 PROCEDURE — 1036F TOBACCO NON-USER: CPT | Performed by: SURGERY

## 2024-07-22 PROCEDURE — 1159F MED LIST DOCD IN RCRD: CPT | Performed by: SURGERY

## 2024-07-22 PROCEDURE — 99211 OFF/OP EST MAY X REQ PHY/QHP: CPT | Performed by: SURGERY

## 2024-07-22 PROCEDURE — 3008F BODY MASS INDEX DOCD: CPT | Performed by: INTERNAL MEDICINE

## 2024-07-22 RX ORDER — SODIUM CHLORIDE 9 MG/ML
100 INJECTION, SOLUTION INTRAVENOUS CONTINUOUS
OUTPATIENT
Start: 2024-07-22

## 2024-07-22 RX ORDER — CEFAZOLIN SODIUM 2 G/100ML
2 INJECTION, SOLUTION INTRAVENOUS ONCE
OUTPATIENT
Start: 2024-07-22 | End: 2024-07-22

## 2024-07-22 ASSESSMENT — PAIN SCALES - GENERAL
PAINLEVEL: 0-NO PAIN
PAINLEVEL: 0-NO PAIN

## 2024-07-22 ASSESSMENT — ENCOUNTER SYMPTOMS
CONSTITUTIONAL NEGATIVE: 1
EYES NEGATIVE: 1
ENDOCRINE NEGATIVE: 1
MYALGIAS: 1
BRUISES/BLEEDS EASILY: 1
PSYCHIATRIC NEGATIVE: 1
NEUROLOGICAL NEGATIVE: 1
SHORTNESS OF BREATH: 1
PALPITATIONS: 1

## 2024-07-22 NOTE — PROGRESS NOTES
Chief Complaint  Post op    History of Present Illness    Referring Provider:   CLAUDIA Washington  PCP A Grey Granados    69-year-old -American female here for post op  Here w daughter.     History:  1) No abnormal mammograms, breast biopsies, or breast surgeries.  2) 2/23/2024.  Bilateral screening mammogram.  Left cardiac device.  Scattered fibroglandular tissue bilaterally.  Left breast negative.  Indeterminate right breast mass.  Indeterminate right breast calcifications.  BI-RADS 0.  3) March 1, 2024.  Right breast diagnostic imaging.  Right breast calcifications are probably benign.  6-month follow-up is recommended.  Right breast ultrasound.  At 8:00 5 cm from the nipple a 0.4 x 0.4 x 0.4 cm indeterminate mass is noted, BI-RADS 4.  Right axillary ultrasound is negative.  4) 3/27/2024.  Right breast mass 8:00 5 cm from the nipple biopsy.  Grade 1 invasive ductal carcinoma.  ER greater than 95, MN 95, HER2 negative.  Open coil HydroMARK.  5) reviewed films. Calcs are low suspicion. 6 mo FU vs can be localized for excision  6) patient wanted to avoid surgery and discuss endocrine therapy.  Saw medical oncologist and has returned to discuss surgery.  7) 6/19/2024.  Right partial mastectomy.  5.6 mm grade 1 invasive ductal carcinoma with associated 4.8 cm of DCIS.  DCIS is at the new superior margin. pT1b pNx +margin  8) 7/22/2024. Met with rad onc and med onc  9) opted to proceed with right reexcision    She presents today for post op. no Breast concerns      Ob/gyn history:  Menarche 12  Menopause 39  G 3 P 3, age of first delivery 14  Brief OCPs, no fertility treatments, no HRT    Daughter with cancer unknown type  Sister with cancer  Brother with cancer  Niece with cancer      Review of systems  A comprehensive ROS was taken on the patient intake form and reviewed with the patient. This form is scanned into the electronic medical record.    Constitutional symptoms: Denies generalized fatigue. Denies weight  change, fevers/chills, difficulty sleeping   Eyes: Denies double vision, glaucoma, cataracts.  Ear/nose/throat/mouth: Denies hearing changes, sore throat, sinus problems.  Cardiovascular: No chest pain. Denies irregular heartbeat. Denies ankle swelling.  Respiratory: No wheezing, cough, or shortness of breath.  Gastrointestinal: No abdominal pain, No nausea/vomiting. No indigestion/heartburn. No change in bowel habits. No constipation or diarrhea.   Genitourinary: No urinary incontinence. No urinary frequency. No painful urination.  Musculoskeletal: No bone pain, no muscle pain, no joint pain.   Integumentary: No rash. No masses. No changes in moles. No easy bruising.  Neurological: No headaches. No tremors. No numbness/tingling.  Psychiatric: No anxiety. No depression.  Endocrine: No excessive thirst. Not too hot or too cold. Not tired or fatigued.   Hematological/lymphatic: No swollen glands or blood clotting problems. No bruising.     Physical exam  A chaperone was offered for all portions of the physical exam. The patient declined.     General appearance: appears stated age, alert and oriented x 3  Head: Normocephalic, atraumatic  Eyes: conjunctivae/corneas clear.  Ears: External ears are normal, hearing is grossly intact.  Lungs: normal breathing  Heart: irregular  Abdomen: Soft, nontender, nondistended.  Neurologic: grossly normal  Lymph nodes: No cervical, supraclavicular or axillary lymphadenopathy bilaterally    Breast: A comprehensive breast exam was performed in the seated and supine positions. Breasts are symmetrical. Bilateral nipples are everted. There are no skin changes on arm maneuvers. Bra size: 52D   Right: healing incision. No erythema   Left: no masses. Left upper chest wall port    Medial sternotomy incision.    Results  Imaging  All breast imaging personally reviewed by me.  See HPI    Pathology   3/27/2024.  Right breast mass 8:00 5 cm from the nipple biopsy.  Grade 1 invasive ductal  carcinoma.  ER greater than 95, ND 95, HER2 negative.  Open coil HydroMARK.    Impression:   1) 69-year-old -American female here with new right breast cancer  cT1 cN0 Grade 1 invasive ductal carcinoma.  ER greater than 95, ND 95, HER2 negative.   Additional right breast calcifications  Now s/p right PM pT1b pNx focally positive superior margin  Right re-excision advised  2) extensive cardiac co morbidities include Afib. S/p AVR. Pacemaker in place.  EF 30%. Prior stroke. On coumadin  Non-smoker  3) FH of cancer. Referred to genetics.       Plan:   She is here with her daughter.  She has met with radiation oncology and medical oncology.  She has decided to proceed with right breast reexcision     When I perform a re-excision I explained that I take additional tissue at the area of the close or positive margin. At the time of surgery, once again, I cannot see or feel if there are any cancer cells at the edge of the tissue and it has to be examined under the microscope. Every time I perform a re-excision there are 3 possibilities: 1) no residual disease, 2) residual disease, but a better margin (I would prefer 2 mm or greater) or 3) residual disease, but the margin is still close/positive. The first two possibilities would mean that the disease is completely removed and we can proceed with additional treatments. If the final possibility occurs, we would have to determine if yet another re-excision could be done or consider mastectomy.    She has opted to proceed with right reexcision.  She would like to do this at the end of August.  She will need her Coumadin managed perioperatively.  She will follow-up about 2 weeks after surgery to review her pathology report.  She should call with any sooner concerns.

## 2024-07-22 NOTE — PATIENT INSTRUCTIONS
Post operative teaching completed with patient and her daughter today. She is aware of the post operative expectations. She will contact our office if she has any questions or concerns.

## 2024-07-23 ENCOUNTER — ANTICOAGULATION - WARFARIN VISIT (OUTPATIENT)
Dept: CARDIOLOGY | Facility: CLINIC | Age: 69
End: 2024-07-23
Payer: MEDICAID

## 2024-07-23 ENCOUNTER — APPOINTMENT (OUTPATIENT)
Dept: RADIATION ONCOLOGY | Facility: CLINIC | Age: 69
End: 2024-07-23
Payer: MEDICAID

## 2024-07-23 DIAGNOSIS — I48.19 PERSISTENT ATRIAL FIBRILLATION (MULTI): Primary | ICD-10-CM

## 2024-07-23 LAB
POC INR: 2.9
POC PROTHROMBIN TIME: NORMAL

## 2024-07-23 PROCEDURE — 85610 PROTHROMBIN TIME: CPT | Mod: QW | Performed by: INTERNAL MEDICINE

## 2024-07-23 NOTE — PROGRESS NOTES
Patient identification verified with 2 identifiers.    Location: [facility] at [facility] [address] [place] [address] [phone] option #1     Referring Physician: DR. CASANOVA  Enrollment/ Re-enrollment date: 24   INR Goal: 2.0-3.0  INR monitoring is per [facility] protocol.  Anticoagulation Medication: warfarin  Indication: Atrial Fibrillation/Atrial Flutter    Subjective   Bleeding signs/symptoms: No    Bruising: No   Major bleeding event: No  Thrombosis signs/symptoms: No  Thromboembolic event: No  Missed doses: No  Extra doses: No  Medication changes: No  Dietary changes: No  Change in health: No  Change in activity: No  Alcohol: No  Other concerns: No    Upcoming Procedures:  Does the Patient Have any upcoming procedures that require interruption in anticoagulation therapy? no  Does the patient require bridging? no      Anticoagulation Summary  As of 2024      INR goal:  2.0-3.0   TTR:  66.7% (9.9 mo)   INR used for dosin.90 (2024)   Weekly warfarin total:  37.5 mg               Assessment/Plan   Therapeutic     1. New dose: no change    2. Next INR: 1 week      Education provided to patient during the visit:  Patient instructed to call in interim with questions, concerns and changes.   Patient educated on dietary consistency in vitamin k consumption.   Patient educated on signs of bleeding/clotting.

## 2024-07-31 ENCOUNTER — ANTICOAGULATION - WARFARIN VISIT (OUTPATIENT)
Dept: CARDIOLOGY | Facility: CLINIC | Age: 69
End: 2024-07-31
Payer: MEDICAID

## 2024-07-31 DIAGNOSIS — I48.19 PERSISTENT ATRIAL FIBRILLATION (MULTI): Primary | ICD-10-CM

## 2024-07-31 LAB
POC INR: 2.6
POC PROTHROMBIN TIME: NORMAL

## 2024-07-31 PROCEDURE — 85610 PROTHROMBIN TIME: CPT | Mod: QW

## 2024-07-31 PROCEDURE — 99211 OFF/OP EST MAY X REQ PHY/QHP: CPT

## 2024-07-31 NOTE — PROGRESS NOTES
Patient identification verified with 2 identifiers.    Location: [facility] at [facility] - [address] [place] [address] [phone] option #1     Referring Physician: DR. CASANOVA  Enrollment/ Re-enrollment date: 24   INR Goal: 2.0-3.0  INR monitoring is per [facility] protocol.  Anticoagulation Medication: warfarin  Indication: Atrial Fibrillation/Atrial Flutter    Subjective   Bleeding signs/symptoms: No    Bruising: No   Major bleeding event: No  Thrombosis signs/symptoms: No  Thromboembolic event: No  Missed doses: No  Extra doses: No  Medication changes: No  Dietary changes: No  Change in health: No  Change in activity: No  Alcohol: No  Other concerns: No    Upcoming Procedures:  Does the Patient Have any upcoming procedures that require interruption in anticoagulation therapy? PT WILL BE HAVING BREAST SURGERY 24 REQUIRING WARFARIN HOLD.   Does the patient require bridging? UNKNOWN BUT PT WILL UPDATE WHEN SHE FINDS OUT      Anticoagulation Summary  As of 2024      INR goal:  2.0-3.0   TTR:  67.5% (10.2 mo)   INR used for dosin.60 (2024)   Weekly warfarin total:  37.5 mg               Assessment/Plan   Therapeutic     1. New dose: no change    2. Next INR: 2 weeks      Education provided to patient during the visit:  Patient instructed to call in interim with questions, concerns and changes.   Patient educated on dietary consistency in vitamin k consumption.   Patient educated on signs of bleeding/clotting.

## 2024-08-07 ENCOUNTER — CLINICAL SUPPORT (OUTPATIENT)
Dept: PREADMISSION TESTING | Facility: HOSPITAL | Age: 69
End: 2024-08-07
Payer: MEDICAID

## 2024-08-07 DIAGNOSIS — Z17.0 MALIGNANT NEOPLASM OF LOWER-OUTER QUADRANT OF RIGHT BREAST OF FEMALE, ESTROGEN RECEPTOR POSITIVE (MULTI): ICD-10-CM

## 2024-08-07 DIAGNOSIS — C50.511 MALIGNANT NEOPLASM OF LOWER-OUTER QUADRANT OF RIGHT BREAST OF FEMALE, ESTROGEN RECEPTOR POSITIVE (MULTI): ICD-10-CM

## 2024-08-13 ENCOUNTER — ANTICOAGULATION - WARFARIN VISIT (OUTPATIENT)
Dept: CARDIOLOGY | Facility: CLINIC | Age: 69
End: 2024-08-13
Payer: MEDICAID

## 2024-08-13 ENCOUNTER — APPOINTMENT (OUTPATIENT)
Dept: NEPHROLOGY | Facility: CLINIC | Age: 69
End: 2024-08-13
Payer: MEDICAID

## 2024-08-13 ENCOUNTER — LAB (OUTPATIENT)
Dept: LAB | Facility: LAB | Age: 69
End: 2024-08-13
Payer: MEDICAID

## 2024-08-13 VITALS
HEART RATE: 75 BPM | WEIGHT: 259.6 LBS | TEMPERATURE: 96.3 F | BODY MASS INDEX: 44.21 KG/M2 | DIASTOLIC BLOOD PRESSURE: 69 MMHG | SYSTOLIC BLOOD PRESSURE: 106 MMHG

## 2024-08-13 DIAGNOSIS — I12.9 HYPERTENSIVE KIDNEY DISEASE WITH STAGE 4 CHRONIC KIDNEY DISEASE (MULTI): Primary | ICD-10-CM

## 2024-08-13 DIAGNOSIS — I48.19 PERSISTENT ATRIAL FIBRILLATION (MULTI): Primary | ICD-10-CM

## 2024-08-13 DIAGNOSIS — I12.9 HYPERTENSIVE KIDNEY DISEASE WITH STAGE 3B CHRONIC KIDNEY DISEASE (MULTI): ICD-10-CM

## 2024-08-13 DIAGNOSIS — N18.4 HYPERTENSIVE KIDNEY DISEASE WITH STAGE 4 CHRONIC KIDNEY DISEASE (MULTI): ICD-10-CM

## 2024-08-13 DIAGNOSIS — N18.32 HYPERTENSIVE KIDNEY DISEASE WITH STAGE 3B CHRONIC KIDNEY DISEASE (MULTI): ICD-10-CM

## 2024-08-13 DIAGNOSIS — N18.4 HYPERTENSIVE KIDNEY DISEASE WITH STAGE 4 CHRONIC KIDNEY DISEASE (MULTI): Primary | ICD-10-CM

## 2024-08-13 DIAGNOSIS — I12.9 HYPERTENSIVE KIDNEY DISEASE WITH STAGE 4 CHRONIC KIDNEY DISEASE (MULTI): ICD-10-CM

## 2024-08-13 LAB
25(OH)D3 SERPL-MCNC: 38 NG/ML (ref 30–100)
ALBUMIN SERPL BCP-MCNC: 4.3 G/DL (ref 3.4–5)
ANION GAP SERPL CALC-SCNC: 14 MMOL/L (ref 10–20)
APPEARANCE UR: ABNORMAL
BACTERIA #/AREA URNS AUTO: ABNORMAL /HPF
BILIRUB UR STRIP.AUTO-MCNC: NEGATIVE MG/DL
BUN SERPL-MCNC: 30 MG/DL (ref 6–23)
CALCIUM SERPL-MCNC: 10.1 MG/DL (ref 8.6–10.6)
CHLORIDE SERPL-SCNC: 102 MMOL/L (ref 98–107)
CO2 SERPL-SCNC: 30 MMOL/L (ref 21–32)
COLOR UR: COLORLESS
CREAT SERPL-MCNC: 2.02 MG/DL (ref 0.5–1.05)
CREAT UR-MCNC: 68.1 MG/DL (ref 20–320)
CREAT UR-MCNC: 68.1 MG/DL (ref 20–320)
EGFRCR SERPLBLD CKD-EPI 2021: 26 ML/MIN/1.73M*2
ERYTHROCYTE [DISTWIDTH] IN BLOOD BY AUTOMATED COUNT: 13.6 % (ref 11.5–14.5)
GLUCOSE SERPL-MCNC: 97 MG/DL (ref 74–99)
GLUCOSE UR STRIP.AUTO-MCNC: ABNORMAL MG/DL
HCT VFR BLD AUTO: 43.3 % (ref 36–46)
HGB BLD-MCNC: 13.8 G/DL (ref 12–16)
KETONES UR STRIP.AUTO-MCNC: NEGATIVE MG/DL
LEUKOCYTE ESTERASE UR QL STRIP.AUTO: ABNORMAL
MCH RBC QN AUTO: 29.7 PG (ref 26–34)
MCHC RBC AUTO-ENTMCNC: 31.9 G/DL (ref 32–36)
MCV RBC AUTO: 93 FL (ref 80–100)
MICROALBUMIN UR-MCNC: 15.8 MG/L
MICROALBUMIN/CREAT UR: 23.2 UG/MG CREAT
MUCOUS THREADS #/AREA URNS AUTO: ABNORMAL /LPF
NITRITE UR QL STRIP.AUTO: NEGATIVE
NRBC BLD-RTO: 0 /100 WBCS (ref 0–0)
PH UR STRIP.AUTO: 6 [PH]
PHOSPHATE SERPL-MCNC: 3.7 MG/DL (ref 2.5–4.9)
PLATELET # BLD AUTO: 145 X10*3/UL (ref 150–450)
POC INR: 2.4
POC PROTHROMBIN TIME: NORMAL
POTASSIUM SERPL-SCNC: 4.3 MMOL/L (ref 3.5–5.3)
PROT UR STRIP.AUTO-MCNC: NEGATIVE MG/DL
PROT UR-ACNC: 17 MG/DL (ref 5–24)
PROT/CREAT UR: 0.25 MG/MG CREAT (ref 0–0.17)
PTH-INTACT SERPL-MCNC: 160.5 PG/ML (ref 18.5–88)
RBC # BLD AUTO: 4.64 X10*6/UL (ref 4–5.2)
RBC # UR STRIP.AUTO: NEGATIVE /UL
RBC #/AREA URNS AUTO: ABNORMAL /HPF
SODIUM SERPL-SCNC: 142 MMOL/L (ref 136–145)
SP GR UR STRIP.AUTO: 1.01
SQUAMOUS #/AREA URNS AUTO: ABNORMAL /HPF
UROBILINOGEN UR STRIP.AUTO-MCNC: NORMAL MG/DL
WBC # BLD AUTO: 3.4 X10*3/UL (ref 4.4–11.3)
WBC #/AREA URNS AUTO: >50 /HPF
WBC CLUMPS #/AREA URNS AUTO: ABNORMAL /HPF

## 2024-08-13 PROCEDURE — 81001 URINALYSIS AUTO W/SCOPE: CPT

## 2024-08-13 PROCEDURE — 99211 OFF/OP EST MAY X REQ PHY/QHP: CPT

## 2024-08-13 PROCEDURE — 3074F SYST BP LT 130 MM HG: CPT | Performed by: INTERNAL MEDICINE

## 2024-08-13 PROCEDURE — 3078F DIAST BP <80 MM HG: CPT | Performed by: INTERNAL MEDICINE

## 2024-08-13 PROCEDURE — 85610 PROTHROMBIN TIME: CPT | Mod: QW

## 2024-08-13 PROCEDURE — 80069 RENAL FUNCTION PANEL: CPT

## 2024-08-13 PROCEDURE — 85027 COMPLETE CBC AUTOMATED: CPT

## 2024-08-13 PROCEDURE — 82043 UR ALBUMIN QUANTITATIVE: CPT

## 2024-08-13 PROCEDURE — 1160F RVW MEDS BY RX/DR IN RCRD: CPT | Performed by: INTERNAL MEDICINE

## 2024-08-13 PROCEDURE — 1126F AMNT PAIN NOTED NONE PRSNT: CPT | Performed by: INTERNAL MEDICINE

## 2024-08-13 PROCEDURE — 83970 ASSAY OF PARATHORMONE: CPT

## 2024-08-13 PROCEDURE — 1159F MED LIST DOCD IN RCRD: CPT | Performed by: INTERNAL MEDICINE

## 2024-08-13 PROCEDURE — 1036F TOBACCO NON-USER: CPT | Performed by: INTERNAL MEDICINE

## 2024-08-13 PROCEDURE — 82306 VITAMIN D 25 HYDROXY: CPT

## 2024-08-13 PROCEDURE — 84156 ASSAY OF PROTEIN URINE: CPT

## 2024-08-13 PROCEDURE — 82570 ASSAY OF URINE CREATININE: CPT

## 2024-08-13 PROCEDURE — 99213 OFFICE O/P EST LOW 20 MIN: CPT | Performed by: INTERNAL MEDICINE

## 2024-08-13 ASSESSMENT — PAIN SCALES - GENERAL: PAINLEVEL: 0-NO PAIN

## 2024-08-13 NOTE — PROGRESS NOTES
Patient identification verified with 2 identifiers.    Location: [facility] at [facility] - [address] [place] [address] [phone] option #1     Referring Physician: DR. CASANOVA  Enrollment/ Re-enrollment date: 2024   INR Goal: 2.0-3.0  INR monitoring is per [facility] protocol.  Anticoagulation Medication: warfarin  Indication: Atrial Fibrillation/Atrial Flutter    Subjective   Bleeding signs/symptoms: No    Bruising: No   Major bleeding event: No  Thrombosis signs/symptoms: No  Thromboembolic event: No  Missed doses: No  Extra doses: No  Medication changes: No  Dietary changes: No  Change in health: No  Change in activity: No  Alcohol: No  Other concerns: No    Upcoming Procedures:  Does the Patient Have any upcoming procedures that require interruption in anticoagulation therapy? PT PENDING SURGERY FOR BREAST CA 24. PRE-OP TESTING 8/15/24. PT WILL REPORT WARFARIN INSTRUCTIONS TO US AT NEXT VISIT 24  Does the patient require bridging? no      Anticoagulation Summary  As of 2024      INR goal:  2.0-3.0   TTR:  68.8% (10.6 mo)   INR used for dosin.40 (2024)   Weekly warfarin total:  37.5 mg               Assessment/Plan   Therapeutic     1. New dose: no change    2. Next INR: 1 week      Education provided to patient during the visit:  Patient instructed to call in interim with questions, concerns and changes.   Patient educated on compliance with dosing, follow up appointments, and prescribed plan of care.

## 2024-08-14 ENCOUNTER — APPOINTMENT (OUTPATIENT)
Dept: CARDIOLOGY | Facility: CLINIC | Age: 69
End: 2024-08-14
Payer: MEDICAID

## 2024-08-15 ENCOUNTER — TELEPHONE (OUTPATIENT)
Dept: PREADMISSION TESTING | Facility: HOSPITAL | Age: 69
End: 2024-08-15

## 2024-08-15 ENCOUNTER — DOCUMENTATION (OUTPATIENT)
Dept: PREADMISSION TESTING | Facility: HOSPITAL | Age: 69
End: 2024-08-15

## 2024-08-15 ENCOUNTER — LAB (OUTPATIENT)
Dept: LAB | Facility: LAB | Age: 69
End: 2024-08-15
Payer: MEDICAID

## 2024-08-15 ENCOUNTER — PRE-ADMISSION TESTING (OUTPATIENT)
Dept: PREADMISSION TESTING | Facility: HOSPITAL | Age: 69
End: 2024-08-15
Payer: MEDICAID

## 2024-08-15 ENCOUNTER — HOSPITAL ENCOUNTER (OUTPATIENT)
Dept: CARDIOLOGY | Facility: HOSPITAL | Age: 69
Discharge: HOME | End: 2024-08-15
Payer: MEDICAID

## 2024-08-15 VITALS
HEIGHT: 64 IN | RESPIRATION RATE: 16 BRPM | SYSTOLIC BLOOD PRESSURE: 101 MMHG | TEMPERATURE: 97 F | OXYGEN SATURATION: 95 % | HEART RATE: 79 BPM | WEIGHT: 261.02 LBS | DIASTOLIC BLOOD PRESSURE: 65 MMHG | BODY MASS INDEX: 44.56 KG/M2

## 2024-08-15 DIAGNOSIS — I48.0 PAROXYSMAL ATRIAL FIBRILLATION (MULTI): ICD-10-CM

## 2024-08-15 DIAGNOSIS — Z79.01 ANTICOAGULATED ON COUMADIN: ICD-10-CM

## 2024-08-15 DIAGNOSIS — I48.0 PAROXYSMAL ATRIAL FIBRILLATION (MULTI): Primary | ICD-10-CM

## 2024-08-15 LAB
INR PPP: 2.2 (ref 0.9–1.1)
PROTHROMBIN TIME: 24.9 SECONDS (ref 9.8–12.8)

## 2024-08-15 PROCEDURE — 93005 ELECTROCARDIOGRAM TRACING: CPT

## 2024-08-15 PROCEDURE — 99204 OFFICE O/P NEW MOD 45 MIN: CPT | Performed by: NURSE PRACTITIONER

## 2024-08-15 PROCEDURE — 85610 PROTHROMBIN TIME: CPT

## 2024-08-15 ASSESSMENT — ENCOUNTER SYMPTOMS
CONSTITUTIONAL NEGATIVE: 1
GASTROINTESTINAL NEGATIVE: 1
CARDIOVASCULAR NEGATIVE: 1
NECK PAIN: 1
RHINORRHEA: 1
ARTHRALGIAS: 1
LIMITED RANGE OF MOTION: 1

## 2024-08-15 NOTE — CPM/PAT H&P
CPM/PAT Evaluation       Name: Trinidad Hines (Trinidad Hines)  /Age: 1955/69 y.o.     SURGEON :DR HERO TAVAREZ   Surgery, Date, and Length:  Right Breast Partial Mastectomy; Re-Excision of Superior Margin 24    HPI:  This a 69 y.o. fe-male who presents for presurgical evaluation for  for above mentioned procedure . She is s/p partial mastectomy on 24. 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 TAVAREZ the patient elects to proceed with the planned procedure.       Past Medical History:   Diagnosis Date    Cardiology follow-up encounter     Herberth Gandara MD next apt 24    Cardiomyopathy (Multi)     CHF (congestive heart failure) (Multi)     Chronic kidney disease, stage 3 unspecified (Multi)     Chronic systolic heart failure (Multi)     HFrEF    Coronary artery disease     Dilated cardiomyopathy (Multi)     Encounter for pre-operative cardiovascular clearance     Gout     H/O echocardiogram 2024    Hyperlipidemia     Hyperparathyroidism (Multi)     Hypertension     Insomnia     Malignant neoplasm of lower-outer quadrant of right breast of female, estrogen receptor negative (Multi)     OA (osteoarthritis)     Obesity     Persistent atrial fibrillation (Multi)     Presence of automatic (implantable) cardiac defibrillator     Cardiac defibrillator in place    Rheumatic heart failure (Multi)     s/p aortic valve replacement     Sleep apnea     Stroke (Multi)     hemorrhagic stroke       Past Surgical History:   Procedure Laterality Date    AORTIC VALVE REPLACEMENT      redo in  with bioprosthetic aortic valve    CARDIAC CATHETERIZATION  2023    CARDIAC CATHETERIZATION N/A 2024    Procedure: Right Heart Cath;  Surgeon: Hieu Jack MD;  Location: [facility] Cardiac [facility] Lab;  Service: Cardiovascular;  Laterality: N/A;    CARDIAC ELECTROPHYSIOLOGY PROCEDURE N/A 10/30/2023    Procedure: ICD UPGRADE, DUAL TO BIV;  Surgeon: Kendra GILL  MD Gely;  Location: [place] [address] Cardiac Cath Lab;  Service: Electrophysiology;  Laterality: N/A;    CARDIOVERSION      TOTAL KNEE ARTHROPLASTY  2015    Total Knee Arthroplasty   Anesthesia History  Pt denies any past history of anesthetic complications such as PONV, awareness, prolonged sedation, dental damage, aspiration, cardiac arrest, difficult intubation, difficult I.V. access or unexpected hospital admissions.  NO malignant hyperthermia and or pseudo cholinesterase deficiency.    The patient is not  a Gnosticist and will accept blood and blood products if medically indicated.   History of blood transfusions .Type and screen not sent.     Social History  Social History     Substance and Sexual Activity   Drug Use Never      Social History     Substance and Sexual Activity   Alcohol Use Never      Social History     Tobacco Use   Smoking Status Former    Current packs/day: 0.00    Average packs/day: 1 pack/day for 20.0 years (20.0 ttl pk-yrs)    Types: Cigarettes    Start date:     Quit date:     Years since quittin.6   Smokeless Tobacco Never          Family History   Problem Relation Name Age of Onset    Heart attack Mother      Heart attack Father      Kidney failure Sister      Cancer Brother         Allergies   Allergen Reactions    Dofetilide Anaphylaxis    Aspirin Rash    Diltiazem Hcl Itching    Lisinopril Cough and Dry mouth    Phenytoin Hives and Rash       Prior to Admission medications    Medication Sig Start Date End Date Taking? Authorizing Provider   albuterol 90 mcg/actuation inhaler INAHEL 2 PUFFS EVERY 4 HOURS AS NEEDED FOR WHEEZING 3/14/24   Minda Ricardo MD   allopurinol (Zyloprim) 300 mg tablet Take 1 tablet (300 mg) by mouth once daily.  Patient taking differently: Take 1 tablet (300 mg) by mouth once daily at bedtime. 23   Minda Ricardo MD   calcitriol (Rocaltrol) 0.25 mcg capsule TAKE 1 CAPSULE BY MOUTH EVERY DAY  Patient taking  differently: Take 1 capsule (0.25 mcg) by mouth once daily at bedtime. 5/3/24   Safia Gerber MD   dapagliflozin propanediol (Farxiga) 10 mg Take 1 tablet (10 mg) by mouth once daily.  Patient taking differently: Take 1 tablet (10 mg) by mouth once daily at bedtime. 5/8/24 5/8/25  Safia Gerber MD   fluocinonide (Fluocinonide-E) 0.05 % cream Apply topically 2 times a day. 6/21/24 6/21/25  Minda Ricardo MD   lidocaine (Xylocaine) 5 % ointment Apply 1 Application topically 3 times a day as needed for mild pain (1 - 3). 2/23/23   Historical Provider, MD   metoprolol succinate XL (Toprol-XL) 100 mg 24 hr tablet Take 1 tablet (100 mg) by mouth once daily.  Patient taking differently: Take 1 tablet (100 mg) by mouth once daily at bedtime. 4/17/24   Herberth Gandara MD   sacubitriL-valsartan (Entresto) 49-51 mg tablet Take 1 tablet by mouth 2 times a day. 4/17/24 4/17/25  Herberth Gandara MD   simvastatin (Zocor) 20 mg tablet Take 1 tablet (20 mg) by mouth once daily at bedtime. 4/17/24   Herberth Gandara MD   spironolactone (Aldactone) 25 mg tablet Take 1 tablet (25 mg) by mouth once daily.  Patient taking differently: Take 1 tablet (25 mg) by mouth once daily at bedtime. 4/17/24   Herberth Gandara MD   torsemide (Demadex) 20 mg tablet Take 1 tablet (20 mg) by mouth see administration instructions. Please take 40mg on Monday, Wednesday, and Friday. Take 20mg other days of the week. 6/11/24 6/11/25  Ranulfo Licona MD MPH   traMADol (Ultram) 50 mg tablet Take 1 tablet (50 mg) by mouth every 6 hours if needed for moderate pain (4 - 6). 1/13/24   Historical Provider, MD   warfarin (Coumadin) 5 mg tablet TAKE 1.5 TABLET DAILY AS DIRECTED BY COUMADIN CLINIC  Patient taking differently: Take 1 tablet (5 mg) by mouth once daily. Takes at night 1/12/24   Herberth Gandara MD        PAT ROS:   Constitutional:   neg    Neuro/Psych:   Eyes:   Ears:   Nose:    nasal discharge  Mouth:   neg    Throat:   neg    Neck:    neck  "pain  Cardio:   neg    Respiratory:   Endocrine:   GI:   neg    :   neg    Musculoskeletal:    arthralgias (KNEES)   decreased ROM  Hematologic:   neg    Skin:  neg        Physical Exam  Vitals reviewed.   Constitutional:       Appearance: She is obese.   HENT:      Head: Normocephalic.      Mouth/Throat:      Mouth: Mucous membranes are moist.   Eyes:      Extraocular Movements: Extraocular movements intact.      Pupils: Pupils are equal, round, and reactive to light.   Cardiovascular:      Rate and Rhythm: Normal rate.      Pulses: Normal pulses.      Heart sounds: Normal heart sounds.   Pulmonary:      Effort: Pulmonary effort is normal.      Breath sounds: Normal breath sounds.   Musculoskeletal:         General: Tenderness (b/l knees) present.      Cervical back: Normal range of motion.   Skin:     General: Skin is warm and dry.   Neurological:      Mental Status: She is alert and oriented to person, place, and time.   Psychiatric:         Behavior: Behavior normal.          PAT AIRWAY:   Airway:     Mallampati::  II   Edentulous     /65   Pulse 79   Temp 36.1 °C (97 °F)   Resp 16   Ht 1.626 m (5' 4\")   Wt 118 kg (261 lb 0.4 oz)   LMP  (LMP Unknown)   SpO2 95%   BMI 44.80 kg/m²   EKG IN EPIC  Lab Results   Component Value Date    WBC 3.4 (L) 08/13/2024    HGB 13.8 08/13/2024    HCT 43.3 08/13/2024    MCV 93 08/13/2024     (L) 08/13/2024     2 d ago  (8/13/24) 2 mo ago  (6/3/24)         Glucose  74 - 99 mg/dL 97         Sodium  136 - 145 mmol/L 142         Potassium  3.5 - 5.3 mmol/L 4.3         Chloride  98 - 107 mmol/L 102         Bicarbonate  21 - 32 mmol/L 30         Anion Gap  10 - 20 mmol/L 14         Urea Nitrogen  6 - 23 mg/dL 30 High          Creatinine  0.50 - 1.05 mg/dL 2.02 High          eGFR  >60 mL/min/1.73m*2 26 Low             Calcium  8.6 - 10.6 mg/dL 10.1         Phosphorus  2.5 - 4.9 mg/dL 3.7           Lab Results   Component Value Date    INR 2.2 (H) 08/15/2024    INR " 2.40 08/13/2024    INR 2.60 07/31/2024    PROTIME 24.9 (H) 08/15/2024    PROTIME 25.3 (H) 06/03/2024    PROTIME 30.3 (H) 03/20/2023       ASSESSMENT/PLAN    Patient is a 69 year-old  scheduled for Right Breast Partial Mastectomy; Re-Excision of Superior Margin  with Dr. Sosa  on  8/28/24 .  CARDIOVASCULAR:  RCRI score / Risk: The patients score is 2 based on history . Per ACC/AHA guidelines this places her  at  6.0% risk for MACE undergoing a low  risk procedure . The patient has the following risk factors:HF , remote CVA   Functional Capacity: The patients exercise tolerance is  3  METS. This is based on the patient's pain in her knees, uses a walker  Patient denies  active cardiac symptoms or anginal equivalents .    CHF:  The patient has CHF which began several years ago.  And is currently stable  based on history of normal cardiac exam today  today's EKG.Recommendatiosn: Monitor I&O, avoid hypervolemia, diuretics as needed .    AFIB/AVR/ANTICOAGULATION :  Patient was instructed to stop Coumadin , 5 days before surgery .  Pt to return  for repeat INR on 8/27/24.  REPEAT PT/INR ON 8/27/24 IS 1.30   Resume anticoagulation therapy as soon as appropriate.    CHADSVASC =8.5%    PPM/ICD:  The patient has a pacemaker/ICD,implanted on 10/30/23.placed for dilated cardiomyopathy .Interrogation has been requested .    Based on history of prosthetic valve, and heart failure .We are asking for cardiac consultation for risk assessment and recommendations regarding cardiac optimization.  Pt has an appointment with Dr Gandara on 8/19/24.      PULMONARY:  The patient has the following factors that place them at increased risk of perioperative pulmonary complications;age greater than 65/BMI greater than 27/decreased  functional status/QI/greater than 1.5 hour procedure.  Postoperatively the patient would benefit from early pulmonary toilet/incentive spirometry q 1-2 hours while awake/pulse oximetry/cautious use of respiratory  depressant medications such as opioids/elevate the HOB/oral hygiene.      DVT:  CAPRINI SCORE=16  The patient has the following factors that increase her  Risk for thrombus formation ; age>70, bmi>40, malignancy , strole , Virchow's triad , , Surgical procedure >2 hrs  procedure .    Recommendations: DVT prophylaxis  per Dr. Sosa protocol . SCD's, DOMITILA's, and early ambulation are recommended. Heparin or LMWH is recommended for the very high risk .    RECEIVED CARDIAC CLEARANCE FROM DR. CASANOVA WHO STATES YOUR CARDIOVASCULAR RISK FOR YOUR ANTICIPATED BREAST SURGERY IS ACCEPTABLE PLEASE STOP YOUR FARXIGA 3 DAYS PRIOR. PLEASE STOP WARFARIN 5 DAYS PRIOR TO SURGERY.     Risk assessment complete.  Patient is scheduled for  LOW  surgical risk procedure.  Patient is considered an increased risk to proceed with the planned procedure.      Preoperative medication instructions were provided and reviewed with the patient.  Any additional testing or evaluation was explained to the patient.  Nothing by mouth instructions were discussed and patient's questions were answered prior to conclusion to this encounter.  Patient verbalized understanding of preoperative instructions given in preadmission testing; discharge instructions available in EMR.

## 2024-08-15 NOTE — PREPROCEDURE INSTRUCTIONS
Medication List            Accurate as of August 15, 2024  9:40 AM. Always use your most recent med list.                albuterol 90 mcg/actuation inhaler  INAHEL 2 PUFFS EVERY 4 HOURS AS NEEDED FOR WHEEZING  Notes to patient: USE IF NEEDED     allopurinol 300 mg tablet  Commonly known as: Zyloprim  Take 1 tablet (300 mg) by mouth once daily.  Medication Adjustments for Surgery: Take morning of surgery with sip of water, no other fluids     calcitriol 0.25 mcg capsule  Commonly known as: Rocaltrol  TAKE 1 CAPSULE BY MOUTH EVERY DAY  Medication Adjustments for Surgery: Continue until night before surgery     dapagliflozin propanediol 10 mg  Commonly known as: Farxiga  Take 1 tablet (10 mg) by mouth once daily.  Medication Adjustments for Surgery: Stop 3 days before surgery     Fluocinonide-E 0.05 % cream  Generic drug: fluocinonide  Apply topically 2 times a day.  Medication Adjustments for Surgery: Continue until night before surgery     lidocaine 5 % ointment  Commonly known as: Xylocaine  Medication Adjustments for Surgery: Continue until night before surgery     metoprolol succinate  mg 24 hr tablet  Commonly known as: Toprol-XL  Take 1 tablet (100 mg) by mouth once daily.  Medication Adjustments for Surgery: Take morning of surgery with sip of water, no other fluids     sacubitriL-valsartan 49-51 mg tablet  Commonly known as: Entresto  Take 1 tablet by mouth 2 times a day.  Medication Adjustments for Surgery: Stop 1 day before surgery     simvastatin 20 mg tablet  Commonly known as: Zocor  Take 1 tablet (20 mg) by mouth once daily at bedtime.  Medication Adjustments for Surgery: Take morning of surgery with sip of water, no other fluids     spironolactone 25 mg tablet  Commonly known as: Aldactone  Take 1 tablet (25 mg) by mouth once daily.  Medication Adjustments for Surgery: Take morning of surgery with sip of water, no other fluids     torsemide 20 mg tablet  Commonly known as: Demadex  Take 1  tablet (20 mg) by mouth see administration instructions. Please take 40mg on Monday, Wednesday, and Friday. Take 20mg other days of the week.  Notes to patient: TAKE AM OF SURGERY IF SCHEDULED      traMADol 50 mg tablet  Commonly known as: Ultram  Notes to patient: USE IF NEEDED     warfarin 5 mg tablet  Commonly known as: Coumadin  Take as directed by the anticoagulation clinic. If you are unsure how to take this medication, talk to your nurse or doctor.  Original instructions: TAKE 1.5 TABLET DAILY AS DIRECTED BY COUMADIN CLINIC  Notes to patient: FOLLOW DR CASANOVA'S WARFARIN/BRIDGING INSTRUCTIONS '                The medication bridging plan has been discussed with the surgeon and the patient has been informed of the plan.          CONTACT SURGEON'S OFFICE IF YOU DEVELOP:  * Fever = 100.4 F   * New respiratory symptoms (e.g. cough, shortness of breath, respiratory distress, sore throat)  * Recent loss of taste or smell  *Flu like symptoms such as headache, fatigue or gastrointestinal symptoms  * You develop any open sores, shingles, burning or painful urination   AND/OR:  * You no longer wish to have the surgery.  * Any other personal circumstances change that may lead to the need to cancel or defer this surgery.  *You were admitted to any hospital within one week of your planned procedure.    SMOKING:  *Quitting smoking can make a huge difference to your health and recovery from surgery.    *If you need help with quitting, call 2-284-QUIT-NOW.    THE DAY BEFORE SURGERY:  *Do not eat any food after midnight the night before surgery.   You may have up to  sips of water to take am medicationsl  DIABETICS:  Please check fasting blood sugar  upon waking up.  If fasting sugar is <80 mg/dl, please drink 100ml/3oz of apple juice no later than 2 hours prior to surgery.      SURGICAL TIME  *You will be contacted between 2 p.m. and 6 p.m. the business day before your surgery with your arrival time.  *If you haven't received  a call by 6pm, call [phone].  *Scheduled surgery times may change and you will be notified if this occurs-check your personal voicemail for any updates.    ON THE MORNING OF SURGERY:  *Wear comfortable, loose fitting clothing.   *Do not use moisturizers, creams, lotions or perfume.  *All jewelry and valuables should be left at home.  *Prosthetic devices such as contact lenses, hearing aids, dentures, eyelash extensions, hairpins and body piercing must be removed before surgery.    BRING WITH YOU:  *Photo ID and insurance card  *Current list of medicines and allergies  *Pacemaker/Defibrillator/Heart stent cards  *CPAP machine and mask  *Slings/splints/crutches  *Copy of your complete Advanced Directive/DHPOA-if applicable  *Neurostimulator implant remote    PARKING AND ARRIVAL:  *Check in at the Main Entrance desk and let them know you are here for surgery.  *You will be directed to the 2nd floor surgical waiting area.    AFTER OUTPATIENT SURGERY:  *A responsible adult MUST accompany you at the time of discharge and stay with you for 24 hours after your surgery.  *You may NOT drive yourself home after surgery.  *You may use a taxi or ride sharing service ([employer], Uber) to return home ONLY if you are accompanied by a friend or family member.  *Instructions for resuming your medications will be provided by your surgeon.

## 2024-08-15 NOTE — PREPROCEDURE INSTRUCTIONS
Medication List            Accurate as of August 15, 2024  9:40 AM. Always use your most recent med list.                albuterol 90 mcg/actuation inhaler  INAHEL 2 PUFFS EVERY 4 HOURS AS NEEDED FOR WHEEZING  Notes to patient: USE IF NEEDED     allopurinol 300 mg tablet  Commonly known as: Zyloprim  Take 1 tablet (300 mg) by mouth once daily.  Medication Adjustments for Surgery: Take morning of surgery with sip of water, no other fluids     calcitriol 0.25 mcg capsule  Commonly known as: Rocaltrol  TAKE 1 CAPSULE BY MOUTH EVERY DAY  Medication Adjustments for Surgery: Continue until night before surgery     dapagliflozin propanediol 10 mg  Commonly known as: Farxiga  Take 1 tablet (10 mg) by mouth once daily.  Medication Adjustments for Surgery: Stop 3 days before surgery     Fluocinonide-E 0.05 % cream  Generic drug: fluocinonide  Apply topically 2 times a day.  Medication Adjustments for Surgery: Continue until night before surgery     lidocaine 5 % ointment  Commonly known as: Xylocaine  Medication Adjustments for Surgery: Continue until night before surgery     metoprolol succinate  mg 24 hr tablet  Commonly known as: Toprol-XL  Take 1 tablet (100 mg) by mouth once daily.  Medication Adjustments for Surgery: Take morning of surgery with sip of water, no other fluids     sacubitriL-valsartan 49-51 mg tablet  Commonly known as: Entresto  Take 1 tablet by mouth 2 times a day.  Medication Adjustments for Surgery: Stop 1 day before surgery     simvastatin 20 mg tablet  Commonly known as: Zocor  Take 1 tablet (20 mg) by mouth once daily at bedtime.  Medication Adjustments for Surgery: Take morning of surgery with sip of water, no other fluids     spironolactone 25 mg tablet  Commonly known as: Aldactone  Take 1 tablet (25 mg) by mouth once daily.  Medication Adjustments for Surgery: Take morning of surgery with sip of water, no other fluids     torsemide 20 mg tablet  Commonly known as: Demadex  Take 1  tablet (20 mg) by mouth see administration instructions. Please take 40mg on Monday, Wednesday, and Friday. Take 20mg other days of the week.  Notes to patient: TAKE AM OF SURGERY IF SCHEDULED      traMADol 50 mg tablet  Commonly known as: Ultram  Notes to patient: USE IF NEEDED     warfarin 5 mg tablet  Commonly known as: Coumadin  Take as directed by the anticoagulation clinic. If you are unsure how to take this medication, talk to your nurse or doctor.  Original instructions: TAKE 1.5 TABLET DAILY AS DIRECTED BY COUMADIN CLINIC  Notes to patient: FOLLOW DR CASANOVA'S WARFARIN/BRIDGING INSTRUCTIONS '                The medication bridging plan has been discussed with the surgeon and the patient has been informed of the plan.          CONTACT SURGEON'S OFFICE IF YOU DEVELOP:  * Fever = 100.4 F   * New respiratory symptoms (e.g. cough, shortness of breath, respiratory distress, sore throat)  * Recent loss of taste or smell  *Flu like symptoms such as headache, fatigue or gastrointestinal symptoms  * You develop any open sores, shingles, burning or painful urination   AND/OR:  * You no longer wish to have the surgery.  * Any other personal circumstances change that may lead to the need to cancel or defer this surgery.  *You were admitted to any hospital within one week of your planned procedure.    SMOKING:  *Quitting smoking can make a huge difference to your health and recovery from surgery.    *If you need help with quitting, call 8-162-QUIT-NOW.    THE DAY BEFORE SURGERY:  *Do not eat any food after midnight the night before surgery.   You may have up to 13.5 ounces of clear liquid until TWO hours before your instructed arrival time to the hospital  DIABETICS:  Please check fasting blood sugar  upon waking up.  If fasting sugar is <80 mg/dl, please drink 100ml/3oz of apple juice no later than 2 hours prior to surgery.      SURGICAL TIME  *You will be contacted between 2 p.m. and 6 p.m. the business day before your  surgery with your arrival time.  *If you haven't received a call by 6pm, call [phone].  *Scheduled surgery times may change and you will be notified if this occurs-check your personal voicemail for any updates.    ON THE MORNING OF SURGERY:  *Wear comfortable, loose fitting clothing.   *Do not use moisturizers, creams, lotions or perfume.  *All jewelry and valuables should be left at home.  *Prosthetic devices such as contact lenses, hearing aids, dentures, eyelash extensions, hairpins and body piercing must be removed before surgery.    BRING WITH YOU:  *Photo ID and insurance card  *Current list of medicines and allergies  *Pacemaker/Defibrillator/Heart stent cards  *CPAP machine and mask  *Slings/splints/crutches  *Copy of your complete Advanced Directive/DHPOA-if applicable  *Neurostimulator implant remote    PARKING AND ARRIVAL:  *Check in at the Main Entrance desk and let them know you are here for surgery.  *You will be directed to the 2nd floor surgical waiting area.    AFTER OUTPATIENT SURGERY:  *A responsible adult MUST accompany you at the time of discharge and stay with you for 24 hours after your surgery.  *You may NOT drive yourself home after surgery.  *You may use a taxi or ride sharing service ([employer], Uber) to return home ONLY if you are accompanied by a friend or family member.  *Instructions for resuming your medications will be provided by your surgeon.

## 2024-08-15 NOTE — CPM/PAT NURSE NOTE
CPM/PAT Nurse Note      Name: Trinidad Hines (Trinidad Hines)  /Age: 1955/69 y.o.       Past Medical History:   Diagnosis Date    Cardiology follow-up encounter     Herberth Gandara MD next apt [address]    Cardiomyopathy (Multi)     CHF (congestive heart failure) (Multi)     Chronic kidney disease, stage 3 unspecified (Multi)     Chronic systolic heart failure (Multi)     HFrEF    Coronary artery disease     Dilated cardiomyopathy (Multi)     Encounter for pre-operative cardiovascular clearance     Gout     H/O echocardiogram 2024    Hyperlipidemia     Hyperparathyroidism (Multi)     Hypertension     Insomnia     Malignant neoplasm of lower-outer quadrant of right breast of female, estrogen receptor negative (Multi)     OA (osteoarthritis)     Obesity     Persistent atrial fibrillation (Multi)     Presence of automatic (implantable) cardiac defibrillator     Cardiac defibrillator in place    Rheumatic heart failure (Multi)     s/p aortic valve replacement     Sleep apnea     Stroke (Multi)     hemorrhagic stroke       Past Surgical History:   Procedure Laterality Date    AORTIC VALVE REPLACEMENT      redo in  with bioprosthetic aortic valve    CARDIAC CATHETERIZATION  2023    CARDIAC CATHETERIZATION N/A 2024    Procedure: Right Heart Cath;  Surgeon: Hieu Jack MD;  Location: [facility] Cardiac Cath Lab;  Service: Cardiovascular;  Laterality: N/A;    CARDIAC ELECTROPHYSIOLOGY PROCEDURE N/A 10/30/2023    Procedure: ICD UPGRADE, DUAL TO BIV;  Surgeon: Kendra Hong MD;  Location: [place] [address] Cardiac Cath Lab;  Service: Electrophysiology;  Laterality: N/A;    CARDIOVERSION      TOTAL KNEE ARTHROPLASTY  2015    Total Knee Arthroplasty       Patient Sexual activity questions deferred to the physician.    Family History   Problem Relation Name Age of Onset    Heart attack Mother      Heart attack Father      Kidney failure Sister      Cancer Brother         Allergies    Allergen Reactions    Dofetilide Anaphylaxis    Aspirin Rash    Diltiazem Hcl Itching    Lisinopril Cough and Dry mouth    Phenytoin Hives and Rash       Prior to Admission medications    Medication Sig Start Date End Date Taking? Authorizing Provider   albuterol 90 mcg/actuation inhaler INAHEL 2 PUFFS EVERY 4 HOURS AS NEEDED FOR WHEEZING  Patient not taking: Reported on 8/15/2024 3/14/24   Minda Ricardo MD   allopurinol (Zyloprim) 300 mg tablet Take 1 tablet (300 mg) by mouth once daily.  Patient taking differently: Take 1 tablet (300 mg) by mouth once daily at bedtime. 12/19/23   Minda Ricardo MD   calcitriol (Rocaltrol) 0.25 mcg capsule TAKE 1 CAPSULE BY MOUTH EVERY DAY  Patient taking differently: Take 1 capsule (0.25 mcg) by mouth once daily at bedtime. 5/3/24   Safia Gerber MD   dapagliflozin propanediol (Farxiga) 10 mg Take 1 tablet (10 mg) by mouth once daily.  Patient taking differently: Take 1 tablet (10 mg) by mouth once daily at bedtime. 5/8/24 5/8/25  Safia Gerber MD   fluocinonide (Fluocinonide-E) 0.05 % cream Apply topically 2 times a day.  Patient not taking: Reported on 8/15/2024 6/21/24 6/21/25  Minda Ricardo MD   lidocaine (Xylocaine) 5 % ointment Apply 1 Application topically 3 times a day as needed for mild pain (1 - 3). 2/23/23   Historical Provider, MD   metoprolol succinate XL (Toprol-XL) 100 mg 24 hr tablet Take 1 tablet (100 mg) by mouth once daily.  Patient taking differently: Take 1 tablet (100 mg) by mouth once daily at bedtime. 4/17/24   Herberth Gandara MD   sacubitriL-valsartan (Entresto) 49-51 mg tablet Take 1 tablet by mouth 2 times a day. 4/17/24 4/17/25  Herberth Gandara MD   simvastatin (Zocor) 20 mg tablet Take 1 tablet (20 mg) by mouth once daily at bedtime. 4/17/24   Herberth Gandara MD   spironolactone (Aldactone) 25 mg tablet Take 1 tablet (25 mg) by mouth once daily.  Patient taking differently: Take 1 tablet (25 mg) by mouth once daily at  bedtime. 4/17/24   Herberth Gandara MD   torsemide (Demadex) 20 mg tablet Take 1 tablet (20 mg) by mouth see administration instructions. Please take 40mg on Monday, Wednesday, and Friday. Take 20mg other days of the week. 6/11/24 6/11/25  Ranulfo Licona MD MPH   traMADol (Ultram) 50 mg tablet Take 1 tablet (50 mg) by mouth every 6 hours if needed for moderate pain (4 - 6). 1/13/24   Historical Provider, MD   warfarin (Coumadin) 5 mg tablet TAKE 1.5 TABLET DAILY AS DIRECTED BY COUMADIN CLINIC  Patient taking differently: Take 1 tablet (5 mg) by mouth once daily. Takes at night 1/12/24   Herberth Gandara MD        PAT ROS     DASI Risk Score    No data to display       Caprini DVT Assessment    No data to display       Modified Frailty Index    No data to display       CHADS2 Stroke Risk  Current as of a minute ago        8.5% 3 to 100%: High Risk   2 to < 3%: Medium Risk   0 to < 2%: Low Risk     Last Change: N/A          This score determines the patient's risk of having a stroke if the patient has atrial fibrillation.          Points Metrics   1 Has Congestive Heart Failure:  Yes     Patients with congestive heart failure get 1 point.    Current as of a minute ago   1 Has Hypertension:  Yes     Patients with hypertension get 1 point.    Current as of a minute ago   0 Age:  69     Patients who are 75 years of age or older get 1 point.    Current as of a minute ago   0 Has Diabetes:  No     Patients with diabetes get 1 point.    Current as of a minute ago   2 Had Stroke:  Yes  Had TIA:  No  Had Thromboembolism:  No     Patients who have had a stroke, TIA, or thromboembolism get 2 points.    Current as of a minute ago             Revised Cardiac Risk Index    No data to display       Apfel Simplified Score    No data to display       Risk Analysis Index Results This Encounter    No data found in the last 1 encounters.         Nurse Plan of Action:     After Visit Summary (AVS) reviewed and patient verbalized good  understanding of medications and NPO instructions.

## 2024-08-15 NOTE — H&P (VIEW-ONLY)
CPM/PAT Evaluation       Name: Trinidad Hines (Trinidad Hines)  /Age: 1955/69 y.o.     SURGEON :DR HERO TAVAREZ   Surgery, Date, and Length:  Right Breast Partial Mastectomy; Re-Excision of Superior Margin 24    HPI:  This a 69 y.o. fe-male who presents for presurgical evaluation for  for above mentioned procedure . She is s/p partial mastectomy on 24. 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 TAVAREZ the patient elects to proceed with the planned procedure.       Past Medical History:   Diagnosis Date    Cardiology follow-up encounter     Herberth Gandara MD next apt 24    Cardiomyopathy (Multi)     CHF (congestive heart failure) (Multi)     Chronic kidney disease, stage 3 unspecified (Multi)     Chronic systolic heart failure (Multi)     HFrEF    Coronary artery disease     Dilated cardiomyopathy (Multi)     Encounter for pre-operative cardiovascular clearance     Gout     H/O echocardiogram 2024    Hyperlipidemia     Hyperparathyroidism (Multi)     Hypertension     Insomnia     Malignant neoplasm of lower-outer quadrant of right breast of female, estrogen receptor negative (Multi)     OA (osteoarthritis)     Obesity     Persistent atrial fibrillation (Multi)     Presence of automatic (implantable) cardiac defibrillator     Cardiac defibrillator in place    Rheumatic heart failure (Multi)     s/p aortic valve replacement     Sleep apnea     Stroke (Multi)     hemorrhagic stroke       Past Surgical History:   Procedure Laterality Date    AORTIC VALVE REPLACEMENT      redo in  with bioprosthetic aortic valve    CARDIAC CATHETERIZATION  2023    CARDIAC CATHETERIZATION N/A 2024    Procedure: Right Heart Cath;  Surgeon: Hieu Jack MD;  Location: [facility] Cardiac Cath Lab;  Service: Cardiovascular;  Laterality: N/A;    CARDIAC ELECTROPHYSIOLOGY PROCEDURE N/A 10/30/2023    Procedure: ICD UPGRADE, DUAL TO BIV;  Surgeon: Kendra GILL  MD Gely;  Location: [place] [address] Cardiac Cath Lab;  Service: Electrophysiology;  Laterality: N/A;    CARDIOVERSION      TOTAL KNEE ARTHROPLASTY  2015    Total Knee Arthroplasty   Anesthesia History  Pt denies any past history of anesthetic complications such as PONV, awareness, prolonged sedation, dental damage, aspiration, cardiac arrest, difficult intubation, difficult I.V. access or unexpected hospital admissions.  NO malignant hyperthermia and or pseudo cholinesterase deficiency.    The patient is not  a [employer] and will accept blood and blood products if medically indicated.   History of blood transfusions .Type and screen not sent.     Social History  Social History     Substance and Sexual Activity   Drug Use Never      Social History     Substance and Sexual Activity   Alcohol Use Never      Social History     Tobacco Use   Smoking Status Former    Current packs/day: 0.00    Average packs/day: 1 pack/day for 20.0 years (20.0 ttl pk-yrs)    Types: Cigarettes    Start date:     Quit date:     Years since quittin.6   Smokeless Tobacco Never          Family History   Problem Relation Name Age of Onset    Heart attack Mother      Heart attack Father      Kidney failure Sister      Cancer Brother         Allergies   Allergen Reactions    Dofetilide Anaphylaxis    Aspirin Rash    Diltiazem Hcl Itching    Lisinopril Cough and Dry mouth    Phenytoin Hives and Rash       Prior to Admission medications    Medication Sig Start Date End Date Taking? Authorizing Provider   albuterol 90 mcg/actuation inhaler INAHEL 2 PUFFS EVERY 4 HOURS AS NEEDED FOR WHEEZING 3/14/24   Minda Ricardo MD   allopurinol (Zyloprim) 300 mg tablet Take 1 tablet (300 mg) by mouth once daily.  Patient taking differently: Take 1 tablet (300 mg) by mouth once daily at bedtime. 23   Minda Ricardo MD   calcitriol (Rocaltrol) 0.25 mcg capsule TAKE 1 CAPSULE BY MOUTH EVERY DAY  Patient taking  differently: Take 1 capsule (0.25 mcg) by mouth once daily at bedtime. 5/3/24   Safia Gerber MD   dapagliflozin propanediol (Farxiga) 10 mg Take 1 tablet (10 mg) by mouth once daily.  Patient taking differently: Take 1 tablet (10 mg) by mouth once daily at bedtime. 5/8/24 5/8/25  Safia Gerber MD   fluocinonide (Fluocinonide-E) 0.05 % cream Apply topically 2 times a day. 6/21/24 6/21/25  Minda Ricardo MD   lidocaine (Xylocaine) 5 % ointment Apply 1 Application topically 3 times a day as needed for mild pain (1 - 3). 2/23/23   Historical Provider, MD   metoprolol succinate XL (Toprol-XL) 100 mg 24 hr tablet Take 1 tablet (100 mg) by mouth once daily.  Patient taking differently: Take 1 tablet (100 mg) by mouth once daily at bedtime. 4/17/24   Herberth Gandara MD   sacubitriL-valsartan (Entresto) 49-51 mg tablet Take 1 tablet by mouth 2 times a day. 4/17/24 4/17/25  Herberth Gandara MD   simvastatin (Zocor) 20 mg tablet Take 1 tablet (20 mg) by mouth once daily at bedtime. 4/17/24   Herberth Gandara MD   spironolactone (Aldactone) 25 mg tablet Take 1 tablet (25 mg) by mouth once daily.  Patient taking differently: Take 1 tablet (25 mg) by mouth once daily at bedtime. 4/17/24   Herberth Gandara MD   torsemide (Demadex) 20 mg tablet Take 1 tablet (20 mg) by mouth see administration instructions. Please take 40mg on Monday, Wednesday, and Friday. Take 20mg other days of the week. 6/11/24 6/11/25  Ranulfo Licona MD MPH   traMADol (Ultram) 50 mg tablet Take 1 tablet (50 mg) by mouth every 6 hours if needed for moderate pain (4 - 6). 1/13/24   Historical Provider, MD   warfarin (Coumadin) 5 mg tablet TAKE 1.5 TABLET DAILY AS DIRECTED BY COUMADIN CLINIC  Patient taking differently: Take 1 tablet (5 mg) by mouth once daily. Takes at night 1/12/24   Herberth Gandara MD        PAT ROS:   Constitutional:   neg    Neuro/Psych:   Eyes:   Ears:   Nose:    nasal discharge  Mouth:   neg    Throat:   neg    Neck:    neck  "pain  Cardio:   neg    Respiratory:   Endocrine:   GI:   neg    :   neg    Musculoskeletal:    arthralgias (KNEES)   decreased ROM  Hematologic:   neg    Skin:  neg        Physical Exam  Vitals reviewed.   Constitutional:       Appearance: She is obese.   HENT:      Head: Normocephalic.      Mouth/Throat:      Mouth: Mucous membranes are moist.   Eyes:      Extraocular Movements: Extraocular movements intact.      Pupils: Pupils are equal, round, and reactive to light.   Cardiovascular:      Rate and Rhythm: Normal rate.      Pulses: Normal pulses.      Heart sounds: Normal heart sounds.   Pulmonary:      Effort: Pulmonary effort is normal.      Breath sounds: Normal breath sounds.   Musculoskeletal:         General: Tenderness (b/l knees) present.      Cervical back: Normal range of motion.   Skin:     General: Skin is warm and dry.   Neurological:      Mental Status: She is alert and oriented to person, place, and time.   Psychiatric:         Behavior: Behavior normal.          PAT AIRWAY:   Airway:     Mallampati::  II   Edentulous     /65   Pulse 79   Temp 36.1 °C (97 °F)   Resp 16   Ht 1.626 m (5' 4\")   Wt 118 kg (261 lb 0.4 oz)   LMP  (LMP Unknown)   SpO2 95%   BMI 44.80 kg/m²   EKG IN EPIC  Lab Results   Component Value Date    WBC 3.4 (L) 08/13/2024    HGB 13.8 08/13/2024    HCT 43.3 08/13/2024    MCV 93 08/13/2024     (L) 08/13/2024     2 d ago  (8/13/24) 2 mo ago  (6/3/24)         Glucose  74 - 99 mg/dL 97         Sodium  136 - 145 mmol/L 142         Potassium  3.5 - 5.3 mmol/L 4.3         Chloride  98 - 107 mmol/L 102         Bicarbonate  21 - 32 mmol/L 30         Anion Gap  10 - 20 mmol/L 14         Urea Nitrogen  6 - 23 mg/dL 30 High          Creatinine  0.50 - 1.05 mg/dL 2.02 High          eGFR  >60 mL/min/1.73m*2 26 Low             Calcium  8.6 - 10.6 mg/dL 10.1         Phosphorus  2.5 - 4.9 mg/dL 3.7           Lab Results   Component Value Date    INR 2.2 (H) 08/15/2024    INR " 2.40 08/13/2024    INR 2.60 07/31/2024    PROTIME 24.9 (H) 08/15/2024    PROTIME 25.3 (H) 06/03/2024    PROTIME 30.3 (H) 03/20/2023       ASSESSMENT/PLAN    Patient is a 69 year-old  scheduled for Right Breast Partial Mastectomy; Re-Excision of Superior Margin  with Dr. Sosa  on  8/28/24 .  CARDIOVASCULAR:  RCRI score / Risk: The patients score is 2 based on history . Per ACC/AHA guidelines this places her  at  6.0% risk for MACE undergoing a low  risk procedure . The patient has the following risk factors:HF , remote CVA   Functional Capacity: The patients exercise tolerance is  3  METS. This is based on the patient's pain in her knees, uses a walker  Patient denies  active cardiac symptoms or anginal equivalents .    CHF:  The patient has CHF which began several years ago.  And is currently stable  based on history of normal cardiac exam today  today's EKG.Recommendatiosn: Monitor I&O, avoid hypervolemia, diuretics as needed .    AFIB/AVR/ANTICOAGULATION :  Patient was instructed to stop Coumadin , 5 days before surgery .  Pt to return  for repeat INR on 8/27/24.  REPEAT PT/INR ON 8/27/24 IS 1.30   Resume anticoagulation therapy as soon as appropriate.    CHADSVASC =8.5%    PPM/ICD:  The patient has a pacemaker/ICD,implanted on 10/30/23.placed for dilated cardiomyopathy .Interrogation has been requested .    Based on history of prosthetic valve, and heart failure .We are asking for cardiac consultation for risk assessment and recommendations regarding cardiac optimization.  Pt has an appointment with Dr Gandara on 8/19/24.      PULMONARY:  The patient has the following factors that place them at increased risk of perioperative pulmonary complications;age greater than 65/BMI greater than 27/decreased  functional status/QI/greater than 1.5 hour procedure.  Postoperatively the patient would benefit from early pulmonary toilet/incentive spirometry q 1-2 hours while awake/pulse oximetry/cautious use of respiratory  depressant medications such as opioids/elevate the HOB/oral hygiene.      DVT:  CAPRINI SCORE=16  The patient has the following factors that increase her  Risk for thrombus formation ; age>70, bmi>40, malignancy , strole , Virchow's triad , , Surgical procedure >2 hrs  procedure .    Recommendations: DVT prophylaxis  per Dr. Sosa protocol . SCD's, DOMITILA's, and early ambulation are recommended. Heparin or LMWH is recommended for the very high risk .    RECEIVED CARDIAC CLEARANCE FROM DR. CASANOVA WHO STATES YOUR CARDIOVASCULAR RISK FOR YOUR ANTICIPATED BREAST SURGERY IS ACCEPTABLE PLEASE STOP YOUR FARXIGA 3 DAYS PRIOR. PLEASE STOP WARFARIN 5 DAYS PRIOR TO SURGERY.     Risk assessment complete.  Patient is scheduled for  LOW  surgical risk procedure.  Patient is considered an increased risk to proceed with the planned procedure.      Preoperative medication instructions were provided and reviewed with the patient.  Any additional testing or evaluation was explained to the patient.  Nothing by mouth instructions were discussed and patient's questions were answered prior to conclusion to this encounter.  Patient verbalized understanding of preoperative instructions given in preadmission testing; discharge instructions available in EMR.

## 2024-08-15 NOTE — CPM/PAT NURSE NOTE
CPM/PAT Nurse Note      Name: Trinidad Hines (Trinidad Hines)  /Age: 1955/69 y.o.       Past Medical History:   Diagnosis Date    Cardiology follow-up encounter     Herberth Gandara MD next apt 24    Cardiomyopathy (Multi)     CHF (congestive heart failure) (Multi)     Chronic kidney disease, stage 3 unspecified (Multi)     Chronic systolic heart failure (Multi)     HFrEF    Coronary artery disease     Dilated cardiomyopathy (Multi)     Encounter for pre-operative cardiovascular clearance     Gout     H/O echocardiogram 2024    Hyperlipidemia     Hyperparathyroidism (Multi)     Hypertension     Insomnia     Malignant neoplasm of lower-outer quadrant of right breast of female, estrogen receptor negative (Multi)     OA (osteoarthritis)     Obesity     Persistent atrial fibrillation (Multi)     Presence of automatic (implantable) cardiac defibrillator     Cardiac defibrillator in place    Rheumatic heart failure (Multi)     s/p aortic valve replacement     Sleep apnea     Stroke (Multi)     hemorrhagic stroke       Past Surgical History:   Procedure Laterality Date    AORTIC VALVE REPLACEMENT      redo in  with bioprosthetic aortic valve    CARDIAC CATHETERIZATION  2023    CARDIAC CATHETERIZATION N/A 2024    Procedure: Right Heart Cath;  Surgeon: Hieu Jack MD;  Location: [facility] Cardiac Cath Lab;  Service: Cardiovascular;  Laterality: N/A;    CARDIAC ELECTROPHYSIOLOGY PROCEDURE N/A 10/30/2023    Procedure: ICD UPGRADE, DUAL TO BIV;  Surgeon: Kendra Hong MD;  Location: [place] [address] Cardiac Cath Lab;  Service: Electrophysiology;  Laterality: N/A;    CARDIOVERSION      TOTAL KNEE ARTHROPLASTY  2015    Total Knee Arthroplasty       Patient Sexual activity questions deferred to the physician.    Family History   Problem Relation Name Age of Onset    Heart attack Mother      Heart attack Father      Kidney failure Sister      Cancer Brother         Allergies    Allergen Reactions    Dofetilide Anaphylaxis    Aspirin Rash    Diltiazem Hcl Itching    Lisinopril Cough and Dry mouth    Phenytoin Hives and Rash       Prior to Admission medications    Medication Sig Start Date End Date Taking? Authorizing Provider   albuterol 90 mcg/actuation inhaler INAHEL 2 PUFFS EVERY 4 HOURS AS NEEDED FOR WHEEZING  Patient not taking: Reported on 8/15/2024 3/14/24   Minda Ricardo MD   allopurinol (Zyloprim) 300 mg tablet Take 1 tablet (300 mg) by mouth once daily.  Patient taking differently: Take 1 tablet (300 mg) by mouth once daily at bedtime. 12/19/23   Minda Ricardo MD   calcitriol (Rocaltrol) 0.25 mcg capsule TAKE 1 CAPSULE BY MOUTH EVERY DAY  Patient taking differently: Take 1 capsule (0.25 mcg) by mouth once daily at bedtime. 5/3/24   Safia Gerber MD   dapagliflozin propanediol (Farxiga) 10 mg Take 1 tablet (10 mg) by mouth once daily.  Patient taking differently: Take 1 tablet (10 mg) by mouth once daily at bedtime. 5/8/24 5/8/25  Safia Gerber MD   fluocinonide (Fluocinonide-E) 0.05 % cream Apply topically 2 times a day.  Patient not taking: Reported on 8/15/2024 6/21/24 6/21/25  Minda Ricardo MD   lidocaine (Xylocaine) 5 % ointment Apply 1 Application topically 3 times a day as needed for mild pain (1 - 3). 2/23/23   Historical Provider, MD   metoprolol succinate XL (Toprol-XL) 100 mg 24 hr tablet Take 1 tablet (100 mg) by mouth once daily.  Patient taking differently: Take 1 tablet (100 mg) by mouth once daily at bedtime. 4/17/24   Herberth Gandara MD   sacubitriL-valsartan (Entresto) 49-51 mg tablet Take 1 tablet by mouth 2 times a day. 4/17/24 4/17/25  Herberth Gandara MD   simvastatin (Zocor) 20 mg tablet Take 1 tablet (20 mg) by mouth once daily at bedtime. 4/17/24   Herberth Gandara MD   spironolactone (Aldactone) 25 mg tablet Take 1 tablet (25 mg) by mouth once daily.  Patient taking differently: Take 1 tablet (25 mg) by mouth once daily at  bedtime. 4/17/24   Herberth Gandara MD   torsemide (Demadex) 20 mg tablet Take 1 tablet (20 mg) by mouth see administration instructions. Please take 40mg on Monday, Wednesday, and Friday. Take 20mg other days of the week. 6/11/24 6/11/25  Ranulfo Licona MD MPH   traMADol (Ultram) 50 mg tablet Take 1 tablet (50 mg) by mouth every 6 hours if needed for moderate pain (4 - 6). 1/13/24   Historical Provider, MD   warfarin (Coumadin) 5 mg tablet TAKE 1.5 TABLET DAILY AS DIRECTED BY COUMADIN CLINIC  Patient taking differently: Take 1 tablet (5 mg) by mouth once daily. Takes at night 1/12/24   Herberth Gandara MD        PAT ROS     DASI Risk Score    No data to display       Caprini DVT Assessment    No data to display       Modified Frailty Index    No data to display       CHADS2 Stroke Risk  Current as of 7 hours ago        8.5% 3 to 100%: High Risk   2 to < 3%: Medium Risk   0 to < 2%: Low Risk     Last Change: N/A          This score determines the patient's risk of having a stroke if the patient has atrial fibrillation.          Points Metrics   1 Has Congestive Heart Failure:  Yes     Patients with congestive heart failure get 1 point.    Current as of 7 hours ago   1 Has Hypertension:  Yes     Patients with hypertension get 1 point.    Current as of 7 hours ago   0 Age:  69     Patients who are 75 years of age or older get 1 point.    Current as of 7 hours ago   0 Has Diabetes:  No     Patients with diabetes get 1 point.    Current as of 7 hours ago   2 Had Stroke:  Yes  Had TIA:  No  Had Thromboembolism:  No     Patients who have had a stroke, TIA, or thromboembolism get 2 points.    Current as of 7 hours ago             Revised Cardiac Risk Index    No data to display       Apfel Simplified Score    No data to display       Risk Analysis Index Results This Encounter    No data found in the last 1 encounters.         Nurse Plan of Action:     After Visit Summary (AVS) reviewed and patient verbalized good  understanding of medications and NPO instructions.

## 2024-08-16 LAB
ATRIAL RATE: 78 BPM
P OFFSET: 217 MS
P ONSET: 179 MS
PR INTERVAL: 80 MS
Q ONSET: 219 MS
QRS COUNT: 13 BEATS
QRS DURATION: 106 MS
QT INTERVAL: 430 MS
QTC CALCULATION(BAZETT): 490 MS
QTC FREDERICIA: 469 MS
R AXIS: -54 DEGREES
T AXIS: 33 DEGREES
T OFFSET: 434 MS
VENTRICULAR RATE: 78 BPM

## 2024-08-19 ENCOUNTER — OFFICE VISIT (OUTPATIENT)
Dept: CARDIOLOGY | Facility: CLINIC | Age: 69
End: 2024-08-19
Payer: MEDICAID

## 2024-08-19 VITALS
DIASTOLIC BLOOD PRESSURE: 63 MMHG | SYSTOLIC BLOOD PRESSURE: 97 MMHG | BODY MASS INDEX: 43.9 KG/M2 | WEIGHT: 263.5 LBS | HEIGHT: 65 IN | OXYGEN SATURATION: 96 % | HEART RATE: 76 BPM

## 2024-08-19 DIAGNOSIS — I42.0 DILATED CARDIOMYOPATHY (MULTI): Primary | ICD-10-CM

## 2024-08-19 PROCEDURE — 1126F AMNT PAIN NOTED NONE PRSNT: CPT | Performed by: INTERNAL MEDICINE

## 2024-08-19 PROCEDURE — 99215 OFFICE O/P EST HI 40 MIN: CPT | Performed by: INTERNAL MEDICINE

## 2024-08-19 PROCEDURE — 1036F TOBACCO NON-USER: CPT | Performed by: INTERNAL MEDICINE

## 2024-08-19 PROCEDURE — 3078F DIAST BP <80 MM HG: CPT | Performed by: INTERNAL MEDICINE

## 2024-08-19 PROCEDURE — 3074F SYST BP LT 130 MM HG: CPT | Performed by: INTERNAL MEDICINE

## 2024-08-19 PROCEDURE — 3008F BODY MASS INDEX DOCD: CPT | Performed by: INTERNAL MEDICINE

## 2024-08-19 RX ORDER — DAPAGLIFLOZIN 5 MG/1
10 TABLET, FILM COATED ORAL EVERY 24 HOURS
Qty: 180 TABLET | Refills: 3 | Status: SHIPPED | OUTPATIENT
Start: 2024-08-19 | End: 2024-08-19

## 2024-08-19 ASSESSMENT — PATIENT HEALTH QUESTIONNAIRE - PHQ9
2. FEELING DOWN, DEPRESSED OR HOPELESS: NOT AT ALL
1. LITTLE INTEREST OR PLEASURE IN DOING THINGS: NOT AT ALL
SUM OF ALL RESPONSES TO PHQ9 QUESTIONS 1 AND 2: 0

## 2024-08-19 ASSESSMENT — ENCOUNTER SYMPTOMS
DEPRESSION: 0
OCCASIONAL FEELINGS OF UNSTEADINESS: 1
LOSS OF SENSATION IN FEET: 0

## 2024-08-19 ASSESSMENT — PAIN SCALES - GENERAL: PAINLEVEL: 0-NO PAIN

## 2024-08-19 NOTE — PATIENT INSTRUCTIONS
Thank you for coming to your cardiology visit today.  Your cardiovascular risk for your anticipated breast surgery is acceptable.  Please stop your Farxiga 3 days before the surgery.  Your last dose should be Sunday the 25th.  Please stop your warfarin 5 days before surgery your last dose being Friday the 23rd.  Please stay on your other medications and resume both your Farxiga and warfarin the night after the surgery.  Please make a 3-month appointment

## 2024-08-19 NOTE — PROGRESS NOTES
Primary Care Physician: Minda Ricardo MD  Date of Visit: 08/19/2024  9:00 AM EDT  [place] [facility] [place] [address]     Chief Complaint:   Chief Complaint   Patient presents with    Follow-up        HPI / Summary:   Trinidad Hines is a 69 y.o. female presents for followup.   Here for perioperative risk cardiac assessment    March 4 echocardiogram 30% EF reduced RV function mild to moderate TR, bioprosthetic aortic valve normal gradient no periprosthetic regurgitation or prosthetic regurgitation mean aortic valve gradient of 4 mmHg mild MR estimated RVSP of 40.  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    Is  going for second right breast. Stable otero,  Weight stable.      Specialty Problems    None       Past Medical History:   Diagnosis Date    Cardiology follow-up encounter     Herberth Gandara MD next apt 8/19/24    Cardiomyopathy (Multi)     CHF (congestive heart failure) (Multi)     Chronic kidney disease, stage 3 unspecified (Multi)     Chronic systolic heart failure (Multi)     HFrEF    Coronary artery disease     Dilated cardiomyopathy (Multi)     Encounter for pre-operative cardiovascular clearance     Gout     H/O echocardiogram 02/23/2024    Hyperlipidemia     Hyperparathyroidism (Multi)     Hypertension     Insomnia     Malignant neoplasm of lower-outer quadrant of right breast of female, estrogen receptor negative (Multi)     OA (osteoarthritis)     Obesity     Persistent atrial fibrillation (Multi)      Presence of automatic (implantable) cardiac defibrillator     Cardiac defibrillator in place    Rheumatic heart failure (Multi)     s/p aortic valve replacement 1986    Sleep apnea     Stroke (Multi)     hemorrhagic stroke          Past Surgical History:   Procedure Laterality Date    AORTIC VALVE REPLACEMENT  1986    redo in 2007 with bioprosthetic aortic valve    CARDIAC CATHETERIZATION  05/24/2023    CARDIAC CATHETERIZATION N/A 6/6/2024    Procedure: Right Heart Cath;  Surgeon: Hieu Jack MD;  Location: [facility] Cardiac Cath Lab;  Service: Cardiovascular;  Laterality: N/A;    CARDIAC ELECTROPHYSIOLOGY PROCEDURE N/A 10/30/2023    Procedure: ICD UPGRADE, DUAL TO BIV;  Surgeon: Kendra Hong MD;  Location: [place] [address] Cardiac Cath Lab;  Service: Electrophysiology;  Laterality: N/A;    CARDIOVERSION  2015    TOTAL KNEE ARTHROPLASTY  04/29/2015    Total Knee Arthroplasty          Social History:   reports that she quit smoking about 30 years ago. Her smoking use included cigarettes. She started smoking about 50 years ago. She has a 20 pack-year smoking history. She has never used smokeless tobacco. She reports that she does not drink alcohol and does not use drugs.      Allergies:  Allergies   Allergen Reactions    Dofetilide Anaphylaxis    Aspirin Rash    Diltiazem Hcl Itching    Lisinopril Cough and Dry mouth    Phenytoin Hives and Rash       Outpatient Medications:  Current Outpatient Medications   Medication Instructions    albuterol 90 mcg/actuation inhaler INAHEL 2 PUFFS EVERY 4 HOURS AS NEEDED FOR WHEEZING    allopurinol (ZYLOPRIM) 300 mg, oral, Daily    calcitriol (ROCALTROL) 0.25 mcg, oral, Daily    dapagliflozin propanediol (FARXIGA) 10 mg, oral, Daily    fluocinonide (Fluocinonide-E) 0.05 % cream Topical, 2 times daily    lidocaine (Xylocaine) 5 % ointment 1 Application, Topical, 3 times daily PRN    metoprolol succinate XL (TOPROL-XL) 100 mg, oral, Daily    sacubitriL-valsartan (Entresto) 49-51  "mg tablet 1 tablet, oral, 2 times daily    simvastatin (ZOCOR) 20 mg, oral, Nightly    spironolactone (ALDACTONE) 25 mg, oral, Daily    torsemide (DEMADEX) 20 mg, oral, See admin instructions, Please take 40mg on Monday, Wednesday, and Friday. Take 20mg other days of the week.    traMADol (ULTRAM) 50 mg, oral, Every 6 hours PRN    warfarin (Coumadin) 5 mg tablet TAKE 1.5 TABLET DAILY AS DIRECTED BY COUMADIN CLINIC       ROS     Physical Exam:  Vitals:    08/19/24 0901   BP: 97/63   BP Location: Right arm   Patient Position: Sitting   BP Cuff Size: Large adult   Pulse: 76   SpO2: 96%   Weight: 120 kg (263 lb 8 oz)   Height: 1.651 m (5' 5\")     Wt Readings from Last 5 Encounters:   08/19/24 120 kg (263 lb 8 oz)   08/15/24 118 kg (261 lb 0.4 oz)   08/13/24 118 kg (259 lb 9.6 oz)   07/22/24 118 kg (259 lb 7.7 oz)   07/22/24 118 kg (259 lb 7.7 oz)     Body mass index is 43.85 kg/m².     Mean and V waves estimated JVP of about 12 cm.  Irregular rhythm distant heart sounds.  Clear lungs.  Large pannus no palpable masses.  3+ pitting edema to the knees bilaterally  Last Labs:  CMP:  Recent Labs     08/13/24  1045 06/03/24  1020 05/03/24  0924 02/19/24  1255 12/31/23  1626 10/12/23  1150    140 147*  --  140 143   K 4.3 4.3 4.7  --  3.7 4.6    104 107  --  108* 104   CO2 30 27 30  --  23 29   ANIONGAP 14 13 15  --  13 15   BUN 30* 26* 27*  --  25* 27*   CREATININE 2.02* 2.05* 2.64* 2.14* 1.83* 1.77*   EGFR 26* 26* 19* 25* 30* 31*   GLUCOSE 97 93 96  --  87 106*     Recent Labs     08/13/24  1045 06/03/24  1020 05/03/24  0924 12/31/23  1626 10/12/23  1116 03/20/23  0346 12/24/22  2045 11/02/22  1321 09/05/22  2132 01/19/22  1223 03/05/21  2221 11/08/19  1501 08/06/19  1111   ALBUMIN 4.3 4.2 4.1 3.9 4.2 4.2   < > 4.1 3.4   < > 3.8   < > 4.2   ALKPHOS  --   --  59 45  --  45  --  55 40   < > 72   < > 78   ALT  --   --  8 8  --  10  --  11 9   < > 62*   < > 14   AST  --   --  11 15  --  20  --  11 11   < > 51*   < > " 23   BILITOT  --   --  0.8 1.6*  --  1.2  --  1.0 0.8   < > 0.7   < > 1.6*   LIPASE  --   --   --   --   --   --   --   --   --   --  77  --  15    < > = values in this interval not displayed.     CBC:  Recent Labs     08/13/24  1045 06/03/24  1020 05/03/24  0924 12/31/23  1626 10/12/23  1150   WBC 3.4* 2.8* 3.9* 4.5 3.9*   HGB 13.8 13.2 12.9 12.0 13.6   HCT 43.3 41.4 39.9 35.2* 42.7   * 129* 169 95* 159   MCV 93 93 93 87 93     COAG:   Recent Labs     08/15/24  1023 08/13/24  0000 07/31/24  1056 07/23/24  1006 07/13/24  1003   INR 2.2* 2.40 2.60 2.90 3.20     HEME/ENDO:  Recent Labs     05/03/24  0924 11/02/22  1321 01/19/22  1223 10/25/20  0746 03/25/20  0708   TSH 4.92* 4.17* 2.72  --  4.63*   HGBA1C 5.8* 5.7*  --  5.6  --       CARDIAC:   Recent Labs     03/20/23  1919 03/20/23  0545 03/20/23  0346 09/05/22  2214 09/05/22  2133 07/20/21  1039 02/25/21  1159 10/25/20  0746 10/24/20  1435   TROPHS CANCELED  CANCELED 12 13 11 13  --   --   --   --    BNP  --   --  380*  --   --  223* 181* 381* 209*     Recent Labs     05/03/24  0924 01/19/22  1223 03/14/19  1228   CHOL 158 187 146   LDLF  --  106* 77   HDL 62.8 68.3 55.3   TRIG 109 62 68       Last Cardiology Tests:  ECG:      Echo:  Echo Results:  Transthoracic Echo (TTE) Complete 03/04/2024    [facility] [place] [address]  Tel [phone] and Fax [phone]    TRANSTHORACIC ECHOCARDIOGRAM REPORT      Patient Name:      MALIK Martinez Physician:    [id number] Ulysses Alarcon MD  Study Date:        3/4/2024             Ordering Provider:    [id number] ERIKA TOPETE  MRN/PID:           [id number]             Fellow:  Accession#:        [id number]         Nurse:  Date of Birth/Age: 1955 / 68 years Sonographer:          [facility]  Gender:            F                    Additional Staff:  Height:            165.10 cm            Admit Date:  Weight:            117.93 kg             Admission Status:     Outpatient  BSA / BMI:         2.21 m2 / 43.27      Encounter#:           [id number]  kg/m2  Department Location:  [facility]  Echo Lab  Blood Pressure: 108 /73 mmHg    Study Type:    TRANSTHORACIC ECHO (TTE) COMPLETE  Diagnosis/ICD: Chronic systolic (congestive) heart failure (CHF)-I50.22  Indication:    Congestive Heart Failure  CPT Code:      Echo Complete w Full Doppler-15440    Patient History:  BMI:               Obese >30  Pertinent History: A-Fib, Cardiomyopathy, HTN, CHF, CVA and Palpitations. AVR  1986 and 2007, #21 Bhaork-Shiley,CKD,QI,Rheurmatic  fever,Cardioversion.    Study Detail: The following Echo studies were performed: 2D, M-Mode, Doppler and  color flow. Technically challenging study due to body habitus.  Patient has a defibrillator.      PHYSICIAN INTERPRETATION:  Left Ventricle: The left ventricular systolic function is moderately decreased, with an estimated ejection fraction of 30-35%. The patient is in atrial fibrillation which may influence the estimate of left ventricular function and transvalvular flows. There is global hypokinesis of the left ventricle with minor regional variations. The left ventricular cavity size is mildly dilated. The left ventricular septal wall thickness is mildly increased. There is mild eccentric left ventricular hypertrophy. Abnormal (paradoxical) septal motion consistent with post-operative status. Left ventricular diastolic filling was indeterminate.  Left Atrium: The left atrium is mild to moderately dilated.  Right Ventricle: The right ventricle is slightly enlarged. There is mildly reduced right ventricular systolic function. A device is visualized in the right ventricle.  Right Atrium: The right atrium is moderately dilated.  Aortic Valve: There is a prosthetic aortic valve present. The aortic valve dimensionless index is 0.59. There is a Fernie-Shiley bioprosthetic aortic valve, with a 21 mm reported size. There is no  aditi-prosthetic aortic valve regurgitation. Echo findings are consistent with normal aortic valve prosthesis structure and function. There is no evidence of aortic valve regurgitation. The peak instantaneous gradient of the aortic valve is 7.6 mmHg. The mean gradient of the aortic valve is 3.9 mmHg.  Mitral Valve: The mitral valve is mildly thickened. There is mild mitral valve regurgitation.  Tricuspid Valve: The tricuspid valve is structurally normal. There is mild to moderate tricuspid regurgitation. The Doppler estimated RVSP is mildly elevated at 39.9 mmHg.  Pulmonic Valve: The pulmonic valve is not well visualized. The pulmonic valve regurgitation was not well visualized.  Pericardium: There is a trivial pericardial effusion.  Aorta: The aortic root is normal.  Systemic Veins: The inferior vena cava appears dilated. There is less than 50% IVC collapse with inspiration.  In comparison to the previous echocardiogram(s): Compared with study from 8/28/2023, no significant change.      CONCLUSIONS:  1. Left ventricular systolic function is moderately decreased with a 30-35% estimated ejection fraction.  2. Abnormal septal motion consistent with post-operative status.  3. There is mild eccentric left ventricular hypertrophy.  4. There is mildly reduced right ventricular systolic function.  5. The left atrium is mild to moderately dilated.  6. The right atrium is moderately dilated.  7. Mild to moderate tricuspid regurgitation visualized.  8. Mildly elevated RVSP.  9. There is a bioprosthetic aortic valve.  10. Patient refused Definity.  11. The patient is in atrial fibrillation which may influence the estimate of left ventricular function and transvalvular flows.  12. There is global hypokinesis of the left ventricle with minor regional variations.    QUANTITATIVE DATA SUMMARY:  2D MEASUREMENTS:  Normal Ranges:  IVSd:          1.25 cm    (0.6-1.1cm)  LVPWd:         0.91 cm    (0.6-1.1cm)  LVIDd:         5.47 cm     (3.9-5.9cm)  LVIDs:         4.68 cm  LV Mass Index: 105.7 g/m2  LV % FS        14.4 %    LA VOLUME:  Normal Ranges:  LA Vol A4C:        85.8 ml    (22+/-6mL/m2)  LA Vol A2C:        91.2 ml  LA Vol BP:         91.2 ml  LA Vol Index A4C:  38.8 ml/m2  LA Vol Index A2C:  41.2 ml/m2  LA Vol Index BP:   41.2 ml/m2  LA Area A4C:       26.0 cm2  LA Area A2C:       26.0 cm2  LA Major Axis A4C: 6.7 cm  LA Major Axis A2C: 6.3 cm  LA Volume Index:   40.0 ml/m2  LA Vol A4C:        76.4 ml  LA Vol A2C:        84.1 ml    RA VOLUME BY A/L METHOD:  Normal Ranges:  RA Vol A4C:        104.8 ml   (8.3-19.5ml)  RA Vol Index A4C:  47.4 ml/m2  RA Area A4C:       30.0 cm2  RA Major Axis A4C: 7.3 cm    M-MODE MEASUREMENTS:  Normal Ranges:  Ao Root: 2.90 cm (2.0-3.7cm)  LAs:     6.30 cm (2.7-4.0cm)    AORTA MEASUREMENTS:  Normal Ranges:  Ao Arch: 2.90 cm (2.0-3.6cm)    LV SYSTOLIC FUNCTION BY 2D PLANIMETRY (MOD):  Normal Ranges:  EF-A4C View: 31.5 % (>=55%)  EF-A2C View: 35.1 %  EF-Biplane:  33.6 %    LV DIASTOLIC FUNCTION:  Normal Ranges:  MV Peak E:    1.05 m/s    (0.7-1.2 m/s)  MV Peak A:    0.29 m/s    (0.42-0.7 m/s)  E/A Ratio:    3.69        (1.0-2.2)  MV e'         0.10 m/s    (>8.0)  MV lateral e' 0.10 m/s  MV medial e'  0.11 m/s  MV A Dur:     112.27 msec  E/e' Ratio:   10.04       (<8.0)    MITRAL VALVE:  Normal Ranges:  MV DT: 133 msec (150-240msec)    AORTIC VALVE:  Normal Ranges:  AoV Vmax:                1.38 m/s (<=1.7m/s)  AoV Peak P.6 mmHg (<20mmHg)  AoV Mean PG:             3.9 mmHg (1.7-11.5mmHg)  LVOT Max Wing:            0.86 m/s (<=1.1m/s)  AoV VTI:                 25.70 cm (18-25cm)  LVOT VTI:                15.19 cm  LVOT Diameter:           2.11 cm  (1.8-2.4cm)  AoV Area, VTI:           2.07 cm2 (2.5-5.5cm2)  AoV Area,Vmax:           2.20 cm2 (2.5-4.5cm2)  AoV Dimensionless Index: 0.59      RIGHT VENTRICLE:  RV Basal 4.20 cm  RV Mid   2.50 cm  RV Major 6.9 cm  TAPSE:   15.9 mm  RV s'    0.09  m/s    TRICUSPID VALVE/RVSP:  Normal Ranges:  Peak TR Velocity: 2.50 m/s  RV Syst Pressure: 39.9 mmHg (< 30mmHg)  IVC Diam:         2.90 cm    PULMONIC VALVE:  Normal Ranges:  PV Accel Time: 94 msec  (>120ms)  PV Max Wing:    0.8 m/s  (0.6-0.9m/s)  PV Max P.5 mmHg      [id number] Ulysses Alarcon MD  Electronically signed on 3/4/2024 at 11:06:08 AM        ** Final **       Cath:      Stress Test:  Stress Results:  No results found for this or any previous visit from the past 365 days.         Cardiac Imaging:  ECG 12 Lead  Electronic ventricular pacemaker  When compared with ECG of 24-MAY-2024 09:10,  Vent. rate has decreased BY   9 BPM  Confirmed by Nathanael Sanon ([id number]) on 2024 7:35:50 AM        Assessment/Plan     Patient specific risk given her multiple comorbidities of dilated cardiomyopathy, heart failure with reduced EF, CKD 4, atrial arrhythmia, bioprosthetic AVR is moderately elevated however her surgical specific risk is low.  Her weight is stable and seems to be on a reasonable regimen.  I would estimate her cardiovascular risk is therefore being acceptable recommending warfarin being held for 5 days without bridge.  Farxiga to be held 3 days before surgery both should be reinstituted the evening after surgery thank you for allowing me to participate in the care of see her back in 3 months      Orders:  No orders of the defined types were placed in this encounter.     Followup Appts:  Future Appointments   Date Time Provider Department Center   [facility] 10[id number] [facility] [id number] [facility]   2024  3:00 PM Mary Sosa MD [id number] [facility]   2024 10:00 AM NUVIA Licea [id number] Academic   2024 10:00 [facility]   10/2/2024 10:00 AM DOMINICK Ramirez-CNP [id number] [facility]   10/21/2024 10:00 AM Claudine Middleton MD [id number] [facility]   2025  9:00 AM Safia Gerber MD [id number] [facility]            ____________________________________________________________  Herberth Gandara MD    Senior Attending Physician  [facility]  [facility]

## 2024-08-22 DIAGNOSIS — M10.00 IDIOPATHIC GOUT, UNSPECIFIED CHRONICITY, UNSPECIFIED SITE: Primary | ICD-10-CM

## 2024-08-22 RX ORDER — ALLOPURINOL 300 MG/1
300 TABLET ORAL DAILY
Qty: 90 TABLET | Refills: 1 | Status: CANCELLED | OUTPATIENT
Start: 2024-08-22

## 2024-08-24 ENCOUNTER — ANTICOAGULATION - WARFARIN VISIT (OUTPATIENT)
Dept: CARDIOLOGY | Facility: CLINIC | Age: 69
End: 2024-08-24
Payer: MEDICAID

## 2024-08-24 DIAGNOSIS — I48.19 PERSISTENT ATRIAL FIBRILLATION (MULTI): Primary | ICD-10-CM

## 2024-08-24 LAB
POC INR: 2.1
POC PROTHROMBIN TIME: NORMAL

## 2024-08-24 PROCEDURE — 99211 OFF/OP EST MAY X REQ PHY/QHP: CPT

## 2024-08-24 PROCEDURE — 85610 PROTHROMBIN TIME: CPT | Mod: QW

## 2024-08-24 NOTE — PROGRESS NOTES
Patient identification verified with 2 identifiers.    Location: [facility] - [address] [place] [address] [phone] option #1     Referring Physician: DR. CASANOVA  Enrollment/ Re-enrollment date: 2024   INR Goal: 2.0-3.0  INR monitoring is per [facility] protocol.  Anticoagulation Medication: warfarin  Indication: Atrial Fibrillation/Atrial Flutter    Subjective   Bleeding signs/symptoms: No    Bruising: No   Major bleeding event: No  Thrombosis signs/symptoms: No  Thromboembolic event: No  Missed doses: Yes  pt started a 5 day hold of warfarin yesterday for upcoming surgery  Extra doses: No  Medication changes: No  Dietary changes: No  Change in health: No  Change in activity: No  Alcohol: No  Other concerns: No    Upcoming Procedures:  Does the Patient Have any upcoming procedures that require interruption in anticoagulation therapy? yes  Does the patient require bridging? no      Anticoagulation Summary  As of 2024      INR goal:  2.0-3.0   TTR:  69.9% (11 mo)   INR used for dosin.10 (2024)   Weekly warfarin total:  37.5 mg               Assessment/Plan   Therapeutic   Will continue holding warfarin for upcoming surgery.  Patient needs an INR on 24 prior to surgery.  **I MADE NEXT ACT CLINIC APPOINTMENT FOR 24**  Education provided to patient during the visit:  Patient instructed to call in interim with questions, concerns and changes.   Patient educated on compliance with dosing, follow up appointments, and prescribed plan of care.

## 2024-08-26 ENCOUNTER — TELEPHONE (OUTPATIENT)
Dept: PREADMISSION TESTING | Facility: HOSPITAL | Age: 69
End: 2024-08-26
Payer: MEDICAID

## 2024-08-27 ENCOUNTER — ANTICOAGULATION - WARFARIN VISIT (OUTPATIENT)
Dept: CARDIOLOGY | Facility: CLINIC | Age: 69
End: 2024-08-27
Payer: MEDICAID

## 2024-08-27 ENCOUNTER — TELEPHONE (OUTPATIENT)
Dept: PREADMISSION TESTING | Facility: HOSPITAL | Age: 69
End: 2024-08-27
Payer: MEDICAID

## 2024-08-27 DIAGNOSIS — I48.19 PERSISTENT ATRIAL FIBRILLATION (MULTI): Primary | ICD-10-CM

## 2024-08-27 DIAGNOSIS — M10.00 IDIOPATHIC GOUT, UNSPECIFIED CHRONICITY, UNSPECIFIED SITE: ICD-10-CM

## 2024-08-27 LAB
POC INR: 1.3
POC PROTHROMBIN TIME: NORMAL

## 2024-08-27 PROCEDURE — 99211 OFF/OP EST MAY X REQ PHY/QHP: CPT

## 2024-08-27 PROCEDURE — 85610 PROTHROMBIN TIME: CPT | Mod: QW

## 2024-08-27 RX ORDER — ALLOPURINOL 300 MG/1
300 TABLET ORAL DAILY
Qty: 90 TABLET | Refills: 1 | Status: CANCELLED | OUTPATIENT
Start: 2024-08-27

## 2024-08-27 NOTE — PROGRESS NOTES
Patient identification verified with 2 identifiers.    Location: [facility] - [address] [place] [address] [phone] option #1     Referring Physician: DR. KASH CASANOVA  Enrollment/ Re-enrollment date: 2025   INR Goal: 2.0-3.0  INR monitoring is per [facility] protocol.  Anticoagulation Medication: warfarin  Indication: Atrial Fibrillation/Atrial Flutter    Subjective   Bleeding signs/symptoms: No    Bruising: No   Major bleeding event: No  Thrombosis signs/symptoms: No  Thromboembolic event: No  Missed doses: No  Extra doses: No  Medication changes: No  Dietary changes: No  Change in health: No  Change in activity: No  Alcohol: No  Other concerns: No    Upcoming Procedures:  Does the Patient Have any upcoming procedures that require interruption in anticoagulation therapy? no  Does the patient require bridging? no      Anticoagulation Summary  As of 2024      INR goal:  2.0-3.0   TTR:  69.5% (11 mo)   INR used for dosin.30 (2024)   Weekly warfarin total:  37.5 mg               Assessment/Plan   Subtherapeutic     1. New dose: no change   2. Next INR: 1 week PT ALREADY HAS AN APPT SCHEDULED 24      Education provided to patient during the visit:  Patient instructed to call in interim with questions, concerns and changes.   Patient educated on compliance with dosing, follow up appointments, and prescribed plan of care.

## 2024-08-28 ENCOUNTER — APPOINTMENT (OUTPATIENT)
Dept: CARDIOLOGY | Facility: HOSPITAL | Age: 69
End: 2024-08-28
Payer: MEDICAID

## 2024-08-28 ENCOUNTER — ANESTHESIA (OUTPATIENT)
Dept: OPERATING ROOM | Facility: HOSPITAL | Age: 69
End: 2024-08-28
Payer: MEDICAID

## 2024-08-28 ENCOUNTER — HOSPITAL ENCOUNTER (OUTPATIENT)
Facility: HOSPITAL | Age: 69
Setting detail: OUTPATIENT SURGERY
Discharge: HOME | End: 2024-08-28
Attending: SURGERY | Admitting: SURGERY
Payer: MEDICAID

## 2024-08-28 ENCOUNTER — ANESTHESIA EVENT (OUTPATIENT)
Dept: OPERATING ROOM | Facility: HOSPITAL | Age: 69
End: 2024-08-28
Payer: MEDICAID

## 2024-08-28 ENCOUNTER — HOSPITAL ENCOUNTER (OUTPATIENT)
Dept: RADIOLOGY | Facility: CLINIC | Age: 69
Discharge: HOME | End: 2024-08-28
Payer: MEDICAID

## 2024-08-28 VITALS
BODY MASS INDEX: 43.58 KG/M2 | SYSTOLIC BLOOD PRESSURE: 106 MMHG | HEART RATE: 81 BPM | OXYGEN SATURATION: 98 % | WEIGHT: 261.91 LBS | RESPIRATION RATE: 16 BRPM | TEMPERATURE: 97.5 F | DIASTOLIC BLOOD PRESSURE: 78 MMHG

## 2024-08-28 DIAGNOSIS — I48.19 PERSISTENT ATRIAL FIBRILLATION (MULTI): ICD-10-CM

## 2024-08-28 DIAGNOSIS — I48.91 ATRIAL FIBRILLATION, UNSPECIFIED TYPE (MULTI): ICD-10-CM

## 2024-08-28 DIAGNOSIS — C50.511 MALIGNANT NEOPLASM OF LOWER-OUTER QUADRANT OF RIGHT BREAST OF FEMALE, ESTROGEN RECEPTOR POSITIVE (MULTI): Primary | ICD-10-CM

## 2024-08-28 DIAGNOSIS — Z17.0 MALIGNANT NEOPLASM OF LOWER-OUTER QUADRANT OF RIGHT BREAST OF FEMALE, ESTROGEN RECEPTOR POSITIVE (MULTI): Primary | ICD-10-CM

## 2024-08-28 DIAGNOSIS — R92.8 OTHER ABNORMAL AND INCONCLUSIVE FINDINGS ON DIAGNOSTIC IMAGING OF BREAST: ICD-10-CM

## 2024-08-28 DIAGNOSIS — Z95.810 AICD (AUTOMATIC CARDIOVERTER/DEFIBRILLATOR) PRESENT: ICD-10-CM

## 2024-08-28 LAB — BODY SURFACE AREA: 2.33 M2

## 2024-08-28 PROCEDURE — 93287 PERI-PX DEVICE EVAL & PRGR: CPT | Performed by: INTERNAL MEDICINE

## 2024-08-28 PROCEDURE — A6250 SKIN SEAL PROTECT MOISTURIZR: HCPCS | Performed by: SURGERY

## 2024-08-28 PROCEDURE — 2500000004 HC RX 250 GENERAL PHARMACY W/ HCPCS (ALT 636 FOR OP/ED): Mod: JZ | Performed by: SURGERY

## 2024-08-28 PROCEDURE — 7100000009 HC PHASE TWO TIME - INITIAL BASE CHARGE: Performed by: SURGERY

## 2024-08-28 PROCEDURE — 7100000002 HC RECOVERY ROOM TIME - EACH INCREMENTAL 1 MINUTE: Performed by: SURGERY

## 2024-08-28 PROCEDURE — 2780000003 HC OR 278 NO HCPCS: Performed by: SURGERY

## 2024-08-28 PROCEDURE — 2500000005 HC RX 250 GENERAL PHARMACY W/O HCPCS: Performed by: SURGERY

## 2024-08-28 PROCEDURE — 3600000009 HC OR TIME - EACH INCREMENTAL 1 MINUTE - PROCEDURE LEVEL FOUR: Performed by: SURGERY

## 2024-08-28 PROCEDURE — 93287 PERI-PX DEVICE EVAL & PRGR: CPT

## 2024-08-28 PROCEDURE — 2500000005 HC RX 250 GENERAL PHARMACY W/O HCPCS: Performed by: NURSE ANESTHETIST, CERTIFIED REGISTERED

## 2024-08-28 PROCEDURE — 7100000001 HC RECOVERY ROOM TIME - INITIAL BASE CHARGE: Performed by: SURGERY

## 2024-08-28 PROCEDURE — 3700000002 HC GENERAL ANESTHESIA TIME - EACH INCREMENTAL 1 MINUTE: Performed by: SURGERY

## 2024-08-28 PROCEDURE — 2500000004 HC RX 250 GENERAL PHARMACY W/ HCPCS (ALT 636 FOR OP/ED): Performed by: SURGERY

## 2024-08-28 PROCEDURE — 3700000001 HC GENERAL ANESTHESIA TIME - INITIAL BASE CHARGE: Performed by: SURGERY

## 2024-08-28 PROCEDURE — 2500000004 HC RX 250 GENERAL PHARMACY W/ HCPCS (ALT 636 FOR OP/ED): Performed by: NURSE ANESTHETIST, CERTIFIED REGISTERED

## 2024-08-28 PROCEDURE — 88307 TISSUE EXAM BY PATHOLOGIST: CPT | Mod: TC,AHULAB | Performed by: SURGERY

## 2024-08-28 PROCEDURE — 2500000005 HC RX 250 GENERAL PHARMACY W/O HCPCS: Performed by: ANESTHESIOLOGY

## 2024-08-28 PROCEDURE — 3600000004 HC OR TIME - INITIAL BASE CHARGE - PROCEDURE LEVEL FOUR: Performed by: SURGERY

## 2024-08-28 PROCEDURE — 19301 PARTIAL MASTECTOMY: CPT | Performed by: SURGERY

## 2024-08-28 PROCEDURE — 2720000007 HC OR 272 NO HCPCS: Performed by: SURGERY

## 2024-08-28 PROCEDURE — 7100000010 HC PHASE TWO TIME - EACH INCREMENTAL 1 MINUTE: Performed by: SURGERY

## 2024-08-28 RX ORDER — LIDOCAINE HYDROCHLORIDE 20 MG/ML
INJECTION, SOLUTION EPIDURAL; INFILTRATION; INTRACAUDAL; PERINEURAL AS NEEDED
Status: DISCONTINUED | OUTPATIENT
Start: 2024-08-28 | End: 2024-08-28

## 2024-08-28 RX ORDER — SODIUM CHLORIDE, SODIUM LACTATE, POTASSIUM CHLORIDE, CALCIUM CHLORIDE 600; 310; 30; 20 MG/100ML; MG/100ML; MG/100ML; MG/100ML
100 INJECTION, SOLUTION INTRAVENOUS CONTINUOUS
Status: DISCONTINUED | OUTPATIENT
Start: 2024-08-28 | End: 2024-08-28 | Stop reason: HOSPADM

## 2024-08-28 RX ORDER — PROPOFOL 10 MG/ML
INJECTION, EMULSION INTRAVENOUS AS NEEDED
Status: DISCONTINUED | OUTPATIENT
Start: 2024-08-28 | End: 2024-08-28

## 2024-08-28 RX ORDER — OXYCODONE HYDROCHLORIDE 5 MG/1
5 TABLET ORAL EVERY 4 HOURS PRN
Status: DISCONTINUED | OUTPATIENT
Start: 2024-08-28 | End: 2024-08-28 | Stop reason: HOSPADM

## 2024-08-28 RX ORDER — SODIUM CHLORIDE 9 MG/ML
100 INJECTION, SOLUTION INTRAVENOUS CONTINUOUS
Status: DISCONTINUED | OUTPATIENT
Start: 2024-08-28 | End: 2024-08-28 | Stop reason: HOSPADM

## 2024-08-28 RX ORDER — CEFAZOLIN SODIUM 2 G/100ML
2 INJECTION, SOLUTION INTRAVENOUS ONCE
Status: DISCONTINUED | OUTPATIENT
Start: 2024-08-28 | End: 2024-08-28 | Stop reason: HOSPADM

## 2024-08-28 RX ORDER — ONDANSETRON HYDROCHLORIDE 2 MG/ML
INJECTION, SOLUTION INTRAVENOUS AS NEEDED
Status: DISCONTINUED | OUTPATIENT
Start: 2024-08-28 | End: 2024-08-28

## 2024-08-28 RX ORDER — FENTANYL CITRATE 50 UG/ML
INJECTION, SOLUTION INTRAMUSCULAR; INTRAVENOUS AS NEEDED
Status: DISCONTINUED | OUTPATIENT
Start: 2024-08-28 | End: 2024-08-28

## 2024-08-28 RX ORDER — LIDOCAINE HYDROCHLORIDE 10 MG/ML
0.1 INJECTION, SOLUTION EPIDURAL; INFILTRATION; INTRACAUDAL; PERINEURAL ONCE
Status: DISCONTINUED | OUTPATIENT
Start: 2024-08-28 | End: 2024-08-28 | Stop reason: HOSPADM

## 2024-08-28 RX ORDER — CEFAZOLIN 1 G/1
INJECTION, POWDER, FOR SOLUTION INTRAVENOUS AS NEEDED
Status: DISCONTINUED | OUTPATIENT
Start: 2024-08-28 | End: 2024-08-28

## 2024-08-28 RX ORDER — ONDANSETRON HYDROCHLORIDE 2 MG/ML
4 INJECTION, SOLUTION INTRAVENOUS ONCE AS NEEDED
Status: DISCONTINUED | OUTPATIENT
Start: 2024-08-28 | End: 2024-08-28 | Stop reason: HOSPADM

## 2024-08-28 RX ORDER — PHENYLEPHRINE HCL IN 0.9% NACL 1 MG/10 ML
SYRINGE (ML) INTRAVENOUS AS NEEDED
Status: DISCONTINUED | OUTPATIENT
Start: 2024-08-28 | End: 2024-08-28

## 2024-08-28 RX ADMIN — Medication 4 L/MIN: at 12:22

## 2024-08-28 SDOH — HEALTH STABILITY: MENTAL HEALTH: CURRENT SMOKER: 0

## 2024-08-28 ASSESSMENT — PAIN SCALES - GENERAL
PAIN_LEVEL: 4
PAINLEVEL_OUTOF10: 0 - NO PAIN

## 2024-08-28 ASSESSMENT — PAIN - FUNCTIONAL ASSESSMENT
PAIN_FUNCTIONAL_ASSESSMENT: UNABLE TO SELF-REPORT
PAIN_FUNCTIONAL_ASSESSMENT: 0-10
PAIN_FUNCTIONAL_ASSESSMENT: UNABLE TO SELF-REPORT
PAIN_FUNCTIONAL_ASSESSMENT: 0-10

## 2024-08-28 NOTE — DISCHARGE INSTRUCTIONS
POST-OP INSTRUCTIONS FOR BREAST SURGERY PATIENTS: DR. HERBERT SOSA    Activity:    Avoid direct impact to the surgical site.  No heavy lifting (greater than 10 lbs) or strenuous activity with the arm on the surgical side until evaluated at post-op appointment.    Wound care:    Ice pack, if desired, on and off in 15 minute intervals.  A supportive bra can be worn for comfort and support.  You may start showering normally tomorrow. Don't scrub and pat the surgical site dry.  No swimming or submerging the surgical site in a hot tub or bath for 2 weeks.  Outer dressing to be removed in 2 days.  Leave steri-strips intact for 1 week.  Observe the surgical site for signs of bleeding or infection. (Some swelling or bruising is anticipated.  A small amount of blood or an air bubble beneath the dressing is not a cause for concern.)    Call physician with abnormal findings: Progressive swelling, increasing pain, bright red skin discoloration, chills or fever (Temperature 101.5 or higher).    Follow-up:  post-op appointment with Dr. Sosa was previously scheduled.  Pathology report will be discussed at that visit.    Pain:    No RX   You may also use over-the-counter medication such as Tylenol (acetaminophen) or Advil (ibuprofen).  Remember that some prescription medications contain Tylenol (acetaminophen). If you are taking a prescription medication that contains Tylenol (acetaminophen), do not take additional over-the-counter Tylenol.  All pain medications can be constipating; remember to drink plenty of fluids and use stool softener and/or laxatives as needed. These are available over-the-counter.  Pain control is best when medication is taken before pain becomes severe.    Medication refills:    Medications cannot be refilled after business hours or on weekends.      Questions / Concerns:  Call Clinic [phone], option 2.        no

## 2024-08-28 NOTE — ANESTHESIA PREPROCEDURE EVALUATION
Patient: Trinidad Hines    Procedure Information       Date/Time: 08/28/24 1100    Procedure: Right Breast Partial Mastectomy; Re-Excision of Superior Margin (Right: Breast)    Location: [facility] OR 03 / [facility] OR    Surgeons: Mary Sosa MD            Relevant Problems   Cardiac   (+) Aortic valve disorder   (+) Atrial fibrillation (Multi)   (+) Atrial fibrillation, unspecified type (Multi)   (+) Benign hypertension   (+) Chest pain   (+) Hyperlipidemia      Pulmonary   (+) Obstructive sleep apnea syndrome      Neuro   (+) CVA (cerebral vascular accident) (Multi)      GI   (+) Irritable bowel syndrome with constipation      /Renal   (+) Acute cystitis without hematuria      Endocrine   (+) Hyperparathyroidism, secondary (Multi)   (+) Morbid obesity (Multi)      Hematology   (+) Long term (current) use of anticoagulants      Musculoskeletal   (+) Osteoarthritis of right knee      ID   (+) Acute upper respiratory infection   (+) Influenza      Skin   (+) Skin rash      GYN   (+) Malignant neoplasm of lower-outer quadrant of right breast of female, estrogen receptor positive (Multi)       Clinical information reviewed:    Allergies      OB Status           NPO Detail:  NPO/Void Status  Date of Last Liquid: 08/27/24  Time of Last Liquid: 2330  Date of Last Solid: 08/27/24  Time of Last Solid: 2330  Last Intake Type: Clear fluids  Time of Last Void: 0944         Physical Exam    Airway  Mallampati: I  TM distance: >3 FB  Neck ROM: full     Cardiovascular - normal exam     Dental - normal exam     Pulmonary - normal exam     Abdominal - normal exam             Anesthesia Plan    History of general anesthesia?: yes  History of complications of general anesthesia?: no    ASA 3     general     The patient is not a current smoker.  Patient was not previously instructed to abstain from smoking on day of procedure.  Patient did not smoke on day of procedure.    intravenous induction   Postoperative administration of  opioids is intended.  Anesthetic plan and risks discussed with patient.  Use of blood products discussed with patient who.    Plan discussed with CAA.

## 2024-08-28 NOTE — ANESTHESIA PROCEDURE NOTES
Airway  Date/Time: 8/28/2024 11:29 AM  Urgency: elective    Airway not difficult    Staffing  Performed: CRNA   Authorized by: Dash Ventura MD    Performed by: DOMINICK Coronado-BRIANNA  Patient location during procedure: OR    Indications and Patient Condition  Indications for airway management: anesthesia  Spontaneous Ventilation: absent  Sedation level: deep  Preoxygenated: yes  Patient position: sniffing  Mask difficulty assessment: 0 - not attempted    Final Airway Details  Final airway type: supraglottic airway      Successful airway: oropharyngeal (igel)  Size 4     Number of attempts at approach: 1

## 2024-08-28 NOTE — OP NOTE
Right Breast Partial Mastectomy; Re-Excision of Superior Margin (R) Operative Note     Date: 2024  OR Location: [facility] OR    Name: Trinidad Hines, : 1955, Age: 69 y.o., MRN: [id number], Sex: female    Diagnosis  Pre-op Diagnosis      * Malignant neoplasm of lower-outer quadrant of right breast of female, estrogen receptor positive (Multi) [C50.511, Z17.0] Post-op Diagnosis     * Malignant neoplasm of lower-outer quadrant of right breast of female, estrogen receptor positive (Multi) [C50.511, Z17.0]     Procedures  Right Breast Partial Mastectomy; Re-Excision of Superior Margin  14970 - CT MASTECTOMY PARTIAL      Surgeons      * Mary Sosa - Primary    Resident/Fellow/Other Assistant:  Surgeons and Role:     * Zachariah Benavides PA-C - DEVYN First Assist    Procedure Summary  Anesthesia: General  ASA: III  Anesthesia Staff: Anesthesiologist: Dash Ventura MD  CRNA: DOMINICK Coronado-CRNA  Estimated Blood Loss: 5 mL  Intra-op Medications:   Administrations occurring from 1100 to 1230 on 24:   Medication Name Total Dose   bupivacaine PF (Marcaine) 0.25 % (2.5 mg/mL) 30 mL, lidocaine PF (Xylocaine) 10 mg/mL (1 %) 30 mL syringe 30 mL   sodium chloride 0.9% infusion 125 mL              Anesthesia Record               Intraprocedure I/O Totals          Output    Est. Blood Loss 5 mL    Total Output 5 mL          Specimen:   ID Type Source Tests Collected by Time   1 : RIGHT BREAST NEW SUPERIOR MARGIN, STITCH MARKS NEW MARGIN Tissue BREAST MASTECTOMY RIGHT SURGICAL PATHOLOGY EXAM Mary Sosa MD 2024 1130        Staff:   Circulator: Venessa  Scrub Person: Zoie Cooper Circulator: Cathy  Scrub Person: Lora Cooper Scrub: Christiana         Drains and/or Catheters: * None in log *    Tourniquet Times:         Implants:     Findings: old lumpectomy cavity easily identified    Indications: Trinidad Hines is an 69 y.o. female who is having surgery for Malignant neoplasm of lower-outer quadrant of  right breast of female, estrogen receptor positive (Multi) [C50.511, Z17.0].     The patient was seen in the preoperative area. The risks, benefits, complications, treatment options, non-operative alternatives, expected recovery and outcomes were discussed with the patient. The possibilities of reaction to medication, pulmonary aspiration, injury to surrounding structures, bleeding, recurrent infection, the need for additional procedures, failure to diagnose a condition, and creating a complication requiring transfusion or operation were discussed with the patient. The patient concurred with the proposed plan, giving informed consent.  The site of surgery was properly noted/marked if necessary per policy. The patient has been actively warmed in preoperative area. Preoperative antibiotics have been ordered and given within 1 hours of incision. Venous thrombosis prophylaxis have been ordered including bilateral sequential compression devices    Procedure Details:   Informed consent was obtained. The surgical site marked and the Day of Surgery Update completed. The patient was taken to the operating room and positioned in a supine position on the operating room table. Anesthesia was induced without difficulty. SCDs were placed for DVT prophylaxis. Antibiotics were administered within 60 minutes prior to incision for surgical prophylaxis. Lower body Amadou Hugger was applied to help maintain normothermia.     I reviewed my note and the appropriate films.  A surgical safety time-out was performed.        The patient was sterilely prepped and draped in the usual fashion.       I then turned my attention to the right breast. The skin and surrounding tissue was anesthetized with local anesthetic. Her prior circumareoloar skin incision was re opened with a 15 blade scalpel.  Subcutaneous tissues were sharply divided down to and into subcutaneous fat using Bovie cautery.  I dissected towards old cavity. Minimal seroma was  noted.  I excised the new superior margin and oriented the new margin with a stitch    The cavity was irrigated with sterile saline solution.  Hemostasis was achieved and confirmed with cautery.           Hemostasis was achieved with bovie.  The deep dermal layer was closed with interrupted 3-0 vicryl sutures.  The skin was closed with a running 4-0 monocryl subcuticular stitch.        A sterile dressing was applied.  A surgical bra was used for light compression.     Anesthesia was reversed and the patient was brought to the recovery room in stable condition having tolerated the procedure well.  There were no complications.  30 cc of 1% lidocaine/0.25% marcaine mixed was used throughout the case.       Complications:  None; patient tolerated the procedure well.    Disposition: PACU - hemodynamically stable.  Condition: stable     This procedure was not performed to treat breast cancer through sentinel node biopsy      Additional Details: pts cardiac device turned off for surgery    Attending Attestation: A qualified resident physician was not available.    Mary Sosa  Phone Number: [phone]

## 2024-08-28 NOTE — ANESTHESIA POSTPROCEDURE EVALUATION
Patient: Trinidad Hines    Procedure Summary       Date: 08/28/24 Room / Location: [facility] A OR 03 / Virtual [facility] OR    Anesthesia Start: 1115 Anesthesia Stop: 1224    Procedure: Right Breast Partial Mastectomy; Re-Excision of Superior Margin (Right: Breast) Diagnosis:       Malignant neoplasm of lower-outer quadrant of right breast of female, estrogen receptor positive (Multi)      (Malignant neoplasm of lower-outer quadrant of right breast of female, estrogen receptor positive (Multi) [C50.511, Z17.0])    Surgeons: Mary Sosa MD Responsible Provider: Dash Ventura MD    Anesthesia Type: general ASA Status: 3            Anesthesia Type: general    Vitals Value Taken Time   /78 08/28/24 1315   Temp 36.4 °C (97.5 °F) 08/28/24 1315   Pulse 78 08/28/24 1315   Resp 15 08/28/24 1315   SpO2 100 % 08/28/24 1315       Anesthesia Post Evaluation    Patient location during evaluation: PACU  Patient participation: complete - patient participated  Level of consciousness: awake and alert  Pain score: 4  Pain management: adequate  Airway patency: patent  Cardiovascular status: acceptable  Respiratory status: acceptable  Hydration status: acceptable  Postoperative Nausea and Vomiting: none    No notable events documented.

## 2024-08-29 DIAGNOSIS — M10.00 IDIOPATHIC GOUT, UNSPECIFIED CHRONICITY, UNSPECIFIED SITE: Primary | ICD-10-CM

## 2024-08-30 DIAGNOSIS — I42.9 CARDIOMYOPATHY, UNSPECIFIED TYPE (MULTI): ICD-10-CM

## 2024-08-30 RX ORDER — ALLOPURINOL 300 MG/1
300 TABLET ORAL DAILY
Qty: 90 TABLET | Refills: 1 | OUTPATIENT
Start: 2024-08-30

## 2024-09-01 DIAGNOSIS — I10 BENIGN HYPERTENSION: ICD-10-CM

## 2024-09-01 DIAGNOSIS — M10.00 IDIOPATHIC GOUT, UNSPECIFIED CHRONICITY, UNSPECIFIED SITE: ICD-10-CM

## 2024-09-01 DIAGNOSIS — N18.32 HYPERTENSIVE KIDNEY DISEASE WITH STAGE 3B CHRONIC KIDNEY DISEASE (MULTI): ICD-10-CM

## 2024-09-01 DIAGNOSIS — I12.9 HYPERTENSIVE KIDNEY DISEASE WITH STAGE 3B CHRONIC KIDNEY DISEASE (MULTI): ICD-10-CM

## 2024-09-01 DIAGNOSIS — R21 SKIN RASH: ICD-10-CM

## 2024-09-01 DIAGNOSIS — Z00.00 ENCOUNTER FOR GENERAL ADULT MEDICAL EXAMINATION WITHOUT ABNORMAL FINDINGS: ICD-10-CM

## 2024-09-01 DIAGNOSIS — I48.91 ATRIAL FIBRILLATION, UNSPECIFIED TYPE (MULTI): ICD-10-CM

## 2024-09-01 DIAGNOSIS — I42.9 CARDIOMYOPATHY, UNSPECIFIED TYPE (MULTI): ICD-10-CM

## 2024-09-01 RX ORDER — ALBUTEROL SULFATE 90 UG/1
2 INHALANT RESPIRATORY (INHALATION) EVERY 4 HOURS PRN
Qty: 18 G | Refills: 0 | Status: SHIPPED | OUTPATIENT
Start: 2024-09-01

## 2024-09-01 NOTE — TELEPHONE ENCOUNTER
Refilled.  Please schedule patient's posthospitalization follow-up visit to be done within 6 days from the discharge date..

## 2024-09-03 ENCOUNTER — HOSPITAL ENCOUNTER (OUTPATIENT)
Dept: CARDIOLOGY | Facility: CLINIC | Age: 69
Discharge: HOME | End: 2024-09-03
Payer: MEDICAID

## 2024-09-03 ENCOUNTER — TELEMEDICINE (OUTPATIENT)
Dept: PRIMARY CARE | Facility: CLINIC | Age: 69
End: 2024-09-03
Payer: MEDICAID

## 2024-09-03 DIAGNOSIS — G47.00 INSOMNIA: ICD-10-CM

## 2024-09-03 DIAGNOSIS — D72.819 LEUKOPENIA: ICD-10-CM

## 2024-09-03 DIAGNOSIS — E66.01 MORBID OBESITY (MULTI): ICD-10-CM

## 2024-09-03 DIAGNOSIS — E78.5 HYPERLIPIDEMIA: ICD-10-CM

## 2024-09-03 DIAGNOSIS — E04.1 THYROID NODULE: ICD-10-CM

## 2024-09-03 DIAGNOSIS — Z95.810 PRESENCE OF AUTOMATIC (IMPLANTABLE) CARDIAC DEFIBRILLATOR: ICD-10-CM

## 2024-09-03 DIAGNOSIS — N18.4 CKD (CHRONIC KIDNEY DISEASE), STAGE IV (MULTI): ICD-10-CM

## 2024-09-03 DIAGNOSIS — E87.6 HYPOKALEMIA: ICD-10-CM

## 2024-09-03 DIAGNOSIS — N25.81 HYPERPARATHYROIDISM, SECONDARY (MULTI): ICD-10-CM

## 2024-09-03 DIAGNOSIS — I50.22 CHRONIC SYSTOLIC HEART FAILURE (MULTI): ICD-10-CM

## 2024-09-03 DIAGNOSIS — Z17.0 MALIGNANT NEOPLASM OF LOWER-OUTER QUADRANT OF RIGHT BREAST OF FEMALE, ESTROGEN RECEPTOR POSITIVE (MULTI): Primary | ICD-10-CM

## 2024-09-03 DIAGNOSIS — R29.898 BILATERAL LEG WEAKNESS: ICD-10-CM

## 2024-09-03 DIAGNOSIS — I48.91 ATRIAL FIBRILLATION (MULTI): ICD-10-CM

## 2024-09-03 DIAGNOSIS — G47.33 OBSTRUCTIVE SLEEP APNEA SYNDROME: ICD-10-CM

## 2024-09-03 DIAGNOSIS — M25.50 ARTHRALGIA: ICD-10-CM

## 2024-09-03 DIAGNOSIS — I10 BENIGN HYPERTENSION: ICD-10-CM

## 2024-09-03 DIAGNOSIS — I35.9 AORTIC VALVE DISORDER: ICD-10-CM

## 2024-09-03 DIAGNOSIS — R00.2 PALPITATIONS: ICD-10-CM

## 2024-09-03 DIAGNOSIS — M17.11 OSTEOARTHRITIS OF RIGHT KNEE: ICD-10-CM

## 2024-09-03 DIAGNOSIS — K58.1 IRRITABLE BOWEL SYNDROME WITH CONSTIPATION: ICD-10-CM

## 2024-09-03 DIAGNOSIS — R06.02 SHORTNESS OF BREATH: ICD-10-CM

## 2024-09-03 DIAGNOSIS — M85.80 OSTEOPENIA: ICD-10-CM

## 2024-09-03 DIAGNOSIS — C50.511 MALIGNANT NEOPLASM OF LOWER-OUTER QUADRANT OF RIGHT BREAST OF FEMALE, ESTROGEN RECEPTOR POSITIVE (MULTI): Primary | ICD-10-CM

## 2024-09-03 DIAGNOSIS — M25.569 KNEE PAIN: ICD-10-CM

## 2024-09-03 DIAGNOSIS — I61.9 HEMORRHAGIC CEREBROVASCULAR ACCIDENT (CVA) (MULTI): ICD-10-CM

## 2024-09-03 DIAGNOSIS — R53.82 CHRONIC FATIGUE: ICD-10-CM

## 2024-09-03 DIAGNOSIS — I63.9 CVA (CEREBRAL VASCULAR ACCIDENT) (MULTI): ICD-10-CM

## 2024-09-03 DIAGNOSIS — M10.00 IDIOPATHIC GOUT, UNSPECIFIED CHRONICITY, UNSPECIFIED SITE: ICD-10-CM

## 2024-09-03 DIAGNOSIS — Z95.810 CARDIAC DEFIBRILLATOR IN PLACE: ICD-10-CM

## 2024-09-03 DIAGNOSIS — R32 URINARY INCONTINENCE: ICD-10-CM

## 2024-09-03 DIAGNOSIS — M10.9 GOUT: ICD-10-CM

## 2024-09-03 DIAGNOSIS — I42.9 CARDIOMYOPATHY (MULTI): ICD-10-CM

## 2024-09-03 DIAGNOSIS — I42.0 DILATED CARDIOMYOPATHY (MULTI): ICD-10-CM

## 2024-09-03 PROCEDURE — 99213 OFFICE O/P EST LOW 20 MIN: CPT | Performed by: FAMILY MEDICINE

## 2024-09-03 PROCEDURE — 93296 REM INTERROG EVL PM/IDS: CPT

## 2024-09-03 RX ORDER — ALLOPURINOL 300 MG/1
300 TABLET ORAL DAILY
Qty: 90 TABLET | Refills: 1 | Status: SHIPPED | OUTPATIENT
Start: 2024-09-03

## 2024-09-03 NOTE — ASSESSMENT & PLAN NOTE
-Well-controlled.  Allopurinol 300 mg daily refilled today.  Patient has been on this medication for several years.

## 2024-09-03 NOTE — PROGRESS NOTES
Subjective   Patient ID: Trinidad Hines is a 69 y.o. female who presents for     HPI  The patient is a 69-year-old female with a history significant for persistent AF on Warfarin, HFrEF (3/2024 TTE: LVEF 30-35%, mildly reduced RV fx, LA mild to moderately dilated, RA moderately dilated, mild to moderate TR, mildly elevated RVSP 39.9 mmHg, normally fx bioprosthetic aortic valve, trivial pericardial effusion, LVIDd 5.47 cm, IVS 1.25 cm), ICD s/p CRT-D upgrade 10/2023, Rheumatic Heart Failure s/p aortic valve replacement 1986, redo in 2007 with bioprosthetic aortic valve, gout,HTN, hemorrhagic stroke, newly diagnosed right breast cancer, planned for right breast partial mastectomy 6/19/24. who is here for:    1- post hospitalization follow-up visit from 8/28/2024: she had a Right Breast Magseed Partial Mastectomy in 6/2024 and needed a Right Breast Partial Mastectomy with Re-Excision of the Superior Margin.    2-allopurinol refilled.     A review of system was completed.  All systems were reviewed and were normal, except for the ones that are listed in the HPI.    Objective   Physical Exam    Assessment/Plan   Problem List Items Addressed This Visit       Gout     -Well-controlled.  Allopurinol 300 mg daily refilled today.  Patient has been on this medication for several years.         Relevant Medications    allopurinol (Zyloprim) 300 mg tablet    Malignant neoplasm of lower-outer quadrant of right breast of female, estrogen receptor positive (Multi) - Primary    Patient to return to office in 4 to 6 months for reassessment.

## 2024-09-04 ENCOUNTER — ANTICOAGULATION - WARFARIN VISIT (OUTPATIENT)
Dept: CARDIOLOGY | Facility: CLINIC | Age: 69
End: 2024-09-04
Payer: MEDICAID

## 2024-09-04 DIAGNOSIS — I48.19 PERSISTENT ATRIAL FIBRILLATION (MULTI): Primary | ICD-10-CM

## 2024-09-04 LAB
POC INR: 1.1
POC PROTHROMBIN TIME: NORMAL

## 2024-09-04 PROCEDURE — 99211 OFF/OP EST MAY X REQ PHY/QHP: CPT

## 2024-09-04 PROCEDURE — 85610 PROTHROMBIN TIME: CPT | Mod: QW

## 2024-09-04 NOTE — TELEPHONE ENCOUNTER
Refilled.Refilled.  Please schedule patient's physical exam/Medicare wellness visit before the next refill.

## 2024-09-04 NOTE — PROGRESS NOTES
Patient identification verified with 2 identifiers.    Location: [facility] at [facility] - [address] [address] [phone] option #1     Referring Physician: DR. KASH CASANOVA  Enrollment/ Re-enrollment date: 2024  INR Goal: 2.0-3.0  INR monitoring is per AMS protocol.  Anticoagulation Medication: warfarin  Indication: Atrial Fibrillation/Atrial Flutter    Subjective   Bleeding signs/symptoms: No    Bruising: No   Major bleeding event: No  Thrombosis signs/symptoms: No  Thromboembolic event: No  Missed doses: No  Extra doses: No  Medication changes: No  Dietary changes: No  Change in health: No  Change in activity: No  Alcohol: No  Other concerns: No    Upcoming Procedures:  Does the Patient Have any upcoming procedures that require interruption in anticoagulation therapy? no  Does the patient require bridging? no      Anticoagulation Summary  As of 2024      INR goal:  2.0-3.0   TTR:  67.7% (11.3 mo)   INR used for dosin.10 (2024)   Weekly warfarin total:  42.5 mg               Assessment/Plan   Subtherapeutic  Patient has been back on warfarin for 6 days so will increase weekly dose .  RTC in 1 week    Education provided to patient during the visit:  Patient instructed to call in interim with questions, concerns and changes.   Patient educated on compliance with dosing, follow up appointments, and prescribed plan of care.

## 2024-09-05 LAB
LABORATORY COMMENT REPORT: NORMAL
PATH REPORT.FINAL DX SPEC: NORMAL
PATH REPORT.GROSS SPEC: NORMAL
PATH REPORT.RELEVANT HX SPEC: NORMAL
PATH REPORT.TOTAL CANCER: NORMAL

## 2024-09-05 RX ORDER — METOPROLOL SUCCINATE 100 MG/1
100 TABLET, EXTENDED RELEASE ORAL DAILY
Qty: 90 TABLET | Refills: 1 | Status: SHIPPED | OUTPATIENT
Start: 2024-09-05

## 2024-09-05 RX ORDER — SIMVASTATIN 20 MG/1
20 TABLET, FILM COATED ORAL NIGHTLY
Qty: 90 TABLET | Refills: 1 | Status: SHIPPED | OUTPATIENT
Start: 2024-09-05

## 2024-09-05 RX ORDER — FLUOCINONIDE 0.5 MG/G
CREAM TOPICAL 2 TIMES DAILY
Qty: 15 G | Refills: 1 | Status: SHIPPED | OUTPATIENT
Start: 2024-09-05 | End: 2025-09-05

## 2024-09-05 RX ORDER — WARFARIN SODIUM 5 MG/1
TABLET ORAL
Qty: 135 TABLET | Refills: 1 | Status: SHIPPED | OUTPATIENT
Start: 2024-09-05

## 2024-09-05 RX ORDER — CALCITRIOL 0.25 UG/1
0.25 CAPSULE ORAL DAILY
Qty: 90 CAPSULE | Refills: 1 | Status: SHIPPED | OUTPATIENT
Start: 2024-09-05

## 2024-09-06 ENCOUNTER — TELEPHONE (OUTPATIENT)
Dept: SURGICAL ONCOLOGY | Facility: CLINIC | Age: 69
End: 2024-09-06
Payer: MEDICAID

## 2024-09-06 NOTE — TELEPHONE ENCOUNTER
Left message for patient to call back regarding moving post op apt sooner as pathology has returned. Awaiting return call.

## 2024-09-07 RX ORDER — ALLOPURINOL 300 MG/1
300 TABLET ORAL DAILY
Qty: 30 TABLET | Refills: 1 | Status: SHIPPED | OUTPATIENT
Start: 2024-09-07

## 2024-09-11 ENCOUNTER — ANTICOAGULATION - WARFARIN VISIT (OUTPATIENT)
Dept: CARDIOLOGY | Facility: CLINIC | Age: 69
End: 2024-09-11
Payer: MEDICAID

## 2024-09-11 DIAGNOSIS — I48.19 PERSISTENT ATRIAL FIBRILLATION (MULTI): Primary | ICD-10-CM

## 2024-09-11 LAB
POC INR: 1.7
POC PROTHROMBIN TIME: NORMAL

## 2024-09-11 PROCEDURE — 99211 OFF/OP EST MAY X REQ PHY/QHP: CPT

## 2024-09-11 PROCEDURE — 85610 PROTHROMBIN TIME: CPT | Mod: QW

## 2024-09-11 NOTE — PROGRESS NOTES
History of Present Illness:  Trinidad Hines is a 69 y.o. female with a personal history of cancer.  Trinidad Hines was referred to the Cancer Genetics Clinic at [facility] by Claudine Middleton MD. Trinidad Hines is interested in genetic testing to clarify their personal risk for cancer, as well as the risks to their family members.    Cancer Medical History:  Personal history of cancer? Yes   Type: Breast   Age at diagnosis: 69  Summary:  Ms. Hines was diagnosed with right-sided invasive ductal carcinoma, stage IA (cT1, cN0, cM0, G1, ER+, TN+, HER2-) on 3/27/2024. She had a Right Breast Magseed Partial Mastectomy in 2024 and needed a Right Breast Partial Mastectomy with Re-Excision of the Superior Margin.     History of other cancers: No     Prior genetic testing? No     Cancer screening history:  Colonoscopy? Yes, most recent 21 (3 polyps, TAs)   Patient reports 1 prior colonoscopy,    Upper endoscopy? No   Dermatology? No   Other cancer screening? None    Reproductive History:  Menarche 12  Menopause 39  G 3 P 3, age of first delivery 14  Brief OCPs, no fertility treatments, no HRT  Hysterectomy? No   Oophorectomy? No     Family history:  A 4-generation pedigree was obtained and was significant for the following:   Daughter (, 42) who passed of an abdominal cancer that was diagnosed at 36   Brother (, 46) who passed of cancer (type unknown to us) diagnosed in his 40s  Sister (living, 76) with  a h/o breast cancer diagnosed at an age <50. Her daughter (living, <50) has a h/o breast cancer. Neither had had genetic testing to our knowledge.  Maternal aunt (, 90s) with a h/o cancer (type unknown to us) at an age >50  There is no known Ashkenazi Yars[place] ancestry. Consanguinity was denied.       Discussion:  Trinidad Hines is a 69 y.o. old female with a personal and family history of cancer.  Based on there being 3 close relatives on one side with breast cancer (including two  diagnosed <50), Trinidad Hines meets NCCN criteria for testing of the BRCA1 and BRCA2 genes. We discussed that a more comprehensive test is reasonable given that we do not know what type of cancers were in her daughter, brother, and aunt. She is interested in testing, which is recommended, and was ordered today via the 34-gene CancerNext + SourceTour panel from [facility]. Our discussion is summarized below.    We reviewed genes and chromosomes, inherited forms of breast and ovarian cancer, and the BRCA1 and BRCA2 genes causing HBOC. We discussed that most cancers are not due to an inherited genetic susceptibility. However, in about 5-10% of families, there is an inherited genetic mutation that can make a person more susceptible to developing certain forms of cancer. Within these families, we often see multiple family members with cancer, occurring in multiple generations. In addition, earlier onset and bilateral cancers are suggestive of an inherited form of cancer. Finally, there is a clustering of certain types of cancer in these families, such as breast and ovarian cancer.    We discussed the BRCA1 and BRCA2 genes, which are two genes that have been linked to early-onset breast and/or ovarian cancer. Changes in these genes (sometimes referred to as mutations) are inherited in a dominant pattern and confer >60% lifetime risk for breast cancer. This is elevated compared to the general population risk of 13%. In addition, BRCA1 and BRCA2 mutation carriers have up to a 58% lifetime risk for ovarian cancer, which is elevated over the 1.3% general population risk. Mutation carriers who have already been diagnosed with cancer have an increased risk to develop a second, contralateral breast cancer. BRCA2 gene mutation carriers have an increased risk for male breast cancer, prostate cancer, melanoma and pancreatic cancer.    We discussed that there are multiple genes associated with increased breast cancer risk.  Some genes, like the BRCA1 and BRCA2 genes, are considered highly penetrant breast cancer genes, meaning a mutation in the gene confers a high risk of breast cancer. Additionally, there are other intermediate (moderate risk) breast cancer genes. For some of the moderate risk genes, there is often limited information regarding the degree to which a mutation in the gene affects risk of different types of cancers. Additionally, for some of these moderate risk genes, the appropriate management for individuals who have a mutation in one of these genes is not always clear. Our knowledge about the cancer risks associated with mutations in these moderate risk genes is always growing, and we will likely be able to provide more comprehensive information in the future.     Gene mutations in most cancer risk genes, i.e. BRCA1 and BRCA2, are inherited in an autosomal dominant fashion. This means that if an individual has a change in either of these genes, their siblings, parents, and children have a 50% chance of also having that gene change and a 50% chance of not having the gene change.     We reviewed the three results we can get back:  1. Positive- Identified a change in a cancer gene that confers an increased cancer risk. We will discuss potential changes in management for her and her family based on the specific gene mutation found.  2. Negative-Clears her for the cancer predisposition syndrome we assessed, but cannot clear her for all cancer predisposition risks. She would continue to be screened based on her personal and family history.  3. Variant of Uncertain Significance (VUS) - We discussed should an uncertain result come back that this would be treated like a negative result (i.e. no management recommendation will be made no familial variant testing) as the implications of this finding are currently unknown.    Trinidad Hines was counseled about hereditary cancer susceptibility including cancer risks, options for  increased screening and/or risk reduction, genetic testing, and the implications for other family members. We discussed performing genetic testing in the context of a multi-gene panel test that looks at the BRCA1 and BRCA2, as well as moderate penetrant genes. She is interested in this approach, which is recommended and was ordered today via the 34-gene CancerNext + [employer] panel from [facility].    Results are typically available within 4 weeks, and Trinidad Hines will return to the Cancer Genetics Clinic to discuss her testing results. At that time, we will make recommendations for both Trinidad Hines and her family members in terms of cancer screening and/or cancer risk reduction options.         PLAN:  1.  Trinidad Hines elected to undergo genetic testing via a panel test that analyzes 34 genes associated with breast and other cancer risks. Consent for testing was verbalized and a plan made to collect a blood sample using DNA/RNA kit mailed to the patients home to be taken to her nearest  lab. The sample will be sent to [facility] for analysis. Results are typically available in 43 weeks.    2. Trinidad Hines will return to the Cancer Genetics Clinic in approximately 4 weeks to discuss her test results.     3. We remain available to Trinidad Hines or her family members at [phone] if any questions arise regarding information discussed at today's visit.    Hannah Marcos MS, [facility]  Certified Genetic Counselor  [facility]  [phone]    Reviewed by:  Dr. Sasha Turner  Clinical   [place] [facility]  [phone]

## 2024-09-11 NOTE — PROGRESS NOTES
Patient identification verified with 2 identifiers.    Location: [email or web address] at [facility] - [address] [address] [phone] option #1     Referring Physician: DR. KASH CASANOVA  Enrollment/ Re-enrollment date: 2024  INR Goal: 2.0-3.0  INR monitoring is per AMS protocol.  Anticoagulation Medication: warfarin  Indication: Atrial Fibrillation/Atrial Flutter    Subjective   Bleeding signs/symptoms: No    Bruising: No   Major bleeding event: No  Thrombosis signs/symptoms: No  Thromboembolic event: No  Missed doses: No  Extra doses: No  Medication changes: No  Dietary changes: No  Change in health: No  Change in activity: No  Alcohol: No  Other concerns: No    Upcoming Procedures:  Does the Patient Have any upcoming procedures that require interruption in anticoagulation therapy? no  Does the patient require bridging? no      Anticoagulation Summary  As of 2024      INR goal:  2.0-3.0   TTR:  66.4% (11.6 mo)   INR used for dosin.70 (2024)   Weekly warfarin total:  45 mg               Assessment/Plan   Subtherapeutic  I will increase weekly dose .  RTC in 1 week    Education provided to patient during the visit:  Patient instructed to call in interim with questions, concerns and changes.   Patient educated on compliance with dosing, follow up appointments, and prescribed plan of care.

## 2024-09-16 ENCOUNTER — ONCOLOGY MEDICATION OUTREACH (OUTPATIENT)
Dept: HEMATOLOGY/ONCOLOGY | Facility: CLINIC | Age: 69
End: 2024-09-16
Payer: MEDICAID

## 2024-09-16 ENCOUNTER — DOCUMENTATION (OUTPATIENT)
Dept: HEMATOLOGY/ONCOLOGY | Facility: HOSPITAL | Age: 69
End: 2024-09-16
Payer: MEDICAID

## 2024-09-16 ENCOUNTER — OFFICE VISIT (OUTPATIENT)
Dept: SURGICAL ONCOLOGY | Facility: CLINIC | Age: 69
End: 2024-09-16
Payer: MEDICAID

## 2024-09-16 VITALS
BODY MASS INDEX: 43.67 KG/M2 | WEIGHT: 262.4 LBS | HEART RATE: 83 BPM | DIASTOLIC BLOOD PRESSURE: 66 MMHG | SYSTOLIC BLOOD PRESSURE: 93 MMHG | TEMPERATURE: 97.5 F

## 2024-09-16 DIAGNOSIS — C50.511 MALIGNANT NEOPLASM OF LOWER-OUTER QUADRANT OF RIGHT BREAST OF FEMALE, ESTROGEN RECEPTOR POSITIVE: Primary | ICD-10-CM

## 2024-09-16 DIAGNOSIS — Z17.0 MALIGNANT NEOPLASM OF LOWER-OUTER QUADRANT OF RIGHT BREAST OF FEMALE, ESTROGEN RECEPTOR POSITIVE: Primary | ICD-10-CM

## 2024-09-16 DIAGNOSIS — C50.511 MALIGNANT NEOPLASM OF LOWER-OUTER QUADRANT OF RIGHT BREAST OF FEMALE, ESTROGEN RECEPTOR POSITIVE: ICD-10-CM

## 2024-09-16 DIAGNOSIS — Z17.0 MALIGNANT NEOPLASM OF LOWER-OUTER QUADRANT OF RIGHT BREAST OF FEMALE, ESTROGEN RECEPTOR POSITIVE: ICD-10-CM

## 2024-09-16 PROCEDURE — 99211 OFF/OP EST MAY X REQ PHY/QHP: CPT | Performed by: SURGERY

## 2024-09-16 RX ORDER — ANASTROZOLE 1 MG/1
1 TABLET ORAL DAILY
Qty: 30 TABLET | Refills: 11 | Status: SHIPPED | OUTPATIENT
Start: 2024-09-16 | End: 2025-09-16

## 2024-09-16 ASSESSMENT — PATIENT HEALTH QUESTIONNAIRE - PHQ9
SUM OF ALL RESPONSES TO PHQ9 QUESTIONS 1 & 2: 0
1. LITTLE INTEREST OR PLEASURE IN DOING THINGS: NOT AT ALL
2. FEELING DOWN, DEPRESSED OR HOPELESS: NOT AT ALL

## 2024-09-16 ASSESSMENT — PAIN SCALES - GENERAL: PAINLEVEL: 2

## 2024-09-16 NOTE — PROGRESS NOTES
Chief Complaint  Post op  S/p right PM re-excision 8/28    History of Present Illness    Referring Provider:   CLAUDIA Washington  PCP A Grey Granados    69-year-old -American female here for post op  Here w daughter.     History:  1) No abnormal mammograms, breast biopsies, or breast surgeries.  2) 2/23/2024.  Bilateral screening mammogram.  Left cardiac device.  Scattered fibroglandular tissue bilaterally.  Left breast negative.  Indeterminate right breast mass.  Indeterminate right breast calcifications.  BI-RADS 0.  3) March 1, 2024.  Right breast diagnostic imaging.  Right breast calcifications are probably benign.  6-month follow-up is recommended.  Right breast ultrasound.  At 8:00 5 cm from the nipple a 0.4 x 0.4 x 0.4 cm indeterminate mass is noted, BI-RADS 4.  Right axillary ultrasound is negative.  4) 3/27/2024.  Right breast mass 8:00 5 cm from the nipple biopsy.  Grade 1 invasive ductal carcinoma.  ER greater than 95, UT 95, HER2 negative.  Open coil HydroMARK.  5) reviewed films. Calcs are low suspicion. 6 mo FU vs can be localized for excision  6) patient wanted to avoid surgery and discuss endocrine therapy.  Saw medical oncologist and has returned to discuss surgery.  7) 6/19/2024.  Right partial mastectomy.  5.6 mm grade 1 invasive ductal carcinoma with associated 4.8 cm of DCIS.  DCIS is at the new superior margin. pT1b pNx +margin  8) 7/22/2024. Met with rad onc and med onc  9) opted to proceed with right reexcision  10) 8/28/2024. Right re-excision of superior margin residual DCIS (5/5 slides) present at final margin    She presents today for post op. no Breast concerns      Ob/gyn history:  Menarche 12  Menopause 39  G 3 P 3, age of first delivery 14  Brief OCPs, no fertility treatments, no HRT    Daughter with cancer unknown type  Sister with cancer  Brother with cancer  Niece with cancer      Review of systems  A comprehensive ROS was taken on the patient intake form and reviewed with the  patient. This form is scanned into the electronic medical record.    Constitutional symptoms: Denies generalized fatigue. Denies weight change, fevers/chills, difficulty sleeping   Eyes: Denies double vision, glaucoma, cataracts.  Ear/nose/throat/mouth: Denies hearing changes, sore throat, sinus problems.  Cardiovascular: No chest pain. Denies irregular heartbeat. Denies ankle swelling.  Respiratory: No wheezing, cough, or shortness of breath.  Gastrointestinal: No abdominal pain, No nausea/vomiting. No indigestion/heartburn. No change in bowel habits. No constipation or diarrhea.   Genitourinary: No urinary incontinence. No urinary frequency. No painful urination.  Musculoskeletal: No bone pain, no muscle pain, no joint pain.   Integumentary: No rash. No masses. No changes in moles. No easy bruising.  Neurological: No headaches. No tremors. No numbness/tingling.  Psychiatric: No anxiety. No depression.  Endocrine: No excessive thirst. Not too hot or too cold. Not tired or fatigued.   Hematological/lymphatic: No swollen glands or blood clotting problems. No bruising.     Physical exam  A chaperone was offered for all portions of the physical exam. The patient declined.     General appearance: appears stated age, alert and oriented x 3  Head: Normocephalic, atraumatic  Eyes: conjunctivae/corneas clear.  Ears: External ears are normal, hearing is grossly intact.  Lungs: normal breathing  Heart: irregular  Abdomen: Soft, nontender, nondistended.  Neurologic: grossly normal  Lymph nodes: No cervical, supraclavicular or axillary lymphadenopathy bilaterally    Breast: A comprehensive breast exam was performed in the seated and supine positions. Breasts are symmetrical. Bilateral nipples are everted. There are no skin changes on arm maneuvers. Bra size: 52D   Right: healing incision. No erythema   Left: no masses. Left upper chest wall pacemaker    Medial sternotomy incision.    Results  Imaging  All breast imaging  personally reviewed by me.  See hospitals    Pathology   3/27/2024.  Right breast mass 8:00 5 cm from the nipple biopsy.  Grade 1 invasive ductal carcinoma.  ER greater than 95, HI 95, HER2 negative.  Open coil HydroMARK.    Impression:   1) 69-year-old -American female here with new right breast cancer  cT1 cN0 Grade 1 invasive ductal carcinoma.  ER greater than 95, HI 95, HER2 negative.   Additional right breast calcifications  Now s/p right PM pT1b pNx focally positive superior margin  Now s/p right re-excision, persistent positive superior margin  2) extensive cardiac co morbidities include Afib. S/p AVR. Pacemaker in place.  EF 30%. Prior stroke. On coumadin  Non-smoker  3) FH of cancer. Referred to genetics.       Plan:   She is here with her daughter.  She is recovering well from surgery.  We reviewed her pathology report and she was given a copy.  Unfortunately 5 out of 5 slides were notable for residual DCIS.  Margin is still positive.    Surgical treatment is not complete.  I advised another reexcision.  She could also proceed to mastectomy.    The patient has multiple comorbidities.  She is very hesitant to pursue additional surgery.  We initially discussed the option of primary endocrine therapy.  If she were to decline additional surgery we could consider endocrine therapy with continued observation.  Additional surgery could be reserved for changes on exam or imaging.    I reviewed the case with Dr. Chu and Dr. Middleton.  We all agree that standard treatment would be additional surgery.  The patient would like to avoid surgery we discussed the plan of endocrine therapy without radiation.    She met with Dr. Middleton today and will start anastrozole.  Given that she is declining additional surgery we will forego 6-month follow-up right mammogram for probably benign findings.  I would like to see her in February 2025 with a bilateral mammogram.  She should call with any sooner concerns.

## 2024-09-16 NOTE — PROGRESS NOTES
Reviewed Anastrozole, dose, frequency, administration, treatment cycle, duration of therapy, and missed doses. Counseled on potential side effects including but not limited to hot flashes, muscle/joint pain, and osteoporosis. Provided medication/regimen handout.  All questions answered and contact information was given to patient.     Jossie Cardoso, PharmD  PGY-2 Oncology Pharmacy Resident

## 2024-09-16 NOTE — PROGRESS NOTES
"Spoke to Dr. Sosa and Dr. Chu.   Re-excision still shows residual DCIS on the margin. She is not to have another re-excision.   I explained that the invasive part is completely excised. The residual part is \"in situ\" parts only.     Plan is to do endocrine therapy next. Gave education on anastrozole ([facility] also provided info).   Sent anastrozole script to her pharmacy today.    Plan:     Start Arimidex 1 mg po daily  Advised patient to call us if she has any side effects after start taking AI.  DEXA will be ordered and give Zometa if osteopenia/osteoporosis.   Vitamin D and Calcium supplements  Will discuss Zometa and DEXA at the next visit  MD visit on 10/14/2024 and sooner if needed.       09/16/24 at 12:55 PM - Claudine Middleton MD    "

## 2024-09-17 ENCOUNTER — APPOINTMENT (OUTPATIENT)
Dept: GENETICS | Facility: CLINIC | Age: 69
End: 2024-09-17
Payer: MEDICAID

## 2024-09-17 DIAGNOSIS — Z71.83 ENCOUNTER FOR NONPROCREATIVE GENETIC COUNSELING: ICD-10-CM

## 2024-09-17 DIAGNOSIS — Z80.3 FAMILY HISTORY OF MALIGNANT NEOPLASM OF BREAST: ICD-10-CM

## 2024-09-17 DIAGNOSIS — C50.511 MALIGNANT NEOPLASM OF LOWER-OUTER QUADRANT OF RIGHT BREAST OF FEMALE, ESTROGEN RECEPTOR POSITIVE: ICD-10-CM

## 2024-09-17 DIAGNOSIS — Z17.0 MALIGNANT NEOPLASM OF LOWER-OUTER QUADRANT OF RIGHT BREAST OF FEMALE, ESTROGEN RECEPTOR POSITIVE: ICD-10-CM

## 2024-09-18 ENCOUNTER — ANTICOAGULATION - WARFARIN VISIT (OUTPATIENT)
Dept: CARDIOLOGY | Facility: CLINIC | Age: 69
End: 2024-09-18
Payer: MEDICAID

## 2024-09-18 DIAGNOSIS — I48.19 PERSISTENT ATRIAL FIBRILLATION (MULTI): Primary | ICD-10-CM

## 2024-09-18 LAB
POC INR: 3
POC PROTHROMBIN TIME: NORMAL

## 2024-09-18 PROCEDURE — 85610 PROTHROMBIN TIME: CPT | Mod: QW

## 2024-09-18 PROCEDURE — 99211 OFF/OP EST MAY X REQ PHY/QHP: CPT

## 2024-09-18 NOTE — PROGRESS NOTES
Patient identification verified with 2 identifiers.    Location: [facility] at [facility] - [address] [address] [phone] option #1     Referring Physician: DR. KASH CASANOVA  Enrollment/ Re-enrollment date: 11/4/2024  INR Goal: 2.0-3.0  INR monitoring is per [facility] protocol.  Anticoagulation Medication: warfarin  Indication: Atrial Fibrillation/Atrial Flutter    Subjective   Bleeding signs/symptoms: No    Bruising: No   Major bleeding event: No  Thrombosis signs/symptoms: No  Thromboembolic event: No  Missed doses: No  Extra doses: No  Medication changes: Yes  ON ANASTROZOLE  Dietary changes: No  Change in health: No  Change in activity: No  Alcohol: No  Other concerns: No    Upcoming Procedures:  Does the Patient Have any upcoming procedures that require interruption in anticoagulation therapy? no  Does the patient require bridging? no      Anticoagulation Summary  As of 9/18/2024      INR goal:  2.0-3.0   TTR:  66.6% (11.8 mo)   INR used for dosing:  3.00 (9/18/2024)   Weekly warfarin total:  45 mg               Assessment/Plan   THERAPEUTIC  MAINTAIN TWD  RTC IN 1 WEEK  Education provided to patient during the visit:  Patient instructed to call in interim with questions, concerns and changes.   Patient educated on compliance with dosing, follow up appointments, and prescribed plan of care.

## 2024-09-25 ENCOUNTER — ANTICOAGULATION - WARFARIN VISIT (OUTPATIENT)
Dept: CARDIOLOGY | Facility: CLINIC | Age: 69
End: 2024-09-25
Payer: MEDICAID

## 2024-09-25 ENCOUNTER — LAB (OUTPATIENT)
Dept: LAB | Facility: LAB | Age: 69
End: 2024-09-25
Payer: MEDICAID

## 2024-09-25 DIAGNOSIS — Z17.0 MALIGNANT NEOPLASM OF LOWER-OUTER QUADRANT OF RIGHT BREAST OF FEMALE, ESTROGEN RECEPTOR POSITIVE: ICD-10-CM

## 2024-09-25 DIAGNOSIS — C50.511 MALIGNANT NEOPLASM OF LOWER-OUTER QUADRANT OF RIGHT BREAST OF FEMALE, ESTROGEN RECEPTOR POSITIVE: ICD-10-CM

## 2024-09-25 DIAGNOSIS — I48.19 PERSISTENT ATRIAL FIBRILLATION (MULTI): Primary | ICD-10-CM

## 2024-09-25 DIAGNOSIS — Z80.3 FAMILY HISTORY OF MALIGNANT NEOPLASM OF BREAST: ICD-10-CM

## 2024-09-25 LAB
POC INR: 3.3
POC PROTHROMBIN TIME: NORMAL

## 2024-09-25 PROCEDURE — 85610 PROTHROMBIN TIME: CPT | Mod: QW

## 2024-09-25 PROCEDURE — 99211 OFF/OP EST MAY X REQ PHY/QHP: CPT

## 2024-09-25 NOTE — PROGRESS NOTES
Patient identification verified with 2 identifiers.    Location: [facility] at [facility] - [address] [address] [phone] option #1     Referring Physician: KASH CASANOVA  Enrollment/ Re-enrollment date: 11/04/2024   INR Goal: 2.0-3.0  INR monitoring is per [facility] protocol.  Anticoagulation Medication: warfarin  Indication: Atrial Fibrillation/Atrial Flutter    Subjective   Bleeding signs/symptoms: No    Bruising: No   Major bleeding event: No  Thrombosis signs/symptoms: No  Thromboembolic event: No  Missed doses: No  Extra doses: No  Medication changes: No  Dietary changes: No  Change in health: No  Change in activity: No  Alcohol: No  Other concerns: No    Upcoming Procedures:  Does the Patient Have any upcoming procedures that require interruption in anticoagulation therapy? no  Does the patient require bridging? no      Anticoagulation Summary  As of 9/25/2024      INR goal:  2.0-3.0   TTR:  65.3% (1 y)   INR used for dosing:  3.30 (9/25/2024)   Weekly warfarin total:  42.5 mg               Assessment/Plan   Supratherapeutic     1. New dose: RECENT INCREASES - WILL DECREASE DOSE    2. Next INR:  10 DAYS      Education provided to patient during the visit:  Patient instructed to call in interim with questions, concerns and changes.   Patient educated on interactions between medications and warfarin.   Patient educated on dietary consistency in vitamin k consumption.   Patient educated on affects of alcohol consumption while taking warfarin.   Patient educated on signs of bleeding/clotting.   Patient educated on compliance with dosing, follow up appointments, and prescribed plan of care.

## 2024-09-27 ENCOUNTER — APPOINTMENT (OUTPATIENT)
Dept: RADIOLOGY | Facility: CLINIC | Age: 69
End: 2024-09-27
Payer: MEDICAID

## 2024-10-02 ENCOUNTER — APPOINTMENT (OUTPATIENT)
Dept: SURGICAL ONCOLOGY | Facility: CLINIC | Age: 69
End: 2024-10-02
Payer: MEDICAID

## 2024-10-05 ENCOUNTER — ANTICOAGULATION - WARFARIN VISIT (OUTPATIENT)
Dept: CARDIOLOGY | Facility: CLINIC | Age: 69
End: 2024-10-05
Payer: MEDICAID

## 2024-10-05 DIAGNOSIS — I48.19 PERSISTENT ATRIAL FIBRILLATION (MULTI): Primary | ICD-10-CM

## 2024-10-05 LAB
POC INR: 2.8
POC PROTHROMBIN TIME: NORMAL

## 2024-10-05 PROCEDURE — 99211 OFF/OP EST MAY X REQ PHY/QHP: CPT

## 2024-10-05 PROCEDURE — 85610 PROTHROMBIN TIME: CPT | Mod: QW

## 2024-10-05 NOTE — PROGRESS NOTES
Patient identification verified with 2 identifiers.    Location: [facility] - [address] [address] [phone] option #1     Referring Physician: KASH CASANOVA  Enrollment/ Re-enrollment date: 2024   INR Goal: 2.0-3.0  INR monitoring is per [facility] protocol.  Anticoagulation Medication: warfarin  Indication: Atrial Fibrillation/Atrial Flutter    Subjective   Bleeding signs/symptoms: No    Bruising: No   Major bleeding event: No  Thrombosis signs/symptoms: No  Thromboembolic event: No  Missed doses: No  Extra doses: No  Medication changes: No  Dietary changes: No  Change in health: No  Change in activity: No  Alcohol: No  Other concerns: No    Upcoming Procedures:  Does the Patient Have any upcoming procedures that require interruption in anticoagulation therapy? no  Does the patient require bridging? no      Anticoagulation Summary  As of 10/5/2024      INR goal:  2.0-3.0   TTR:  64.6% (1 y)   INR used for dosin.80 (10/5/2024)   Weekly warfarin total:  42.5 mg               Assessment/Plan   Therapeutic     1. New dose: Will maintain dose and patient will return in 1 week.    2. Next INR: 1 week      Education provided to patient during the visit:  Patient instructed to call in interim with questions, concerns and changes.

## 2024-10-10 ENCOUNTER — TELEPHONE (OUTPATIENT)
Dept: GENETICS | Facility: CLINIC | Age: 69
End: 2024-10-10
Payer: MEDICAID

## 2024-10-10 LAB
COMMENTS - MP RESULT TYPE: NORMAL
SCAN RESULT: NORMAL

## 2024-10-11 ENCOUNTER — ANTICOAGULATION - WARFARIN VISIT (OUTPATIENT)
Dept: CARDIOLOGY | Facility: CLINIC | Age: 69
End: 2024-10-11
Payer: MEDICAID

## 2024-10-11 DIAGNOSIS — I48.19 PERSISTENT ATRIAL FIBRILLATION (MULTI): Primary | ICD-10-CM

## 2024-10-11 LAB
POC INR: 2.6
POC PROTHROMBIN TIME: NORMAL

## 2024-10-11 PROCEDURE — 85610 PROTHROMBIN TIME: CPT | Mod: QW

## 2024-10-11 PROCEDURE — 99211 OFF/OP EST MAY X REQ PHY/QHP: CPT

## 2024-10-11 NOTE — PROGRESS NOTES
Patient identification verified with 2 identifiers.    Location: [facility] at [facility] - [address] [address] [phone] option #1     Referring Physician: KASH CASANOVA  Enrollment/ Re-enrollment date: 2024   INR Goal: 2.0-3.0  INR monitoring is per [facility] protocol.  Anticoagulation Medication: warfarin  Indication: Atrial Fibrillation/Atrial Flutter    Subjective   Bleeding signs/symptoms: No    Bruising: No   Major bleeding event: No  Thrombosis signs/symptoms: No  Thromboembolic event: No  Missed doses: No  Extra doses: No  Medication changes: No  Dietary changes: No  Change in health: No  Change in activity: No  Alcohol: No  Other concerns: No    Upcoming Procedures:  Does the Patient Have any upcoming procedures that require interruption in anticoagulation therapy? no  Does the patient require bridging? no      Anticoagulation Summary  As of 10/11/2024      INR goal:  2.0-3.0   TTR:  65.2% (1 y)   INR used for dosin.60 (10/11/2024)   Weekly warfarin total:  42.5 mg               Assessment/Plan   Therapeutic     1. New dose: Will maintain dose and patient will return in 1 week.    2. Next INR: RTC in 3 weeks. Patient needs Saturday appointment.      Education provided to patient during the visit:  Patient instructed to call in interim with questions, concerns and changes.

## 2024-10-11 NOTE — PROGRESS NOTES
.Patient Visit Information:   Patient name: Trinidad Hines  : 1955  Date of Service: 10/14/2024    Visit Type: Follow-up      Cancer History:          Breast         AJCC Edition: 8th (AJCC), Diagnosis Date:           Cancer Staging   Malignant neoplasm of lower-outer quadrant of right breast of female, estrogen receptor positive, Staging form: Breast, AJCC 8th Edition, Clinical stage from 2024: Stage IA (cT1, cN0, cM0, G1, ER+, AK+, HER2-) - Signed by Mary Sosa MD on 2024  Malignant neoplasm of lower-outer quadrant of right breast of female, estrogen receptor positive, Staging form: Breast, AJCC 8th Edition, Pathologic stage from 2024: Stage Unknown (pT1b, pNX, cM0, G1, ER+, AK+, HER2-) - Signed by Mary Sosa MD on 2024     Treatment Synopsis:       Trinidad Hines is a 69 y.o. post-menopausal AA female with right-sided invasive ductal carcinoma, clinical stage IA (cT1, cN0, cM0, G1, ER+, AK+, HER2-).  The patient's breast cancer was diagnosed on 3/27/2024 and is grade 1, estrogen receptor positive at >95%, progesterone receptor positive at 95%, and HER-2/yue negative.     PMHx - A-fib, Cadiomyopathy (EF 30%), s/p AVR, CKD (stage 3), non-traumatic intracerebral hemorrhage, depression, cardiac defibrillator.      CURRENT THERAPY: Endocrine therapy planned     ONCOLOGIC HISTORY:  Details of her breast cancer history are as follows:     No abnormal mammograms, breast biopsies, or breast surgeries.  2024 - B/L screening mammogram.  Left cardiac device.  Scattered fibroglandular tissue bilaterally.  Left breast negative.  Indeterminate right breast mass.  Indeterminate right breast calcifications.  BI-RADS 0.  3/1/2024 -  Right breast dx imaging.  Right breast calcifications are probably benign.  6-month follow-up is recommended.  Right breast ultrasound.  At 8:00 5 cm from the nipple a 0.4 x 0.4 x 0.4 cm indeterminate mass is noted, BI-RADS 4.  Right axillary ultrasound is  "negative.  3/27/2024 - Right breast mass 8:00 5 cm from the nipple biopsy.  Grade 1 invasive ductal carcinoma.  ER greater than 95, KY 95, HER2 negative.   4/18/2024 - Office visit with Dr. Sosa. Per Dr. Sosa's note on 4/18/2024 - Calcs are low suspicion. 6 mo FU vs can be localized for excision. Lumpectomy +/- RT was discussed  6/19/2024: Right-sided lumpectomy by Dr. Sosa. Final path showing IDC, grade 1. 5.6 mm. ER > 95%, KY 95%, HER2 negative (1+ IHC). pT1b. Axilla was not assessed.   Re-excision still shows residual DCIS on the margin. She is not to have another re-excision.   9/16/2024: Started anastrozole       History of Present Illness: Patient ID:  Trinidad Hines is a 69 y.o. female.     Chief Complaint and/or reason for visit: Here for a follow-up     Interval History:    Trinidad Hines is a pleasant  69 y.o. woman with history of newly diagnosed invasive ductal carcinoma of the right breast. Here for a follow-up.     In the interim, she started anastrozole on 9/16/2024.  She reports that anastrozole may be making her chronic pain slightly worse and cause occasional \"hot feeling\" especially at night. She says these are very tolerable.     She reports 5/10 pain in her knees which is her baseline (she uses a walker).    She denies fever, chills, Wt loss, headaches, CP, SOB, N/V, abdominal pain, constipation, diarrhea, neuropathy, joint pain, extremity swelling, rashes. Appetite is good and she is drinking fine.     She comes to clinic by her self today.     Review of Systems:   A 14-point review of system was completed and was negative except for what is noted in HPI.      Allergies and Intolerances:       Allergies:   Allergies   Allergen Reactions    Dofetilide Anaphylaxis    Aspirin Rash    Diltiazem Hcl Itching    Lisinopril Cough and Dry mouth    Phenytoin Hives and Rash             Outpatient Medication Profile:   Current Outpatient Medications on File Prior to Visit   Medication Sig Dispense Refill    " albuterol 90 mcg/actuation inhaler Inhale 2 puffs every 4 hours if needed for wheezing. 18 g 0    allopurinol (Zyloprim) 300 mg tablet TAKE 1 TABLET BY MOUTH EVERY DAY 30 tablet 1    allopurinol (Zyloprim) 300 mg tablet Take 1 tablet (300 mg) by mouth once daily. 90 tablet 1    anastrozole (Arimidex) 1 mg tablet Take 1 tablet (1 mg total) by mouth once daily.  Swallow whole with a drink of water. 30 tablet 11    calcitriol (Rocaltrol) 0.25 mcg capsule Take 1 capsule (0.25 mcg) by mouth once daily. 90 capsule 1    dapagliflozin propanediol (Farxiga) 10 mg Take 1 tablet (10 mg) by mouth once daily. (Patient taking differently: Take 1 tablet (10 mg) by mouth once daily at bedtime.) 90 tablet 3    fluocinonide (Fluocinonide-E) 0.05 % cream Apply topically 2 times a day. 15 g 1    lidocaine (Xylocaine) 5 % ointment Apply 1 Application topically 3 times a day as needed for mild pain (1 - 3).      metoprolol succinate XL (Toprol-XL) 100 mg 24 hr tablet Take 1 tablet (100 mg) by mouth once daily. 90 tablet 1    simvastatin (Zocor) 20 mg tablet Take 1 tablet (20 mg) by mouth once daily at bedtime. 90 tablet 1    spironolactone (Aldactone) 25 mg tablet Take 1 tablet (25 mg) by mouth once daily. (Patient taking differently: Take 1 tablet (25 mg) by mouth once daily at bedtime.) 90 tablet 1    torsemide (Demadex) 20 mg tablet Take 1 tablet (20 mg) by mouth see administration instructions. Please take 40mg on Monday, Wednesday, and Friday. Take 20mg other days of the week. 40 tablet 11    traMADol (Ultram) 50 mg tablet Take 1 tablet (50 mg) by mouth every 6 hours if needed for moderate pain (4 - 6).      warfarin (Coumadin) 5 mg tablet TAKE 1.5 TABLET DAILY AS DIRECTED BY COUMADIN CLINIC  Strength: 5 mg 135 tablet 1    sacubitriL-valsartan (Entresto) 49-51 mg tablet Take 1 tablet by mouth 2 times a day. 180 tablet 3     No current facility-administered medications on file prior to visit.          Medical History:    Past Medical  History:   Diagnosis Date    Cardiology follow-up encounter     Herberth Gandara MD next [address]    Cardiomyopathy     CHF (congestive heart failure)     Chronic kidney disease, stage 3 unspecified (Multi)     Chronic systolic heart failure     HFrEF    Coronary artery disease     Dilated cardiomyopathy (Multi)     Encounter for pre-operative cardiovascular clearance     Gout     H/O echocardiogram 2024    Hyperlipidemia     Hyperparathyroidism (Multi)     Hypertension     Insomnia     Malignant neoplasm of lower-outer quadrant of right breast of female, estrogen receptor negative     OA (osteoarthritis)     Obesity     Persistent atrial fibrillation (Multi)     Presence of automatic (implantable) cardiac defibrillator     Cardiac defibrillator in place    Rheumatic heart failure     s/p aortic valve replacement     Sleep apnea     Stroke (Multi)     hemorrhagic stroke       Surgical History:   Past Surgical History:   Procedure Laterality Date    AORTIC VALVE REPLACEMENT      redo in  with bioprosthetic aortic valve    CARDIAC CATHETERIZATION  2023    CARDIAC CATHETERIZATION N/A 2024    Procedure: Right Heart Cath;  Surgeon: Hieu Jack MD;  Location: [facility] Cardiac Cath Lab;  Service: Cardiovascular;  Laterality: N/A;    CARDIAC ELECTROPHYSIOLOGY PROCEDURE N/A 10/30/2023    Procedure: ICD UPGRADE, DUAL TO BIV;  Surgeon: Kendra Hong MD;  Location: [place] [address] Cardiac Cath Lab;  Service: Electrophysiology;  Laterality: N/A;    CARDIOVERSION      TOTAL KNEE ARTHROPLASTY  2015    Total Knee Arthroplasty       Social Substance History:   Social History     Tobacco Use    Smoking status: Former     Current packs/day: 0.00     Average packs/day: 1 pack/day for 20.0 years (20.0 ttl pk-yrs)     Types: Cigarettes     Start date:      Quit date:      Years since quittin.8    Smokeless tobacco: Never   Substance Use Topics    Alcohol use: Never     Social  History     Substance and Sexual Activity   Drug Use Never       Family History:   Family History   Problem Relation Name Age of Onset    Heart attack Mother      Heart attack Father      Kidney failure Sister      Cancer Brother          OBJECTIVE:     Visit Vitals  Temp:  [36.2 °C (97.2 °F)] 36.2 °C (97.2 °F)  Heart Rate:  [87] 87  BP: (105)/(73) 105/73    Pain Scale: 5/10 (chronic, in her knees)     The ECOG performance scale today is:       1 Restricted in physically strenuous activity but ambulatory and able to carry out work of a light or sedentary nature, e.g., light house work, office work    She uses a walker.       Physical Exam:      Constitutional: Well developed, awake/alert/oriented  x3, no distress, alert and cooperative   Eyes: PERRL, EOMI, clear sclera   ENMT: mucous membranes moist, no apparent injury,  no lesions seen   Head/Neck: Neck supple, no apparent injury, thyroid  without mass or tenderness, No JVD, trachea midline, no bruits   Respiratory/Thorax: Patent airways, CTAB, normal  breath sounds with good chest expansion, thorax symmetric   Cardiovascular: Regular, rate and rhythm, no murmurs,  2+ equal pulses of the extremities, normal S 1and S 2   Gastrointestinal: Nondistended, soft, non-tender,  no rebound tenderness or guarding, no masses palpable, no organomegaly, +BS, no bruits   Musculoskeletal: ROM intact, no joint swelling, normal  strength   Extremities: No LE edema   Neurological: alert and oriented x3, intact senses,  motor, response and reflexes, normal strength   Breast: s/p rt sided lumpectomy with well healed surgical incision. No palpable mass in either breast. No palpable axillary or supraclavicular lymphadenopathies bilaterally. No swelling of either upper extremity which had free range of motion.   Lymphatic: No significant lymphadenopathy   Psychological: Appropriate mood and behavior   Skin: Warm and dry, no lesions, no rashes          LAB RESULTS    Lab Results  "  Component Value Date    WBC 3.4 (L) 08/13/2024    HGB 13.8 08/13/2024    HCT 43.3 08/13/2024    MCV 93 08/13/2024     (L) 08/13/2024     Lab Results   Component Value Date    NEUTROABS 1.58 06/03/2024       No results for input(s): \"CREATININE\", \"BUN\", \"NA\", \"K\", \"CL\", \"CO2\" in the last 72 hours.  No components found for: \"CALCGFR\"  Lab Results   Component Value Date    GLUCOSE 97 08/13/2024     Lab Results   Component Value Date     08/13/2024    K 4.3 08/13/2024     08/13/2024    CO2 30 08/13/2024    BUN 30 (H) 08/13/2024    CREATININE 2.02 (H) 08/13/2024    ALT 8 05/03/2024    AST 11 05/03/2024    ALKPHOS 59 05/03/2024    BILITOT 0.8 05/03/2024     No results found for: \"FOLATE\"  No results found for: \"KEMXGPFP22\"  Lab Results   Component Value Date    TSH 4.92 (H) 05/03/2024 6/19/2024: Lumpectomy path ([id number])     FINAL DIAGNOSIS   A. BREAST, RIGHT, LOWER OUTER QUADRANT, LUMPECTOMY:   -- Invasive ductal carcinoma, grade 1, see note and synoptic cancer summary.  -- Ductal carcinoma in situ, intermediate to high nuclear grade, cribriform, micropapillary and papillary patterns with comedonecrosis and microcalcifications.  -- Biopsy site changes.     Note: Immunohistochemical stains for SMMHC and p63 were performed which show the absence of myoepithelial cell layer in the focus of invasive ductal carcinoma.     B. BREAST MARGIN, RIGHT, NEW SUPERIOR, EXCISION:   -- Ductal carcinoma in situ, see note.  -- No invasive carcinoma identified.     Note: Ductal carcinoma in situ is focally present at the new inked margin.     C. BREAST MARGIN, RIGHT, NEW LATERAL, EXCISION:   -- Benign breast tissue.  -- No carcinoma identified.     D. BREAST MARGIN, RIGHT, NEW MEDIAL, EXCISION:   -- Benign breast tissue.  -- No carcinoma identified.     E. BREAST MARGIN, RIGHT, NEW ANTERIOR, EXCISION:   -- Benign breast tissue.  -- No carcinoma identified.     F. BREAST MARGIN, RIGHT, NEW INFERIOR, EXCISION: "   -- Benign breast tissue with cautery artifacts.  -- No carcinoma identified.     Note: Deeper levels of sections were reviewed.     G. BREAST MARGIN, RIGHT, NEW POSTERIOR, EXCISION:      -- Benign breast tissue.  -- No carcinoma identified.        verall Grade  Grade 1 (scores of 3, 4 or 5)   Tumor Size  Greatest dimension of largest invasive focus (Millimeters): 5.6 mm   Tumor Focality  Single focus of invasive carcinoma   Ductal Carcinoma In Situ (DCIS)  Present     Positive for extensive intraductal component (EIC)   Size (Extent) of DCIS  Estimated size (extent) of DCIS is at least (Millimeters): 48 mm     pT Category  pT1b     SPECIAL STUDIES        Estrogen Receptor (ER) Status  Positive (greater than 10% of cells demonstrate nuclear positivity)   Percentage of Cells with Nuclear Positivity  >95 %        Progesterone Receptor (PgR) Status  Positive   Percentage of Cells with Nuclear Positivity  95 %        HER2 (by immunohistochemistry)  Negative (Score 1+)   Testing Performed on Case Number  [id number]     Imaging:     No images are attached to the encounter.        Assessment and Plan:   Assessment     Trinidad Hines is a 69 y.o.  post-menopausal AA female with right-sided invasive ductal carcinoma, clinical stage IA (cT1, cN0, cM0, G1, ER+, OH+, HER2-).  The patient's breast cancer was diagnosed on 3/27/2024 and is grade 1, estrogen receptor positive at >95%, progesterone receptor positive at 95%, and HER-2/yue negative. S/p lumpectomy on 6/19/2016.     Predict tool: Predicted overall survival at 5 yrs   Surgery only:  92%  + Hormone therapy : 93%  + Chemotherapy: 92%  + Bisphosphonate: 92%      PMHx - A-fib, Cadiomyopathy (EF 30%), s/p AVR, CKD (stage 3), non-traumatic intracerebral hemorrhage, depression, cardiac defibrillator.      Previously her surgeon and myself had long discussions regarding primary endocrine therapy vs. upfront surgery. She elected to have surgery and had lumpectomy on  6/19/2024. No node sampling. Her clinical staging is IA. Her invasive component was small (5.6 mm) but DCIS component was extensive (48 mm) with focally close positive margin. I advised re-excision of this margin but she is very hesitant to pursue more surgery.  She saw radiation oncologist (Dr. Chu) today.     She comes in today to discuss adjuvant systemic endocrine treatment options.     Given patient's small tumor size, clinically node-negative disease and hormone receptor positivity, her risk of recurrence is low. Also, due to her multiple medical co-morbidities (mentioned above), I do not recommend chemotherapy. She will however, benefit from systemic endocrine therapy to minimize her risk of recurrence.     I recommend AI x5 years.  Side effects that were discussed included but were not limited to osteoporosis, arthritis arthralgia, hot flashes, liver toxicity. After discussion of pros and cons of this recommendation,  patient elected to proceed treatment with Arimidex.     DEXA scan in 2022 showed osteopenia  (Ca and Vitamin D recommended)  I recommend Zometa if she has osteopenia/osteoporosis by DEXA.  She also needs dental clearance before Zometa is initiated.     Plan:   Continue anastrozole 1mg po daily x5 years  Take Vitamin D and Calcium supplements  Check Vitamin D levels  DEXA ordered today. If DEXA is good, no zometa (low-risk BC and little benefit from zometa) but monitor DEXA yearly. Most likely DEXA will show osteopenia (based on 2022 scan).   If Zometa is indicated, she will need dental clearance.  Engage in at least 150 min of Aerobic exercise weekly which translates to 30 minutes of brisk walking cristian BURROU[facility]HS visit at 3 months and sooner if needed (Pt and daughter's request to give enough time for completing local/eloy therapies)  RTC on 1/6/2025        Claudine Middleton MD, PhD   Hematology/Oncology  [facility]

## 2024-10-14 ENCOUNTER — OFFICE VISIT (OUTPATIENT)
Dept: HEMATOLOGY/ONCOLOGY | Facility: CLINIC | Age: 69
End: 2024-10-14
Payer: MEDICAID

## 2024-10-14 VITALS
WEIGHT: 261.02 LBS | HEART RATE: 87 BPM | SYSTOLIC BLOOD PRESSURE: 105 MMHG | DIASTOLIC BLOOD PRESSURE: 73 MMHG | BODY MASS INDEX: 43.44 KG/M2 | TEMPERATURE: 97.2 F

## 2024-10-14 DIAGNOSIS — C50.511 MALIGNANT NEOPLASM OF LOWER-OUTER QUADRANT OF RIGHT BREAST OF FEMALE, ESTROGEN RECEPTOR POSITIVE: ICD-10-CM

## 2024-10-14 DIAGNOSIS — Z17.0 MALIGNANT NEOPLASM OF LOWER-OUTER QUADRANT OF RIGHT BREAST OF FEMALE, ESTROGEN RECEPTOR POSITIVE: ICD-10-CM

## 2024-10-14 PROCEDURE — 99214 OFFICE O/P EST MOD 30 MIN: CPT | Performed by: INTERNAL MEDICINE

## 2024-10-14 ASSESSMENT — PATIENT HEALTH QUESTIONNAIRE - PHQ9
2. FEELING DOWN, DEPRESSED OR HOPELESS: SEVERAL DAYS
2. FEELING DOWN, DEPRESSED OR HOPELESS: NOT AT ALL
SUM OF ALL RESPONSES TO PHQ9 QUESTIONS 1 & 2: 1
1. LITTLE INTEREST OR PLEASURE IN DOING THINGS: NOT AT ALL
SUM OF ALL RESPONSES TO PHQ9 QUESTIONS 1 & 2: 0
1. LITTLE INTEREST OR PLEASURE IN DOING THINGS: NOT AT ALL

## 2024-10-14 ASSESSMENT — PAIN SCALES - GENERAL: PAINLEVEL: 0-NO PAIN

## 2024-10-14 NOTE — PATIENT INSTRUCTIONS
Please schedule bone density scan. (This is to check the density of your bones and to see if you may have loss bone mass).  Please schedule follow up with Dr. Middleton on  1/6/25.  Please call [phone] (option 5, then option 2) with symptoms, questions or concerns.

## 2024-10-21 ENCOUNTER — APPOINTMENT (OUTPATIENT)
Dept: HEMATOLOGY/ONCOLOGY | Facility: CLINIC | Age: 69
End: 2024-10-21
Payer: MEDICAID

## 2024-10-28 PROBLEM — Z79.811 USE OF ANASTROZOLE (ARIMIDEX): Status: ACTIVE | Noted: 2024-10-28

## 2024-10-28 PROBLEM — Z85.3 ENCOUNTER FOR FOLLOW-UP SURVEILLANCE OF BREAST CANCER: Status: ACTIVE | Noted: 2024-10-28

## 2024-10-28 PROBLEM — Z08 ENCOUNTER FOR FOLLOW-UP SURVEILLANCE OF BREAST CANCER: Status: ACTIVE | Noted: 2024-10-28

## 2024-10-30 ENCOUNTER — APPOINTMENT (OUTPATIENT)
Dept: SURGICAL ONCOLOGY | Facility: CLINIC | Age: 69
End: 2024-10-30
Payer: MEDICAID

## 2024-10-30 DIAGNOSIS — Z85.3 ENCOUNTER FOR FOLLOW-UP SURVEILLANCE OF BREAST CANCER: ICD-10-CM

## 2024-10-30 DIAGNOSIS — Z08 ENCOUNTER FOR FOLLOW-UP SURVEILLANCE OF BREAST CANCER: ICD-10-CM

## 2024-10-30 DIAGNOSIS — Z17.0 MALIGNANT NEOPLASM OF LOWER-OUTER QUADRANT OF RIGHT BREAST OF FEMALE, ESTROGEN RECEPTOR POSITIVE: Primary | ICD-10-CM

## 2024-10-30 DIAGNOSIS — Z79.811 USE OF ANASTROZOLE (ARIMIDEX): ICD-10-CM

## 2024-10-30 DIAGNOSIS — C50.511 MALIGNANT NEOPLASM OF LOWER-OUTER QUADRANT OF RIGHT BREAST OF FEMALE, ESTROGEN RECEPTOR POSITIVE: Primary | ICD-10-CM

## 2024-11-02 ENCOUNTER — ANTICOAGULATION - WARFARIN VISIT (OUTPATIENT)
Dept: CARDIOLOGY | Facility: CLINIC | Age: 69
End: 2024-11-02
Payer: MEDICAID

## 2024-11-02 DIAGNOSIS — I48.19 PERSISTENT ATRIAL FIBRILLATION (MULTI): Primary | ICD-10-CM

## 2024-11-02 LAB
POC INR: 2.9
POC PROTHROMBIN TIME: NORMAL

## 2024-11-02 PROCEDURE — 99211 OFF/OP EST MAY X REQ PHY/QHP: CPT

## 2024-11-02 PROCEDURE — 85610 PROTHROMBIN TIME: CPT | Mod: QW

## 2024-11-04 ENCOUNTER — APPOINTMENT (OUTPATIENT)
Facility: CLINIC | Age: 69
End: 2024-11-04
Payer: MEDICAID

## 2024-11-04 DIAGNOSIS — Z17.0 MALIGNANT NEOPLASM OF LOWER-OUTER QUADRANT OF RIGHT BREAST OF FEMALE, ESTROGEN RECEPTOR POSITIVE: ICD-10-CM

## 2024-11-04 DIAGNOSIS — Z71.83 ENCOUNTER FOR NONPROCREATIVE GENETIC COUNSELING: ICD-10-CM

## 2024-11-04 DIAGNOSIS — Z80.3 FAMILY HISTORY OF MALIGNANT NEOPLASM OF BREAST: ICD-10-CM

## 2024-11-04 DIAGNOSIS — C50.511 MALIGNANT NEOPLASM OF LOWER-OUTER QUADRANT OF RIGHT BREAST OF FEMALE, ESTROGEN RECEPTOR POSITIVE: ICD-10-CM

## 2024-11-04 PROBLEM — I48.19 PERSISTENT ATRIAL FIBRILLATION (MULTI): Status: ACTIVE | Noted: 2024-11-04

## 2024-11-19 ENCOUNTER — OFFICE VISIT (OUTPATIENT)
Dept: CARDIOLOGY | Facility: CLINIC | Age: 69
End: 2024-11-19
Payer: MEDICAID

## 2024-11-19 VITALS
DIASTOLIC BLOOD PRESSURE: 61 MMHG | BODY MASS INDEX: 43.41 KG/M2 | OXYGEN SATURATION: 95 % | WEIGHT: 260.56 LBS | HEART RATE: 78 BPM | SYSTOLIC BLOOD PRESSURE: 90 MMHG | HEIGHT: 65 IN

## 2024-11-19 DIAGNOSIS — I42.0 DILATED CARDIOMYOPATHY (MULTI): ICD-10-CM

## 2024-11-19 PROCEDURE — 3078F DIAST BP <80 MM HG: CPT | Performed by: INTERNAL MEDICINE

## 2024-11-19 PROCEDURE — 1159F MED LIST DOCD IN RCRD: CPT | Performed by: INTERNAL MEDICINE

## 2024-11-19 PROCEDURE — 1126F AMNT PAIN NOTED NONE PRSNT: CPT | Performed by: INTERNAL MEDICINE

## 2024-11-19 PROCEDURE — 99214 OFFICE O/P EST MOD 30 MIN: CPT | Performed by: INTERNAL MEDICINE

## 2024-11-19 PROCEDURE — 1160F RVW MEDS BY RX/DR IN RCRD: CPT | Performed by: INTERNAL MEDICINE

## 2024-11-19 PROCEDURE — 99417 PROLNG OP E/M EACH 15 MIN: CPT | Performed by: INTERNAL MEDICINE

## 2024-11-19 PROCEDURE — 1036F TOBACCO NON-USER: CPT | Performed by: INTERNAL MEDICINE

## 2024-11-19 PROCEDURE — 3008F BODY MASS INDEX DOCD: CPT | Performed by: INTERNAL MEDICINE

## 2024-11-19 PROCEDURE — 3074F SYST BP LT 130 MM HG: CPT | Performed by: INTERNAL MEDICINE

## 2024-11-19 ASSESSMENT — PAIN SCALES - GENERAL: PAINLEVEL_OUTOF10: 0-NO PAIN

## 2024-11-19 ASSESSMENT — ENCOUNTER SYMPTOMS
DEPRESSION: 0
OCCASIONAL FEELINGS OF UNSTEADINESS: 1
LOSS OF SENSATION IN FEET: 0

## 2024-11-19 NOTE — PROGRESS NOTES
Primary Care Physician: Minda Ricardo MD  Date of Visit: 11/19/2024  9:00 AM EST  Location of visit: [facility] [address]     Chief Complaint:   Chief Complaint   Patient presents with    Follow-up    Atrial Fibrillation    Hypertension    Cardiomyopathy        HPI / Summary:   Trinidad Hines is a 69 y.o. female presents for followup.     Last seen August 19.  Rheumatic heart disease.  Initial aortic valve replacement in 1986 (Fernie Shiley 21 mm)redo in 2007(27 mm Freestyle, root repair)  Breast cancer.  Hypertension.  Hemorrhagic stroke.    CKD 4 eGFR 26, Cr 2.0  Follows with Tonyaiyabelle  Gout.  CRT-D implanted in 2023.  April echocardiogram Fernie Shiley prostheses in aortic position functioning well low gradient, EF of 30%, A-fib, left atrial enlargement, mild MR, mildly elevated RVSP, mildly reduced RV function,    Specialty Problems          Cardiology Problems    Benign hypertension    Cardiomyopathy     Comment on above: Added by Problem List Migration; 2013-7-21; Moved to [facility] Nov 29 2013 9:07PM;         Aortic valve disorder    Atrial fibrillation (Multi)    Cardiac defibrillator in place    Chronic systolic heart failure    Hyperlipidemia    Palpitations    Dilated cardiomyopathy (Multi)    Screening cholesterol level    Chest pain     Comment on above: CHEST PAIN         Long term (current) use of anticoagulants    Atrial fibrillation, unspecified type (Multi)    Persistent atrial fibrillation (Multi)        Past Medical History:   Diagnosis Date    Cardiology follow-up encounter     Herberth Gandara MD next apt 8/19/24    Cardiomyopathy     CHF (congestive heart failure)     Chronic kidney disease, stage 3 unspecified (Multi)     Chronic systolic heart failure     HFrEF    Coronary artery disease     Dilated cardiomyopathy (Multi)     Encounter for pre-operative cardiovascular clearance     Gout     H/O echocardiogram 02/23/2024    Hyperlipidemia     Hyperparathyroidism (Multi)     Hypertension      Insomnia     Malignant neoplasm of lower-outer quadrant of right breast of female, estrogen receptor negative     OA (osteoarthritis)     Obesity     Persistent atrial fibrillation (Multi)     Presence of automatic (implantable) cardiac defibrillator     Cardiac defibrillator in place    Rheumatic heart failure     s/p aortic valve replacement 1986    Sleep apnea     Stroke (Multi)     hemorrhagic stroke          Past Surgical History:   Procedure Laterality Date    AORTIC VALVE REPLACEMENT  1986    redo in 2007 with bioprosthetic aortic valve    CARDIAC CATHETERIZATION  05/24/2023    CARDIAC CATHETERIZATION N/A 6/6/2024    Procedure: Right Heart Cath;  Surgeon: Hieu Jack MD;  Location: [facility] Cardiac Cath Lab;  Service: Cardiovascular;  Laterality: N/A;    CARDIAC ELECTROPHYSIOLOGY PROCEDURE N/A 10/30/2023    Procedure: ICD UPGRADE, DUAL TO BIV;  Surgeon: Kendra Hong MD;  Location: [place] [address] Cardiac Cath Lab;  Service: Electrophysiology;  Laterality: N/A;    CARDIOVERSION  2015    TOTAL KNEE ARTHROPLASTY  04/29/2015    Total Knee Arthroplasty          Social History:   reports that she quit smoking about 30 years ago. Her smoking use included cigarettes. She started smoking about 50 years ago. She has a 20 pack-year smoking history. She has never used smokeless tobacco. She reports that she does not drink alcohol and does not use drugs.      Allergies:  Allergies   Allergen Reactions    Dofetilide Anaphylaxis    Aspirin Rash    Diltiazem Hcl Itching    Lisinopril Cough and Dry mouth    Phenytoin Hives and Rash       Outpatient Medications:  Current Outpatient Medications   Medication Instructions    albuterol 90 mcg/actuation inhaler 2 puffs, inhalation, Every 4 hours PRN    allopurinol (ZYLOPRIM) 300 mg, oral, Daily    allopurinol (ZYLOPRIM) 300 mg, oral, Daily    anastrozole (ARIMIDEX) 1 mg, oral, Daily, Swallow whole with a drink of water.    calcitriol (ROCALTROL) 0.25 mcg, oral, Daily     "dapagliflozin propanediol (FARXIGA) 10 mg, oral, Daily    fluocinonide (Fluocinonide-E) 0.05 % cream Topical, 2 times daily    lidocaine (Xylocaine) 5 % ointment 1 Application, 3 times daily PRN    metoprolol succinate XL (TOPROL-XL) 100 mg, oral, Daily    sacubitriL-valsartan (Entresto) 49-51 mg tablet 1 tablet, oral, 2 times daily    simvastatin (ZOCOR) 20 mg, oral, Nightly    spironolactone (ALDACTONE) 25 mg, oral, Daily    torsemide (DEMADEX) 20 mg, oral, See admin instructions, Please take 40mg on Monday, Wednesday, and Friday. Take 20mg other days of the week.    traMADol (ULTRAM) 50 mg, Every 6 hours PRN    warfarin (Coumadin) 5 mg tablet TAKE 1.5 TABLET DAILY AS DIRECTED BY COUMADIN CLINIC  Strength: 5 mg       ROS     Physical Exam:  Vitals:    11/19/24 0911   BP: 90/61   BP Location: Right arm   Patient Position: Sitting   BP Cuff Size: Large adult   Pulse: 78   SpO2: 95%   Weight: 118 kg (260 lb 9 oz)   Height: 1.651 m (5' 5\")     Wt Readings from Last 5 Encounters:   11/19/24 118 kg (260 lb 9 oz)   10/14/24 118 kg (261 lb 0.4 oz)   09/16/24 119 kg (262 lb 6.4 oz)   08/28/24 119 kg (261 lb 14.5 oz)   08/19/24 120 kg (263 lb 8 oz)     Body mass index is 43.36 kg/m².   She is in no acute distress.  Difficult to  neck veins.  Regular rhythm no gallop.  Clear lungs.  Soft abdomen.  Some ankle edema with intact lower extremity pulses which may be somewhat reduced     Last Labs:  CMP:  Recent Labs     08/13/24  1045 06/03/24  1020 05/03/24  0924 02/19/24  1255 12/31/23  1626 10/12/23  1150    140 147*  --  140 143   K 4.3 4.3 4.7  --  3.7 4.6    104 107  --  108* 104   CO2 30 27 30  --  23 29   ANIONGAP 14 13 15  --  13 15   BUN 30* 26* 27*  --  25* 27*   CREATININE 2.02* 2.05* 2.64* 2.14* 1.83* 1.77*   EGFR 26* 26* 19* 25* 30* 31*   GLUCOSE 97 93 96  --  87 106*     Recent Labs     08/13/24  1045 06/03/24  1020 05/03/24  0924 12/31/23  1626 10/12/23  1116 03/20/23  0346 12/24/22  2045 " 11/02/22  1321 09/05/22  2132 01/19/22  1223 03/05/21  2221 11/08/19  1501 08/06/19  1111   ALBUMIN 4.3 4.2 4.1 3.9 4.2 4.2   < > 4.1 3.4   < > 3.8   < > 4.2   ALKPHOS  --   --  59 45  --  45  --  55 40   < > 72   < > 78   ALT  --   --  8 8  --  10  --  11 9   < > 62*   < > 14   AST  --   --  11 15  --  20  --  11 11   < > 51*   < > 23   BILITOT  --   --  0.8 1.6*  --  1.2  --  1.0 0.8   < > 0.7   < > 1.6*   LIPASE  --   --   --   --   --   --   --   --   --   --  77  --  15    < > = values in this interval not displayed.     CBC:  Recent Labs     08/13/24  1045 06/03/24  1020 05/03/24  0924 12/31/23  1626 10/12/23  1150   WBC 3.4* 2.8* 3.9* 4.5 3.9*   HGB 13.8 13.2 12.9 12.0 13.6   HCT 43.3 41.4 39.9 35.2* 42.7   * 129* 169 95* 159   MCV 93 93 93 87 93     COAG:   Recent Labs     11/02/24  0849 10/11/24  0916 10/05/24  0000 09/25/24  1111 09/18/24  1029   INR 2.90 2.60 2.80 3.30 3.00     HEME/ENDO:  Recent Labs     05/03/24  0924 11/02/22  1321 01/19/22  1223 10/25/20  0746 03/25/20  0708   TSH 4.92* 4.17* 2.72  --  4.63*   HGBA1C 5.8* 5.7*  --  5.6  --       CARDIAC:   Recent Labs     03/20/23  1919 03/20/23  0545 03/20/23  0346 09/05/22  2214 09/05/22  2133 07/20/21  1039 02/25/21  1159 10/25/20  0746 10/24/20  1435   TROPHS CANCELED  CANCELED 12 13 11 13  --   --   --   --    BNP  --   --  380*  --   --  223* 181* 381* 209*     Recent Labs     05/03/24  0924 01/19/22  1223 03/14/19  1228   CHOL 158 187 146   LDLF  --  106* 77   HDL 62.8 68.3 55.3   TRIG 109 62 68       Last Cardiology Tests:  ECG:      Echo:  Echo Results:  Transthoracic Echo (TTE) Complete 03/04/2024    [facility], [address] [place] [address]  Tel [phone] and Fax [phone]    TRANSTHORACIC ECHOCARDIOGRAM REPORT      Patient Name:      MALIK Martinez Physician:    [id number] Ulysses Alarcon MD  Study Date:        3/4/2024             Ordering Provider:    Massiel JAMES  BRIDGET TOPETE  MRN/PID:           [id number]             Fellow:  Accession#:        [id number]         Nurse:  Date of Birth/Age: 1955 / 68 years Sonographer:          Belkys Perrin DARELL  Gender:            F                    Additional Staff:  Height:            165.10 cm            Admit Date:  Weight:            117.93 kg            Admission Status:     Outpatient  BSA / BMI:         2.21 m2 / 43.27      Encounter#:           [id number]  kg/m2  Department Location:  [facility]  Echo Lab  Blood Pressure: 108 /73 mmHg    Study Type:    TRANSTHORACIC ECHO (TTE) COMPLETE  Diagnosis/ICD: Chronic systolic (congestive) heart failure (CHF)-I50.22  Indication:    Congestive Heart Failure  CPT Code:      Echo Complete w Full Doppler-[id number]    Patient History:  BMI:               Obese >30  Pertinent History: A-Fib, Cardiomyopathy, HTN, CHF, CVA and Palpitations. AVR  1986 and 2007, #21 Bhaork-Shiley,CKD,QI,Rheurmatic  fever,Cardioversion.    Study Detail: The following Echo studies were performed: 2D, M-Mode, Doppler and  color flow. Technically challenging study due to body habitus.  Patient has a defibrillator.      PHYSICIAN INTERPRETATION:  Left Ventricle: The left ventricular systolic function is moderately decreased, with an estimated ejection fraction of 30-35%. The patient is in atrial fibrillation which may influence the estimate of left ventricular function and transvalvular flows. There is global hypokinesis of the left ventricle with minor regional variations. The left ventricular cavity size is mildly dilated. The left ventricular septal wall thickness is mildly increased. There is mild eccentric left ventricular hypertrophy. Abnormal (paradoxical) septal motion consistent with post-operative status. Left ventricular diastolic filling was indeterminate.  Left Atrium: The left atrium is mild to moderately dilated.  Right Ventricle: The right ventricle is slightly enlarged. There is mildly reduced  right ventricular systolic function. A device is visualized in the right ventricle.  Right Atrium: The right atrium is moderately dilated.  Aortic Valve: There is a prosthetic aortic valve present. The aortic valve dimensionless index is 0.59. There is a Fernie-Shiley bioprosthetic aortic valve, with a 21 mm reported size. There is no aditi-prosthetic aortic valve regurgitation. Echo findings are consistent with normal aortic valve prosthesis structure and function. There is no evidence of aortic valve regurgitation. The peak instantaneous gradient of the aortic valve is 7.6 mmHg. The mean gradient of the aortic valve is 3.9 mmHg.  Mitral Valve: The mitral valve is mildly thickened. There is mild mitral valve regurgitation.  Tricuspid Valve: The tricuspid valve is structurally normal. There is mild to moderate tricuspid regurgitation. The Doppler estimated RVSP is mildly elevated at 39.9 mmHg.  Pulmonic Valve: The pulmonic valve is not well visualized. The pulmonic valve regurgitation was not well visualized.  Pericardium: There is a trivial pericardial effusion.  Aorta: The aortic root is normal.  Systemic Veins: The inferior vena cava appears dilated. There is less than 50% IVC collapse with inspiration.  In comparison to the previous echocardiogram(s): Compared with study from 8/28/2023, no significant change.      CONCLUSIONS:  1. Left ventricular systolic function is moderately decreased with a 30-35% estimated ejection fraction.  2. Abnormal septal motion consistent with post-operative status.  3. There is mild eccentric left ventricular hypertrophy.  4. There is mildly reduced right ventricular systolic function.  5. The left atrium is mild to moderately dilated.  6. The right atrium is moderately dilated.  7. Mild to moderate tricuspid regurgitation visualized.  8. Mildly elevated RVSP.  9. There is a bioprosthetic aortic valve.  10. Patient refused Definity.  11. The patient is in atrial fibrillation which  may influence the estimate of left ventricular function and transvalvular flows.  12. There is global hypokinesis of the left ventricle with minor regional variations.    QUANTITATIVE DATA SUMMARY:  2D MEASUREMENTS:  Normal Ranges:  IVSd:          1.25 cm    (0.6-1.1cm)  LVPWd:         0.91 cm    (0.6-1.1cm)  LVIDd:         5.47 cm    (3.9-5.9cm)  LVIDs:         4.68 cm  LV Mass Index: 105.7 g/m2  LV % FS        14.4 %    LA VOLUME:  Normal Ranges:  LA Vol A4C:        85.8 ml    (22+/-6mL/m2)  LA Vol A2C:        91.2 ml  LA Vol BP:         91.2 ml  LA Vol Index A4C:  38.8 ml/m2  LA Vol Index A2C:  41.2 ml/m2  LA Vol Index BP:   41.2 ml/m2  LA Area A4C:       26.0 cm2  LA Area A2C:       26.0 cm2  LA Major Axis A4C: 6.7 cm  LA Major Axis A2C: 6.3 cm  LA Volume Index:   40.0 ml/m2  LA Vol A4C:        76.4 ml  LA Vol A2C:        84.1 ml    RA VOLUME BY A/L METHOD:  Normal Ranges:  RA Vol A4C:        104.8 ml   (8.3-19.5ml)  RA Vol Index A4C:  47.4 ml/m2  RA Area A4C:       30.0 cm2  RA Major Axis A4C: 7.3 cm    M-MODE MEASUREMENTS:  Normal Ranges:  Ao Root: 2.90 cm (2.0-3.7cm)  LAs:     6.30 cm (2.7-4.0cm)    AORTA MEASUREMENTS:  Normal Ranges:  Ao Arch: 2.90 cm (2.0-3.6cm)    LV SYSTOLIC FUNCTION BY 2D PLANIMETRY (MOD):  Normal Ranges:  EF-A4C View: 31.5 % (>=55%)  EF-A2C View: 35.1 %  EF-Biplane:  33.6 %    LV DIASTOLIC FUNCTION:  Normal Ranges:  MV Peak E:    1.05 m/s    (0.7-1.2 m/s)  MV Peak A:    0.29 m/s    (0.42-0.7 m/s)  E/A Ratio:    3.69        (1.0-2.2)  MV e'         0.10 m/s    (>8.0)  MV lateral e' 0.10 m/s  MV medial e'  0.11 m/s  MV A Dur:     112.27 msec  E/e' Ratio:   10.04       (<8.0)    MITRAL VALVE:  Normal Ranges:  MV DT: 133 msec (150-240msec)    AORTIC VALVE:  Normal Ranges:  AoV Vmax:                1.38 m/s (<=1.7m/s)  AoV Peak P.6 mmHg (<20mmHg)  AoV Mean PG:             3.9 mmHg (1.7-11.5mmHg)  LVOT Max Wing:            0.86 m/s (<=1.1m/s)  AoV VTI:                 25.70 cm  (18-25cm)  LVOT VTI:                15.19 cm  LVOT Diameter:           2.11 cm  (1.8-2.4cm)  AoV Area, VTI:           2.07 cm2 (2.5-5.5cm2)  AoV Area,Vmax:           2.20 cm2 (2.5-4.5cm2)  AoV Dimensionless Index: 0.59      RIGHT VENTRICLE:  RV Basal 4.20 cm  RV Mid   2.50 cm  RV Major 6.9 cm  TAPSE:   15.9 mm  RV s'    0.09 m/s    TRICUSPID VALVE/RVSP:  Normal Ranges:  Peak TR Velocity: 2.50 m/s  RV Syst Pressure: 39.9 mmHg (< 30mmHg)  IVC Diam:         2.90 cm    PULMONIC VALVE:  Normal Ranges:  PV Accel Time: 94 msec  (>120ms)  PV Max Wing:    0.8 m/s  (0.6-0.9m/s)  PV Max P.5 mmHg      [id number] Ulysses Alarcon MD  Electronically signed on 3/4/2024 at 11:06:08 AM        ** Final **       Cath:      Stress Test:  Stress Results:  No results found for this or any previous visit from the past 365 days.         Cardiac Imaging:  ECG 12 Lead  Electronic ventricular pacemaker  When compared with ECG of 24-MAY-2024 09:10,  Vent. rate has decreased BY   9 BPM  Confirmed by Nathanael Sanon ([id number]) on 2024 7:35:50 AM        Assessment/Plan       History of Fernie Shiley  valve in the aortic position #21 in , freestyle aortic root 27 mm implanted   Prior hemorrhagic stroke, atrial fibrillation on warfarin, hypertension, gout, advanced CKD, severe LV dysfunction 30% EF.  Seems stable from a cardiovascular standpoint on a reasonable GDMT regimen.  She will follow-up with us in 6 months she continues to follow with nephrology and get surveillance labs fairly regularly.  She continues to follow in warfarin clinic doing well.  I scheduled her back to see us in 6 months I prescribed her with an updated handicap placard for 5 times  Orders:  No orders of the defined types were placed in this encounter.     Followup Appts:  Future Appointments   Date Time Provider Department Center   2024 10:00 AM [facility] [id number] DEXA [id number] [facility]   2024  9:00 AM ANTICOAG [facility] [id number] COAG CLINIC [id number] [facility]    12/4/2024 12:30 PM MARCELO Ramirez [id number] [facility]   1/6/2025  8:00 AM Claudine Middleton MD [id number] [facility]   1/14/2025  9:00 AM Safia Gerber MD [id number] [facility]   2/20/2025  9:00 AM [facility] 2 [facility]   2/20/2025 10:00 AM Mary Sosa MD [id number] [facility]           ____________________________________________________________  Herberth Gandara MD    Senior Attending Physician  [facility]  [facility]

## 2024-11-21 ENCOUNTER — HOSPITAL ENCOUNTER (OUTPATIENT)
Dept: RADIOLOGY | Facility: CLINIC | Age: 69
Discharge: HOME | End: 2024-11-21
Payer: MEDICAID

## 2024-11-21 DIAGNOSIS — Z17.0 MALIGNANT NEOPLASM OF LOWER-OUTER QUADRANT OF RIGHT BREAST OF FEMALE, ESTROGEN RECEPTOR POSITIVE: ICD-10-CM

## 2024-11-21 DIAGNOSIS — C50.511 MALIGNANT NEOPLASM OF LOWER-OUTER QUADRANT OF RIGHT BREAST OF FEMALE, ESTROGEN RECEPTOR POSITIVE: ICD-10-CM

## 2024-11-21 PROCEDURE — 77080 DXA BONE DENSITY AXIAL: CPT | Performed by: RADIOLOGY

## 2024-11-21 PROCEDURE — 77080 DXA BONE DENSITY AXIAL: CPT

## 2024-11-26 NOTE — PROGRESS NOTES
[facility]  Trinidad Hines female   1955 69 y.o.  [id number]      Chief Complaint  Follow up clinical exam, history right breast cancer.    History Of Present Illness  Trinidad Hines is a very pleasant 69 y.o. AA female diagnosed 3/27/2024 with right breast invasive ductal carcinoma (IDC), grade 1, ER>95%, CA+95, HER2 negative. On 2024 She underwent a right partial mastectomy with 5.6 mm grade 1IDC with associated 4.8 cm of ductal carcinoma in situ (DCIS). DCIS is at the new superior margin. She met with rad/onc and med/onc on 2024 and opted to proceed with the right re-excision. On 2024 She had the right re-excision of superior margin with residual DCIS present at final margin. She has extensive cardiac co morbidities including Afib, s/p AVR and defibrillator in place. EF 30%. Prior stroke. On coumadin. Non-smoker. She started Anastrozole on 2024 and is tolerating it. She was referred to genetics. She has no known family history of breast cancer. She presents today for clinical exam.   Tage IA, pT1b pNx cM0    REPRODUCTIVE HISTORY: menarche age 12, , first birth age 14, did not breastfeed, OCP's x 0-5 years, natural menopause age 39, no HRT, scattered fibroglandular tissue    FAMILY CANCER HISTORY:   Daughter: cancer unknown type, age 38  Sister: cancer unknown type  Brother: cancer unknown type  Niece: cancer unknown type    Review of Systems  Constitutional:  Negative for appetite change, fatigue, fever and unexpected weight change.   HENT:  Negative for ear pain, hearing loss, nosebleeds, sore throat and trouble swallowing.    Eyes:  Negative for discharge, itching and visual disturbance.   Breast: As stated in HPI.  Respiratory:  Negative for cough, chest tightness and shortness of breath.    Cardiovascular:  Negative for chest pain, palpitations and leg swelling.   Gastrointestinal:  Negative for abdominal pain, constipation, diarrhea and nausea.   Endocrine:  Negative for cold intolerance and heat intolerance.   Genitourinary:  Negative for dysuria, frequency, hematuria, pelvic pain and vaginal bleeding.   Musculoskeletal:  Negative for arthralgias, back pain, gait problem, joint swelling and myalgias.   Skin:  Negative for color change and rash.   Allergic/Immunologic: Negative for environmental allergies and food allergies.   Neurological:  Negative for dizziness, tremors, speech difficulty, weakness, numbness and headaches.   Hematological:  Does not bruise/bleed easily.   Psychiatric/Behavioral:  Negative for agitation, dysphoric mood and sleep disturbance. The patient is not nervous/anxious.         Past Medical History  She has a past medical history of Cardiology follow-up encounter, Cardiomyopathy, CHF (congestive heart failure), Chronic kidney disease, stage 3 unspecified (Multi), Chronic systolic heart failure, Coronary artery disease, Dilated cardiomyopathy (Multi), Encounter for pre-operative cardiovascular clearance, Gout, H/O echocardiogram (02/23/2024), Hyperlipidemia, Hyperparathyroidism (Multi), Hypertension, Insomnia, Malignant neoplasm of lower-outer quadrant of right breast of female, estrogen receptor negative, OA (osteoarthritis), Obesity, Persistent atrial fibrillation (Multi), Presence of automatic (implantable) cardiac defibrillator, Rheumatic heart failure, Sleep apnea, and Stroke (Multi).    Surgical History  She has a past surgical history that includes Aortic valve replacement (1986); Total knee arthroplasty (04/29/2015); Cardiac electrophysiology procedure (N/A, 10/30/2023); Cardioversion (2015); Cardiac catheterization (05/24/2023); and Cardiac catheterization (N/A, 6/6/2024).    Family History  Cancer-related family history includes Cancer in her brother.     Social History  She reports that she quit smoking about 30 years ago. Her smoking use included cigarettes. She started smoking about 50 years ago. She has a 20 pack-year smoking  history. She has never used smokeless tobacco. She reports that she does not drink alcohol and does not use drugs.    Allergies  Dofetilide, Aspirin, Diltiazem hcl, Lisinopril, and Phenytoin    Medications  Current Outpatient Medications   Medication Instructions    albuterol 90 mcg/actuation inhaler 2 puffs, inhalation, Every 4 hours PRN    allopurinol (ZYLOPRIM) 300 mg, oral, Daily    allopurinol (ZYLOPRIM) 300 mg, oral, Daily    anastrozole (ARIMIDEX) 1 mg, oral, Daily, Swallow whole with a drink of water.    calcitriol (ROCALTROL) 0.25 mcg, oral, Daily    dapagliflozin propanediol (FARXIGA) 10 mg, oral, Daily    fluocinonide (Fluocinonide-E) 0.05 % cream Topical, 2 times daily    lidocaine (Xylocaine) 5 % ointment 1 Application, 3 times daily PRN    metoprolol succinate XL (TOPROL-XL) 100 mg, oral, Daily    sacubitriL-valsartan (Entresto) 49-51 mg tablet 1 tablet, oral, 2 times daily    simvastatin (ZOCOR) 20 mg, oral, Nightly    spironolactone (ALDACTONE) 25 mg, oral, Daily    torsemide (DEMADEX) 20 mg, oral, See admin instructions, Please take 40mg on Monday, Wednesday, and Friday. Take 20mg other days of the week.    traMADol (ULTRAM) 50 mg, Every 6 hours PRN    warfarin (Coumadin) 5 mg tablet TAKE 1.5 TABLET DAILY AS DIRECTED BY COUMADIN CLINIC  Strength: 5 mg       Last Recorded Vitals  There were no vitals filed for this visit.      Physical Exam  Patient is alert and oriented x3 and in a relaxed and appropriate mood. Her gait is unsteady and uses a wheeled walker. Sclera is clear. The breasts are nearly symmetrical. The tissue is soft without palpable abnormalities, discrete nodules or masses. The skin and nipples appear normal. There is no cervical, supraclavicular or axillary lymphadenopathy. Heart rate and rhythm normal, S1 and S2 appreciated. The lungs are clear to auscultation bilaterally. Abdomen is soft and non-tender.        Visit Diagnosis  1. Encounter for follow-up surveillance of breast cancer         2. Use of anastrozole (Arimidex)              Assessment/Plan  Breast cancer surveillance, normal clinical exam, history right breast cancer, partial mastectomy, Anastrozole, no family history of breast cancer, scattered fibroglandular tissue    Plan:  Return February 2025 for bilateral diagnostic mammogram and office visit. Continue Anastrozole.    Patient Discussion/Summary  Your clinical examination is normal. Please return in February 2025 for mammogram and office visit or sooner if you have any problems or concerns.     IMPORTANT INFORMATION REGARDING YOUR RESULTS    If you receive medical information from My [facility] Personal Health Record (online chart) your results will be released into your chart. This means you may view or see results of your biopsy or procedure before I contact you directly. If this occurs, please call the office and we will discuss your results over the phone.    You can see your health information, review clinical summaries from office visits & test results online when you follow your health with MY  Chart, a personal health record. To sign up go to [email or web address]. If you need assistance with signing up or trouble getting into your account call [facility] 24/7 at [phone].    My office phone number is [phone]  if you need to get in touch with me or have additional questions or concerns. Thank you for choosing [facility] and trusting me as your healthcare provider. I look forward to seeing you again at your next office visit. I am honored to be a provider on your health care team and I remain dedicated to helping you achieve your health goals.       Crystal Washington, DOMINICK-CNP

## 2024-11-30 ENCOUNTER — ANTICOAGULATION - WARFARIN VISIT (OUTPATIENT)
Dept: CARDIOLOGY | Facility: CLINIC | Age: 69
End: 2024-11-30
Payer: MEDICAID

## 2024-11-30 DIAGNOSIS — I48.19 PERSISTENT ATRIAL FIBRILLATION (MULTI): Primary | ICD-10-CM

## 2024-11-30 LAB
POC INR: 3.1
POC PROTHROMBIN TIME: NORMAL

## 2024-11-30 PROCEDURE — 85610 PROTHROMBIN TIME: CPT | Mod: QW

## 2024-11-30 PROCEDURE — 99211 OFF/OP EST MAY X REQ PHY/QHP: CPT

## 2024-11-30 NOTE — PROGRESS NOTES
Patient identification verified with 2 identifiers.    Location: [facility] at [facility] - [address] [address] [phone] option #1     Referring Physician: DR. CASANOVA  Enrollment/ Re-enrollment date: 11/4/2025   INR Goal: 2.0-3.0  INR monitoring is per [facility] protocol.  Anticoagulation Medication: warfarin  Indication: Atrial Fibrillation/Atrial Flutter    Subjective   Bleeding signs/symptoms: No    Bruising: No   Major bleeding event: No  Thrombosis signs/symptoms: No  Thromboembolic event: No  Missed doses: No  Extra doses: No  Medication changes: No  Dietary changes: No  PT WILL EAT SOME GREEN VEGETABLES TODAY  Change in health: No  Change in activity: No  Alcohol: No  Other concerns: No    Upcoming Procedures:  Does the Patient Have any upcoming procedures that require interruption in anticoagulation therapy? no  Does the patient require bridging? no      Anticoagulation Summary  As of 11/30/2024      INR goal:  2.0-3.0   TTR:  66.0% (1.2 y)   INR used for dosing:  3.10 (11/30/2024)   Weekly warfarin total:  42.5 mg               Assessment/Plan   Supratherapeutic     1. New dose: no change    2. Next INR: 1 week      Education provided to patient during the visit:  Patient instructed to call in interim with questions, concerns and changes.   Patient educated on dietary consistency in vitamin k consumption.   Patient educated on compliance with dosing, follow up appointments, and prescribed plan of care.

## 2024-12-04 ENCOUNTER — APPOINTMENT (OUTPATIENT)
Dept: SURGICAL ONCOLOGY | Facility: CLINIC | Age: 69
End: 2024-12-04
Payer: MEDICAID

## 2024-12-04 DIAGNOSIS — Z79.811 USE OF ANASTROZOLE (ARIMIDEX): ICD-10-CM

## 2024-12-04 DIAGNOSIS — Z08 ENCOUNTER FOR FOLLOW-UP SURVEILLANCE OF BREAST CANCER: Primary | ICD-10-CM

## 2024-12-04 DIAGNOSIS — Z85.3 ENCOUNTER FOR FOLLOW-UP SURVEILLANCE OF BREAST CANCER: Primary | ICD-10-CM

## 2024-12-06 ENCOUNTER — ANTICOAGULATION - WARFARIN VISIT (OUTPATIENT)
Dept: CARDIOLOGY | Facility: CLINIC | Age: 69
End: 2024-12-06
Payer: MEDICAID

## 2024-12-06 ENCOUNTER — APPOINTMENT (OUTPATIENT)
Dept: CARDIOLOGY | Facility: CLINIC | Age: 69
End: 2024-12-06
Payer: MEDICAID

## 2024-12-06 DIAGNOSIS — I48.19 PERSISTENT ATRIAL FIBRILLATION (MULTI): Primary | ICD-10-CM

## 2024-12-09 ENCOUNTER — HOSPITAL ENCOUNTER (OUTPATIENT)
Dept: CARDIOLOGY | Facility: CLINIC | Age: 69
Discharge: HOME | End: 2024-12-09
Payer: MEDICAID

## 2024-12-09 DIAGNOSIS — I42.0 DILATED CARDIOMYOPATHY (MULTI): ICD-10-CM

## 2024-12-09 DIAGNOSIS — Z95.810 PRESENCE OF AUTOMATIC (IMPLANTABLE) CARDIAC DEFIBRILLATOR: ICD-10-CM

## 2024-12-09 PROCEDURE — 93296 REM INTERROG EVL PM/IDS: CPT

## 2024-12-10 DIAGNOSIS — I42.9 CARDIOMYOPATHY, UNSPECIFIED TYPE (MULTI): ICD-10-CM

## 2024-12-11 ENCOUNTER — TELEPHONE (OUTPATIENT)
Dept: CARDIOLOGY | Facility: HOSPITAL | Age: 69
End: 2024-12-11

## 2024-12-11 RX ORDER — SPIRONOLACTONE 25 MG/1
25 TABLET ORAL DAILY
Qty: 90 TABLET | Refills: 1 | Status: SHIPPED | OUTPATIENT
Start: 2024-12-11

## 2024-12-14 ENCOUNTER — ANTICOAGULATION - WARFARIN VISIT (OUTPATIENT)
Dept: CARDIOLOGY | Facility: CLINIC | Age: 69
End: 2024-12-14
Payer: MEDICAID

## 2024-12-14 DIAGNOSIS — I48.19 PERSISTENT ATRIAL FIBRILLATION (MULTI): Primary | ICD-10-CM

## 2024-12-14 LAB
POC INR: 2.4
POC PROTHROMBIN TIME: NORMAL

## 2024-12-14 PROCEDURE — 85610 PROTHROMBIN TIME: CPT | Mod: QW

## 2024-12-14 PROCEDURE — 99211 OFF/OP EST MAY X REQ PHY/QHP: CPT

## 2024-12-14 NOTE — PROGRESS NOTES
Patient identification verified with 2 identifiers.    Location: [facility] at [facility] - [address] [address] [phone] option #1     Referring Physician: DR. CASANOVA  Enrollment/ Re-enrollment date: 2025   INR Goal: 2.0-3.0  INR monitoring is per [facility] protocol.  Anticoagulation Medication: warfarin  Indication: Atrial Fibrillation/Atrial Flutter    Subjective   Bleeding signs/symptoms: No    Bruising: No   Major bleeding event: No  Thrombosis signs/symptoms: No  Thromboembolic event: No  Missed doses: No  Extra doses: No  Medication changes: No  Dietary changes: No  Change in health: No  Change in activity: No  Alcohol: No  Other concerns: No    Upcoming Procedures:  Does the Patient Have any upcoming procedures that require interruption in anticoagulation therapy? no  Does the patient require bridging? no      Anticoagulation Summary  As of 2024      INR goal:  2.0-3.0   TTR:  66.7% (1.2 y)   INR used for dosin.40 (2024)   Weekly warfarin total:  42.5 mg               Assessment/Plan   THERAPEUTIC      Education provided to patient during the visit:  Patient instructed to call in interim with questions, concerns and changes.   Patient educated on dietary consistency in vitamin k consumption.   Patient educated on compliance with dosing, follow up appointments, and prescribed plan of care.

## 2025-01-02 NOTE — PROGRESS NOTES
.Patient Visit Information:   Patient name: Trinidad Hines  : 1955  Date of Service: 2025    Visit Type: Follow-up      Cancer History:          Breast         AJCC Edition: 8th (AJCC), Diagnosis Date:           Cancer Staging   Malignant neoplasm of lower-outer quadrant of right breast of female, estrogen receptor positive, Staging form: Breast, AJCC 8th Edition, Clinical stage from 2024: Stage IA (cT1, cN0, cM0, G1, ER+, NE+, HER2-) - Signed by Mary Sosa MD on 2024  Malignant neoplasm of lower-outer quadrant of right breast of female, estrogen receptor positive, Staging form: Breast, AJCC 8th Edition, Pathologic stage from 2024: Stage Unknown (pT1b, pNX, cM0, G1, ER+, NE+, HER2-) - Signed by Mary Sosa MD on 2024     Treatment Synopsis:       Trinidad Hines is a 69 y.o. post-menopausal AA female with right-sided invasive ductal carcinoma, clinical stage IA (cT1, cN0, cM0, G1, ER+, NE+, HER2-).  The patient's breast cancer was diagnosed on 3/27/2024 and is grade 1, estrogen receptor positive at >95%, progesterone receptor positive at 95%, and HER-2/yue negative.     PMHx - A-fib, Cadiomyopathy (EF 30%), s/p AVR, CKD (stage 3), non-traumatic intracerebral hemorrhage, depression, cardiac defibrillator.      CURRENT THERAPY: Endocrine therapy planned     ONCOLOGIC HISTORY:  Details of her breast cancer history are as follows:     No abnormal mammograms, breast biopsies, or breast surgeries.  2024 - B/L screening mammogram.  Left cardiac device.  Scattered fibroglandular tissue bilaterally.  Left breast negative.  Indeterminate right breast mass.  Indeterminate right breast calcifications.  BI-RADS 0.  3/1/2024 -  Right breast dx imaging.  Right breast calcifications are probably benign.  6-month follow-up is recommended.  Right breast ultrasound.  At 8:00 5 cm from the nipple a 0.4 x 0.4 x 0.4 cm indeterminate mass is noted, BI-RADS 4.  Right axillary ultrasound is  negative.  3/27/2024 - Right breast mass 8:00 5 cm from the nipple biopsy.  Grade 1 invasive ductal carcinoma.  ER greater than 95, WA 95, HER2 negative.   4/18/2024 - Office visit with Dr. Sosa. Per Dr. Sosa's note on 4/18/2024 - Calcs are low suspicion. 6 mo FU vs can be localized for excision. Lumpectomy +/- RT was discussed  6/19/2024: Right-sided lumpectomy by Dr. Sosa. Final path showing IDC, grade 1. 5.6 mm. ER > 95%, WA 95%, HER2 negative (1+ IHC). pT1b. Axilla was not assessed.   Re-excision still shows residual DCIS on the margin. She is not to have another re-excision.   9/16/2024: Started anastrozole    History of Present Illness: Patient ID:  Trinidad Hines is a 69 y.o. female.     Chief Complaint and/or reason for visit: Here for a follow-up     Interval History:    Trinidad Hines is a pleasant  69 y.o. woman with history of newly diagnosed invasive ductal carcinoma of the right breast. Here for a follow-up.     She  started anastrozole on 9/16/2024.  Today, she does not express any new concerns being on anastrozole. Initially she experienced hot flashes which has since resolved. She says she is doing fine with anastrozole.     She reports 5/10 pain in her knees which is chronic and her baseline (she uses a walker).      She denies fever, chills, Wt loss, headaches, CP, SOB, N/V, abdominal pain, constipation, diarrhea, neuropathy, joint pain, extremity swelling, rashes. Appetite is good and she is drinking fine.     She comes to clinic by her self today.     Review of Systems:   A 14-point review of system was completed and was negative except for what is noted in HPI.      Allergies and Intolerances:       Allergies:   Allergies   Allergen Reactions    Dofetilide Anaphylaxis    Aspirin Rash    Diltiazem Hcl Itching    Lisinopril Cough and Dry mouth    Phenytoin Hives and Rash             Outpatient Medication Profile:   Current Outpatient Medications on File Prior to Visit   Medication Sig Dispense Refill     albuterol 90 mcg/actuation inhaler Inhale 2 puffs every 4 hours if needed for wheezing. 18 g 0    allopurinol (Zyloprim) 300 mg tablet TAKE 1 TABLET BY MOUTH EVERY DAY 30 tablet 1    allopurinol (Zyloprim) 300 mg tablet Take 1 tablet (300 mg) by mouth once daily. 90 tablet 1    anastrozole (Arimidex) 1 mg tablet Take 1 tablet (1 mg total) by mouth once daily.  Swallow whole with a drink of water. 30 tablet 11    calcitriol (Rocaltrol) 0.25 mcg capsule Take 1 capsule (0.25 mcg) by mouth once daily. 90 capsule 1    dapagliflozin propanediol (Farxiga) 10 mg Take 1 tablet (10 mg) by mouth once daily. 90 tablet 3    fluocinonide (Fluocinonide-E) 0.05 % cream Apply topically 2 times a day. 15 g 1    lidocaine (Xylocaine) 5 % ointment Apply 1 Application topically 3 times a day as needed for mild pain (1 - 3).      metoprolol succinate XL (Toprol-XL) 100 mg 24 hr tablet Take 1 tablet (100 mg) by mouth once daily. 90 tablet 1    sacubitriL-valsartan (Entresto) 49-51 mg tablet Take 1 tablet by mouth 2 times a day. 180 tablet 3    simvastatin (Zocor) 20 mg tablet Take 1 tablet (20 mg) by mouth once daily at bedtime. 90 tablet 1    spironolactone (Aldactone) 25 mg tablet TAKE 1 TABLET BY MOUTH EVERY DAY 90 tablet 1    torsemide (Demadex) 20 mg tablet Take 1 tablet (20 mg) by mouth see administration instructions. Please take 40mg on Monday, Wednesday, and Friday. Take 20mg other days of the week. 40 tablet 11    traMADol (Ultram) 50 mg tablet Take 1 tablet (50 mg) by mouth every 6 hours if needed for moderate pain (4 - 6).      warfarin (Coumadin) 5 mg tablet TAKE 1.5 TABLET DAILY AS DIRECTED BY COUMADIN CLINIC  Strength: 5 mg 135 tablet 1     No current facility-administered medications on file prior to visit.          Medical History:    Past Medical History:   Diagnosis Date    Cardiology follow-up encounter     Herberth Gandara MD next apt 8/19/24    Cardiomyopathy     CHF (congestive heart failure)     Chronic kidney  disease, stage 3 unspecified (Multi)     Chronic systolic heart failure     HFrEF    Coronary artery disease     Dilated cardiomyopathy (Multi)     Encounter for pre-operative cardiovascular clearance     Gout     H/O echocardiogram 2024    Hyperlipidemia     Hyperparathyroidism (Multi)     Hypertension     Insomnia     Malignant neoplasm of lower-outer quadrant of right breast of female, estrogen receptor negative     OA (osteoarthritis)     Obesity     Persistent atrial fibrillation (Multi)     Presence of automatic (implantable) cardiac defibrillator     Cardiac defibrillator in place    Rheumatic heart failure     s/p aortic valve replacement     Sleep apnea     Stroke (Multi)     hemorrhagic stroke       Surgical History:   Past Surgical History:   Procedure Laterality Date    AORTIC VALVE REPLACEMENT  1986    redo in  with bioprosthetic aortic valve    CARDIAC CATHETERIZATION  2023    CARDIAC CATHETERIZATION N/A 2024    Procedure: Right Heart Cath;  Surgeon: Hieu Jack MD;  Location: [facility] Cardiac Cath Lab;  Service: Cardiovascular;  Laterality: N/A;    CARDIAC ELECTROPHYSIOLOGY PROCEDURE N/A 10/30/2023    Procedure: ICD UPGRADE, DUAL TO BIV;  Surgeon: Kendra Hong MD;  Location: [address] [id number] Cardiac Cath Lab;  Service: Electrophysiology;  Laterality: N/A;    CARDIOVERSION      TOTAL KNEE ARTHROPLASTY  2015    Total Knee Arthroplasty       Social Substance History:   Social History     Tobacco Use    Smoking status: Former     Current packs/day: 0.00     Average packs/day: 1 pack/day for 20.0 years (20.0 ttl pk-yrs)     Types: Cigarettes     Start date:      Quit date:      Years since quittin.0    Smokeless tobacco: Never   Substance Use Topics    Alcohol use: Never     Social History     Substance and Sexual Activity   Drug Use Never       Family History:   Family History   Problem Relation Name Age of Onset    Heart attack Mother      Heart attack  Father      Kidney failure Sister      Cancer Brother          OBJECTIVE:     Visit Vitals  Temp:  [36.6 °C (97.9 °F)] 36.6 °C (97.9 °F)  Heart Rate:  [106] 106  BP: (113)/(75) 113/75    Pain Scale: 5/10 (chronic, in her knees)       The ECOG performance scale today is:       1 Restricted in physically strenuous activity but ambulatory and able to carry out work of a light or sedentary nature, e.g., light house work, office work    She uses a walker.       Physical Exam:      Constitutional: Well developed, awake/alert/oriented  x3, no distress, alert and cooperative   Eyes: PERRL, EOMI, clear sclera   ENMT: mucous membranes moist, no apparent injury,  no lesions seen   Head/Neck: Neck supple, no apparent injury, thyroid  without mass or tenderness, No JVD, trachea midline, no bruits   Respiratory/Thorax: Patent airways, CTAB, normal  breath sounds with good chest expansion, thorax symmetric   Cardiovascular: Regular, rate and rhythm, no murmurs,  2+ equal pulses of the extremities, normal S 1and S 2   Gastrointestinal: Nondistended, soft, non-tender,  no rebound tenderness or guarding, no masses palpable, no organomegaly, +BS, no bruits   Musculoskeletal: ROM intact, no joint swelling, normal  strength   Extremities: No LE edema   Neurological: alert and oriented x3, intact senses,  motor, response and reflexes, normal strength   Breast: s/p rt sided lumpectomy with well healed surgical incision. No palpable mass in either breast. No palpable axillary or supraclavicular lymphadenopathies bilaterally. No swelling of either upper extremity which had free range of motion.   Lymphatic: No significant lymphadenopathy   Psychological: Appropriate mood and behavior   Skin: Warm and dry, no lesions, no rashes          LAB RESULTS    Lab Results   Component Value Date    WBC 3.4 (L) 08/13/2024    HGB 13.8 08/13/2024    HCT 43.3 08/13/2024    MCV 93 08/13/2024     (L) 08/13/2024     Lab Results   Component Value Date  "   NEUTROABS 1.58 06/03/2024       No results for input(s): \"CREATININE\", \"BUN\", \"NA\", \"K\", \"CL\", \"CO2\" in the last 72 hours.  No components found for: \"CALCGFR\"  Lab Results   Component Value Date    GLUCOSE 97 08/13/2024     Lab Results   Component Value Date     08/13/2024    K 4.3 08/13/2024     08/13/2024    CO2 30 08/13/2024    BUN 30 (H) 08/13/2024    CREATININE 2.02 (H) 08/13/2024    ALT 8 05/03/2024    AST 11 05/03/2024    ALKPHOS 59 05/03/2024    BILITOT 0.8 05/03/2024     No results found for: \"FOLATE\"  No results found for: \"[id number]\"  Lab Results   Component Value Date    TSH 4.92 (H) 05/03/2024 6/19/2024: Lumpectomy path ([id number])     FINAL DIAGNOSIS   A. BREAST, RIGHT, LOWER OUTER QUADRANT, LUMPECTOMY:   -- Invasive ductal carcinoma, grade 1, see note and synoptic cancer summary.  -- Ductal carcinoma in situ, intermediate to high nuclear grade, cribriform, micropapillary and papillary patterns with comedonecrosis and microcalcifications.  -- Biopsy site changes.     Note: Immunohistochemical stains for SMMHC and p63 were performed which show the absence of myoepithelial cell layer in the focus of invasive ductal carcinoma.     B. BREAST MARGIN, RIGHT, NEW SUPERIOR, EXCISION:   -- Ductal carcinoma in situ, see note.  -- No invasive carcinoma identified.     Note: Ductal carcinoma in situ is focally present at the new inked margin.     C. BREAST MARGIN, RIGHT, NEW LATERAL, EXCISION:   -- Benign breast tissue.  -- No carcinoma identified.     D. BREAST MARGIN, RIGHT, NEW MEDIAL, EXCISION:   -- Benign breast tissue.  -- No carcinoma identified.     E. BREAST MARGIN, RIGHT, NEW ANTERIOR, EXCISION:   -- Benign breast tissue.  -- No carcinoma identified.     F. BREAST MARGIN, RIGHT, NEW INFERIOR, EXCISION:   -- Benign breast tissue with cautery artifacts.  -- No carcinoma identified.     Note: Deeper levels of sections were reviewed.     G. BREAST MARGIN, RIGHT, NEW POSTERIOR, " EXCISION:      -- Benign breast tissue.  -- No carcinoma identified.        verall Grade  Grade 1 (scores of 3, 4 or 5)   Tumor Size  Greatest dimension of largest invasive focus (Millimeters): 5.6 mm   Tumor Focality  Single focus of invasive carcinoma   Ductal Carcinoma In Situ (DCIS)  Present     Positive for extensive intraductal component (EIC)   Size (Extent) of DCIS  Estimated size (extent) of DCIS is at least (Millimeters): 48 mm     pT Category  pT1b     SPECIAL STUDIES        Estrogen Receptor (ER) Status  Positive (greater than 10% of cells demonstrate nuclear positivity)   Percentage of Cells with Nuclear Positivity  >95 %        Progesterone Receptor (PgR) Status  Positive   Percentage of Cells with Nuclear Positivity  95 %        HER2 (by immunohistochemistry)  Negative (Score 1+)   Testing Performed on Case Number  [id number]     Imaging:     No images are attached to the encounter.        Assessment and Plan:   Assessment     Trinidad Hines is a 69 y.o.  post-menopausal AA female with right-sided invasive ductal carcinoma, clinical stage IA (cT1, cN0, cM0, G1, ER+, NM+, HER2-).  The patient's breast cancer was diagnosed on 3/27/2024 and is grade 1, estrogen receptor positive at >95%, progesterone receptor positive at 95%, and HER-2/yue negative. S/p lumpectomy on 6/19/2016.     Predict tool: Predicted overall survival at 5 yrs   Surgery only:  92%  + Hormone therapy : 93%  + Chemotherapy: 92%  + Bisphosphonate: 92%      PMHx - A-fib, Cadiomyopathy (EF 30%), s/p AVR, CKD (stage 3), non-traumatic intracerebral hemorrhage, depression, cardiac defibrillator.      Previously her surgeon and myself had long discussions regarding primary endocrine therapy vs. upfront surgery. She elected to have surgery and had lumpectomy on 6/19/2024. No node sampling. Her clinical staging is IA. Her invasive component was small (5.6 mm) but DCIS component was extensive (48 mm) with focally close positive margin. I  advised re-excision of this margin but she is very hesitant to pursue more surgery.  She saw radiation oncologist (Dr. Chu) today.     She comes in today to discuss adjuvant systemic endocrine treatment options.     Given patient's small tumor size, clinically node-negative disease and hormone receptor positivity, her risk of recurrence is low. Also, due to her multiple medical co-morbidities (mentioned above), I do not recommend chemotherapy. She will however, benefit from systemic endocrine therapy to minimize her risk of recurrence.     I recommend AI x5 years.  Side effects that were discussed included but were not limited to osteoporosis, arthritis arthralgia, hot flashes, liver toxicity. After discussion of pros and cons of this recommendation,  patient elected to proceed treatment with Arimidex.     DEXA scan in 2022 showed osteopenia  (Ca and Vitamin D recommended)  11/21/2024: Osteopenia    I recommend Zometa since she has osteopenia. She has NO teeth.  She is on warfarin, for which no interaction was found with denosomab (confirmed with PharmD).   Vitamin D has been normal (last checked on 8/13/2024).    Plan:   Continue anastrozole 1mg po daily x5 years  Take Vitamin D and Calcium supplements  Check Vitamin D levels along with basic labs today  Denosomab order is in place. Will sign once basic labs are back  Repeat DEXA in 2024 showed osteopenia. Discussed denosomab today (chronic kidney disease: baseline Cr ~ 2).  MD visit at 2  months to check if she is tolerating denosomab  RTC on 2/17/2024      Claudine Middleton MD, PhD   Hematology/Oncology  [facility]

## 2025-01-06 ENCOUNTER — OFFICE VISIT (OUTPATIENT)
Dept: HEMATOLOGY/ONCOLOGY | Facility: CLINIC | Age: 70
End: 2025-01-06
Payer: MEDICAID

## 2025-01-06 VITALS — DIASTOLIC BLOOD PRESSURE: 75 MMHG | SYSTOLIC BLOOD PRESSURE: 113 MMHG | HEART RATE: 106 BPM | TEMPERATURE: 97.9 F

## 2025-01-06 DIAGNOSIS — C50.511 MALIGNANT NEOPLASM OF LOWER-OUTER QUADRANT OF RIGHT BREAST OF FEMALE, ESTROGEN RECEPTOR POSITIVE: Primary | ICD-10-CM

## 2025-01-06 DIAGNOSIS — Z17.0 MALIGNANT NEOPLASM OF LOWER-OUTER QUADRANT OF RIGHT BREAST OF FEMALE, ESTROGEN RECEPTOR POSITIVE: Primary | ICD-10-CM

## 2025-01-06 PROCEDURE — 3074F SYST BP LT 130 MM HG: CPT | Performed by: INTERNAL MEDICINE

## 2025-01-06 PROCEDURE — 1159F MED LIST DOCD IN RCRD: CPT | Performed by: INTERNAL MEDICINE

## 2025-01-06 PROCEDURE — 1126F AMNT PAIN NOTED NONE PRSNT: CPT | Performed by: INTERNAL MEDICINE

## 2025-01-06 PROCEDURE — 3078F DIAST BP <80 MM HG: CPT | Performed by: INTERNAL MEDICINE

## 2025-01-06 PROCEDURE — 99214 OFFICE O/P EST MOD 30 MIN: CPT | Performed by: INTERNAL MEDICINE

## 2025-01-06 RX ORDER — EPINEPHRINE 0.3 MG/.3ML
0.3 INJECTION SUBCUTANEOUS EVERY 5 MIN PRN
OUTPATIENT
Start: 2025-01-06

## 2025-01-06 RX ORDER — DIPHENHYDRAMINE HYDROCHLORIDE 50 MG/ML
50 INJECTION INTRAMUSCULAR; INTRAVENOUS AS NEEDED
Status: CANCELLED | OUTPATIENT
Start: 2025-01-06

## 2025-01-06 RX ORDER — ALBUTEROL SULFATE 0.83 MG/ML
3 SOLUTION RESPIRATORY (INHALATION) AS NEEDED
OUTPATIENT
Start: 2025-01-06

## 2025-01-06 RX ORDER — FAMOTIDINE 10 MG/ML
20 INJECTION INTRAVENOUS ONCE AS NEEDED
OUTPATIENT
Start: 2025-01-06

## 2025-01-06 RX ORDER — ALBUTEROL SULFATE 0.83 MG/ML
3 SOLUTION RESPIRATORY (INHALATION) AS NEEDED
Status: CANCELLED | OUTPATIENT
Start: 2025-01-06

## 2025-01-06 RX ORDER — FAMOTIDINE 10 MG/ML
20 INJECTION INTRAVENOUS ONCE AS NEEDED
Status: CANCELLED | OUTPATIENT
Start: 2025-01-06

## 2025-01-06 RX ORDER — EPINEPHRINE 0.3 MG/.3ML
0.3 INJECTION SUBCUTANEOUS EVERY 5 MIN PRN
Status: CANCELLED | OUTPATIENT
Start: 2025-01-06

## 2025-01-06 RX ORDER — DIPHENHYDRAMINE HYDROCHLORIDE 50 MG/ML
50 INJECTION INTRAMUSCULAR; INTRAVENOUS AS NEEDED
OUTPATIENT
Start: 2025-01-06

## 2025-01-06 ASSESSMENT — PAIN SCALES - GENERAL: PAINLEVEL_OUTOF10: 0-NO PAIN

## 2025-01-06 NOTE — PATIENT INSTRUCTIONS
Please obtain labs today.   Information and handout provided on [employer].   Please schedule first treatment before next follow up.  Schedule follow up with Dr. Middleton on 2/17/25.  Please call [phone] (option 5, then option 2) with symptoms, questions or concerns.

## 2025-01-08 ENCOUNTER — TELEPHONE (OUTPATIENT)
Dept: CARDIOLOGY | Facility: CLINIC | Age: 70
End: 2025-01-08
Payer: MEDICAID

## 2025-01-11 ENCOUNTER — ANTICOAGULATION - WARFARIN VISIT (OUTPATIENT)
Dept: CARDIOLOGY | Facility: CLINIC | Age: 70
End: 2025-01-11
Payer: MEDICAID

## 2025-01-11 ENCOUNTER — LAB (OUTPATIENT)
Dept: LAB | Facility: LAB | Age: 70
End: 2025-01-11
Payer: MEDICAID

## 2025-01-11 DIAGNOSIS — N18.4 HYPERTENSIVE KIDNEY DISEASE WITH STAGE 4 CHRONIC KIDNEY DISEASE (MULTI): ICD-10-CM

## 2025-01-11 DIAGNOSIS — C50.511 MALIGNANT NEOPLASM OF LOWER-OUTER QUADRANT OF RIGHT BREAST OF FEMALE, ESTROGEN RECEPTOR POSITIVE: ICD-10-CM

## 2025-01-11 DIAGNOSIS — I12.9 HYPERTENSIVE KIDNEY DISEASE WITH STAGE 4 CHRONIC KIDNEY DISEASE (MULTI): ICD-10-CM

## 2025-01-11 DIAGNOSIS — N18.32 HYPERTENSIVE KIDNEY DISEASE WITH STAGE 3B CHRONIC KIDNEY DISEASE (MULTI): ICD-10-CM

## 2025-01-11 DIAGNOSIS — I12.9 HYPERTENSIVE KIDNEY DISEASE WITH STAGE 3B CHRONIC KIDNEY DISEASE (MULTI): ICD-10-CM

## 2025-01-11 DIAGNOSIS — Z17.0 MALIGNANT NEOPLASM OF LOWER-OUTER QUADRANT OF RIGHT BREAST OF FEMALE, ESTROGEN RECEPTOR POSITIVE: ICD-10-CM

## 2025-01-11 DIAGNOSIS — Z79.01 ANTICOAGULATED ON COUMADIN: ICD-10-CM

## 2025-01-11 DIAGNOSIS — I48.19 PERSISTENT ATRIAL FIBRILLATION (MULTI): Primary | ICD-10-CM

## 2025-01-11 LAB
25(OH)D3 SERPL-MCNC: 39 NG/ML (ref 30–100)
ALBUMIN SERPL BCP-MCNC: 4 G/DL (ref 3.4–5)
ALP SERPL-CCNC: 54 U/L (ref 33–136)
ALT SERPL W P-5'-P-CCNC: 8 U/L (ref 7–45)
ANION GAP SERPL CALC-SCNC: 11 MMOL/L (ref 10–20)
AST SERPL W P-5'-P-CCNC: 12 U/L (ref 9–39)
BASOPHILS # BLD AUTO: 0.03 X10*3/UL (ref 0–0.1)
BASOPHILS NFR BLD AUTO: 0.8 %
BILIRUB SERPL-MCNC: 0.8 MG/DL (ref 0–1.2)
BUN SERPL-MCNC: 33 MG/DL (ref 6–23)
CA-I BLD-SCNC: 1.21 MMOL/L (ref 1.1–1.33)
CALCIUM SERPL-MCNC: 9.6 MG/DL (ref 8.6–10.6)
CHLORIDE SERPL-SCNC: 107 MMOL/L (ref 98–107)
CO2 SERPL-SCNC: 29 MMOL/L (ref 21–32)
CREAT SERPL-MCNC: 2.16 MG/DL (ref 0.5–1.05)
EGFRCR SERPLBLD CKD-EPI 2021: 24 ML/MIN/1.73M*2
EOSINOPHIL # BLD AUTO: 0.08 X10*3/UL (ref 0–0.7)
EOSINOPHIL NFR BLD AUTO: 2.1 %
ERYTHROCYTE [DISTWIDTH] IN BLOOD BY AUTOMATED COUNT: 13.1 % (ref 11.5–14.5)
GLUCOSE SERPL-MCNC: 115 MG/DL (ref 74–99)
HCT VFR BLD AUTO: 37.8 % (ref 36–46)
HGB BLD-MCNC: 12.3 G/DL (ref 12–16)
IMM GRANULOCYTES # BLD AUTO: 0.01 X10*3/UL (ref 0–0.7)
IMM GRANULOCYTES NFR BLD AUTO: 0.3 % (ref 0–0.9)
INR PPP: 2.3 (ref 0.9–1.1)
LYMPHOCYTES # BLD AUTO: 1.22 X10*3/UL (ref 1.2–4.8)
LYMPHOCYTES NFR BLD AUTO: 32.5 %
MCH RBC QN AUTO: 29.4 PG (ref 26–34)
MCHC RBC AUTO-ENTMCNC: 32.5 G/DL (ref 32–36)
MCV RBC AUTO: 90 FL (ref 80–100)
MONOCYTES # BLD AUTO: 0.38 X10*3/UL (ref 0.1–1)
MONOCYTES NFR BLD AUTO: 10.1 %
NEUTROPHILS # BLD AUTO: 2.03 X10*3/UL (ref 1.2–7.7)
NEUTROPHILS NFR BLD AUTO: 54.2 %
NRBC BLD-RTO: 0 /100 WBCS (ref 0–0)
PHOSPHATE SERPL-MCNC: 4 MG/DL (ref 2.5–4.9)
PLATELET # BLD AUTO: 162 X10*3/UL (ref 150–450)
POC INR: 2.3
POC PROTHROMBIN TIME: NORMAL
POTASSIUM SERPL-SCNC: 4.1 MMOL/L (ref 3.5–5.3)
PROT SERPL-MCNC: 6.2 G/DL (ref 6.4–8.2)
PROTHROMBIN TIME: 25.8 SECONDS (ref 9.8–12.8)
PTH-INTACT SERPL-MCNC: 122.1 PG/ML (ref 18.5–88)
RBC # BLD AUTO: 4.18 X10*6/UL (ref 4–5.2)
SODIUM SERPL-SCNC: 143 MMOL/L (ref 136–145)
WBC # BLD AUTO: 3.8 X10*3/UL (ref 4.4–11.3)

## 2025-01-11 PROCEDURE — 82330 ASSAY OF CALCIUM: CPT

## 2025-01-11 PROCEDURE — 80053 COMPREHEN METABOLIC PANEL: CPT

## 2025-01-11 PROCEDURE — 84100 ASSAY OF PHOSPHORUS: CPT

## 2025-01-11 PROCEDURE — 99211 OFF/OP EST MAY X REQ PHY/QHP: CPT

## 2025-01-11 PROCEDURE — 85025 COMPLETE CBC W/AUTO DIFF WBC: CPT

## 2025-01-11 PROCEDURE — 82306 VITAMIN D 25 HYDROXY: CPT

## 2025-01-11 PROCEDURE — 85610 PROTHROMBIN TIME: CPT | Mod: QW

## 2025-01-11 PROCEDURE — 85610 PROTHROMBIN TIME: CPT

## 2025-01-11 PROCEDURE — 83970 ASSAY OF PARATHORMONE: CPT

## 2025-01-11 NOTE — PROGRESS NOTES
Patient identification verified with 2 identifiers.    Location: [facility] - [address] [address] [phone] option #1     Referring Physician: DR. CASANOVA  Enrollment/ Re-enrollment date: 2025   INR Goal: 2.0-3.0  INR monitoring is per [facility] protocol.  Anticoagulation Medication: warfarin  Indication: Atrial Fibrillation/Atrial Flutter    Subjective   Bleeding signs/symptoms: No    Bruising: No   Major bleeding event: No  Thrombosis signs/symptoms: No  Thromboembolic event: No  Missed doses: No  Extra doses: No  Medication changes: No  Dietary changes: No  Change in health: No  Change in activity: No  Alcohol: No  Other concerns: No    Upcoming Procedures:  Does the Patient Have any upcoming procedures that require interruption in anticoagulation therapy? no  Does the patient require bridging? no      Anticoagulation Summary  As of 2025      INR goal:  2.0-3.0   TTR:  68.7% (1.3 y)   INR used for dosin.30 (2025)   Weekly warfarin total:  42.5 mg               Assessment/Plan   THERAPEUTIC  MAINTAIN WEEKLY DOSE  RTC IN 4 WEEKS  Education provided to patient during the visit:  Patient instructed to call in interim with questions, concerns and changes.   Patient educated on dietary consistency in vitamin k consumption.   Patient educated on compliance with dosing, follow up appointments, and prescribed plan of care.

## 2025-01-14 ENCOUNTER — APPOINTMENT (OUTPATIENT)
Dept: NEPHROLOGY | Facility: CLINIC | Age: 70
End: 2025-01-14
Payer: MEDICAID

## 2025-01-14 VITALS
SYSTOLIC BLOOD PRESSURE: 94 MMHG | WEIGHT: 265.2 LBS | HEART RATE: 52 BPM | DIASTOLIC BLOOD PRESSURE: 63 MMHG | HEIGHT: 65 IN | TEMPERATURE: 97.1 F | BODY MASS INDEX: 44.18 KG/M2

## 2025-01-14 DIAGNOSIS — I12.9 HYPERTENSIVE KIDNEY DISEASE WITH STAGE 4 CHRONIC KIDNEY DISEASE (MULTI): Primary | ICD-10-CM

## 2025-01-14 DIAGNOSIS — N18.4 HYPERTENSIVE KIDNEY DISEASE WITH STAGE 4 CHRONIC KIDNEY DISEASE (MULTI): Primary | ICD-10-CM

## 2025-01-14 LAB
APPEARANCE UR: ABNORMAL
BACTERIA #/AREA URNS AUTO: ABNORMAL /HPF
BILIRUB UR STRIP.AUTO-MCNC: NEGATIVE MG/DL
COLOR UR: ABNORMAL
CREAT UR-MCNC: 51.2 MG/DL (ref 20–320)
CREAT UR-MCNC: 51.2 MG/DL (ref 20–320)
GLUCOSE UR STRIP.AUTO-MCNC: ABNORMAL MG/DL
KETONES UR STRIP.AUTO-MCNC: NEGATIVE MG/DL
LEUKOCYTE ESTERASE UR QL STRIP.AUTO: ABNORMAL
MICROALBUMIN UR-MCNC: 10 MG/L
MICROALBUMIN/CREAT UR: 19.5 UG/MG CREAT
MUCOUS THREADS #/AREA URNS AUTO: ABNORMAL /LPF
NITRITE UR QL STRIP.AUTO: NEGATIVE
PH UR STRIP.AUTO: 5 [PH]
PROT UR STRIP.AUTO-MCNC: NEGATIVE MG/DL
PROT UR-ACNC: 6 MG/DL (ref 5–24)
PROT/CREAT UR: 0.12 MG/MG CREAT
RBC # UR STRIP.AUTO: NEGATIVE /UL
RBC #/AREA URNS AUTO: ABNORMAL /HPF
SP GR UR STRIP.AUTO: 1.01
UROBILINOGEN UR STRIP.AUTO-MCNC: NORMAL MG/DL
WBC #/AREA URNS AUTO: >50 /HPF
WBC CLUMPS #/AREA URNS AUTO: ABNORMAL /HPF

## 2025-01-14 PROCEDURE — 84156 ASSAY OF PROTEIN URINE: CPT

## 2025-01-14 PROCEDURE — 82043 UR ALBUMIN QUANTITATIVE: CPT

## 2025-01-14 PROCEDURE — 82570 ASSAY OF URINE CREATININE: CPT

## 2025-01-14 PROCEDURE — 81001 URINALYSIS AUTO W/SCOPE: CPT

## 2025-01-14 NOTE — PROGRESS NOTES
Follow up CKD    No new complaints  Occ R sided abd pain  Denies nausea, vomiting, chest pain, dyspnea  No urinary symptoms     NAD  Sclera AI s inj  MMM, no sores  Deferred secondary to COVID  No edema  No tremor  No rash  Appropriate     Stage 4 CKD; variable sCr with hemodynamics  Minimal proteinuria, recheck pending  HTN well controlled  CHF, well controlled    No changes to medications  Labs and follow up in 3-4 months

## 2025-01-27 DIAGNOSIS — I48.91 ATRIAL FIBRILLATION, UNSPECIFIED TYPE (MULTI): ICD-10-CM

## 2025-01-27 RX ORDER — WARFARIN SODIUM 5 MG/1
TABLET ORAL
Qty: 135 TABLET | Refills: 1 | Status: SHIPPED | OUTPATIENT
Start: 2025-01-27 | End: 2025-01-27 | Stop reason: SDUPTHER

## 2025-01-27 RX ORDER — WARFARIN SODIUM 5 MG/1
TABLET ORAL
Qty: 135 TABLET | Refills: 1 | Status: SHIPPED | OUTPATIENT
Start: 2025-01-27 | End: 2025-01-30 | Stop reason: SDUPTHER

## 2025-01-30 DIAGNOSIS — I48.91 ATRIAL FIBRILLATION, UNSPECIFIED TYPE (MULTI): ICD-10-CM

## 2025-01-30 RX ORDER — WARFARIN SODIUM 5 MG/1
TABLET ORAL
Qty: 135 TABLET | Refills: 1 | Status: SHIPPED | OUTPATIENT
Start: 2025-01-30 | End: 2025-01-31 | Stop reason: SDUPTHER

## 2025-01-31 DIAGNOSIS — I48.91 ATRIAL FIBRILLATION, UNSPECIFIED TYPE (MULTI): ICD-10-CM

## 2025-01-31 RX ORDER — WARFARIN SODIUM 5 MG/1
TABLET ORAL
Qty: 135 TABLET | Refills: 1 | Status: SHIPPED | OUTPATIENT
Start: 2025-01-31

## 2025-01-31 RX ORDER — WARFARIN SODIUM 5 MG/1
TABLET ORAL
Qty: 135 TABLET | Refills: 1 | Status: SHIPPED | OUTPATIENT
Start: 2025-01-31 | End: 2025-01-31 | Stop reason: SDUPTHER

## 2025-02-03 RX ORDER — WARFARIN SODIUM 5 MG/1
TABLET ORAL
Qty: 6 TABLET | OUTPATIENT
Start: 2025-02-03

## 2025-02-08 ENCOUNTER — LAB (OUTPATIENT)
Dept: LAB | Facility: HOSPITAL | Age: 70
End: 2025-02-08
Payer: MEDICAID

## 2025-02-08 ENCOUNTER — ANTICOAGULATION - WARFARIN VISIT (OUTPATIENT)
Dept: CARDIOLOGY | Facility: CLINIC | Age: 70
End: 2025-02-08
Payer: MEDICAID

## 2025-02-08 DIAGNOSIS — I48.19 PERSISTENT ATRIAL FIBRILLATION (MULTI): Primary | ICD-10-CM

## 2025-02-08 DIAGNOSIS — Z17.0 MALIGNANT NEOPLASM OF LOWER-OUTER QUADRANT OF RIGHT BREAST OF FEMALE, ESTROGEN RECEPTOR POSITIVE: ICD-10-CM

## 2025-02-08 DIAGNOSIS — C50.511 MALIGNANT NEOPLASM OF LOWER-OUTER QUADRANT OF RIGHT BREAST OF FEMALE, ESTROGEN RECEPTOR POSITIVE: ICD-10-CM

## 2025-02-08 LAB
ALBUMIN SERPL BCP-MCNC: 4.3 G/DL (ref 3.4–5)
ALP SERPL-CCNC: 54 U/L (ref 33–136)
ALT SERPL W P-5'-P-CCNC: 8 U/L (ref 7–45)
ANION GAP SERPL CALC-SCNC: 15 MMOL/L (ref 10–20)
AST SERPL W P-5'-P-CCNC: 12 U/L (ref 9–39)
BILIRUB SERPL-MCNC: 1 MG/DL (ref 0–1.2)
BUN SERPL-MCNC: 36 MG/DL (ref 6–23)
CA-I BLD-SCNC: 1.24 MMOL/L (ref 1.1–1.33)
CALCIUM SERPL-MCNC: 10.3 MG/DL (ref 8.6–10.6)
CHLORIDE SERPL-SCNC: 104 MMOL/L (ref 98–107)
CO2 SERPL-SCNC: 27 MMOL/L (ref 21–32)
CREAT SERPL-MCNC: 2.47 MG/DL (ref 0.5–1.05)
EGFRCR SERPLBLD CKD-EPI 2021: 21 ML/MIN/1.73M*2
GLUCOSE SERPL-MCNC: 107 MG/DL (ref 74–99)
POC INR: 2
POC PROTHROMBIN TIME: NORMAL
POTASSIUM SERPL-SCNC: 4.5 MMOL/L (ref 3.5–5.3)
PROT SERPL-MCNC: 6.7 G/DL (ref 6.4–8.2)
SODIUM SERPL-SCNC: 141 MMOL/L (ref 136–145)

## 2025-02-08 PROCEDURE — 85610 PROTHROMBIN TIME: CPT | Mod: QW

## 2025-02-08 PROCEDURE — 99211 OFF/OP EST MAY X REQ PHY/QHP: CPT

## 2025-02-08 NOTE — PROGRESS NOTES
Patient identification verified with 2 identifiers.    Location: [facility] at [facility] - [address] [address] [phone] option #1     Referring Physician: DR. CASANOVA  Enrollment/ Re-enrollment date: 2025   INR Goal: 2.0-3.0  INR monitoring is per [facility] protocol.  Anticoagulation Medication: warfarin  Indication: Atrial Fibrillation/Atrial Flutter    Subjective   Bleeding signs/symptoms: No    Bruising: No   Major bleeding event: No  Thrombosis signs/symptoms: No  Thromboembolic event: No  Missed doses: No  Extra doses: No  Medication changes: No  Dietary changes: No  Change in health: No  Change in activity: No  Alcohol: No  Other concerns: No    Upcoming Procedures:  Does the Patient Have any upcoming procedures that require interruption in anticoagulation therapy? no  Does the patient require bridging? no      Anticoagulation Summary  As of 2025      INR goal:  2.0-3.0   TTR:  70.4% (1.4 y)   INR used for dosin.00 (2025)   Weekly warfarin total:  42.5 mg               Assessment/Plan   THERAPEUTIC  MAINTAIN WEEKLY DOSE  RTC IN 4 WEEKS  Education provided to patient during the visit:  Patient instructed to call in interim with questions, concerns and changes.   Patient educated on dietary consistency in vitamin k consumption.   Patient educated on compliance with dosing, follow up appointments, and prescribed plan of care.

## 2025-02-10 ENCOUNTER — DOCUMENTATION (OUTPATIENT)
Dept: HEMATOLOGY/ONCOLOGY | Facility: HOSPITAL | Age: 70
End: 2025-02-10
Payer: MEDICAID

## 2025-02-10 ENCOUNTER — TELEPHONE (OUTPATIENT)
Dept: RESEARCH | Facility: HOSPITAL | Age: 70
End: 2025-02-10

## 2025-02-10 ENCOUNTER — INFUSION (OUTPATIENT)
Dept: HEMATOLOGY/ONCOLOGY | Facility: CLINIC | Age: 70
End: 2025-02-10
Payer: MEDICAID

## 2025-02-10 VITALS
TEMPERATURE: 98.1 F | DIASTOLIC BLOOD PRESSURE: 74 MMHG | OXYGEN SATURATION: 100 % | RESPIRATION RATE: 18 BRPM | SYSTOLIC BLOOD PRESSURE: 108 MMHG | BODY MASS INDEX: 43.36 KG/M2 | WEIGHT: 260.58 LBS | HEART RATE: 88 BPM

## 2025-02-10 DIAGNOSIS — Z17.0 MALIGNANT NEOPLASM OF LOWER-OUTER QUADRANT OF RIGHT BREAST OF FEMALE, ESTROGEN RECEPTOR POSITIVE: ICD-10-CM

## 2025-02-10 DIAGNOSIS — E55.9 VITAMIN D INSUFFICIENCY: Primary | ICD-10-CM

## 2025-02-10 DIAGNOSIS — C50.511 MALIGNANT NEOPLASM OF LOWER-OUTER QUADRANT OF RIGHT BREAST OF FEMALE, ESTROGEN RECEPTOR POSITIVE: ICD-10-CM

## 2025-02-10 PROCEDURE — 2500000004 HC RX 250 GENERAL PHARMACY W/ HCPCS (ALT 636 FOR OP/ED): Mod: JZ,SE | Performed by: INTERNAL MEDICINE

## 2025-02-10 PROCEDURE — 96372 THER/PROPH/DIAG INJ SC/IM: CPT

## 2025-02-10 RX ORDER — EPINEPHRINE 0.3 MG/.3ML
0.3 INJECTION SUBCUTANEOUS EVERY 5 MIN PRN
Status: DISCONTINUED | OUTPATIENT
Start: 2025-02-10 | End: 2025-02-10 | Stop reason: HOSPADM

## 2025-02-10 RX ORDER — DIPHENHYDRAMINE HYDROCHLORIDE 50 MG/ML
50 INJECTION INTRAMUSCULAR; INTRAVENOUS AS NEEDED
Status: DISCONTINUED | OUTPATIENT
Start: 2025-02-10 | End: 2025-02-10 | Stop reason: HOSPADM

## 2025-02-10 RX ORDER — ALBUTEROL SULFATE 0.83 MG/ML
3 SOLUTION RESPIRATORY (INHALATION) AS NEEDED
Status: DISCONTINUED | OUTPATIENT
Start: 2025-02-10 | End: 2025-02-10 | Stop reason: HOSPADM

## 2025-02-10 RX ORDER — CHOLECALCIFEROL (VITAMIN D3) 25 MCG
1000 TABLET ORAL DAILY
Qty: 30 TABLET | Refills: 11 | Status: SHIPPED | OUTPATIENT
Start: 2025-02-10 | End: 2026-02-10

## 2025-02-10 RX ORDER — DIPHENHYDRAMINE HYDROCHLORIDE 50 MG/ML
50 INJECTION INTRAMUSCULAR; INTRAVENOUS AS NEEDED
OUTPATIENT
Start: 2025-08-07

## 2025-02-10 RX ORDER — FAMOTIDINE 10 MG/ML
20 INJECTION INTRAVENOUS ONCE AS NEEDED
OUTPATIENT
Start: 2025-08-07

## 2025-02-10 RX ORDER — EPINEPHRINE 0.3 MG/.3ML
0.3 INJECTION SUBCUTANEOUS EVERY 5 MIN PRN
OUTPATIENT
Start: 2025-08-07

## 2025-02-10 RX ORDER — FAMOTIDINE 10 MG/ML
20 INJECTION INTRAVENOUS ONCE AS NEEDED
Status: DISCONTINUED | OUTPATIENT
Start: 2025-02-10 | End: 2025-02-10 | Stop reason: HOSPADM

## 2025-02-10 RX ORDER — ALBUTEROL SULFATE 0.83 MG/ML
3 SOLUTION RESPIRATORY (INHALATION) AS NEEDED
OUTPATIENT
Start: 2025-08-07

## 2025-02-10 RX ADMIN — DENOSUMAB 60 MG: 60 INJECTION SUBCUTANEOUS at 12:07

## 2025-02-10 ASSESSMENT — PAIN SCALES - GENERAL: PAINLEVEL_OUTOF10: 0-NO PAIN

## 2025-02-10 NOTE — PATIENT INSTRUCTIONS
Patient denies any recent or planned invasive dental procedures. Educated patient on the side effect of osteonecrosis of jaw, the importance of routine dental screenings/cleanings, and to advise his/her dentist of ongoing treatment with prolia. Take vitamin D and calcium supplements.    Your treatment may need to be rescheduled if you do not have your lab work completed prior to your next scheduled infusion. Please be sure to get your blood work completed 7 days before your next treatment. You can go to any [facility] to have these done.

## 2025-02-10 NOTE — TELEPHONE ENCOUNTER
Called ppt today to conduct her [facility] Phase 5 Study Visit 2 by telephone.  She had gone to  [facility] lab on 02/08/2025 to have a final S. Creatinine drawn per the [facility] study protocol.  Results were discussed with participant.  Updated ppt's contact and HCP information during the call.  CMEDs were also reconciled.  MedHx and EVENTS questionnaires were administered.  She had no events since her last [facility] visit.  Ppt stated she had signed and mailed back the Authorization for Medical Release of Information form we had sent her. I informed her that she will be receiving monetary compensation for having the S. Creatinine level drawn for the study.  A check from [facility] should arrive in a few weeks.

## 2025-02-10 NOTE — PROGRESS NOTES
Secure chat from RN -     Patient has not started to take vit D or calcium supplements, patient was hoping you can send in scripts for these? I told her that they were over the counter, but she wanted them to be tried to be sent to pharmacy to be free for patient, thanks     Me : I sent vitamin D to her pharmacy. For calcium, please advise her to talk to her kidney doctor for the best dose     RN: Ok she sees you on 2/17, thank

## 2025-02-16 NOTE — PROGRESS NOTES
.Patient Visit Information:   Patient name: Trinidad Hines  : 1955  Date of Service: 2025    Visit Type: Follow-up      Cancer History:          Breast         AJCC Edition: 8th (AJCC), Diagnosis Date:           Cancer Staging   Malignant neoplasm of lower-outer quadrant of right breast of female, estrogen receptor positive, Staging form: Breast, AJCC 8th Edition, Clinical stage from 2024: Stage IA (cT1, cN0, cM0, G1, ER+, WA+, HER2-) - Signed by Mary Sosa MD on 2024  Malignant neoplasm of lower-outer quadrant of right breast of female, estrogen receptor positive, Staging form: Breast, AJCC 8th Edition, Pathologic stage from 2024: Stage Unknown (pT1b, pNX, cM0, G1, ER+, WA+, HER2-) - Signed by Mary Sosa MD on 2024     Treatment Synopsis:       Trinidad Hines is a 69 y.o. post-menopausal AA female with right-sided invasive ductal carcinoma, clinical stage IA (cT1, cN0, cM0, G1, ER+, WA+, HER2-).  The patient's breast cancer was diagnosed on 3/27/2024 and is grade 1, estrogen receptor positive at >95%, progesterone receptor positive at 95%, and HER-2/yue negative.     PMHx - A-fib, Cadiomyopathy (EF 30%), s/p AVR, CKD (stage 3), non-traumatic intracerebral hemorrhage, depression, cardiac defibrillator.      CURRENT THERAPY: Endocrine therapy (anastrozole)     ONCOLOGIC HISTORY:  Details of her breast cancer history are as follows:     No abnormal mammograms, breast biopsies, or breast surgeries.  2024 - B/L screening mammogram.  Left cardiac device.  Scattered fibroglandular tissue bilaterally.  Left breast negative.  Indeterminate right breast mass.  Indeterminate right breast calcifications.  BI-RADS 0.  3/2024 -  Right breast dx imaging.  Right breast calcifications are probably benign.  6-month follow-up is recommended.  Right breast ultrasound.  At 8:00 5 cm from the nipple a 0.4 x 0.4 x 0.4 cm indeterminate mass is noted, BI-RADS 4.  Right axillary ultrasound is  negative.  3/27/2024 - Right breast mass 8:00 5 cm from the nipple biopsy.  Grade 1 invasive ductal carcinoma.  ER greater than 95, NV 95, HER2 negative.   4/18/2024 - Office visit with Dr. Sosa. Per Dr. Sosa's note on 4/18/2024 - Calcs are low suspicion. 6 mo FU vs can be localized for excision. Lumpectomy +/- RT was discussed  6/19/2024: Right-sided lumpectomy by Dr. Sosa. Final path showing IDC, grade 1. 5.6 mm. ER > 95%, NV 95%, HER2 negative (1+ IHC). pT1b. Axilla was not assessed.   Re-excision still shows residual DCIS on the margin. She is not to have another re-excision.   9/16/2024: Started anastrozole  2/10/2025: First dose of denosomab     History of Present Illness: Patient ID:  Trinidad Hines is a 69 y.o. female.     Chief Complaint and/or reason for visit: Here for a follow-up     Interval History:    Trinidad Hines is a pleasant  69 y.o. woman with history of newly diagnosed invasive ductal carcinoma of the right breast. Here for a follow-up.     She  started anastrozole on 9/16/2024.    Today, she does not express any new concerns being on anastrozole. *** Initially she experienced hot flashes which has since resolved. She says she is doing fine with anastrozole.     She reports 5/10 pain in her knees which is chronic and her baseline (she uses a walker).   ***     She denies fever, chills, Wt loss, headaches, CP, SOB, N/V, abdominal pain, constipation, diarrhea, neuropathy, joint pain, extremity swelling, rashes. Appetite is good and she is drinking fine.     She comes to clinic by her self today.     Review of Systems:   A 14-point review of system was completed and was negative except for what is noted in HPI.      Allergies and Intolerances:       Allergies:   Allergies   Allergen Reactions    Dofetilide Anaphylaxis    Aspirin Rash    Diltiazem Hcl Itching    Lisinopril Cough and Dry mouth    Phenytoin Hives and Rash             Outpatient Medication Profile:   Current Outpatient Medications on File  Prior to Visit   Medication Sig Dispense Refill    albuterol 90 mcg/actuation inhaler Inhale 2 puffs every 4 hours if needed for wheezing. 18 g 0    allopurinol (Zyloprim) 300 mg tablet TAKE 1 TABLET BY MOUTH EVERY DAY 30 tablet 1    allopurinol (Zyloprim) 300 mg tablet Take 1 tablet (300 mg) by mouth once daily. 90 tablet 1    anastrozole (Arimidex) 1 mg tablet Take 1 tablet (1 mg total) by mouth once daily.  Swallow whole with a drink of water. 30 tablet 11    calcitriol (Rocaltrol) 0.25 mcg capsule Take 1 capsule (0.25 mcg) by mouth once daily. 90 capsule 1    cholecalciferol (Vitamin D-3) 25 MCG (1000 UT) tablet Take 1 tablet (1,000 Units) by mouth once daily. 30 tablet 11    dapagliflozin propanediol (Farxiga) 10 mg Take 1 tablet (10 mg) by mouth once daily. 90 tablet 3    fluocinonide (Fluocinonide-E) 0.05 % cream Apply topically 2 times a day. (Patient not taking: Reported on 2/10/2025) 15 g 1    lidocaine (Xylocaine) 5 % ointment Apply 1 Application topically 3 times a day as needed for mild pain (1 - 3).      metoprolol succinate XL (Toprol-XL) 100 mg 24 hr tablet Take 1 tablet (100 mg) by mouth once daily. 90 tablet 1    sacubitriL-valsartan (Entresto) 49-51 mg tablet Take 1 tablet by mouth 2 times a day. (Patient taking differently: Take 1 tablet by mouth once daily.) 180 tablet 3    simvastatin (Zocor) 20 mg tablet Take 1 tablet (20 mg) by mouth once daily at bedtime. 90 tablet 1    spironolactone (Aldactone) 25 mg tablet TAKE 1 TABLET BY MOUTH EVERY DAY 90 tablet 1    torsemide (Demadex) 20 mg tablet Take 1 tablet (20 mg) by mouth see administration instructions. Please take 40mg on Monday, Wednesday, and Friday. Take 20mg other days of the week. 40 tablet 11    traMADol (Ultram) 50 mg tablet Take 1 tablet (50 mg) by mouth every 6 hours if needed for moderate pain (4 - 6).      warfarin (Coumadin) 5 mg tablet TAKE 1.5 TABLET DAILY AS DIRECTED BY COUMADIN CLINIC  Strength: 5 mg 135 tablet 1     No  current facility-administered medications on file prior to visit.          Medical History:    Past Medical History:   Diagnosis Date    Cardiology follow-up encounter     Herberth Gandara MD next apt 8/19/24    Cardiomyopathy     CHF (congestive heart failure)     Chronic kidney disease, stage 3 unspecified (Multi)     Chronic systolic heart failure     HFrEF    Coronary artery disease     Dilated cardiomyopathy (Multi)     Encounter for pre-operative cardiovascular clearance     Gout     H/O echocardiogram 02/23/2024    Hyperlipidemia     Hyperparathyroidism (Multi)     Hypertension     Insomnia     Malignant neoplasm of lower-outer quadrant of right breast of female, estrogen receptor negative     OA (osteoarthritis)     Obesity     Persistent atrial fibrillation (Multi)     Presence of automatic (implantable) cardiac defibrillator     Cardiac defibrillator in place    Rheumatic heart failure     s/p aortic valve replacement 1986    Sleep apnea     Stroke (Multi)     hemorrhagic stroke       Surgical History:   Past Surgical History:   Procedure Laterality Date    AORTIC VALVE REPLACEMENT  1986    redo in 2007 with bioprosthetic aortic valve    CARDIAC CATHETERIZATION  05/24/2023    CARDIAC CATHETERIZATION N/A 6/6/2024    Procedure: Right Heart Cath;  Surgeon: Hieu Jack MD;  Location: [facility] Cardiac Cath Lab;  Service: Cardiovascular;  Laterality: N/A;    CARDIAC ELECTROPHYSIOLOGY PROCEDURE N/A 10/30/2023    Procedure: ICD UPGRADE, DUAL TO BIV;  Surgeon: Kendra Hong MD;  Location: [place] [address] Cardiac Cath Lab;  Service: Electrophysiology;  Laterality: N/A;    CARDIOVERSION  2015    TOTAL KNEE ARTHROPLASTY  04/29/2015    Total Knee Arthroplasty       Social Substance History:   Social History     Tobacco Use    Smoking status: Former     Current packs/day: 0.00     Average packs/day: 1 pack/day for 20.0 years (20.0 ttl pk-yrs)     Types: Cigarettes     Start date: 1974     Quit date: 1994     Years  since quittin.1    Smokeless tobacco: Never   Substance Use Topics    Alcohol use: Never     Social History     Substance and Sexual Activity   Drug Use Never       Family History:   Family History   Problem Relation Name Age of Onset    Heart attack Mother      Heart attack Father      Kidney failure Sister      Cancer Brother          OBJECTIVE:     Visit Vitals       Pain Scale: 5/10 (chronic, in her knees)       The ECOG performance scale today is:       1 Restricted in physically strenuous activity but ambulatory and able to carry out work of a light or sedentary nature, e.g., light house work, office work    She uses a walker.       Physical Exam:      Constitutional: Well developed, awake/alert/oriented  x3, no distress, alert and cooperative   Eyes: PERRL, EOMI, clear sclera   ENMT: mucous membranes moist, no apparent injury,  no lesions seen   Head/Neck: Neck supple, no apparent injury, thyroid  without mass or tenderness, No JVD, trachea midline, no bruits   Respiratory/Thorax: Patent airways, CTAB, normal  breath sounds with good chest expansion, thorax symmetric   Cardiovascular: Regular, rate and rhythm, no murmurs,  2+ equal pulses of the extremities, normal S 1and S 2   Gastrointestinal: Nondistended, soft, non-tender,  no rebound tenderness or guarding, no masses palpable, no organomegaly, +BS, no bruits   Musculoskeletal: ROM intact, no joint swelling, normal  strength   Extremities: No LE edema   Neurological: alert and oriented x3, intact senses,  motor, response and reflexes, normal strength   Breast: s/p rt sided lumpectomy with well healed surgical incision. No palpable mass in either breast. No palpable axillary or supraclavicular lymphadenopathies bilaterally. No swelling of either upper extremity which had free range of motion.   Lymphatic: No significant lymphadenopathy   Psychological: Appropriate mood and behavior   Skin: Warm and dry, no lesions, no rashes          LAB  "RESULTS    Lab Results   Component Value Date    WBC 3.8 (L) 01/11/2025    HGB 12.3 01/11/2025    HCT 37.8 01/11/2025    MCV 90 01/11/2025     01/11/2025     Lab Results   Component Value Date    NEUTROABS 2.03 01/11/2025       No results for input(s): \"CREATININE\", \"BUN\", \"NA\", \"K\", \"CL\", \"CO2\" in the last 72 hours.  No components found for: \"CALCGFR\"  Lab Results   Component Value Date    GLUCOSE 107 (H) 02/08/2025     Lab Results   Component Value Date     02/08/2025    K 4.5 02/08/2025     02/08/2025    CO2 27 02/08/2025    BUN 36 (H) 02/08/2025    CREATININE 2.47 (H) 02/08/2025    ALT 8 02/08/2025    AST 12 02/08/2025    ALKPHOS 54 02/08/2025    BILITOT 1.0 02/08/2025     No results found for: \"FOLATE\"  No results found for: \"JDQEQXDT57\"  Lab Results   Component Value Date    TSH 4.92 (H) 05/03/2024 6/19/2024: Lumpectomy path ([id number])     FINAL DIAGNOSIS   A. BREAST, RIGHT, LOWER OUTER QUADRANT, LUMPECTOMY:   -- Invasive ductal carcinoma, grade 1, see note and synoptic cancer summary.  -- Ductal carcinoma in situ, intermediate to high nuclear grade, cribriform, micropapillary and papillary patterns with comedonecrosis and microcalcifications.  -- Biopsy site changes.     Note: Immunohistochemical stains for SMMHC and p63 were performed which show the absence of myoepithelial cell layer in the focus of invasive ductal carcinoma.     B. BREAST MARGIN, RIGHT, NEW SUPERIOR, EXCISION:   -- Ductal carcinoma in situ, see note.  -- No invasive carcinoma identified.     Note: Ductal carcinoma in situ is focally present at the new inked margin.     C. BREAST MARGIN, RIGHT, NEW LATERAL, EXCISION:   -- Benign breast tissue.  -- No carcinoma identified.     D. BREAST MARGIN, RIGHT, NEW MEDIAL, EXCISION:   -- Benign breast tissue.  -- No carcinoma identified.     E. BREAST MARGIN, RIGHT, NEW ANTERIOR, EXCISION:   -- Benign breast tissue.  -- No carcinoma identified.     F. BREAST MARGIN, RIGHT, " NEW INFERIOR, EXCISION:   -- Benign breast tissue with cautery artifacts.  -- No carcinoma identified.     Note: Deeper levels of sections were reviewed.     G. BREAST MARGIN, RIGHT, NEW POSTERIOR, EXCISION:      -- Benign breast tissue.  -- No carcinoma identified.        verall Grade  Grade 1 (scores of 3, 4 or 5)   Tumor Size  Greatest dimension of largest invasive focus (Millimeters): 5.6 mm   Tumor Focality  Single focus of invasive carcinoma   Ductal Carcinoma In Situ (DCIS)  Present     Positive for extensive intraductal component (EIC)   Size (Extent) of DCIS  Estimated size (extent) of DCIS is at least (Millimeters): 48 mm     pT Category  pT1b     SPECIAL STUDIES        Estrogen Receptor (ER) Status  Positive (greater than 10% of cells demonstrate nuclear positivity)   Percentage of Cells with Nuclear Positivity  >95 %        Progesterone Receptor (PgR) Status  Positive   Percentage of Cells with Nuclear Positivity  95 %        HER2 (by immunohistochemistry)  Negative (Score 1+)   Testing Performed on Case Number  [id number] [id number]     Imaging:     No images are attached to the encounter.        Assessment and Plan:   Assessment     Trinidad Hines is a 69 y.o.  post-menopausal AA female with right-sided invasive ductal carcinoma, clinical stage IA (cT1, cN0, cM0, G1, ER+, ME+, HER2-).  The patient's breast cancer was diagnosed on 3/27/2024 and is grade 1, estrogen receptor positive at >95%, progesterone receptor positive at 95%, and HER-2/yue negative. S/p lumpectomy on 6/19/2016.     Predict tool: Predicted overall survival at 5 yrs   Surgery only:  92%  + Hormone therapy : 93%  + Chemotherapy: 92%  + Bisphosphonate: 92%      PMHx - A-fib, Cadiomyopathy (EF 30%), s/p AVR, CKD (stage 3), non-traumatic intracerebral hemorrhage, depression, cardiac defibrillator.      Previously her surgeon and myself had long discussions regarding primary endocrine therapy vs. upfront surgery. She elected to have surgery and  had lumpectomy on 6/19/2024. No node sampling. Her clinical staging is IA. Her invasive component was small (5.6 mm) but DCIS component was extensive (48 mm) with focally close positive margin. I advised re-excision of this margin but she is very hesitant to pursue more surgery.  She saw radiation oncologist (Dr. Chu) today.     She comes in today to discuss adjuvant systemic endocrine treatment options.     Given patient's small tumor size, clinically node-negative disease and hormone receptor positivity, her risk of recurrence is low. Also, due to her multiple medical co-morbidities (mentioned above), I do not recommend chemotherapy. She will however, benefit from systemic endocrine therapy to minimize her risk of recurrence.     I recommend AI x5 years.  Side effects that were discussed included but were not limited to osteoporosis, arthritis arthralgia, hot flashes, liver toxicity. After discussion of pros and cons of this recommendation,  patient elected to proceed treatment with Arimidex.     DEXA scan in 2022 showed osteopenia  (Ca and Vitamin D recommended)  11/21/2024: Osteopenia  Vitamin D (1/11/2025): 39 (wnl)  Calcium (2/8/2025): 10.3 (wnl)    I recommend Zometa since she has osteopenia. She has NO teeth.  She is on warfarin, for which no interaction was found with denosomab (confirmed with PharmD).   Vitamin D has been normal (last checked on 8/13/2024).  2/8/2025: Creat Clearance = 48 (original Cockcroft-Gault); 33 (modified for overweight patient)    Plan:   Continue anastrozole 1mg po daily x5 years  Take Vitamin D and Calcium supplements  Denosomab first dose given on 2/10/2025. Next dose is scheduled for 8/11/2024.  Repeat DEXA in 2024 showed osteopenia. Discussed denosomab (chronic kidney disease: baseline Cr ~ 2).  Messaged Dr. Safia Gerber (her nephrologist) with assistance to potentially give zometa after denosomab   May consider referral to endocrinology for assistance  MD visit at 3  months.  If everything goes well, transition to survivorship clinic   RTC on 5/19/2024      Claudine Middleton MD, PhD   Hematology/Oncology  [facility]

## 2025-02-17 ENCOUNTER — APPOINTMENT (OUTPATIENT)
Dept: HEMATOLOGY/ONCOLOGY | Facility: CLINIC | Age: 70
End: 2025-02-17
Payer: MEDICAID

## 2025-02-20 ENCOUNTER — APPOINTMENT (OUTPATIENT)
Dept: SURGICAL ONCOLOGY | Facility: CLINIC | Age: 70
End: 2025-02-20
Payer: MEDICAID

## 2025-02-20 ENCOUNTER — APPOINTMENT (OUTPATIENT)
Dept: RADIOLOGY | Facility: CLINIC | Age: 70
End: 2025-02-20
Payer: MEDICAID

## 2025-02-21 ENCOUNTER — HOSPITAL ENCOUNTER (OUTPATIENT)
Dept: RADIOLOGY | Facility: HOSPITAL | Age: 70
Discharge: HOME | End: 2025-02-21
Payer: MEDICAID

## 2025-02-21 DIAGNOSIS — C50.511 MALIGNANT NEOPLASM OF LOWER-OUTER QUADRANT OF RIGHT BREAST OF FEMALE, ESTROGEN RECEPTOR POSITIVE: ICD-10-CM

## 2025-02-21 DIAGNOSIS — Z17.0 MALIGNANT NEOPLASM OF LOWER-OUTER QUADRANT OF RIGHT BREAST OF FEMALE, ESTROGEN RECEPTOR POSITIVE: ICD-10-CM

## 2025-02-21 PROCEDURE — 77062 BREAST TOMOSYNTHESIS BI: CPT

## 2025-02-24 ENCOUNTER — OFFICE VISIT (OUTPATIENT)
Dept: SURGICAL ONCOLOGY | Facility: CLINIC | Age: 70
End: 2025-02-24
Payer: MEDICAID

## 2025-02-24 VITALS
HEART RATE: 95 BPM | TEMPERATURE: 97.8 F | SYSTOLIC BLOOD PRESSURE: 102 MMHG | WEIGHT: 260 LBS | DIASTOLIC BLOOD PRESSURE: 71 MMHG | OXYGEN SATURATION: 96 % | BODY MASS INDEX: 43.27 KG/M2

## 2025-02-24 DIAGNOSIS — C50.511 MALIGNANT NEOPLASM OF LOWER-OUTER QUADRANT OF RIGHT BREAST OF FEMALE, ESTROGEN RECEPTOR POSITIVE: Primary | ICD-10-CM

## 2025-02-24 DIAGNOSIS — Z17.0 MALIGNANT NEOPLASM OF LOWER-OUTER QUADRANT OF RIGHT BREAST OF FEMALE, ESTROGEN RECEPTOR POSITIVE: Primary | ICD-10-CM

## 2025-02-24 PROCEDURE — 99214 OFFICE O/P EST MOD 30 MIN: CPT | Performed by: SURGERY

## 2025-02-24 ASSESSMENT — PAIN SCALES - GENERAL: PAINLEVEL_OUTOF10: 0-NO PAIN

## 2025-02-27 DIAGNOSIS — M10.00 IDIOPATHIC GOUT, UNSPECIFIED CHRONICITY, UNSPECIFIED SITE: ICD-10-CM

## 2025-03-02 RX ORDER — ALLOPURINOL 300 MG/1
300 TABLET ORAL DAILY
Qty: 30 TABLET | Refills: 0 | Status: SHIPPED | OUTPATIENT
Start: 2025-03-02

## 2025-03-08 ENCOUNTER — ANTICOAGULATION - WARFARIN VISIT (OUTPATIENT)
Dept: CARDIOLOGY | Facility: CLINIC | Age: 70
End: 2025-03-08
Payer: MEDICAID

## 2025-03-08 DIAGNOSIS — I48.19 PERSISTENT ATRIAL FIBRILLATION (MULTI): Primary | ICD-10-CM

## 2025-03-08 LAB
POC INR: 2.1
POC PROTHROMBIN TIME: NORMAL

## 2025-03-08 PROCEDURE — 85610 PROTHROMBIN TIME: CPT | Mod: QW

## 2025-03-08 PROCEDURE — 99211 OFF/OP EST MAY X REQ PHY/QHP: CPT

## 2025-03-08 NOTE — PROGRESS NOTES
Patient identification verified with 2 identifiers.    Location: [facility] at [facility] - [address] [address] [phone] option #1     Referring Physician: DR. CASANOVA  Enrollment/ Re-enrollment date: 2025   INR Goal: 2.0-3.0  INR monitoring is per [facility] protocol.  Anticoagulation Medication: warfarin  Indication: Atrial Fibrillation/Atrial Flutter    Subjective   Bleeding signs/symptoms: No    Bruising: No   Major bleeding event: No  Thrombosis signs/symptoms: No  Thromboembolic event: No  Missed doses: No  Extra doses: No  Medication changes: No  Dietary changes: No  Change in health: No  Change in activity: No  Alcohol: No  Other concerns: No    Upcoming Procedures:  Does the Patient Have any upcoming procedures that require interruption in anticoagulation therapy? no  Does the patient require bridging? no      Anticoagulation Summary  As of 3/8/2025      INR goal:  2.0-3.0   TTR:  72.0% (1.4 y)   INR used for dosin.10 (3/8/2025)   Weekly warfarin total:  42.5 mg               Assessment/Plan   THERAPEUTIC  MAINTAIN WEEKLY DOSE  RTC IN 4 WEEKS  Education provided to patient during the visit:  Patient instructed to call in interim with questions, concerns and changes.   Patient educated on dietary consistency in vitamin k consumption.   Patient educated on compliance with dosing, follow up appointments, and prescribed plan of care.

## 2025-03-09 DIAGNOSIS — N18.32 HYPERTENSIVE KIDNEY DISEASE WITH STAGE 3B CHRONIC KIDNEY DISEASE (MULTI): ICD-10-CM

## 2025-03-09 DIAGNOSIS — I12.9 HYPERTENSIVE KIDNEY DISEASE WITH STAGE 3B CHRONIC KIDNEY DISEASE (MULTI): ICD-10-CM

## 2025-03-09 DIAGNOSIS — Z00.00 ENCOUNTER FOR GENERAL ADULT MEDICAL EXAMINATION WITHOUT ABNORMAL FINDINGS: ICD-10-CM

## 2025-03-10 ENCOUNTER — HOSPITAL ENCOUNTER (OUTPATIENT)
Dept: CARDIOLOGY | Facility: CLINIC | Age: 70
Discharge: HOME | End: 2025-03-10
Payer: MEDICAID

## 2025-03-10 DIAGNOSIS — I42.9 CARDIOMYOPATHY WITH IMPLANTABLE CARDIOVERTER-DEFIBRILLATOR (MULTI): ICD-10-CM

## 2025-03-10 DIAGNOSIS — Z95.810 CARDIOMYOPATHY WITH IMPLANTABLE CARDIOVERTER-DEFIBRILLATOR (MULTI): ICD-10-CM

## 2025-03-10 DIAGNOSIS — I42.0 CONGESTIVE CARDIOMYOPATHY (MULTI): ICD-10-CM

## 2025-03-10 PROCEDURE — 93296 REM INTERROG EVL PM/IDS: CPT

## 2025-03-12 RX ORDER — CALCITRIOL 0.25 UG/1
CAPSULE ORAL
Qty: 90 CAPSULE | Refills: 1 | Status: SHIPPED | OUTPATIENT
Start: 2025-03-12

## 2025-03-18 DIAGNOSIS — I12.9 HYPERTENSIVE KIDNEY DISEASE WITH STAGE 3B CHRONIC KIDNEY DISEASE (MULTI): ICD-10-CM

## 2025-03-18 DIAGNOSIS — N18.32 HYPERTENSIVE KIDNEY DISEASE WITH STAGE 3B CHRONIC KIDNEY DISEASE (MULTI): ICD-10-CM

## 2025-03-18 RX ORDER — DAPAGLIFLOZIN 10 MG/1
10 TABLET, FILM COATED ORAL DAILY
Qty: 90 TABLET | Refills: 3 | Status: SHIPPED | OUTPATIENT
Start: 2025-03-18 | End: 2026-03-18

## 2025-03-21 RX ORDER — DAPAGLIFLOZIN 5 MG/1
5 TABLET, FILM COATED ORAL DAILY
Qty: 90 TABLET | Refills: 1 | Status: SHIPPED | OUTPATIENT
Start: 2025-03-21

## 2025-04-04 ENCOUNTER — APPOINTMENT (OUTPATIENT)
Dept: PRIMARY CARE | Facility: CLINIC | Age: 70
End: 2025-04-04
Payer: MEDICAID

## 2025-04-04 ENCOUNTER — ANTICOAGULATION - WARFARIN VISIT (OUTPATIENT)
Dept: CARDIOLOGY | Facility: CLINIC | Age: 70
End: 2025-04-04
Payer: MEDICAID

## 2025-04-04 VITALS
WEIGHT: 260 LBS | TEMPERATURE: 95.7 F | DIASTOLIC BLOOD PRESSURE: 66 MMHG | BODY MASS INDEX: 43.27 KG/M2 | SYSTOLIC BLOOD PRESSURE: 102 MMHG | HEART RATE: 88 BPM

## 2025-04-04 DIAGNOSIS — Z13.220 SCREENING CHOLESTEROL LEVEL: ICD-10-CM

## 2025-04-04 DIAGNOSIS — R10.30 LOWER ABDOMINAL PAIN: ICD-10-CM

## 2025-04-04 DIAGNOSIS — I48.19 PERSISTENT ATRIAL FIBRILLATION (MULTI): Primary | ICD-10-CM

## 2025-04-04 DIAGNOSIS — Z12.11 COLON CANCER SCREENING: ICD-10-CM

## 2025-04-04 DIAGNOSIS — Z00.00 HEALTH CARE MAINTENANCE: Primary | ICD-10-CM

## 2025-04-04 DIAGNOSIS — M25.50 ARTHRALGIA, UNSPECIFIED JOINT: ICD-10-CM

## 2025-04-04 DIAGNOSIS — Z13.1 DIABETES MELLITUS SCREENING: ICD-10-CM

## 2025-04-04 DIAGNOSIS — Z12.31 VISIT FOR SCREENING MAMMOGRAM: ICD-10-CM

## 2025-04-04 LAB
POC INR: 3.2
POC PROTHROMBIN TIME: NORMAL

## 2025-04-04 PROCEDURE — 85610 PROTHROMBIN TIME: CPT | Mod: QW

## 2025-04-04 PROCEDURE — 99211 OFF/OP EST MAY X REQ PHY/QHP: CPT

## 2025-04-04 ASSESSMENT — PAIN SCALES - GENERAL: PAINLEVEL_OUTOF10: 6

## 2025-04-04 NOTE — PROGRESS NOTES
Subjective   Patient ID: Trinidad Hines is a 70 y.o. female who presents for Medicare Annual Wellness Visit Subsequent (Whole body ).  HPI    The patient is a 67-year-old female with a history significant for persistent AF on Warfarin, dilated cardiomyopathy HFrEF (3/2024 TTE: LVEF 30-35%, s/p aortic valve replacement 1986, redo in 2007 with bioprosthetic aortic valve, gout,HTN, non traumatic hemorrhagic stroke, right breast cancer, planned for right breast partial mastectomy 6/19/24, depression, cardiac defibrillator,  who is here for:    1- CPE.  -mammogram 2/22/2022- 2/21/2025: wnl.  Ordered.   -blood test due.  -colonoscopy 4/2/21: repeat in 5y.   -dexa scan 2/9/2022-11/21/2024: osteopenia.  Ca w/ D recommended.   -Immunization: Shingrix, COVID 19, Prevnar, RSV recommended.   -eye exam was 2 months ago - 2023  -Hearing is intact.     2- referral to home care outside of . The patient states that her arthralgia is worse with her cancer treatment.  She is requesting a home health aid.      A review of system was completed.  All systems were reviewed and were normal, except for the ones that are listed in the HPI.    Objective   Physical Exam  Constitutional:       Appearance: Normal appearance.   HENT:      Head: Normocephalic and atraumatic.      Right Ear: Tympanic membrane, ear canal and external ear normal.      Left Ear: Tympanic membrane, ear canal and external ear normal.      Nose: Nose normal.      Mouth/Throat:      Mouth: Mucous membranes are moist.      Pharynx: Oropharynx is clear.   Eyes:      Extraocular Movements: Extraocular movements intact.      Conjunctiva/sclera: Conjunctivae normal.      Pupils: Pupils are equal, round, and reactive to light.   Cardiovascular:      Rate and Rhythm: Normal rate and regular rhythm.      Pulses: Normal pulses.   Pulmonary:      Effort: Pulmonary effort is normal.      Breath sounds: Normal breath sounds.   Abdominal:      General: Abdomen is flat. Bowel sounds  are normal.      Palpations: Abdomen is soft.   Musculoskeletal:         General: Normal range of motion.      Cervical back: Normal range of motion and neck supple.   Skin:     General: Skin is warm.   Neurological:      General: No focal deficit present.      Mental Status: She is alert and oriented to person, place, and time. Mental status is at baseline.   Psychiatric:         Mood and Affect: Mood normal.         Behavior: Behavior normal.         Thought Content: Thought content normal.         Judgment: Judgment normal.     Assessment/Plan   Problem List Items Addressed This Visit       Arthralgia     -Home care referral will be done once the proper information is received.  Patient would like to use a different agency than .          Relevant Orders    Lipid Panel    Comprehensive Metabolic Panel    CBC    TSH with reflex to Free T4 if abnormal    Health care maintenance - Primary     -mammogram 2/22/2022- : wnl.  Ordered.   -blood test due.  -colonoscopy 4/2/21: repeat in 5y.   -dexa scan 2/9/2022-11/21/2024: osteopenia.  Ca w/ D recommended.   -Immunization: Shingrix, COVID 19, Flu recommended.  Ordered.   -eye exam was 2 months ago - 2023  -Hearing screening recommended.         Relevant Orders    Hemoglobin A1c    Referral to Gynecology    Lipid Panel    Comprehensive Metabolic Panel    CBC    TSH with reflex to Free T4 if abnormal    Referral to Gynecology    Visit for screening mammogram    Colon cancer screening    Screening cholesterol level    Relevant Orders    Lipid Panel    Diabetes mellitus screening    Relevant Orders    Hemoglobin A1c    Lower abdominal pain    Relevant Orders    Lipid Panel    CBC    TSH with reflex to Free T4 if abnormal    Referral to Gynecology    Patient to return to office in 6 months for routine care.

## 2025-04-04 NOTE — ASSESSMENT & PLAN NOTE
-mammogram 2/22/2022- : wnl.  Ordered.   -blood test due.  -colonoscopy 4/2/21: repeat in 5y.   -dexa scan 2/9/2022-11/21/2024: osteopenia.  Ca w/ D recommended.   -Immunization: Shingrix, COVID 19, Flu recommended.  Ordered.   -eye exam was 2 months ago - 2023  -Hearing screening recommended.

## 2025-04-04 NOTE — ASSESSMENT & PLAN NOTE
-Home care referral will be done once the proper information is received.  Patient would like to use a different agency than .

## 2025-04-04 NOTE — PROGRESS NOTES
Patient identification verified with 2 identifiers.    Location: [facility] at [facility] - [address] [address] [phone] option #1     Referring Physician: DR. CASANOVA  Enrollment/ Re-enrollment date: 11/4/2025   INR Goal: 2.0-3.0  INR monitoring is per [facility] protocol.  Anticoagulation Medication: warfarin  Indication: Atrial Fibrillation/Atrial Flutter    Subjective   Bleeding signs/symptoms: No    Bruising: No   Major bleeding event: No  Thrombosis signs/symptoms: No  Thromboembolic event: No  Missed doses: No  Extra doses: No  Medication changes: No  Dietary changes: No  Patient did not eat her normal amount of greens the past 2 weeks.  Change in health: No  Change in activity: No  Alcohol: No  Other concerns: No    Upcoming Procedures:  Does the Patient Have any upcoming procedures that require interruption in anticoagulation therapy? no  Does the patient require bridging? no      Anticoagulation Summary  As of 4/4/2025      INR goal:  2.0-3.0   TTR:  72.5% (1.5 y)   INR used for dosing:  3.20 (4/4/2025)   Weekly warfarin total:  42.5 mg               Assessment/Plan   Supratherapeutic     1. New dose: Will maintain dose.  Patient cannot return in 1 week due to transportation issues, so she will return in 2 weeks.    2. Next INR: 2 weeks      Education provided to patient during the visit:  Patient instructed to call in interim with questions, concerns and changes.

## 2025-04-05 ENCOUNTER — APPOINTMENT (OUTPATIENT)
Dept: CARDIOLOGY | Facility: CLINIC | Age: 70
End: 2025-04-05
Payer: MEDICAID

## 2025-04-08 DIAGNOSIS — I42.9 CARDIOMYOPATHY, UNSPECIFIED TYPE (MULTI): ICD-10-CM

## 2025-04-08 DIAGNOSIS — I48.91 ATRIAL FIBRILLATION, UNSPECIFIED TYPE (MULTI): ICD-10-CM

## 2025-04-08 DIAGNOSIS — R21 SKIN RASH: ICD-10-CM

## 2025-04-08 RX ORDER — SPIRONOLACTONE 25 MG/1
25 TABLET ORAL DAILY
Qty: 90 TABLET | Refills: 1 | Status: SHIPPED | OUTPATIENT
Start: 2025-04-08

## 2025-04-08 RX ORDER — AMIODARONE HYDROCHLORIDE 200 MG/1
200 TABLET ORAL DAILY
Qty: 90 TABLET | Refills: 3 | Status: SHIPPED | OUTPATIENT
Start: 2025-04-08

## 2025-04-10 RX ORDER — FLUOCINONIDE 0.5 MG/G
CREAM TOPICAL 2 TIMES DAILY
Qty: 15 G | Refills: 1 | Status: SHIPPED | OUTPATIENT
Start: 2025-04-10

## 2025-04-10 RX ORDER — METOPROLOL SUCCINATE 100 MG/1
100 TABLET, EXTENDED RELEASE ORAL DAILY
Qty: 90 TABLET | Refills: 1 | Status: SHIPPED | OUTPATIENT
Start: 2025-04-10

## 2025-04-13 NOTE — PROGRESS NOTES
Patient Visit Information:   Patient name: Trinidad Hines  : 1955  Date of Service: 2025    Visit Type: Follow-up      Cancer History:          Breast         AJCC Edition: 8th (AJCC), Diagnosis Date:           Cancer Staging   Malignant neoplasm of lower-outer quadrant of right breast of female, estrogen receptor positive, Staging form: Breast, AJCC 8th Edition, Clinical stage from 2024: Stage IA (cT1, cN0, cM0, G1, ER+, MO+, HER2-) - Signed by Mary Sosa MD on 2024  Malignant neoplasm of lower-outer quadrant of right breast of female, estrogen receptor positive, Staging form: Breast, AJCC 8th Edition, Pathologic stage from 2024: Stage Unknown (pT1b, pNX, cM0, G1, ER+, MO+, HER2-) - Signed by Mary Sosa MD on 2024     Treatment Synopsis:       Trinidad Hines is a 70 y.o. post-menopausal AA female with right-sided invasive ductal carcinoma, clinical stage IA (cT1, cN0, cM0, G1, ER+, MO+, HER2-).  The patient's breast cancer was diagnosed on 3/27/2024 and is grade 1, estrogen receptor positive at >95%, progesterone receptor positive at 95%, and HER-2/yue negative.     PMHx - A-fib, Cadiomyopathy (EF 30%), s/p AVR, CKD (stage 3), non-traumatic intracerebral hemorrhage, depression, cardiac defibrillator.      CURRENT THERAPY: Endocrine therapy     ONCOLOGIC HISTORY:  Details of her breast cancer history are as follows:     No abnormal mammograms, breast biopsies, or breast surgeries.  2024 - B/L screening mammogram.  Left cardiac device.  Scattered fibroglandular tissue bilaterally.  Left breast negative.  Indeterminate right breast mass.  Indeterminate right breast calcifications.  BI-RADS 0.  3/1/2024 -  Right breast dx imaging.  Right breast calcifications are probably benign.  6-month follow-up is recommended.  Right breast ultrasound.  At 8:00 5 cm from the nipple a 0.4 x 0.4 x 0.4 cm indeterminate mass is noted, BI-RADS 4.  Right axillary ultrasound is  negative.  3/27/2024 - Right breast mass 8:00 5 cm from the nipple biopsy.  Grade 1 invasive ductal carcinoma.  ER greater than 95, KY 95, HER2 negative.   4/18/2024 - Office visit with Dr. Sosa. Per Dr. Sosa's note on 4/18/2024 - Calcs are low suspicion. 6 mo FU vs can be localized for excision. Lumpectomy +/- RT was discussed  6/19/2024: Right-sided lumpectomy by Dr. Sosa. Final path showing IDC, grade 1. 5.6 mm. ER > 95%, KY 95%, HER2 negative (1+ IHC). pT1b. Axilla was not assessed.   Re-excision still shows residual DCIS on the margin. She is not to have another re-excision.   9/16/2024: Started anastrozole    History of Present Illness: Patient ID:  Trinidad Hines is a 70 y.o. female.     Chief Complaint and/or reason for visit: Here for a follow-up     Interval History:    Trinidad Hines is a pleasant  70 y.o. woman with history of newly diagnosed invasive ductal carcinoma of the right breast. Here for a follow-up.     She  started anastrozole on 9/16/2024, tolerating well.   Today, she states that she has some intermittent sharp pain in her left breast (surgical site).  This only lasts for a few seconds and spontaneously resolves.     She reports 5/10 pain in her knees which is chronic and her baseline (she uses a walker).     Her chronic SOB is stable.      She denies fever, chills, Wt loss, headaches, CP, SOB, N/V, abdominal pain, constipation, diarrhea, neuropathy, joint pain, extremity swelling, rashes. Appetite is good and she is drinking fine.     She comes to clinic by her self today.     Review of Systems:   A 14-point review of system was completed and was negative except for what is noted in HPI.      Allergies and Intolerances:       Allergies:   Allergies   Allergen Reactions    Dofetilide Anaphylaxis    Aspirin Rash    Diltiazem Hcl Itching    Lisinopril Cough and Dry mouth    Phenytoin Hives and Rash             Outpatient Medication Profile:   Current Outpatient Medications on File Prior to Visit    Medication Sig Dispense Refill    albuterol 90 mcg/actuation inhaler Inhale 2 puffs every 4 hours if needed for wheezing. 18 g 0    allopurinol (Zyloprim) 300 mg tablet TAKE 1 TABLET BY MOUTH EVERY DAY 30 tablet 1    allopurinol (Zyloprim) 300 mg tablet TAKE 1 TABLET BY MOUTH ONCE DAILY. 30 tablet 0    amiodarone (Pacerone) 200 mg tablet TAKE 1 TABLET BY MOUTH EVERY DAY 90 tablet 3    anastrozole (Arimidex) 1 mg tablet Take 1 tablet (1 mg total) by mouth once daily.  Swallow whole with a drink of water. 30 tablet 11    calcitriol (Rocaltrol) 0.25 mcg capsule TAKE 1 CAPSULE BY MOUTH EVERY DAY (0.25 MCG) 90 capsule 1    cholecalciferol (Vitamin D-3) 25 MCG (1000 UT) tablet Take 1 tablet (1,000 Units) by mouth once daily. 30 tablet 11    dapagliflozin propanediol (Farxiga) 10 mg Take 1 tablet (10 mg) by mouth once daily. 90 tablet 3    Farxiga 5 mg TAKE 1 TABLET BY MOUTH EVERY DAY 90 tablet 1    Fluocinonide-E 0.05 % cream APPLY TO AFFECTED AREA TWICE A DAY 15 g 1    lidocaine (Xylocaine) 5 % ointment Apply 1 Application topically 3 times a day as needed for mild pain (1 - 3).      metoprolol succinate XL (Toprol-XL) 100 mg 24 hr tablet TAKE 1 TABLET BY MOUTH EVERY DAY 90 tablet 1    sacubitriL-valsartan (Entresto) 49-51 mg tablet Take 1 tablet by mouth 2 times a day. (Patient not taking: Reported on 2/24/2025) 180 tablet 3    simvastatin (Zocor) 20 mg tablet Take 1 tablet (20 mg) by mouth once daily at bedtime. 90 tablet 1    spironolactone (Aldactone) 25 mg tablet TAKE 1 TABLET BY MOUTH EVERY DAY 90 tablet 1    torsemide (Demadex) 20 mg tablet TAKE 2 TABLETS BY MOUTH EVERY  tablet 3    traMADol (Ultram) 50 mg tablet Take 1 tablet (50 mg) by mouth every 6 hours if needed for moderate pain (4 - 6).      warfarin (Coumadin) 5 mg tablet TAKE 1.5 TABLET DAILY AS DIRECTED BY COUMADIN CLINIC  Strength: 5 mg 135 tablet 1    [DISCONTINUED] fluocinonide (Fluocinonide-E) 0.05 % cream Apply topically 2 times a day.  (Patient not taking: No sig reported) 15 g 1    [DISCONTINUED] metoprolol succinate XL (Toprol-XL) 100 mg 24 hr tablet Take 1 tablet (100 mg) by mouth once daily. 90 tablet 1    [DISCONTINUED] spironolactone (Aldactone) 25 mg tablet TAKE 1 TABLET BY MOUTH EVERY DAY 90 tablet 1     No current facility-administered medications on file prior to visit.          Medical History:    Past Medical History:   Diagnosis Date    Cardiology follow-up encounter     Herberth Gandara MD next apt 8/19/24    Cardiomyopathy     CHF (congestive heart failure)     Chronic kidney disease, stage 3 unspecified (Multi)     Chronic systolic heart failure     HFrEF    Coronary artery disease     Dilated cardiomyopathy (Multi)     Encounter for pre-operative cardiovascular clearance     Gout     H/O echocardiogram 02/23/2024    Hyperlipidemia     Hyperparathyroidism (Multi)     Hypertension     Insomnia     Malignant neoplasm of lower-outer quadrant of right breast of female, estrogen receptor negative     OA (osteoarthritis)     Obesity     Persistent atrial fibrillation (Multi)     Presence of automatic (implantable) cardiac defibrillator     Cardiac defibrillator in place    Rheumatic heart failure     s/p aortic valve replacement 1986    Sleep apnea     Stroke (Multi)     hemorrhagic stroke       Surgical History:   Past Surgical History:   Procedure Laterality Date    AORTIC VALVE REPLACEMENT  1986    redo in 2007 with bioprosthetic aortic valve    BREAST BIOPSY  june 12024    CARDIAC CATHETERIZATION  05/24/2023    CARDIAC CATHETERIZATION N/A 06/06/2024    Procedure: Right Heart Cath;  Surgeon: Hieu Jack MD;  Location: [facility] Cardiac Cath Lab;  Service: Cardiovascular;  Laterality: N/A;    CARDIAC ELECTROPHYSIOLOGY PROCEDURE N/A 10/30/2023    Procedure: ICD UPGRADE, DUAL TO BIV;  Surgeon: Kendra Hong MD;  Location: [place] [address] Cardiac Cath Lab;  Service: Electrophysiology;  Laterality: N/A;    CARDIOVERSION  2015    TOTAL  KNEE ARTHROPLASTY  2015    Total Knee Arthroplasty       Social Substance History:   Social History     Tobacco Use    Smoking status: Former     Current packs/day: 0.00     Average packs/day: 1 pack/day for 20.0 years (20.0 ttl pk-yrs)     Types: Cigarettes     Start date:      Quit date:      Years since quittin.2    Smokeless tobacco: Never   Substance Use Topics    Alcohol use: Never     Social History     Substance and Sexual Activity   Drug Use Never       Family History:   Family History   Problem Relation Name Age of Onset    Heart attack Mother      Heart attack Father      Kidney failure Sister      Cancer Brother          OBJECTIVE:     Visit Vitals       Pain Scale: 5/10 (chronic, in her knees)       The ECOG performance scale today is:       1 Restricted in physically strenuous activity but ambulatory and able to carry out work of a light or sedentary nature, e.g., light house work, office work    She uses a walker.       Physical Exam:      Constitutional: Well developed, awake/alert/oriented  x3, no distress, alert and cooperative   Eyes: PERRL, EOMI, clear sclera   ENMT: mucous membranes moist, no apparent injury,  no lesions seen   Head/Neck: Neck supple, no apparent injury, thyroid  without mass or tenderness, No JVD, trachea midline, no bruits   Respiratory/Thorax: Patent airways, CTAB, normal  breath sounds with good chest expansion, thorax symmetric   Cardiovascular: Regular, rate and rhythm, no murmurs,  2+ equal pulses of the extremities, normal S 1and S 2   Gastrointestinal: Nondistended, soft, non-tender,  no rebound tenderness or guarding, no masses palpable, no organomegaly, +BS, no bruits   Musculoskeletal: ROM intact, no joint swelling, normal  strength   Extremities: No LE edema   Neurological: alert and oriented x3, intact senses,  motor, response and reflexes, normal strength   Breast: s/p rt sided lumpectomy with well healed surgical incision. No palpable mass in  "either breast. No palpable axillary or supraclavicular lymphadenopathies bilaterally. No swelling of either upper extremity which had free range of motion.   Lymphatic: No significant lymphadenopathy   Psychological: Appropriate mood and behavior   Skin: Warm and dry, no lesions, no rashes          LAB RESULTS    Lab Results   Component Value Date    WBC 3.8 (L) 01/11/2025    HGB 12.3 01/11/2025    HCT 37.8 01/11/2025    MCV 90 01/11/2025     01/11/2025     Lab Results   Component Value Date    NEUTROABS 2.03 01/11/2025       No results for input(s): \"CREATININE\", \"BUN\", \"NA\", \"K\", \"CL\", \"CO2\" in the last 72 hours.  No components found for: \"CALCGFR\"  Lab Results   Component Value Date    GLUCOSE 107 (H) 02/08/2025     Lab Results   Component Value Date     02/08/2025    K 4.5 02/08/2025     02/08/2025    CO2 27 02/08/2025    BUN 36 (H) 02/08/2025    CREATININE 2.47 (H) 02/08/2025    ALT 8 02/08/2025    AST 12 02/08/2025    ALKPHOS 54 02/08/2025    BILITOT 1.0 02/08/2025     No results found for: \"FOLATE\"  No results found for: \"AWGTDMVZ22\"  Lab Results   Component Value Date    TSH 4.92 (H) 05/03/2024 6/19/2024: Lumpectomy path ([id number])     FINAL DIAGNOSIS   A. BREAST, RIGHT, LOWER OUTER QUADRANT, LUMPECTOMY:   -- Invasive ductal carcinoma, grade 1, see note and synoptic cancer summary.  -- Ductal carcinoma in situ, intermediate to high nuclear grade, cribriform, micropapillary and papillary patterns with comedonecrosis and microcalcifications.  -- Biopsy site changes.     Note: Immunohistochemical stains for SMMHC and p63 were performed which show the absence of myoepithelial cell layer in the focus of invasive ductal carcinoma.     B. BREAST MARGIN, RIGHT, NEW SUPERIOR, EXCISION:   -- Ductal carcinoma in situ, see note.  -- No invasive carcinoma identified.     Note: Ductal carcinoma in situ is focally present at the new inked margin.     C. BREAST MARGIN, RIGHT, NEW LATERAL, " EXCISION:   -- Benign breast tissue.  -- No carcinoma identified.     D. BREAST MARGIN, RIGHT, NEW MEDIAL, EXCISION:   -- Benign breast tissue.  -- No carcinoma identified.     E. BREAST MARGIN, RIGHT, NEW ANTERIOR, EXCISION:   -- Benign breast tissue.  -- No carcinoma identified.     F. BREAST MARGIN, RIGHT, NEW INFERIOR, EXCISION:   -- Benign breast tissue with cautery artifacts.  -- No carcinoma identified.     Note: Deeper levels of sections were reviewed.     G. BREAST MARGIN, RIGHT, NEW POSTERIOR, EXCISION:      -- Benign breast tissue.  -- No carcinoma identified.        verall Grade  Grade 1 (scores of 3, 4 or 5)   Tumor Size  Greatest dimension of largest invasive focus (Millimeters): 5.6 mm   Tumor Focality  Single focus of invasive carcinoma   Ductal Carcinoma In Situ (DCIS)  Present     Positive for extensive intraductal component (EIC)   Size (Extent) of DCIS  Estimated size (extent) of DCIS is at least (Millimeters): 48 mm     pT Category  pT1b     SPECIAL STUDIES        Estrogen Receptor (ER) Status  Positive (greater than 10% of cells demonstrate nuclear positivity)   Percentage of Cells with Nuclear Positivity  >95 %        Progesterone Receptor (PgR) Status  Positive   Percentage of Cells with Nuclear Positivity  95 %        HER2 (by immunohistochemistry)  Negative (Score 1+)   Testing Performed on Case Number  [id number]     Imaging:     No images are attached to the encounter.        Assessment and Plan:   Assessment     Trinidad Hines is a 70 y.o.  post-menopausal AA female with right-sided invasive ductal carcinoma, clinical stage IA (cT1, cN0, cM0, G1, ER+, IN+, HER2-).  The patient's breast cancer was diagnosed on 3/27/2024 and is grade 1, estrogen receptor positive at >95%, progesterone receptor positive at 95%, and HER-2/yue negative. S/p lumpectomy on 6/19/2016.     Predict tool: Predicted overall survival at 5 yrs   Surgery only:  92%  + Hormone therapy : 93%  + Chemotherapy: 92%  +  Bisphosphonate: 92%      PMHx - A-fib, Cadiomyopathy (EF 30%), s/p AVR, CKD (stage 3), non-traumatic intracerebral hemorrhage, depression, cardiac defibrillator.      Previously her surgeon and myself had long discussions regarding primary endocrine therapy vs. upfront surgery. She elected to have surgery and had lumpectomy on 6/19/2024. No node sampling. Her clinical staging is IA. Her invasive component was small (5.6 mm) but DCIS component was extensive (48 mm) with focally close positive margin. I advised re-excision of this margin but she is very hesitant to pursue more surgery.  She saw radiation oncologist (Dr. Chu) today.     She comes in today to discuss adjuvant systemic endocrine treatment options.     Given patient's small tumor size, clinically node-negative disease and hormone receptor positivity, her risk of recurrence is low. Also, due to her multiple medical co-morbidities (mentioned above), I do not recommend chemotherapy. She will however, benefit from systemic endocrine therapy to minimize her risk of recurrence.     I recommend AI x5 years.  Side effects that were discussed included but were not limited to osteoporosis, arthritis arthralgia, hot flashes, liver toxicity. After discussion of pros and cons of this recommendation,  patient elected to proceed treatment with Arimidex.     DEXA scan in 2022 showed osteopenia  (Ca and Vitamin D recommended)  11/21/2024: Osteopenia  1/11/2025: Vitamin D 39     I recommend Zometa since she has osteopenia. She has NO teeth.  She is on warfarin, for which no interaction was found with denosomab (confirmed with PharmD).   Vitamin D has been normal (last checked on 8/13/2024).    She had a stroke back in 1994. Although she is tolerating anastrozole without any issues so far, will switch anastrozole to exemestane.     Plan:   Continue anastrozole 1mg po daily x5 years  Take Vitamin D and Calcium supplements  Dental clearance done (no teeth)    Start  denosomab scheduled on 8/11 (chronic kidney disease: baseline Cr ~ 2)  MD visit in 4 weeks  RTC on 5/12/2025      Claudine Middleton MD, PhD   Hematology/Oncology  [facility]

## 2025-04-14 ENCOUNTER — OFFICE VISIT (OUTPATIENT)
Dept: HEMATOLOGY/ONCOLOGY | Facility: CLINIC | Age: 70
End: 2025-04-14
Payer: MEDICAID

## 2025-04-14 VITALS
DIASTOLIC BLOOD PRESSURE: 73 MMHG | TEMPERATURE: 97.1 F | BODY MASS INDEX: 43.42 KG/M2 | OXYGEN SATURATION: 97 % | SYSTOLIC BLOOD PRESSURE: 102 MMHG | HEART RATE: 87 BPM | WEIGHT: 260.91 LBS

## 2025-04-14 DIAGNOSIS — C50.511 MALIGNANT NEOPLASM OF LOWER-OUTER QUADRANT OF RIGHT BREAST OF FEMALE, ESTROGEN RECEPTOR POSITIVE: ICD-10-CM

## 2025-04-14 DIAGNOSIS — Z17.0 MALIGNANT NEOPLASM OF LOWER-OUTER QUADRANT OF RIGHT BREAST OF FEMALE, ESTROGEN RECEPTOR POSITIVE: ICD-10-CM

## 2025-04-14 PROCEDURE — 99214 OFFICE O/P EST MOD 30 MIN: CPT | Performed by: INTERNAL MEDICINE

## 2025-04-14 RX ORDER — EXEMESTANE 25 MG/1
25 TABLET ORAL DAILY
Qty: 30 TABLET | Refills: 11 | Status: SHIPPED | OUTPATIENT
Start: 2025-04-14 | End: 2026-04-14

## 2025-04-14 ASSESSMENT — PAIN SCALES - GENERAL: PAINLEVEL_OUTOF10: 0-NO PAIN

## 2025-04-14 NOTE — PATIENT INSTRUCTIONS
Stop taking anastrozole and start taking examestane after a meal  Schedule follow up with  Dr. Middleton on 5/12/2025  Please call [phone] (option 5, then option 2) with symptoms, questions or concerns.

## 2025-04-19 ENCOUNTER — ANTICOAGULATION - WARFARIN VISIT (OUTPATIENT)
Dept: CARDIOLOGY | Facility: CLINIC | Age: 70
End: 2025-04-19
Payer: MEDICAID

## 2025-04-19 DIAGNOSIS — I48.19 PERSISTENT ATRIAL FIBRILLATION (MULTI): Primary | ICD-10-CM

## 2025-04-19 LAB
POC INR: 2.1 (ref 0.9–1.1)
POC PROTHROMBIN TIME: ABNORMAL (ref 9.3–12.5)

## 2025-04-19 PROCEDURE — 99211 OFF/OP EST MAY X REQ PHY/QHP: CPT

## 2025-04-19 PROCEDURE — 85610 PROTHROMBIN TIME: CPT | Mod: QW

## 2025-04-19 NOTE — PROGRESS NOTES
Patient identification verified with 2 identifiers.    Location: [facility] at [facility] - [address] [address] [phone] option #1     Referring Physician: DR. CASANOVA  Enrollment/ Re-enrollment date: 25   INR Goal: 2.0-3.0  INR monitoring is per [facility] protocol.  Anticoagulation Medication: warfarin  Indication: Atrial Fibrillation/Atrial Flutter    Subjective   Bleeding signs/symptoms: No    Bruising: No   Major bleeding event: No  Thrombosis signs/symptoms: No  Thromboembolic event: No  Missed doses: No  Extra doses: No  Medication changes: No  Dietary changes: Yes  PT ATE GREENS LIKE SHE WAS INSTRUCTED AT LAST VISIT.  SHE WILL RESUME HER CONSISTENT DIETARY CONSUMPTION OF VITAMIN K.  Change in health: No  Change in activity: No  Alcohol: No  Other concerns: No    Upcoming Procedures:  Does the Patient Have any upcoming procedures that require interruption in anticoagulation therapy? no  Does the patient require bridging? no      Anticoagulation Summary  As of 2025      INR goal:  2.0-3.0   TTR:  72.7% (1.6 y)   INR used for dosin.10 (2025)   Weekly warfarin total:  42.5 mg               Assessment/Plan   Therapeutic     1. New dose: no change    2. Next INR: 2 weeks      Education provided to patient during the visit:  Patient instructed to call in interim with questions, concerns and changes.   Patient educated on dietary consistency in vitamin k consumption.   Patient educated on signs of bleeding/clotting.

## 2025-04-20 LAB
ALBUMIN SERPL-MCNC: 4.1 G/DL (ref 3.6–5.1)
ALP SERPL-CCNC: 35 U/L (ref 37–153)
ALT SERPL-CCNC: 7 U/L (ref 6–29)
ANION GAP SERPL CALCULATED.4IONS-SCNC: 11 MMOL/L (CALC) (ref 7–17)
AST SERPL-CCNC: 12 U/L (ref 10–35)
BILIRUB SERPL-MCNC: 0.9 MG/DL (ref 0.2–1.2)
BUN SERPL-MCNC: 24 MG/DL (ref 7–25)
CALCIUM SERPL-MCNC: 8.7 MG/DL (ref 8.6–10.4)
CHLORIDE SERPL-SCNC: 109 MMOL/L (ref 98–110)
CHOLEST SERPL-MCNC: 148 MG/DL
CHOLEST/HDLC SERPL: 2.5 (CALC)
CO2 SERPL-SCNC: 21 MMOL/L (ref 20–32)
CREAT SERPL-MCNC: 1.81 MG/DL (ref 0.6–1)
EGFRCR SERPLBLD CKD-EPI 2021: 30 ML/MIN/1.73M2
ERYTHROCYTE [DISTWIDTH] IN BLOOD BY AUTOMATED COUNT: 13.5 % (ref 11–15)
EST. AVERAGE GLUCOSE BLD GHB EST-MCNC: 123 MG/DL
EST. AVERAGE GLUCOSE BLD GHB EST-SCNC: 6.8 MMOL/L
GLUCOSE SERPL-MCNC: 103 MG/DL (ref 65–99)
HBA1C MFR BLD: 5.9 %
HCT VFR BLD AUTO: 39.2 % (ref 35–45)
HDLC SERPL-MCNC: 59 MG/DL
HGB BLD-MCNC: 12.5 G/DL (ref 11.7–15.5)
LDLC SERPL CALC-MCNC: 75 MG/DL (CALC)
MCH RBC QN AUTO: 29.6 PG (ref 27–33)
MCHC RBC AUTO-ENTMCNC: 31.9 G/DL (ref 32–36)
MCV RBC AUTO: 92.7 FL (ref 80–100)
NONHDLC SERPL-MCNC: 89 MG/DL (CALC)
PLATELET # BLD AUTO: 143 THOUSAND/UL (ref 140–400)
PMV BLD REES-ECKER: 11.4 FL (ref 7.5–12.5)
POTASSIUM SERPL-SCNC: 4.3 MMOL/L (ref 3.5–5.3)
PROT SERPL-MCNC: 6.4 G/DL (ref 6.1–8.1)
RBC # BLD AUTO: 4.23 MILLION/UL (ref 3.8–5.1)
SODIUM SERPL-SCNC: 141 MMOL/L (ref 135–146)
TRIGL SERPL-MCNC: 63 MG/DL
TSH SERPL-ACNC: 4.19 MIU/L (ref 0.4–4.5)
WBC # BLD AUTO: 3.8 THOUSAND/UL (ref 3.8–10.8)

## 2025-04-23 ENCOUNTER — APPOINTMENT (OUTPATIENT)
Dept: PRIMARY CARE | Facility: CLINIC | Age: 70
End: 2025-04-23
Payer: MEDICAID

## 2025-04-29 ENCOUNTER — APPOINTMENT (OUTPATIENT)
Facility: CLINIC | Age: 70
End: 2025-04-29
Payer: MEDICAID

## 2025-04-29 VITALS
DIASTOLIC BLOOD PRESSURE: 70 MMHG | WEIGHT: 259.4 LBS | SYSTOLIC BLOOD PRESSURE: 102 MMHG | BODY MASS INDEX: 43.22 KG/M2 | HEIGHT: 65 IN

## 2025-04-29 DIAGNOSIS — Z01.419 NORMAL GYNECOLOGIC EXAMINATION: Primary | ICD-10-CM

## 2025-04-29 DIAGNOSIS — R10.30 LOWER ABDOMINAL PAIN: ICD-10-CM

## 2025-04-29 DIAGNOSIS — Z00.00 HEALTH CARE MAINTENANCE: ICD-10-CM

## 2025-04-29 RX ORDER — LATANOPROST 50 UG/ML
1 SOLUTION/ DROPS OPHTHALMIC NIGHTLY
COMMUNITY
Start: 2025-02-09

## 2025-04-29 ASSESSMENT — ENCOUNTER SYMPTOMS
CARDIOVASCULAR NEGATIVE: 1
HEMATOLOGIC/LYMPHATIC NEGATIVE: 1
ENDOCRINE NEGATIVE: 1
NEUROLOGICAL NEGATIVE: 1
PSYCHIATRIC NEGATIVE: 1
CONSTITUTIONAL NEGATIVE: 1
ABDOMINAL PAIN: 1
RESPIRATORY NEGATIVE: 1
ALLERGIC/IMMUNOLOGIC NEGATIVE: 1
MUSCULOSKELETAL NEGATIVE: 1

## 2025-04-29 ASSESSMENT — PAIN SCALES - GENERAL: PAINLEVEL_OUTOF10: 0-NO PAIN

## 2025-04-29 NOTE — PROGRESS NOTES
Subjective   Patient ID: Trinidad Hines is a 70 y.o. female who presents for Abdominal Pain (New patient is here for abdominal pain./Last pap:  2022  neg./Last romana:  2025  cat 6./Last colon screen:  per pt (she is unsure)/Declines chaperone.   REID Zepeda LPN).  Abdominal Pain     patient is here to establish care she complains of lower abdominal pelvic discomfort over the last several months it has not been increasing in severity the patient cannot exactly tell when it started patient is 70-year-old female  5 para 3 spontaneous vaginal deliveries menopause at 40s not on hormone replacement therapy no bleeding since menopause Pap test  normal mammogram in  showed category 6 mammogram breast cancer in a right breast treated by lumpectomy no radiation no chemotherapy it was grade 1 breast cancer we could not find the records of the lymph node examination patient does not smoke she does not drink alcohol she does not use drugs she uses number of medication which include albuterol allopurinol Calcitrol fark CIGA aromatase or exemestane's Elantan Metroprolol Entresto simvastatin spironolactone furosemide tramadol and Coumadin patient has number of allergies which include aspirin family toning lisinopril diltiazem  Past medical history significant for breast cancer artificial heart valve defibrillator implant congestive heart failure and strokes knee surgery family history positive for carcinoma hypertension strokes coronary heart disease and diabetes    Review of Systems   Constitutional: Negative.    Respiratory: Negative.     Cardiovascular: Negative.    Gastrointestinal:  Positive for abdominal pain.   Endocrine: Negative.    Genitourinary: Negative.    Musculoskeletal: Negative.    Skin: Negative.    Allergic/Immunologic: Negative.    Neurological: Negative.    Hematological: Negative.    Psychiatric/Behavioral: Negative.         Objective   Physical Exam  Constitutional:       Appearance:  Normal appearance.   HENT:      Head: Normocephalic and atraumatic.   Cardiovascular:      Rate and Rhythm: Normal rate and regular rhythm.      Pulses: Normal pulses.      Heart sounds: Normal heart sounds.   Pulmonary:      Effort: Pulmonary effort is normal.      Breath sounds: Normal breath sounds.   Abdominal:      General: Abdomen is flat. Bowel sounds are normal.      Palpations: Abdomen is soft.      Hernia: There is no hernia in the left inguinal area or right inguinal area.      Comments: Examination of the abdomen specially below the waist shows vague discomfort but no significant pain   Genitourinary:     General: Normal vulva.      Exam position: Lithotomy position.      Labia:         Right: No rash, tenderness or lesion.         Left: No rash, tenderness or lesion.       Urethra: No prolapse.      Vagina: Normal.      Cervix: Normal.      Uterus: Normal.       Adnexa: Right adnexa normal and left adnexa normal.      Comments: Pelvic examination did not reveal any masses or unusual pain in the pelvis  Musculoskeletal:      Cervical back: Normal range of motion and neck supple.   Skin:     General: Skin is warm and dry.   Neurological:      General: No focal deficit present.      Mental Status: She is alert and oriented to person, place, and time.         Assessment/Plan    normal gynecologic examination         Deyvi Keller MD 04/29/25 12:45 PM    Yes - the patient is able to be screened

## 2025-05-01 ENCOUNTER — APPOINTMENT (OUTPATIENT)
Dept: PRIMARY CARE | Facility: CLINIC | Age: 70
End: 2025-05-01
Payer: MEDICAID

## 2025-05-01 ENCOUNTER — ANTICOAGULATION - WARFARIN VISIT (OUTPATIENT)
Dept: CARDIOLOGY | Facility: CLINIC | Age: 70
End: 2025-05-01
Payer: MEDICAID

## 2025-05-01 VITALS
WEIGHT: 263 LBS | DIASTOLIC BLOOD PRESSURE: 57 MMHG | BODY MASS INDEX: 43.77 KG/M2 | SYSTOLIC BLOOD PRESSURE: 89 MMHG | TEMPERATURE: 98.2 F | HEART RATE: 80 BPM

## 2025-05-01 DIAGNOSIS — M17.11 PRIMARY OSTEOARTHRITIS OF RIGHT KNEE: Primary | ICD-10-CM

## 2025-05-01 DIAGNOSIS — Z13.31 NEGATIVE DEPRESSION SCREENING: ICD-10-CM

## 2025-05-01 DIAGNOSIS — I61.9 HEMORRHAGIC CEREBROVASCULAR ACCIDENT (CVA) (MULTI): ICD-10-CM

## 2025-05-01 DIAGNOSIS — I48.19 PERSISTENT ATRIAL FIBRILLATION (MULTI): ICD-10-CM

## 2025-05-01 LAB
POC INR: 1.8 (ref 0.9–1.1)
POC PROTHROMBIN TIME: ABNORMAL (ref 9.3–12.5)

## 2025-05-01 PROCEDURE — 99211 OFF/OP EST MAY X REQ PHY/QHP: CPT

## 2025-05-01 PROCEDURE — 85610 PROTHROMBIN TIME: CPT | Mod: QW

## 2025-05-01 NOTE — PROGRESS NOTES
Patient identification verified with 2 identifiers.    Location: [facility] at [facility] - [address] [address] [phone] option #1     Referring Physician: DR. CASANOVA  Enrollment/ Re-enrollment date: 25   INR Goal: 2.0-3.0  INR monitoring is per [facility] protocol.  Anticoagulation Medication: warfarin  Indication: Atrial Fibrillation/Atrial Flutter    Subjective   Bleeding signs/symptoms: No    Bruising: No   Major bleeding event: No  Thrombosis signs/symptoms: No  Thromboembolic event: No  Missed doses: No  Extra doses: No  Medication changes: No  Dietary changes: No  Change in health: No  Change in activity: No  Alcohol: No  Other concerns: No    Upcoming Procedures:  Does the Patient Have any upcoming procedures that require interruption in anticoagulation therapy? no  Does the patient require bridging? no      Anticoagulation Summary  As of 2025      INR goal:  2.0-3.0   TTR:  71.9% (1.6 y)   INR used for dosin.80 (2025)   Weekly warfarin total:  42.5 mg               Assessment/Plan   Therapeutic     1. New dose: no change  pt stated she ate more greenes Monday of this week  2. Next INR: 1 week      Education provided to patient during the visit:  Patient instructed to call in interim with questions, concerns and changes.   Patient educated on dietary consistency in vitamin k consumption.   Patient educated on signs of bleeding/clotting.

## 2025-05-01 NOTE — ASSESSMENT & PLAN NOTE
-Arthritis is getting worse.  Patient states that she is no longer a candidate to TKA because of her current anticoagulant status.  -Follow-up with ortho recommended for additional treatment options.   -RTA form was completed today.  A copy was scanned into the records.   -Referral to  done today for the patient's Request to have a ROLLATOR that will allow her to ambulate in an upright position.

## 2025-05-01 NOTE — PROGRESS NOTES
Subjective   Patient ID: Trinidad Hines is a 70 y.o. female who presents for Follow-up.  HPI    The patient is a 70-year-old female with a history significant for persistent AF on Warfarin, dilated cardiomyopathy HFrEF (3/2024 TTE: LVEF 30-35%, s/p aortic valve replacement 1986, redo in 2007 with bioprosthetic aortic valve, gout,HTN, non traumatic hemorrhagic stroke, right breast cancer, planned for right breast partial mastectomy 6/19/24, depression, cardiac defibrillator,  who is here for:    1- OA of right knee and CVA/ HFrEF: patient is here for her  RTA form completion.    2- Request to have a ROLLATOR that will allow her to ambulate in an upright position.     A review of system was completed.  All systems were reviewed and were normal, except for the ones that are listed in the HPI.    Objective   Physical Exam  Constitutional:       Appearance: Normal appearance.   HENT:      Head: Normocephalic and atraumatic.      Right Ear: Tympanic membrane, ear canal and external ear normal.      Left Ear: Tympanic membrane, ear canal and external ear normal.      Nose: Nose normal.      Mouth/Throat:      Mouth: Mucous membranes are moist.      Pharynx: Oropharynx is clear.   Eyes:      Extraocular Movements: Extraocular movements intact.      Conjunctiva/sclera: Conjunctivae normal.      Pupils: Pupils are equal, round, and reactive to light.   Cardiovascular:      Rate and Rhythm: Normal rate and regular rhythm.      Pulses: Normal pulses.   Pulmonary:      Effort: Pulmonary effort is normal.      Breath sounds: Normal breath sounds.   Abdominal:      General: Abdomen is flat. Bowel sounds are normal.      Palpations: Abdomen is soft.   Musculoskeletal:         General: Normal range of motion.      Cervical back: Normal range of motion and neck supple.   Skin:     General: Skin is warm.   Neurological:      General: No focal deficit present.      Mental Status: She is alert and oriented to person, place, and time.  Mental status is at baseline.   Psychiatric:         Mood and Affect: Mood normal.         Behavior: Behavior normal.         Thought Content: Thought content normal.         Judgment: Judgment normal.     Assessment/Plan   Problem List Items Addressed This Visit       Hemorrhagic cerebrovascular accident (CVA) (Multi)    Relevant Orders    Inpatient consult to Social Work and TCC    Osteoarthritis of right knee - Primary    -Arthritis is getting worse.  Patient states that she is no longer a candidate to TKA because of her current anticoagulant status.  -Follow-up with ortho recommended for additional treatment options.   -RTA form was completed today.  A copy was scanned into the records.   -Referral to  done today for the patient's Request to have a ROLLATOR that will allow her to ambulate in an upright position.          Relevant Orders    Inpatient consult to Social Work and TCC    Negative depression screening    -PHQ2 screening was done today: 0.          Patient to return to office in 6 months for routine care.

## 2025-05-02 ENCOUNTER — PATIENT OUTREACH (OUTPATIENT)
Dept: PRIMARY CARE | Facility: CLINIC | Age: 70
End: 2025-05-02
Payer: MEDICAID

## 2025-05-02 NOTE — PROGRESS NOTES
"Spoke with patient at the request of primary care physician regarding assistance with obtaining rollator.  CM spoke with patient. States she currently has a rollator that is about 2 years old but is leaning over to walk and would like to be able to stand up straight.   5'5\" last weight 263 lbs.  CHRISTIE spoke with [employer] supply representative, [phone]. They accept patient's insurance but only carry fixed, not adjustable, rollators. Also, replacement of DME follows Medicare guidelines of 5 years.  CM will notify patient.   "

## 2025-05-03 ENCOUNTER — APPOINTMENT (OUTPATIENT)
Dept: CARDIOLOGY | Facility: CLINIC | Age: 70
End: 2025-05-03
Payer: MEDICAID

## 2025-05-05 ENCOUNTER — TELEPHONE (OUTPATIENT)
Dept: PRIMARY CARE | Facility: CLINIC | Age: 70
End: 2025-05-05
Payer: MEDICAID

## 2025-05-08 ENCOUNTER — ANTICOAGULATION - WARFARIN VISIT (OUTPATIENT)
Dept: CARDIOLOGY | Facility: CLINIC | Age: 70
End: 2025-05-08
Payer: MEDICAID

## 2025-05-08 DIAGNOSIS — I48.19 PERSISTENT ATRIAL FIBRILLATION (MULTI): Primary | ICD-10-CM

## 2025-05-08 LAB
POC INR: 2.3 (ref 0.9–1.1)
POC PROTHROMBIN TIME: ABNORMAL (ref 9.3–12.5)

## 2025-05-08 PROCEDURE — 85610 PROTHROMBIN TIME: CPT | Mod: QW

## 2025-05-08 PROCEDURE — 99211 OFF/OP EST MAY X REQ PHY/QHP: CPT

## 2025-05-08 NOTE — PROGRESS NOTES
Patient identification verified with 2 identifiers.    Location: [facility] - [address] [address] [phone] option #1     Referring Physician: Dr.Lloyd Gandara  Enrollment/ Re-enrollment date: 25   INR Goal: 2.0-3.0  INR monitoring is per AMS protocol.  Anticoagulation Medication: warfarin  Indication: Atrial Fibrillation/Atrial Flutter    Subjective   Bleeding signs/symptoms: No    Bruising: No   Major bleeding event: No  Thrombosis signs/symptoms: No  Thromboembolic event: No  Missed doses: No  Extra doses: No  Medication changes: No  Dietary changes: No  Change in health: No  Change in activity: No  Alcohol: No  Other concerns: No    Upcoming Procedures:  Does the Patient Have any upcoming procedures that require interruption in anticoagulation therapy? no  Does the patient require bridging? no      Anticoagulation Summary  As of 2025      INR goal:  2.0-3.0   TTR:  71.8% (1.6 y)   INR used for dosin.30 (2025)   Weekly warfarin total:  42.5 mg               Assessment/Plan   Therapeutic     1. New dose: no change    2. Next INR: 3 weeks      Education provided to patient during the visit:  Patient instructed to call in interim with questions, concerns and changes.   Patient educated on interactions between medications and warfarin.   Patient educated on dietary consistency in vitamin k consumption.   Patient educated on affects of alcohol consumption while taking warfarin.   Patient educated on signs of bleeding/clotting.   Patient educated on compliance with dosing, follow up appointments, and prescribed plan of care.

## 2025-05-27 ENCOUNTER — ANTICOAGULATION - WARFARIN VISIT (OUTPATIENT)
Dept: CARDIOLOGY | Facility: CLINIC | Age: 70
End: 2025-05-27
Payer: COMMERCIAL

## 2025-05-27 ENCOUNTER — APPOINTMENT (OUTPATIENT)
Dept: NEPHROLOGY | Facility: CLINIC | Age: 70
End: 2025-05-27
Payer: MEDICAID

## 2025-05-27 ENCOUNTER — OFFICE VISIT (OUTPATIENT)
Dept: CARDIOLOGY | Facility: CLINIC | Age: 70
End: 2025-05-27
Payer: COMMERCIAL

## 2025-05-27 VITALS
BODY MASS INDEX: 43.65 KG/M2 | WEIGHT: 262 LBS | SYSTOLIC BLOOD PRESSURE: 111 MMHG | OXYGEN SATURATION: 100 % | DIASTOLIC BLOOD PRESSURE: 71 MMHG | HEART RATE: 82 BPM | HEIGHT: 65 IN

## 2025-05-27 VITALS
WEIGHT: 265 LBS | DIASTOLIC BLOOD PRESSURE: 79 MMHG | TEMPERATURE: 97.3 F | BODY MASS INDEX: 44.1 KG/M2 | HEART RATE: 90 BPM | SYSTOLIC BLOOD PRESSURE: 100 MMHG | OXYGEN SATURATION: 95 %

## 2025-05-27 DIAGNOSIS — I42.9 CARDIOMYOPATHY, UNSPECIFIED TYPE (MULTI): ICD-10-CM

## 2025-05-27 DIAGNOSIS — I48.19 PERSISTENT ATRIAL FIBRILLATION (MULTI): Primary | ICD-10-CM

## 2025-05-27 DIAGNOSIS — Z00.00 ENCOUNTER FOR GENERAL ADULT MEDICAL EXAMINATION WITHOUT ABNORMAL FINDINGS: ICD-10-CM

## 2025-05-27 DIAGNOSIS — I12.9 HYPERTENSIVE KIDNEY DISEASE WITH STAGE 3B CHRONIC KIDNEY DISEASE (MULTI): ICD-10-CM

## 2025-05-27 DIAGNOSIS — I10 BENIGN HYPERTENSION: ICD-10-CM

## 2025-05-27 DIAGNOSIS — N18.32 HYPERTENSIVE KIDNEY DISEASE WITH STAGE 3B CHRONIC KIDNEY DISEASE (MULTI): ICD-10-CM

## 2025-05-27 LAB
POC INR: 2.6 (ref 0.9–1.1)
POC PROTHROMBIN TIME: ABNORMAL (ref 9.3–12.5)

## 2025-05-27 PROCEDURE — 85610 PROTHROMBIN TIME: CPT | Mod: QW

## 2025-05-27 PROCEDURE — 99211 OFF/OP EST MAY X REQ PHY/QHP: CPT

## 2025-05-27 RX ORDER — METOPROLOL SUCCINATE 100 MG/1
100 TABLET, EXTENDED RELEASE ORAL DAILY
Qty: 90 TABLET | Refills: 1 | Status: SHIPPED | OUTPATIENT
Start: 2025-05-27

## 2025-05-27 RX ORDER — CALCITRIOL 0.25 UG/1
0.25 CAPSULE ORAL 3 TIMES WEEKLY
Qty: 36 CAPSULE | Refills: 3 | Status: SHIPPED | OUTPATIENT
Start: 2025-05-28 | End: 2026-05-28

## 2025-05-27 RX ORDER — DAPAGLIFLOZIN 10 MG/1
10 TABLET, FILM COATED ORAL DAILY
Qty: 90 TABLET | Refills: 3 | Status: SHIPPED | OUTPATIENT
Start: 2025-05-27 | End: 2026-05-27

## 2025-05-27 ASSESSMENT — PAIN SCALES - GENERAL: PAINLEVEL_OUTOF10: 0-NO PAIN

## 2025-05-27 NOTE — PROGRESS NOTES
Primary Care Physician: Minda Ricardo MD  Date of Visit: 05/27/2025  9:00 AM EDT  Location of visit: [facility] [address]     Chief Complaint:   Chief complaint evaluation and management of valvular heart disease, exertional dyspnea, recent laboratory studies and office evaluations reviewed       HPI / Summary:   Trinidad Hines is a 70 y.o. female presents for new cardiovascular evaluation. No ref. provider found   6-month office follow-up.  Rheumatic valvular disease.  1986 AVR with a Fernie Shiley 21.  2007 redo AVR with a 27 mm freestyle root.  Breast CA.  Hypertension.  History of hemorrhagic stroke.  Advanced CKD followed by nephrology.  Gallop.  CRT-D in 2023.  April BUN 24 creatinine 1.81 EGFR of 30 unremarkable hemogram, cholesterol 148 LDL 75  Last echo in March 2004 showed EF of 30%, mean aortic valve gradient of 3 no leak trace MR moderate TR RVSP of 41  Left upper arm pain, 'like a cramp' twice a day recently, gets better with massage it gets better, has a tendency to worsen if moves.  Specialty Problems          Cardiology Problems    Benign hypertension    Cardiomyopathy    Comment on above: Added by Problem List Migration; 2013-7-21; Moved to [facility] Nov 29 2013 9:07PM;         Aortic valve disorder    Atrial fibrillation (Multi)    Cardiac defibrillator in place    Chronic systolic heart failure    Hyperlipidemia    Palpitations    Dilated cardiomyopathy (Multi)    Screening cholesterol level    Chest pain    Comment on above: CHEST PAIN         Long term (current) use of anticoagulants    Atrial fibrillation, unspecified type (Multi)    Persistent atrial fibrillation (Multi)        Medical History[1]       Surgical History[2]       Social History:   reports that she quit smoking about 31 years ago. Her smoking use included cigarettes. She started smoking about 51 years ago. She has a 20 pack-year smoking history. She has never used smokeless tobacco. She reports that she does not drink  "alcohol and does not use drugs.      Allergies:  RX Allergies[3]    Outpatient Medications:  Current Outpatient Medications   Medication Instructions    albuterol 90 mcg/actuation inhaler 2 puffs, inhalation, Every 4 hours PRN    allopurinol (ZYLOPRIM) 300 mg, oral, Daily    allopurinol (ZYLOPRIM) 300 mg, oral, Daily    calcitriol (Rocaltrol) 0.25 mcg capsule TAKE 1 CAPSULE BY MOUTH EVERY DAY (0.25 MCG)    cholecalciferol (VITAMIN D-3) 1,000 Units, oral, Daily    dapagliflozin propanediol (FARXIGA) 10 mg, oral, Daily    exemestane (AROMASIN) 25 mg, oral, Daily, Take after a meal.  Try to take at the same time each day.    Fluocinonide-E 0.05 % cream Topical, 2 times daily, to affected area    latanoprost (Xalatan) 0.005 % ophthalmic solution 1 drop, Nightly    lidocaine (Xylocaine) 5 % ointment 1 Application, 3 times daily PRN    metoprolol succinate XL (TOPROL-XL) 100 mg, oral, Daily    sacubitriL-valsartan (Entresto) 49-51 mg tablet 1 tablet, oral, 2 times daily    simvastatin (ZOCOR) 20 mg, oral, Nightly    spironolactone (ALDACTONE) 25 mg, oral, Daily    torsemide (DEMADEX) 40 mg, oral, Daily    traMADol (ULTRAM) 50 mg, Every 6 hours PRN    warfarin (Coumadin) 5 mg tablet TAKE 1.5 TABLET DAILY AS DIRECTED BY COUMADIN CLINIC  Strength: 5 mg       ROS     Physical Exam:  Vitals:    05/27/25 0914   BP: 111/71   BP Location: Right arm   Patient Position: Sitting   BP Cuff Size: Large adult   Pulse: 82   SpO2: 100%   Weight: 119 kg (262 lb)   Height: 1.651 m (5' 5\")     Wt Readings from Last 5 Encounters:   05/27/25 119 kg (262 lb)   05/01/25 119 kg (263 lb)   04/29/25 118 kg (259 lb 6.4 oz)   04/14/25 118 kg (260 lb 14.6 oz)   04/04/25 118 kg (260 lb)     Body mass index is 43.6 kg/m².   Physical Exam   Well-appearing female in no acute distress.  Normal carotid upstrokes I do not appreciate bruits.  While sitting upright her JVP did not seem elevated.  Rhythm was regular could not appreciate murmur but heart sounds " were distant.  Clear lungs.  Soft abdomen.  Dependent edema distal pulses were palpable  Last Labs:  CMP:  Recent Labs     04/19/25  1015 02/08/25  1035 01/11/25  1006 08/13/24  1045 06/03/24  1020    141 143 142 140   K 4.3 4.5 4.1 4.3 4.3    104 107 102 104   CO2 21 27 29 30 27   ANIONGAP 11 15 11 14 13   BUN 24 36* 33* 30* 26*   CREATININE 1.81* 2.47* 2.16* 2.02* 2.05*   EGFR 30* 21* 24* 26* 26*   GLUCOSE 103* 107* 115* 97 93     Recent Labs     04/19/25  1015 02/08/25  1035 01/11/25  1006 08/13/24  1045 06/03/24  1020 05/03/24  0924 12/31/23  1626 01/19/22  1223 03/05/21  2221 11/08/19  1501 08/06/19  1111   ALBUMIN 4.1 4.3 4.0 4.3 4.2 4.1 3.9   < > 3.8   < > 4.2   ALKPHOS 35* 54 54  --   --  59 45   < > 72   < > 78   ALT 7 8 8  --   --  8 8   < > 62*   < > 14   AST 12 12 12  --   --  11 15   < > 51*   < > 23   BILITOT 0.9 1.0 0.8  --   --  0.8 1.6*   < > 0.7   < > 1.6*   LIPASE  --   --   --   --   --   --   --   --  77  --  15    < > = values in this interval not displayed.     CBC:  Recent Labs     04/19/25  1015 01/11/25  1006 08/13/24  1045 06/03/24  1020 05/03/24  0924   WBC 3.8 3.8* 3.4* 2.8* 3.9*   HGB 12.5 12.3 13.8 13.2 12.9   HCT 39.2 37.8 43.3 41.4 39.9    162 145* 129* 169   MCV 92.7 90 93 93 93     COAG:   Recent Labs     05/08/25  0950 05/01/25  1018 04/19/25  1003 04/04/25  0000 03/08/25  0958   INR 2.30* 1.80* 2.10* 3.20 2.10     HEME/ENDO:  Recent Labs     04/19/25  1015 05/03/24  0924 11/02/22  1321 01/19/22  1223 10/25/20  0746   TSH 4.19 4.92* 4.17* 2.72  --    HGBA1C 5.9* 5.8* 5.7*  --  5.6      CARDIAC:   Recent Labs     03/20/23  1919 03/20/23  0545 03/20/23  0346 09/05/22  2214 09/05/22  2133 07/20/21  1039 02/25/21  1159 10/25/20  0746 10/24/20  1435   TROPHS CANCELED  CANCELED 12 13 11 13  --   --   --   --    BNP  --   --  380*  --   --  223* 181* 381* 209*     Recent Labs     04/19/25  1015 05/03/24  0924 01/19/22  1223 03/14/19  1228   CHOL 148 158 187 146   LDLF   --   --  106* 77   HDL 59 62.8 68.3 55.3   TRIG 63 109 62 68       Last Cardiology Tests:  ECG:      Echo:  Echo Results:  Transthoracic Echo (TTE) Complete 03/04/2024    [facility], [address] [place] [address]  Tel [phone] and Fax [phone]    TRANSTHORACIC ECHOCARDIOGRAM REPORT      Patient Name:      MALIK AGUILAR      Reading Physician:    [id number] Ulysses Alarcon MD  Study Date:        3/4/2024             Ordering Provider:    [id number] ERIKA TOPETE  MRN/PID:           [id number]             Fellow:  Accession#:        [id number]         Nurse:  Date of Birth/Age: 1955 / 68 years Sonographer:          Belkys Perrin RDCS  Gender:            F                    Additional Staff:  Height:            165.10 cm            Admit Date:  Weight:            117.93 kg            Admission Status:     Outpatient  BSA / BMI:         2.21 m2 / 43.27      Encounter#:           [id number]  kg/m2  Department Location:  [facility]  Echo Lab  Blood Pressure: 108 /73 mmHg    Study Type:    TRANSTHORACIC ECHO (TTE) COMPLETE  Diagnosis/ICD: Chronic systolic (congestive) heart failure (CHF)-I50.22  Indication:    Congestive Heart Failure  CPT Code:      Echo Complete w Full Doppler-57434    Patient History:  BMI:               Obese >30  Pertinent History: A-Fib, Cardiomyopathy, HTN, CHF, CVA and Palpitations. AVR  1986 and 2007, #21 Bhaork-Shiley,CKD,QI,Rheurmatic  fever,Cardioversion.    Study Detail: The following Echo studies were performed: 2D, M-Mode, Doppler and  color flow. Technically challenging study due to body habitus.  Patient has a defibrillator.      PHYSICIAN INTERPRETATION:  Left Ventricle: The left ventricular systolic function is moderately decreased, with an estimated ejection fraction of 30-35%. The patient is in atrial fibrillation which may influence the estimate of left ventricular function and transvalvular flows. There is global  hypokinesis of the left ventricle with minor regional variations. The left ventricular cavity size is mildly dilated. The left ventricular septal wall thickness is mildly increased. There is mild eccentric left ventricular hypertrophy. Abnormal (paradoxical) septal motion consistent with post-operative status. Left ventricular diastolic filling was indeterminate.  Left Atrium: The left atrium is mild to moderately dilated.  Right Ventricle: The right ventricle is slightly enlarged. There is mildly reduced right ventricular systolic function. A device is visualized in the right ventricle.  Right Atrium: The right atrium is moderately dilated.  Aortic Valve: There is a prosthetic aortic valve present. The aortic valve dimensionless index is 0.59. There is a Fernie-Shiley bioprosthetic aortic valve, with a 21 mm reported size. There is no aditi-prosthetic aortic valve regurgitation. Echo findings are consistent with normal aortic valve prosthesis structure and function. There is no evidence of aortic valve regurgitation. The peak instantaneous gradient of the aortic valve is 7.6 mmHg. The mean gradient of the aortic valve is 3.9 mmHg.  Mitral Valve: The mitral valve is mildly thickened. There is mild mitral valve regurgitation.  Tricuspid Valve: The tricuspid valve is structurally normal. There is mild to moderate tricuspid regurgitation. The Doppler estimated RVSP is mildly elevated at 39.9 mmHg.  Pulmonic Valve: The pulmonic valve is not well visualized. The pulmonic valve regurgitation was not well visualized.  Pericardium: There is a trivial pericardial effusion.  Aorta: The aortic root is normal.  Systemic Veins: The inferior vena cava appears dilated. There is less than 50% IVC collapse with inspiration.  In comparison to the previous echocardiogram(s): Compared with study from 8/28/2023, no significant change.      CONCLUSIONS:  1. Left ventricular systolic function is moderately decreased with a 30-35% estimated  ejection fraction.  2. Abnormal septal motion consistent with post-operative status.  3. There is mild eccentric left ventricular hypertrophy.  4. There is mildly reduced right ventricular systolic function.  5. The left atrium is mild to moderately dilated.  6. The right atrium is moderately dilated.  7. Mild to moderate tricuspid regurgitation visualized.  8. Mildly elevated RVSP.  9. There is a bioprosthetic aortic valve.  10. Patient refused Definity.  11. The patient is in atrial fibrillation which may influence the estimate of left ventricular function and transvalvular flows.  12. There is global hypokinesis of the left ventricle with minor regional variations.    QUANTITATIVE DATA SUMMARY:  2D MEASUREMENTS:  Normal Ranges:  IVSd:          1.25 cm    (0.6-1.1cm)  LVPWd:         0.91 cm    (0.6-1.1cm)  LVIDd:         5.47 cm    (3.9-5.9cm)  LVIDs:         4.68 cm  LV Mass Index: 105.7 g/m2  LV % FS        14.4 %    LA VOLUME:  Normal Ranges:  LA Vol A4C:        85.8 ml    (22+/-6mL/m2)  LA Vol A2C:        91.2 ml  LA Vol BP:         91.2 ml  LA Vol Index A4C:  38.8 ml/m2  LA Vol Index A2C:  41.2 ml/m2  LA Vol Index BP:   41.2 ml/m2  LA Area A4C:       26.0 cm2  LA Area A2C:       26.0 cm2  LA Major Axis A4C: 6.7 cm  LA Major Axis A2C: 6.3 cm  LA Volume Index:   40.0 ml/m2  LA Vol A4C:        76.4 ml  LA Vol A2C:        84.1 ml    RA VOLUME BY A/L METHOD:  Normal Ranges:  RA Vol A4C:        104.8 ml   (8.3-19.5ml)  RA Vol Index A4C:  47.4 ml/m2  RA Area A4C:       30.0 cm2  RA Major Axis A4C: 7.3 cm    M-MODE MEASUREMENTS:  Normal Ranges:  Ao Root: 2.90 cm (2.0-3.7cm)  LAs:     6.30 cm (2.7-4.0cm)    AORTA MEASUREMENTS:  Normal Ranges:  Ao Arch: 2.90 cm (2.0-3.6cm)    LV SYSTOLIC FUNCTION BY 2D PLANIMETRY (MOD):  Normal Ranges:  EF-A4C View: 31.5 % (>=55%)  EF-A2C View: 35.1 %  EF-Biplane:  33.6 %    LV DIASTOLIC FUNCTION:  Normal Ranges:  MV Peak E:    1.05 m/s    (0.7-1.2 m/s)  MV Peak A:    0.29 m/s     (0.42-0.7 m/s)  E/A Ratio:    3.69        (1.0-2.2)  MV e'         0.10 m/s    (>8.0)  MV lateral e' 0.10 m/s  MV medial e'  0.11 m/s  MV A Dur:     112.27 msec  E/e' Ratio:   10.04       (<8.0)    MITRAL VALVE:  Normal Ranges:  MV DT: 133 msec (150-240msec)    AORTIC VALVE:  Normal Ranges:  AoV Vmax:                1.38 m/s (<=1.7m/s)  AoV Peak P.6 mmHg (<20mmHg)  AoV Mean PG:             3.9 mmHg (1.7-11.5mmHg)  LVOT Max Wing:            0.86 m/s (<=1.1m/s)  AoV VTI:                 25.70 cm (18-25cm)  LVOT VTI:                15.19 cm  LVOT Diameter:           2.11 cm  (1.8-2.4cm)  AoV Area, VTI:           2.07 cm2 (2.5-5.5cm2)  AoV Area,Vmax:           2.20 cm2 (2.5-4.5cm2)  AoV Dimensionless Index: 0.59      RIGHT VENTRICLE:  RV Basal 4.20 cm  RV Mid   2.50 cm  RV Major 6.9 cm  TAPSE:   15.9 mm  RV s'    0.09 m/s    TRICUSPID VALVE/RVSP:  Normal Ranges:  Peak TR Velocity: 2.50 m/s  RV Syst Pressure: 39.9 mmHg (< 30mmHg)  IVC Diam:         2.90 cm    PULMONIC VALVE:  Normal Ranges:  PV Accel Time: 94 msec  (>120ms)  PV Max Wing:    0.8 m/s  (0.6-0.9m/s)  PV Max P.5 mmHg      [id number] Ulysses Alarcon MD  Electronically signed on 3/4/2024 at 11:06:08 AM        ** Final **       Cath:      Stress Test:  Stress Results:  No results found for this or any previous visit from the past 365 days.         Cardiac Imaging:  BI mammo bilateral diagnostic tomosynthesis  Narrative: Interpreted By:  Wero Vegas and Kamau Nyokabi   STUDY:  BI MAMMO BILATERAL DIAGNOSTIC TOMOSYNTHESIS;  2025 10:48 am      ACCESSION NUMBER(S):  [id number]      ORDERING CLINICIAN:  HERBERT TAVAREZ      INDICATION:  Status post right lumpectomy with a history of invasive ductal  carcinoma. Positive margins with resection positive for DCIS.      ,C50.511 Malignant neoplasm of lower-outer quadrant of right female  breast,Z17.0 Estrogen receptor positive status (ER+)      COMPARISON:  2024, 2022, 2015       FINDINGS:  2D and tomosynthesis images were reviewed at 1 mm slice thickness.      Density:  There are scattered areas of fibroglandular density.      There is scarring and surgical clips status post right lumpectomy in  the outer slightly lower quadrant at middle depth. No suspicious  masses or calcifications are identified in the left breast.      Impression: 1. Postoperative changes from right lumpectomy with a history of a  focal positive superior margin. At this time surgical excision is not  being pursued. Clinical follow-up is recommended. Imaging follow-up  should be determined on a clinical basis.  2. No mammographic evidence of malignancy in the left breast.      BI-RADS CATEGORY:  BI-RADS CATEGORY:  6 Known Biopsy-Proven Malignancy.  Recommendation:  Immediate Follow-up.  Recommended Date:  Immediate.  Laterality:  Right.      For any future breast imaging appointments, please call [phone]).      I personally reviewed the images/study and I agree with radiology  resident Dr. Cassy Black findings as stated. This study was  interpreted at [place], [place]      MACRO:  None      Signed by: Wero Vegas 2/21/2025 5:11 PM  Dictation workstation:   [id number]        Assessment/Plan     70-year-old female with right sided breast cancer, history of PAF, reduced EF with CRT-D, nontraumatic ICH, depression euvolemic on exam advanced CKD stable tolerating RAAS inhibition.  Entresto renewed she did have flag for lisinopril allergy but that was only cough she has been tolerating the Entresto.  History of Fernie Cearley, AVR in 1986, redo AVR with a freestyle valve in 2007, 2004 echo showing no deterioration of her valve, this is an off year so we will repeat an echo next summer.  She appears euvolemic well compensated I will see her back in 6 months      Orders:  No orders of the defined types were placed in this encounter.     Followup Appts:  Future  Appointments   Date Time Provider Department [place]   5/27/2025  9:30 AM ANTICOAG [facility] [id number] CARD1 COAG CLINIC [id number] [facility]   5/27/2025 11:00 AM Safia Gerber MD [id number] [facility]   5/31/2025  9:30 AM ANTICOAG [facility] [id number] CARD1 COAG CLINIC [id number] [facility]   6/13/2025  8:00 AM Claudine Middleton MD [id number] [facility]   8/11/2025  9:00 AM INF 12 MINOFF [id number] [facility]   8/25/2025 10:30 AM Mary Sosa MD [id number] [facility]           ____________________________________________________________  Herberth Gandara MD    Senior Attending Physician  [facility]  [facility]       [1]   Past Medical History:  Diagnosis Date    Cardiology follow-up encounter     Herberth Gandara MD next apt 8/19/24    Cardiomyopathy     CHF (congestive heart failure)     Chronic kidney disease, stage 3 unspecified (Multi)     Chronic systolic heart failure     HFrEF    Coronary artery disease     Dilated cardiomyopathy (Multi)     Encounter for pre-operative cardiovascular clearance     Gout     H/O echocardiogram 02/23/2024    Hyperlipidemia     Hyperparathyroidism (Multi)     Hypertension     Insomnia     Malignant neoplasm of lower-outer quadrant of right breast of female, estrogen receptor negative     OA (osteoarthritis)     Obesity     Persistent atrial fibrillation (Multi)     Presence of automatic (implantable) cardiac defibrillator     Cardiac defibrillator in place    Rheumatic heart failure     s/p aortic valve replacement 1986    Sleep apnea     Stroke (Multi)     hemorrhagic stroke   [2]   Past Surgical History:  Procedure Laterality Date    AORTIC VALVE REPLACEMENT  1986    redo in 2007 with bioprosthetic aortic valve    BREAST BIOPSY  june 12024    CARDIAC CATHETERIZATION  05/24/2023    CARDIAC CATHETERIZATION N/A 06/06/2024    Procedure: Right Heart Cath;  Surgeon: Hieu Jack MD;  Location: [facility] Cardiac Cath Lab;  Service: Cardiovascular;  Laterality: N/A;     CARDIAC ELECTROPHYSIOLOGY PROCEDURE N/A 10/30/2023    Procedure: ICD UPGRADE, DUAL TO BIV;  Surgeon: Kendra Hong MD;  Location: [place] [address] Cardiac Cath Lab;  Service: Electrophysiology;  Laterality: N/A;    CARDIOVERSION  2015    TOTAL KNEE ARTHROPLASTY  04/29/2015    Total Knee Arthroplasty   [3]   Allergies  Allergen Reactions    Dofetilide Anaphylaxis    Aspirin Rash    Diltiazem Hcl Itching    Lisinopril Cough and Dry mouth    Phenytoin Hives and Rash

## 2025-05-27 NOTE — PROGRESS NOTES
Patient identification verified with 2 identifiers.    Location: [facility] at [facility] - [address] [address] [phone] option #1     Referring Physician: KASH CASANOVA  Enrollment/ Re-enrollment date: 2025   INR Goal: 2.0-3.0  INR monitoring is per [facility] protocol.  Anticoagulation Medication: warfarin  Indication: Atrial Fibrillation/Atrial Flutter    Subjective   Bleeding signs/symptoms: No    Bruising: No   Major bleeding event: No  Thrombosis signs/symptoms: No  Thromboembolic event: No  Missed doses: No  Extra doses: No  Medication changes: No  Dietary changes: No  Change in health: No  Change in activity: No  Alcohol: No  Other concerns: No    Upcoming Procedures:  Does the Patient Have any upcoming procedures that require interruption in anticoagulation therapy? no  Does the patient require bridging? no      Anticoagulation Summary  As of 2025      INR goal:  2.0-3.0   TTR:  72.7% (1.7 y)   INR used for dosin.60 (2025)   Weekly warfarin total:  42.5 mg               Assessment/Plan   Therapeutic     1. New dose: no change    2. Next INR: 3 weeks      Education provided to patient during the visit:  Patient instructed to call in interim with questions, concerns and changes.   Patient educated on interactions between medications and warfarin.   Patient educated on dietary consistency in vitamin k consumption.   Patient educated on affects of alcohol consumption while taking warfarin.   Patient educated on signs of bleeding/clotting.

## 2025-05-27 NOTE — PROGRESS NOTES
Follow up CKD    No new complaints  Denies nausea, vomiting, chest pain, dyspnea  No urinary symptoms     NAD  Sclera AI s inj  MMM, no sores  No edema  No tremor  No rash  Appropriate     Stage 4 CKD; variable sCr with hemodynamics  Minimal proteinuria  HTN well controlled  CHF, well controlled    No changes to medications  Labs and follow up in 3-4 months

## 2025-05-30 ENCOUNTER — APPOINTMENT (OUTPATIENT)
Dept: HEMATOLOGY/ONCOLOGY | Facility: CLINIC | Age: 70
End: 2025-05-30
Payer: COMMERCIAL

## 2025-05-31 ENCOUNTER — APPOINTMENT (OUTPATIENT)
Dept: CARDIOLOGY | Facility: CLINIC | Age: 70
End: 2025-05-31
Payer: COMMERCIAL

## 2025-06-04 DIAGNOSIS — I42.9 CARDIOMYOPATHY, UNSPECIFIED TYPE (MULTI): Primary | ICD-10-CM

## 2025-06-04 DIAGNOSIS — I48.91 ATRIAL FIBRILLATION, UNSPECIFIED TYPE (MULTI): ICD-10-CM

## 2025-06-04 DIAGNOSIS — M10.00 IDIOPATHIC GOUT, UNSPECIFIED CHRONICITY, UNSPECIFIED SITE: ICD-10-CM

## 2025-06-04 RX ORDER — WARFARIN SODIUM 5 MG/1
TABLET ORAL
Qty: 135 TABLET | Refills: 1 | Status: SHIPPED | OUTPATIENT
Start: 2025-06-04

## 2025-06-06 RX ORDER — ALBUTEROL SULFATE 90 UG/1
2 INHALANT RESPIRATORY (INHALATION) EVERY 4 HOURS PRN
Qty: 18 G | Refills: 5 | Status: SHIPPED | OUTPATIENT
Start: 2025-06-06

## 2025-06-06 RX ORDER — SIMVASTATIN 20 MG/1
20 TABLET, FILM COATED ORAL NIGHTLY
Qty: 90 TABLET | Refills: 1 | Status: SHIPPED | OUTPATIENT
Start: 2025-06-06

## 2025-06-06 RX ORDER — ALLOPURINOL 300 MG/1
300 TABLET ORAL DAILY
Qty: 30 TABLET | Refills: 0 | Status: SHIPPED | OUTPATIENT
Start: 2025-06-06

## 2025-06-10 ENCOUNTER — HOSPITAL ENCOUNTER (OUTPATIENT)
Dept: CARDIOLOGY | Facility: CLINIC | Age: 70
Discharge: HOME | End: 2025-06-10
Payer: COMMERCIAL

## 2025-06-10 DIAGNOSIS — I42.0 DILATED CARDIOMYOPATHY (MULTI): ICD-10-CM

## 2025-06-10 DIAGNOSIS — Z95.810 PRESENCE OF AUTOMATIC (IMPLANTABLE) CARDIAC DEFIBRILLATOR: ICD-10-CM

## 2025-06-10 PROCEDURE — 93296 REM INTERROG EVL PM/IDS: CPT

## 2025-06-12 NOTE — PROGRESS NOTES
Patient Visit Information:   Patient name: Trinidad Hines  : 1955  Date of Service: 2025    Visit Type: Follow-up      Cancer History:          Breast         AJCC Edition: 8th (AJCC), Diagnosis Date:           Cancer Staging   Malignant neoplasm of lower-outer quadrant of right breast of female, estrogen receptor positive, Staging form: Breast, AJCC 8th Edition, Clinical stage from 2024: Stage IA (cT1, cN0, cM0, G1, ER+, WY+, HER2-) - Signed by Mary Sosa MD on 2024  Malignant neoplasm of lower-outer quadrant of right breast of female, estrogen receptor positive, Staging form: Breast, AJCC 8th Edition, Pathologic stage from 2024: Stage Unknown (pT1b, pNX, cM0, G1, ER+, WY+, HER2-) - Signed by Mary Sosa MD on 2024     Treatment Synopsis:       Trinidad Hines is a 70 y.o. post-menopausal AA female with right-sided invasive ductal carcinoma, clinical stage IA (cT1, cN0, cM0, G1, ER+, WY+, HER2-).  The patient's breast cancer was diagnosed on 3/27/2024 and is grade 1, estrogen receptor positive at >95%, progesterone receptor positive at 95%, and HER-2/yue negative.     PMHx - A-fib, Cadiomyopathy (EF 30%), s/p AVR, CKD (stage 3), non-traumatic intracerebral hemorrhage, depression, cardiac defibrillator.      CURRENT THERAPY: Endocrine therapy     ONCOLOGIC HISTORY:  Details of her breast cancer history are as follows:     No abnormal mammograms, breast biopsies, or breast surgeries.  2024 - B/L screening mammogram.  Left cardiac device.  Scattered fibroglandular tissue bilaterally.  Left breast negative.  Indeterminate right breast mass.  Indeterminate right breast calcifications.  BI-RADS 0.  3/1/2024 -  Right breast dx imaging.  Right breast calcifications are probably benign.  6-month follow-up is recommended.  Right breast ultrasound.  At 8:00 5 cm from the nipple a 0.4 x 0.4 x 0.4 cm indeterminate mass is noted, BI-RADS 4.  Right axillary ultrasound is  negative.  3/27/2024 - Right breast mass 8:00 5 cm from the nipple biopsy.  Grade 1 invasive ductal carcinoma.  ER greater than 95, DC 95, HER2 negative.   4/18/2024 - Office visit with Dr. Sosa. Per Dr. Sosa's note on 4/18/2024 - Calcs are low suspicion. 6 mo FU vs can be localized for excision. Lumpectomy +/- RT was discussed  6/19/2024: Right-sided lumpectomy by Dr. Sosa. Final path showing IDC, grade 1. 5.6 mm. ER > 95%, DC 95%, HER2 negative (1+ IHC). pT1b. Axilla was not assessed.   Re-excision still shows residual DCIS on the margin. She is not to have another re-excision.   9/16/2024: Started anastrozole  4/14/2025: Switched from anastrozole to exemestane (history of stroke)    History of Present Illness: Patient ID:  Trinidad Hines is a 70 y.o. female.     Chief Complaint and/or reason for visit: Here for a follow-up     Interval History:    Trinidad Hines is a pleasant  70 y.o. woman with history of newly diagnosed invasive ductal carcinoma of the right breast. Here for a follow-up.     4/14/2025: Switched from anastrozole to exemestane (history of stroke)  Today, she states that she has achy joint pain (knees and left wrist). About 7/10 pain.      She reports 5/10 pain in her knees which is chronic and her baseline (she uses a walker).      Her chronic SOB is stable.       She denies fever, chills, Wt loss, headaches, CP, SOB, N/V, abdominal pain, constipation, diarrhea, neuropathy, joint pain, extremity swelling, rashes. Appetite is good and she is drinking fine.     She comes to clinic by her self today.     Review of Systems:   A 14-point review of system was completed and was negative except for what is noted in HPI.      Allergies and Intolerances:       Allergies:   Allergies   Allergen Reactions    Dofetilide Anaphylaxis    Aspirin Rash    Diltiazem Hcl Itching    Lisinopril Cough and Dry mouth    Phenytoin Hives and Rash             Outpatient Medication Profile:   Current Outpatient Medications on  File Prior to Visit   Medication Sig Dispense Refill    albuterol 90 mcg/actuation inhaler INHALE 2 PUFFS BY MOUTH EVERY 4 HOURS IF NEEDED FOR WHEEZING. 18 g 5    allopurinol (Zyloprim) 300 mg tablet TAKE 1 TABLET BY MOUTH EVERY DAY 30 tablet 1    allopurinol (Zyloprim) 300 mg tablet TAKE 1 TABLET BY MOUTH EVERY DAY 30 tablet 0    calcitriol (Rocaltrol) 0.25 mcg capsule Take 1 capsule (0.25 mcg) by mouth 3 times a week. 36 capsule 3    cholecalciferol (Vitamin D-3) 25 MCG (1000 UT) tablet Take 1 tablet (1,000 Units) by mouth once daily. 30 tablet 11    dapagliflozin propanediol (Farxiga) 10 mg tablet Take 1 tablet (10 mg) by mouth once daily. 90 tablet 3    exemestane (Aromasin) 25 mg tablet Take 1 tablet (25 mg total) by mouth once daily.  Take after a meal.  Try to take at the same time each day. 30 tablet 11    Fluocinonide-E 0.05 % cream APPLY TO AFFECTED AREA TWICE A DAY 15 g 1    latanoprost (Xalatan) 0.005 % ophthalmic solution Administer 1 drop into both eyes once daily at bedtime.      lidocaine (Xylocaine) 5 % ointment Apply 1 Application topically 3 times a day as needed for mild pain (1 - 3).      metoprolol succinate XL (Toprol-XL) 100 mg 24 hr tablet Take 1 tablet (100 mg) by mouth once daily. 90 tablet 1    sacubitriL-valsartan (Entresto) 49-51 mg tablet Take 1 tablet by mouth 2 times a day. 180 tablet 3    simvastatin (Zocor) 20 mg tablet TAKE 1 TABLET (20 MG) BY MOUTH ONCE DAILY AT BEDTIME. 90 tablet 1    spironolactone (Aldactone) 25 mg tablet TAKE 1 TABLET BY MOUTH EVERY DAY 90 tablet 1    torsemide (Demadex) 20 mg tablet TAKE 2 TABLETS BY MOUTH EVERY  tablet 3    traMADol (Ultram) 50 mg tablet Take 1 tablet (50 mg) by mouth every 6 hours if needed for moderate pain (4 - 6).      warfarin (Coumadin) 5 mg tablet TAKE 1.5 TABLET BY MOUTH AS DIRECTED BY COUMADIN CLINIC 135 tablet 1     No current facility-administered medications on file prior to visit.          Medical History:    Past  Medical History:   Diagnosis Date    Cardiology follow-up encounter     Herberth Gandara MD next [address]    Cardiomyopathy     CHF (congestive heart failure)     Chronic kidney disease, stage 3 unspecified (Multi)     Chronic systolic heart failure     HFrEF    Coronary artery disease     Dilated cardiomyopathy (Multi)     Encounter for pre-operative cardiovascular clearance     Gout     H/O echocardiogram 2024    Hyperlipidemia     Hyperparathyroidism (Multi)     Hypertension     Insomnia     Malignant neoplasm of lower-outer quadrant of right breast of female, estrogen receptor negative     OA (osteoarthritis)     Obesity     Persistent atrial fibrillation (Multi)     Presence of automatic (implantable) cardiac defibrillator     Cardiac defibrillator in place    Rheumatic heart failure     s/p aortic valve replacement     Sleep apnea     Stroke (Multi)     hemorrhagic stroke       Surgical History:   Past Surgical History:   Procedure Laterality Date    AORTIC VALVE REPLACEMENT      redo in  with bioprosthetic aortic valve    BREAST BIOPSY      CARDIAC CATHETERIZATION  2023    CARDIAC CATHETERIZATION N/A 2024    Procedure: Right Heart Cath;  Surgeon: Hieu Jack MD;  Location: [facility] Cardiac Cath Lab;  Service: Cardiovascular;  Laterality: N/A;    CARDIAC ELECTROPHYSIOLOGY PROCEDURE N/A 10/30/2023    Procedure: ICD UPGRADE, DUAL TO BIV;  Surgeon: Kendra Hong MD;  Location: [place] [address] Cardiac Cath Lab;  Service: Electrophysiology;  Laterality: N/A;    CARDIOVERSION      TOTAL KNEE ARTHROPLASTY  2015    Total Knee Arthroplasty       Social Substance History:   Social History     Tobacco Use    Smoking status: Former     Current packs/day: 0.00     Average packs/day: 1 pack/day for 20.0 years (20.0 ttl pk-yrs)     Types: Cigarettes     Start date:      Quit date:      Years since quittin.4    Smokeless tobacco: Never   Substance Use  Topics    Alcohol use: Never     Social History     Substance and Sexual Activity   Drug Use Never       Family History:   Family History   Problem Relation Name Age of Onset    Heart attack Mother      Heart attack Father      Kidney failure Sister      Cancer Brother          OBJECTIVE:     Visit Vitals  Temp:  [36.2 °C (97.2 °F)] 36.2 °C (97.2 °F)  Heart Rate:  [102] 102  BP: (92)/(63) 92/63    Pain Scale: 7/10 (left wrist). 5/10 (chronic, in her knees)       The ECOG performance scale today is:       1 Restricted in physically strenuous activity but ambulatory and able to carry out work of a light or sedentary nature, e.g., light house work, office work    She uses a walker.       Physical Exam:      Constitutional: Well developed, awake/alert/oriented  x3, no distress, alert and cooperative   Eyes: PERRL, EOMI, clear sclera   ENMT: mucous membranes moist, no apparent injury,  no lesions seen   Head/Neck: Neck supple, no apparent injury, thyroid  without mass or tenderness, No JVD, trachea midline, no bruits   Respiratory/Thorax: Patent airways, CTAB, normal  breath sounds with good chest expansion, thorax symmetric   Cardiovascular: Regular, rate and rhythm, no murmurs,  2+ equal pulses of the extremities, normal S 1and S 2   Gastrointestinal: Nondistended, soft, non-tender,  no rebound tenderness or guarding, no masses palpable, no organomegaly, +BS, no bruits   Musculoskeletal: ROM intact, no joint swelling, normal  strength   Extremities: No LE edema   Neurological: alert and oriented x3, intact senses,  motor, response and reflexes, normal strength   Breast: s/p rt sided lumpectomy with well healed surgical incision. No palpable mass in either breast. No palpable axillary or supraclavicular lymphadenopathies bilaterally. No swelling of either upper extremity which had free range of motion.   Lymphatic: No significant lymphadenopathy   Psychological: Appropriate mood and behavior   Skin: Warm and dry, no  "lesions, no rashes          LAB RESULTS    Lab Results   Component Value Date    WBC 3.8 04/19/2025    HGB 12.5 04/19/2025    HCT 39.2 04/19/2025    MCV 92.7 04/19/2025     04/19/2025     Lab Results   Component Value Date    NEUTROABS 2.03 01/11/2025       No results for input(s): \"CREATININE\", \"BUN\", \"NA\", \"K\", \"CL\", \"CO2\" in the last 72 hours.  No components found for: \"CALCGFR\"  Lab Results   Component Value Date    GLUCOSE 103 (H) 04/19/2025     Lab Results   Component Value Date     04/19/2025    K 4.3 04/19/2025     04/19/2025    CO2 21 04/19/2025    BUN 24 04/19/2025    CREATININE 1.81 (H) 04/19/2025    ALT 7 04/19/2025    AST 12 04/19/2025    ALKPHOS 35 (L) 04/19/2025    BILITOT 0.9 04/19/2025     No results found for: \"FOLATE\"  No results found for: \"UYNBIWAO67\"  Lab Results   Component Value Date    TSH 4.19 04/19/2025 6/19/2024: Lumpectomy path ([id number])     FINAL DIAGNOSIS   A. BREAST, RIGHT, LOWER OUTER QUADRANT, LUMPECTOMY:   -- Invasive ductal carcinoma, grade 1, see note and synoptic cancer summary.  -- Ductal carcinoma in situ, intermediate to high nuclear grade, cribriform, micropapillary and papillary patterns with comedonecrosis and microcalcifications.  -- Biopsy site changes.     Note: Immunohistochemical stains for SMMHC and p63 were performed which show the absence of myoepithelial cell layer in the focus of invasive ductal carcinoma.     B. BREAST MARGIN, RIGHT, NEW SUPERIOR, EXCISION:   -- Ductal carcinoma in situ, see note.  -- No invasive carcinoma identified.     Note: Ductal carcinoma in situ is focally present at the new inked margin.     C. BREAST MARGIN, RIGHT, NEW LATERAL, EXCISION:   -- Benign breast tissue.  -- No carcinoma identified.     D. BREAST MARGIN, RIGHT, NEW MEDIAL, EXCISION:   -- Benign breast tissue.  -- No carcinoma identified.     E. BREAST MARGIN, RIGHT, NEW ANTERIOR, EXCISION:   -- Benign breast tissue.  -- No carcinoma identified.   "   F. BREAST MARGIN, RIGHT, NEW INFERIOR, EXCISION:   -- Benign breast tissue with cautery artifacts.  -- No carcinoma identified.     Note: Deeper levels of sections were reviewed.     G. BREAST MARGIN, RIGHT, NEW POSTERIOR, EXCISION:      -- Benign breast tissue.  -- No carcinoma identified.        verall Grade  Grade 1 (scores of 3, 4 or 5)   Tumor Size  Greatest dimension of largest invasive focus (Millimeters): 5.6 mm   Tumor Focality  Single focus of invasive carcinoma   Ductal Carcinoma In Situ (DCIS)  Present     Positive for extensive intraductal component (EIC)   Size (Extent) of DCIS  Estimated size (extent) of DCIS is at least (Millimeters): 48 mm     pT Category  pT1b     SPECIAL STUDIES        Estrogen Receptor (ER) Status  Positive (greater than 10% of cells demonstrate nuclear positivity)   Percentage of Cells with Nuclear Positivity  >95 %        Progesterone Receptor (PgR) Status  Positive   Percentage of Cells with Nuclear Positivity  95 %        HER2 (by immunohistochemistry)  Negative (Score 1+)   Testing Performed on Case Number  [id number]     Imaging:     No images are attached to the encounter.        Assessment and Plan:   Assessment     Trinidad Hines is a 70 y.o.  post-menopausal AA female with right-sided invasive ductal carcinoma, clinical stage IA (cT1, cN0, cM0, G1, ER+, IN+, HER2-).  The patient's breast cancer was diagnosed on 3/27/2024 and is grade 1, estrogen receptor positive at >95%, progesterone receptor positive at 95%, and HER-2/yue negative. S/p lumpectomy on 6/19/2016.     Predict tool: Predicted overall survival at 5 yrs   Surgery only:  92%  + Hormone therapy : 93%  + Chemotherapy: 92%  + Bisphosphonate: 92%      PMHx - A-fib, Cadiomyopathy (EF 30%), s/p AVR, CKD (stage 3), non-traumatic intracerebral hemorrhage, depression, cardiac defibrillator.      Previously her surgeon and myself had long discussions regarding primary endocrine therapy vs. upfront surgery. She  elected to have surgery and had lumpectomy on 6/19/2024. No node sampling. Her clinical staging is IA. Her invasive component was small (5.6 mm) but DCIS component was extensive (48 mm) with focally close positive margin. I advised re-excision of this margin but she is very hesitant to pursue more surgery.  She saw radiation oncologist (Dr. Chu) today.     She comes in today to discuss adjuvant systemic endocrine treatment options.     Given patient's small tumor size, clinically node-negative disease and hormone receptor positivity, her risk of recurrence is low. Also, due to her multiple medical co-morbidities (mentioned above), I do not recommend chemotherapy. She will however, benefit from systemic endocrine therapy to minimize her risk of recurrence.     I recommend AI x5 years.  Side effects that were discussed included but were not limited to osteoporosis, arthritis arthralgia, hot flashes, liver toxicity. After discussion of pros and cons of this recommendation,  patient elected to proceed treatment with Arimidex.     DEXA scan in 2022 showed osteopenia  (Ca and Vitamin D recommended)  11/21/2024: Osteopenia  1/11/2025: Vitamin D 39     I recommend Zometa since she has osteopenia. She has NO teeth.  She is on warfarin, for which no interaction was found with denosomab (confirmed with PharmD).   Vitamin D has been normal (last checked on 8/13/2024).    She had a stroke back in 1994. Although she is tolerating anastrozole without any issues so far, will switch anastrozole to exemestane. She has been exemestane and is experiencing slightly worse joint pain (knees and left wrist). I offered a trial of letrozole, which she declined today. She would like to continue exemestane. I advised her use tylenol and topical NSAIDs gel, which she agreed.     Plan:   Continue exemestane 25 mg po daily x5 years  Use Voltaren gel and tylenol prn for joint pain   Take Vitamin D and Calcium supplements  Dental clearance done  (no teeth)    Start denosomab scheduled on 8/11 (chronic kidney disease: baseline Cr ~ 2)    MD visit in 3 months (after the first dose of denosomab)  Once she is stable on AI and denosomab, she can be transition to survivorship clinic  RTCin 3 months (around 8/15/2025)      Claudine Middleton MD, PhD   Hematology/Oncology  [facility]

## 2025-06-13 ENCOUNTER — OFFICE VISIT (OUTPATIENT)
Dept: HEMATOLOGY/ONCOLOGY | Facility: CLINIC | Age: 70
End: 2025-06-13
Payer: COMMERCIAL

## 2025-06-13 VITALS
WEIGHT: 264.33 LBS | TEMPERATURE: 97.2 F | OXYGEN SATURATION: 98 % | BODY MASS INDEX: 43.99 KG/M2 | HEART RATE: 102 BPM | SYSTOLIC BLOOD PRESSURE: 92 MMHG | DIASTOLIC BLOOD PRESSURE: 63 MMHG

## 2025-06-13 DIAGNOSIS — C50.511 MALIGNANT NEOPLASM OF LOWER-OUTER QUADRANT OF RIGHT BREAST OF FEMALE, ESTROGEN RECEPTOR POSITIVE: ICD-10-CM

## 2025-06-13 DIAGNOSIS — Z17.0 MALIGNANT NEOPLASM OF LOWER-OUTER QUADRANT OF RIGHT BREAST OF FEMALE, ESTROGEN RECEPTOR POSITIVE: ICD-10-CM

## 2025-06-13 PROCEDURE — 99214 OFFICE O/P EST MOD 30 MIN: CPT | Performed by: INTERNAL MEDICINE

## 2025-06-13 ASSESSMENT — PAIN SCALES - GENERAL: PAINLEVEL_OUTOF10: 6

## 2025-06-13 NOTE — PATIENT INSTRUCTIONS
You may take tylenol as needed.   You may purchase Voltaren gel over the counter as needed.   Proceed with Prolia on 8/11.Please get labs 1-3 days before treatment.   Please schedule follow up with Dr. Middleton around 8/15.  Please call [phone] (option 6) with symptoms, questions or concerns.

## 2025-06-14 ENCOUNTER — ANTICOAGULATION - WARFARIN VISIT (OUTPATIENT)
Dept: CARDIOLOGY | Facility: CLINIC | Age: 70
End: 2025-06-14
Payer: COMMERCIAL

## 2025-06-14 DIAGNOSIS — I48.19 PERSISTENT ATRIAL FIBRILLATION (MULTI): Primary | ICD-10-CM

## 2025-06-14 LAB
POC INR: 2.3 (ref 0.9–1.1)
POC PROTHROMBIN TIME: ABNORMAL (ref 9.3–12.5)

## 2025-06-14 PROCEDURE — 85610 PROTHROMBIN TIME: CPT | Mod: QW

## 2025-06-14 PROCEDURE — 99211 OFF/OP EST MAY X REQ PHY/QHP: CPT

## 2025-06-14 NOTE — PROGRESS NOTES
Patient identification verified with 2 identifiers.    Location: [facility] at [facility] - [address] [address] [phone] option #1     Referring Physician: KASH CASANOVA  Enrollment/ Re-enrollment date: 2025   INR Goal: 2.0-3.0  INR monitoring is per [facility] protocol.  Anticoagulation Medication: warfarin  Indication: Atrial Fibrillation/Atrial Flutter    Subjective   Bleeding signs/symptoms: No    Bruising: No   Major bleeding event: No  Thrombosis signs/symptoms: No  Thromboembolic event: No  Missed doses: No  Extra doses: No  Medication changes: No  Dietary changes: No  Change in health: No  Change in activity: No  Alcohol: No  Other concerns: No    Upcoming Procedures:  Does the Patient Have any upcoming procedures that require interruption in anticoagulation therapy? no  Does the patient require bridging? no      Anticoagulation Summary  As of 2025      INR goal:  2.0-3.0   TTR:  73.5% (1.7 y)   INR used for dosin.30 (2025)   Weekly warfarin total:  42.5 mg               Assessment/Plan   Therapeutic     1. New dose: no change    2. Next INR: 4 weeks    Education provided to patient during the visit:  Patient instructed to call in interim with questions, concerns and changes.   Patient educated on interactions between medications and warfarin.   Patient educated on dietary consistency in vitamin k consumption.   Patient educated on affects of alcohol consumption while taking warfarin.   Patient educated on signs of bleeding/clotting.

## 2025-06-29 DIAGNOSIS — M10.00 IDIOPATHIC GOUT, UNSPECIFIED CHRONICITY, UNSPECIFIED SITE: ICD-10-CM

## 2025-07-03 ENCOUNTER — TELEPHONE (OUTPATIENT)
Dept: PRIMARY CARE | Facility: CLINIC | Age: 70
End: 2025-07-03
Payer: COMMERCIAL

## 2025-07-03 RX ORDER — ALLOPURINOL 300 MG/1
300 TABLET ORAL DAILY
Qty: 90 TABLET | Refills: 1 | Status: SHIPPED | OUTPATIENT
Start: 2025-07-03

## 2025-07-09 DIAGNOSIS — I42.9 CARDIOMYOPATHY, UNSPECIFIED TYPE (MULTI): ICD-10-CM

## 2025-07-09 RX ORDER — SPIRONOLACTONE 25 MG/1
25 TABLET ORAL DAILY
Qty: 90 TABLET | Refills: 1 | Status: SHIPPED | OUTPATIENT
Start: 2025-07-09

## 2025-07-12 ENCOUNTER — HOSPITAL ENCOUNTER (EMERGENCY)
Facility: HOSPITAL | Age: 70
Discharge: HOME | End: 2025-07-12
Payer: COMMERCIAL

## 2025-07-12 ENCOUNTER — ANTICOAGULATION - WARFARIN VISIT (OUTPATIENT)
Dept: CARDIOLOGY | Facility: CLINIC | Age: 70
End: 2025-07-12
Payer: COMMERCIAL

## 2025-07-12 ENCOUNTER — APPOINTMENT (OUTPATIENT)
Dept: RADIOLOGY | Facility: HOSPITAL | Age: 70
End: 2025-07-12
Payer: COMMERCIAL

## 2025-07-12 VITALS
SYSTOLIC BLOOD PRESSURE: 102 MMHG | WEIGHT: 264 LBS | HEART RATE: 88 BPM | DIASTOLIC BLOOD PRESSURE: 64 MMHG | BODY MASS INDEX: 43.99 KG/M2 | HEIGHT: 65 IN | TEMPERATURE: 97.3 F | OXYGEN SATURATION: 100 % | RESPIRATION RATE: 18 BRPM

## 2025-07-12 DIAGNOSIS — M25.561 CHRONIC PAIN OF RIGHT KNEE: Primary | ICD-10-CM

## 2025-07-12 DIAGNOSIS — I48.19 PERSISTENT ATRIAL FIBRILLATION (MULTI): Primary | ICD-10-CM

## 2025-07-12 DIAGNOSIS — M17.11 ARTHRITIS OF RIGHT KNEE: ICD-10-CM

## 2025-07-12 DIAGNOSIS — G89.29 CHRONIC PAIN OF RIGHT KNEE: Primary | ICD-10-CM

## 2025-07-12 LAB
POC INR: 2.4 (ref 0.9–1.1)
POC PROTHROMBIN TIME: ABNORMAL (ref 9.3–12.5)

## 2025-07-12 PROCEDURE — 85610 PROTHROMBIN TIME: CPT | Mod: QW

## 2025-07-12 PROCEDURE — 73562 X-RAY EXAM OF KNEE 3: CPT | Mod: RIGHT SIDE | Performed by: RADIOLOGY

## 2025-07-12 PROCEDURE — 99211 OFF/OP EST MAY X REQ PHY/QHP: CPT

## 2025-07-12 PROCEDURE — 99283 EMERGENCY DEPT VISIT LOW MDM: CPT

## 2025-07-12 PROCEDURE — 73562 X-RAY EXAM OF KNEE 3: CPT | Mod: RT

## 2025-07-12 ASSESSMENT — PAIN - FUNCTIONAL ASSESSMENT: PAIN_FUNCTIONAL_ASSESSMENT: 0-10

## 2025-07-12 ASSESSMENT — PAIN SCALES - GENERAL: PAINLEVEL_OUTOF10: 8

## 2025-07-12 NOTE — DISCHARGE INSTRUCTIONS
Please follow-up with your orthopedic doctor at your appointment scheduled for 7/14/2025  Return to ED for any new or worsening symptoms

## 2025-07-12 NOTE — PROGRESS NOTES
Patient identification verified with 2 identifiers.    Location: [facility] at [facility] - [address] [address] [phone] option #1     Referring Physician: KASH CASANOVA  Enrollment/ Re-enrollment date: 2025   INR Goal: 2.0-3.0  INR monitoring is per [facility] protocol.  Anticoagulation Medication: warfarin  Indication: Atrial Fibrillation/Atrial Flutter    Subjective   Bleeding signs/symptoms: No    Bruising: No   Major bleeding event: No  Thrombosis signs/symptoms: No  Thromboembolic event: No  Missed doses: No  Extra doses: No  Medication changes: No  Dietary changes: No  Change in health: No  Change in activity: No  Alcohol: No  Other concerns: No    Upcoming Procedures:  Does the Patient Have any upcoming procedures that require interruption in anticoagulation therapy? no  Does the patient require bridging? no      Anticoagulation Summary  As of 2025      INR goal:  2.0-3.0   TTR:  74.6% (1.8 y)   INR used for dosin.40 (2025)   Weekly warfarin total:  42.5 mg               Assessment/Plan   Therapeutic     1. New dose: no change    2. Next INR: 4 weeks    Education provided to patient during the visit:  Patient instructed to call in interim with questions, concerns and changes.   Patient educated on interactions between medications and warfarin.   Patient educated on dietary consistency in vitamin k consumption.   Patient educated on affects of alcohol consumption while taking warfarin.   Patient educated on signs of bleeding/clotting.

## 2025-07-12 NOTE — ED TRIAGE NOTES
Patient to ED for c/o   right Knee Pain. Patient reports her Doctors office said she needed a Xray prior to her upcoming appointment. Patient reports swelling and pain but still care bare weight with walker.

## 2025-07-12 NOTE — ED PROVIDER NOTES
HPI   CC: Knee Pain     Patient is a 67-year-old female with a history significant for persistent AF on Warfarin, dilated cardiomyopathy HFrEF (3/2024 TTE: LVEF 30-35%, s/p aortic valve replacement 1986, redo in 2007 with bioprosthetic aortic valve, gout,HTN, non traumatic hemorrhagic stroke, right breast cancer, planned for right breast partial mastectomy 6/19/24, depression, cardiac defibrillator presenting to the ED requesting a knee x-ray.  Patient states that she has had chronic pain in her right knee that has been going on for many years.  She states she has a history of surgery on her left knee and ever since then she has been babying the left knee and walking hard on her right knee.  Over time she has developed pain in this knee.  She denies any recent injuries or any acute change in her pain today.  Patient has an appointment with orthopedics scheduled for 7/14/2025.  She was told she needs an x-ray of her right knee before her appointment and was told she is to go to the ER to get this.  Pain is located in the right knee with no radiation into the foot.  It is rated 5/10.  Denies any numbness, weakness, tingling in her foot.  Patient states that her knee feels slightly swollen but this is what it feels like at baseline and there is no change in this.  She is still able to walk as normal.          ROS: 10-point review of systems was performed and is otherwise negative except as noted in HPI.    Limitations to history: N/A  Independent Historians: Self   External Records Reviewed: Outpatient notes in EMR    Past Medical History: Noncontributory except per HPI     Past Surgical History: Noncontributory except per HPI     Family History: Reviewed and noncontributory     Social History:  Denies tobacco. Denies ETOH. Denies illicit drugs.    RX Allergies[1]    Home Meds:   Current Outpatient Medications   Medication Instructions    albuterol 90 mcg/actuation inhaler 2 puffs, inhalation, Every 4 hours PRN     allopurinol (ZYLOPRIM) 300 mg, oral, Daily    allopurinol (ZYLOPRIM) 300 mg, oral, Daily    calcitriol (ROCALTROL) 0.25 mcg, oral, 3 times weekly    cholecalciferol (VITAMIN D-3) 1,000 Units, oral, Daily    dapagliflozin propanediol (FARXIGA) 10 mg, oral, Daily    exemestane (AROMASIN) 25 mg, oral, Daily, Take after a meal.  Try to take at the same time each day.    Fluocinonide-E 0.05 % cream Topical, 2 times daily, to affected area    latanoprost (Xalatan) 0.005 % ophthalmic solution 1 drop, Nightly    lidocaine (Xylocaine) 5 % ointment 1 Application, 3 times daily PRN    metoprolol succinate XL (TOPROL-XL) 100 mg, oral, Daily    sacubitriL-valsartan (Entresto) 49-51 mg tablet 1 tablet, oral, 2 times daily    simvastatin (ZOCOR) 20 mg, oral, Nightly    spironolactone (ALDACTONE) 25 mg, oral, Daily    torsemide (DEMADEX) 40 mg, oral, Daily    traMADol (ULTRAM) 50 mg, Every 6 hours PRN    warfarin (Coumadin) 5 mg tablet TAKE 1.5 TABLET BY MOUTH AS DIRECTED BY COUMADIN CLINIC        Physical Exam     ED Triage Vitals [07/12/25 1046]   Temperature Heart Rate Respirations BP   36.3 °C (97.3 °F) 94 18 95/61      Pulse Ox Temp src Heart Rate Source Patient Position   100 % -- -- --      BP Location FiO2 (%)     -- --         Vitals and nursing note reviewed.   Physical Exam  Constitutional:       General: She is not in acute distress.     Appearance: Normal appearance. She is not ill-appearing, toxic-appearing or diaphoretic.   HENT:      Head: Normocephalic and atraumatic.      Mouth/Throat:      Mouth: Mucous membranes are moist.      Pharynx: Oropharynx is clear. No oropharyngeal exudate or posterior oropharyngeal erythema.   Eyes:      General: No scleral icterus.        Right eye: No discharge.         Left eye: No discharge.      Extraocular Movements: Extraocular movements intact.      Conjunctiva/sclera: Conjunctivae normal.      Pupils: Pupils are equal, round, and reactive to light.   Cardiovascular:      Rate  and Rhythm: Normal rate and regular rhythm.      Heart sounds: Normal heart sounds. No murmur heard.     No friction rub. No gallop.      Comments: DP and PT 2+ b/l  Pulmonary:      Effort: Pulmonary effort is normal. No respiratory distress.      Breath sounds: Normal breath sounds. No stridor. No wheezing, rhonchi or rales.   Abdominal:      General: Abdomen is flat. Bowel sounds are normal. There is no distension.      Palpations: Abdomen is soft.      Tenderness: There is no abdominal tenderness. There is no right CVA tenderness, left CVA tenderness, guarding or rebound.   Musculoskeletal:         General: Normal range of motion.      Cervical back: Normal range of motion and neck supple. No rigidity or tenderness.      Comments: There is no joint deformity, crepitus, bruising, swelling, erythema, or warmth. Compartments are soft. ROM full to bilateral hip flexion/extension/adduction/abduction/internal rotation/external rotation, knee flexion/extension, ankle dorsi/plantarflexion/inversion/eversion.    Tenderness to palpation of inferior aspect of right patella    Negative Lachman, anterior drawer, posterior drawer, varus stress, valgus stress, Jeanne test on the right.   Lymphadenopathy:      Cervical: No cervical adenopathy.   Skin:     General: Skin is warm and dry.      Capillary Refill: Capillary refill takes less than 2 seconds.   Neurological:      General: No focal deficit present.      Mental Status: She is alert and oriented to person, place, and time.   Psychiatric:         Mood and Affect: Mood normal.         Behavior: Behavior normal.         Diagnostic Results      Labs Reviewed - No data to display      XR knee right 3 views   Final Result   1. Severe lateral and moderate to severe medial/patellofemoral   compartment osteoarthrosis.   2. No acute fracture or malalignment.        MACRO:   None.        Signed by: Emily Rivera 7/12/2025 11:46 AM   Dictation workstation:   [id number]           ED Course & MDM   Assessment/Plan:   Medications - No data to display   ED Course as of 07/12/25 1204   Sat Jul 12, 2025   1201 Patient is a 70-year-old female presenting to the ED with concern for right knee pain.  Vital signs are stable, patient is nontoxic-appearing.  Patient is neurovascularly intact in the right lower extremity with good pulses and soft compartments.  There is no overlying joint redness or warmth.  Lower suspicion for acute arterial occlusion, compartment syndrome, septic joint, crystalline arthropathy.  There is no ligamentous instability on exam.  Given no recent injuries and chronic nature of pain suspect likely osteoarthritis.  Patient is stating that she has an appoint with orthopedic doctor and is just here to get an x-ray of her knee.  X-ray obtained reveals severe osteoarthritis.  Patient is appropriate for outpatient management.  Offered to send prescriptions for pain medicines, patient states that she would like to wait until she sees her orthopedic doctor first.  Offered Ace wrap, however patient declined.  Recommend she follow-up with her orthopedic doctor at her appointment scheduled for this upcoming Monday, 7/14/2025.  Patient understands and is agreeable with plan. Patient told to return to ED for any new or worsening symptoms. [VS]      ED Course User Index  [VS] Rianna Tony PA-C         Diagnoses as of 07/12/25 1204   Chronic pain of right knee   Arthritis of right knee       ED Prescriptions    None         Chronic Medical Conditions Significantly Affecting Care:      Escalation of Care: Appropriate for outpatient management    Counseling: Spoke with the patient and discussed today´s findings, in addition to providing specific details for the plan of care and expected course.  Patient was given the opportunity to ask questions.    Discussed return precautions and importance of follow-up.  Advised to follow-up with orthopedics.  Advised to return to the ED  for changing or worsening symptoms, new symptoms, complaint specific precautions, and precautions listed on the discharge paperwork.  Educated on the common potential side effects of medications prescribed.    I advised the patient that the emergency evaluation and treatment provided today doesn't end their need for medical care. It is very important that they follow-up with their primary care provider or other specialist as instructed.    The plan of care was mutually agreed upon with the patient. The patient and/or family were given the opportunity to ask questions. All questions asked today in the ED were answered to the best of my ability with today's information.    I specifically advised the patient to return to the ED for changing or worsening symptoms, worrisome new symptoms, or for any complaint specific precautions listed on the discharge paperwork.    Procedure  Procedures         [1]   Allergies  Allergen Reactions    Dofetilide Anaphylaxis    Aspirin Rash    Diltiazem Hcl Itching    Lisinopril Cough and Dry mouth    Phenytoin Hives and Rash        Rianna Tony PA-C  07/12/25 3480

## 2025-07-14 ENCOUNTER — APPOINTMENT (OUTPATIENT)
Dept: RADIOLOGY | Facility: CLINIC | Age: 70
End: 2025-07-14
Payer: COMMERCIAL

## 2025-07-14 ENCOUNTER — OFFICE VISIT (OUTPATIENT)
Dept: ORTHOPEDIC SURGERY | Facility: CLINIC | Age: 70
End: 2025-07-14
Payer: COMMERCIAL

## 2025-07-14 DIAGNOSIS — M25.561 RIGHT KNEE PAIN, UNSPECIFIED CHRONICITY: ICD-10-CM

## 2025-07-14 DIAGNOSIS — E66.01 MORBID OBESITY (MULTI): ICD-10-CM

## 2025-07-14 DIAGNOSIS — M17.10 ARTHRITIS OF KNEE: Primary | ICD-10-CM

## 2025-07-14 PROCEDURE — 99212 OFFICE O/P EST SF 10 MIN: CPT | Mod: 25

## 2025-07-14 PROCEDURE — 1159F MED LIST DOCD IN RCRD: CPT | Performed by: ORTHOPAEDIC SURGERY

## 2025-07-14 PROCEDURE — 99204 OFFICE O/P NEW MOD 45 MIN: CPT | Performed by: ORTHOPAEDIC SURGERY

## 2025-07-14 PROCEDURE — 20610 DRAIN/INJ JOINT/BURSA W/O US: CPT | Mod: RT | Performed by: ORTHOPAEDIC SURGERY

## 2025-07-14 PROCEDURE — 2500000004 HC RX 250 GENERAL PHARMACY W/ HCPCS (ALT 636 FOR OP/ED): Performed by: ORTHOPAEDIC SURGERY

## 2025-07-14 RX ORDER — METHYLPREDNISOLONE ACETATE 40 MG/ML
40 INJECTION, SUSPENSION INTRA-ARTICULAR; INTRALESIONAL; INTRAMUSCULAR; SOFT TISSUE
Status: COMPLETED | OUTPATIENT
Start: 2025-07-14 | End: 2025-07-14

## 2025-07-14 RX ORDER — LIDOCAINE HYDROCHLORIDE 20 MG/ML
2 INJECTION, SOLUTION INFILTRATION; PERINEURAL
Status: COMPLETED | OUTPATIENT
Start: 2025-07-14 | End: 2025-07-14

## 2025-07-14 RX ADMIN — LIDOCAINE HYDROCHLORIDE 2 ML: 20 INJECTION, SOLUTION INFILTRATION; PERINEURAL at 12:15

## 2025-07-14 RX ADMIN — METHYLPREDNISOLONE ACETATE 40 MG: 40 INJECTION, SUSPENSION INTRALESIONAL; INTRAMUSCULAR; INTRASYNOVIAL; SOFT TISSUE at 12:15

## 2025-07-14 NOTE — PROGRESS NOTES
70-year-old female here for right knee pain.  20 years status post left total knee.  For progressive pain in her right knee for many years.  She saw orthopedics about 5 years ago.  She had a short-term relief with cortisone injection that time.  Is never had gel injections.  Has multiple medical problems and is on Coumadin.  There is really not want surgery.  Uses a walker for support.  Went to ER yesterday to get x-rays of her knee.    WD/WN morbidly obese female BMI 44  A+O X3  No lymphedema  Inspection of both knees shows symmetric alignment.  Well-healed scar left knee  No flexion contracture.   Moderate right knee crepitus through range of motion.   5/5 strength in quads and hamstrings.   Stable varus/valgus stress.   (-) Lachman.   (-) Mars.   Sensation intact to LT.   Good pulses in both legs.   No apparent effusion.   No erythema.    I personally reviewed the following radiographic exams: X-ray of right knee shows end-stage tricompartmental arthritis.  No acute changes.    Assessment: Morbid obesity.  Right knee arthrosis.    Plan: Discussed nonoperative and operative options in detail.   Risk and benefits discussed in detail. All questions answered today.  Recovery timeline and expectations discussed in detail.  Offered cortisone injection today.  Discussed possible gel injections.  Would need to work on her weight lost if she wants to consider knee replacement in the future.  After informed consent under sterile conditions the right knee was injected with a combination of 2cc 2% lidocaine and 40mg Methylprednisolone. Risks and benefits were discussed in detail.Patient ID: Trinidad Hines is a 70 y.o. female.    L Inj/Asp: R knee on 7/14/2025 12:15 PM  Indications: pain, joint swelling and diagnostic evaluation  Details: 21 G needle, anterolateral approach  Medications: 40 mg methylPREDNISolone acetate 40 mg/mL; 2 mL lidocaine 20 mg/mL (2 %)  Outcome: tolerated well, no immediate  complications  Procedure, treatment alternatives, risks and benefits explained, specific risks discussed. Consent was given by the patient. Immediately prior to procedure a time out was called to verify the correct patient, procedure, equipment, support staff and site/side marked as required. Patient was prepped and draped in the usual sterile fashion.

## 2025-07-18 ENCOUNTER — TELEPHONE (OUTPATIENT)
Dept: PRIMARY CARE | Facility: CLINIC | Age: 70
End: 2025-07-18
Payer: COMMERCIAL

## 2025-08-01 ENCOUNTER — APPOINTMENT (OUTPATIENT)
Dept: PRIMARY CARE | Facility: CLINIC | Age: 70
End: 2025-08-01
Payer: COMMERCIAL

## 2025-08-01 VITALS
HEART RATE: 90 BPM | TEMPERATURE: 98.2 F | SYSTOLIC BLOOD PRESSURE: 102 MMHG | DIASTOLIC BLOOD PRESSURE: 68 MMHG | WEIGHT: 266 LBS | BODY MASS INDEX: 44.26 KG/M2

## 2025-08-01 DIAGNOSIS — I13.0 BENIGN HYPERTENSIVE HEART AND KIDNEY DISEASE WITH COMBINED SYSTOLIC AND DIASTOLIC CHF, NYHA CLASS 1 AND CKD STAGE 3: ICD-10-CM

## 2025-08-01 DIAGNOSIS — N39.46 MIXED STRESS AND URGE URINARY INCONTINENCE: Primary | ICD-10-CM

## 2025-08-01 DIAGNOSIS — I50.40 BENIGN HYPERTENSIVE HEART AND KIDNEY DISEASE WITH COMBINED SYSTOLIC AND DIASTOLIC CHF, NYHA CLASS 1 AND CKD STAGE 3: ICD-10-CM

## 2025-08-01 DIAGNOSIS — R29.898 BILATERAL LEG WEAKNESS: ICD-10-CM

## 2025-08-01 DIAGNOSIS — I50.22 CHRONIC SYSTOLIC HEART FAILURE: ICD-10-CM

## 2025-08-01 DIAGNOSIS — N18.30 BENIGN HYPERTENSIVE HEART AND KIDNEY DISEASE WITH COMBINED SYSTOLIC AND DIASTOLIC CHF, NYHA CLASS 1 AND CKD STAGE 3: ICD-10-CM

## 2025-08-01 DIAGNOSIS — D22.9 NUMEROUS SKIN MOLES: ICD-10-CM

## 2025-08-01 PROCEDURE — 3078F DIAST BP <80 MM HG: CPT | Performed by: FAMILY MEDICINE

## 2025-08-01 PROCEDURE — 90677 PCV20 VACCINE IM: CPT | Performed by: FAMILY MEDICINE

## 2025-08-01 PROCEDURE — G2211 COMPLEX E/M VISIT ADD ON: HCPCS | Performed by: FAMILY MEDICINE

## 2025-08-01 PROCEDURE — 99214 OFFICE O/P EST MOD 30 MIN: CPT | Performed by: FAMILY MEDICINE

## 2025-08-01 PROCEDURE — G0009 ADMIN PNEUMOCOCCAL VACCINE: HCPCS | Performed by: FAMILY MEDICINE

## 2025-08-01 PROCEDURE — 3074F SYST BP LT 130 MM HG: CPT | Performed by: FAMILY MEDICINE

## 2025-08-01 PROCEDURE — 1159F MED LIST DOCD IN RCRD: CPT | Performed by: FAMILY MEDICINE

## 2025-08-01 NOTE — PROGRESS NOTES
Subjective   Patient ID: Trinidad Hines is a 70 y.o. female who presents for Med Refill.    Med Refill    The patient is a 70-year-old female with a history significant for persistent AF on Warfarin, dilated cardiomyopathy HFrEF (3/2024 TTE: LVEF 30-35%, s/p aortic valve replacement 1986, redo in 2007 with bioprosthetic aortic valve, gout,HTN, non traumatic hemorrhagic stroke, right breast cancer, planned for right breast partial mastectomy 6/19/24, depression, cardiac defibrillator,  who is here for:     1-An order for medical supplies.  She needs Pull-Up, a shower chair, an a walker to be sent to [facility].     2- Left temporal and back moles removal.  They are causing some skin irritations and she is requesting a referral to a dermatologist.     A review of system was completed.  All systems were reviewed and were normal, except for the ones that are listed in the HPI.    Objective   Physical Exam  Constitutional:       Appearance: Normal appearance.   HENT:      Head: Normocephalic and atraumatic.      Right Ear: Tympanic membrane, ear canal and external ear normal.      Left Ear: Tympanic membrane, ear canal and external ear normal.      Nose: Nose normal.      Mouth/Throat:      Mouth: Mucous membranes are moist.      Pharynx: Oropharynx is clear.     Eyes:      Extraocular Movements: Extraocular movements intact.      Conjunctiva/sclera: Conjunctivae normal.      Pupils: Pupils are equal, round, and reactive to light.       Cardiovascular:      Rate and Rhythm: Normal rate and regular rhythm.      Pulses: Normal pulses.   Pulmonary:      Effort: Pulmonary effort is normal.      Breath sounds: Normal breath sounds.   Abdominal:      General: Abdomen is flat. Bowel sounds are normal.      Palpations: Abdomen is soft.     Musculoskeletal:         General: Normal range of motion.      Cervical back: Normal range of motion and neck supple.     Skin:     General: Skin is warm.      Comments: Left  temporal 0.5 cm pigmented raised mole.     Neurological:      General: No focal deficit present.      Mental Status: She is alert and oriented to person, place, and time. Mental status is at baseline.     Psychiatric:         Mood and Affect: Mood normal.         Behavior: Behavior normal.         Thought Content: Thought content normal.         Judgment: Judgment normal.     Assessment/Plan   Problem List Items Addressed This Visit       Bilateral leg weakness    -Walker and shower chair orders will be processed by the /  [facility].          Urinary incontinence - Primary    -Pull-Ups orders will be processed by the /  [facility].          Numerous skin moles    -referral to a dermatologist done today.          Relevant Orders    Referral to Dermatology    Patient to return to office in 6 months.

## 2025-08-06 ENCOUNTER — DOCUMENTATION (OUTPATIENT)
Dept: PRIMARY CARE | Facility: CLINIC | Age: 70
End: 2025-08-06
Payer: COMMERCIAL

## 2025-08-06 NOTE — PROGRESS NOTES
MD Patrizia Patrick, could  you please assist this patient.  She needs:    1- Pull-Ups and a shower chair to be ordered thru [employer].  2- An upright walker to be ordered thru a different medical supply company.  She states that she will tell you which one. when you contact her.  She did not have the name of the company today.    Above request received from primary provider.  CM spoke with patient regarding DME needs/requests. States she was previously getting incontinence supplies through another supplier but they no longer accept insurance and was told that [employer] is preferred supplier. Also requesting shower chair for bathtub/shower in home. Has a shower chair but it does not fit in current shower, legs are to wide apart. Does not know needed dimensions. Also requesting a up right walker/rollator. This will allow her to stand straight and not be bent over causing back pain. Currently has a rollator. Does not know where one can be ordered.  Has aide assistance 7 days/week with [facility] that includes bathing and house care assistance.   Dr. Grey gonzalez has already faxed incontinence supply request to [facility]. CM will assist with shower chair.    CHRISTIE [facility] with [facility] representative [phone]. This plan is secondary and manages her Medicaid coverage only. Patient also has Medicare but does not know who manages coverage. Juan F Goss active 7/1/25, payor ID [id number]. Call reference #[id number]

## 2025-08-09 ENCOUNTER — ANTICOAGULATION - WARFARIN VISIT (OUTPATIENT)
Dept: CARDIOLOGY | Facility: CLINIC | Age: 70
End: 2025-08-09
Payer: COMMERCIAL

## 2025-08-09 DIAGNOSIS — I48.19 PERSISTENT ATRIAL FIBRILLATION (MULTI): Primary | ICD-10-CM

## 2025-08-09 LAB
POC INR: 2.1 (ref 0.9–1.1)
POC PROTHROMBIN TIME: ABNORMAL (ref 9.3–12.5)

## 2025-08-09 PROCEDURE — 99211 OFF/OP EST MAY X REQ PHY/QHP: CPT

## 2025-08-09 PROCEDURE — 85610 PROTHROMBIN TIME: CPT | Mod: QW | Performed by: INTERNAL MEDICINE

## 2025-08-09 NOTE — PROGRESS NOTES
Patient identification verified with 2 identifiers.    Location: [facility] - [address] [address] [phone] option #1     Referring Physician: KASH CASANOVA  Enrollment/ Re-enrollment date: 2025   INR Goal: 2.0-3.0  INR monitoring is per [facility] protocol.  Anticoagulation Medication: warfarin  Indication: Atrial Fibrillation/Atrial Flutter    Subjective   Bleeding signs/symptoms: No    Bruising: No   Major bleeding event: No  Thrombosis signs/symptoms: No  Thromboembolic event: No  Missed doses: No  Extra doses: No  Medication changes: No  Dietary changes: No  Change in health: No  Change in activity: No  Alcohol: No  Other concerns: No    Upcoming Procedures:  Does the Patient Have any upcoming procedures that require interruption in anticoagulation therapy? no  Does the patient require bridging? no      Anticoagulation Summary  As of 2025      INR goal:  2.0-3.0   TTR:  75.6% (1.9 y)   INR used for dosin.10 (2025)   Weekly warfarin total:  42.5 mg               Assessment/Plan   Therapeutic     1. New dose: no change    2. Next INR: 4 weeks    Education provided to patient during the visit:  Patient instructed to call in interim with questions, concerns and changes.   Patient educated on interactions between medications and warfarin.   Patient educated on dietary consistency in vitamin k consumption.   Patient educated on affects of alcohol consumption while taking warfarin.   Patient educated on signs of bleeding/clotting.

## 2025-08-10 LAB
25(OH)D3+25(OH)D2 SERPL-MCNC: 44 NG/ML (ref 30–100)
PTH-INTACT SERPL-MCNC: 174 PG/ML (ref 16–77)

## 2025-08-11 ENCOUNTER — APPOINTMENT (OUTPATIENT)
Dept: HEMATOLOGY/ONCOLOGY | Facility: CLINIC | Age: 70
End: 2025-08-11
Payer: COMMERCIAL

## 2025-08-11 DIAGNOSIS — Z17.0 MALIGNANT NEOPLASM OF LOWER-OUTER QUADRANT OF RIGHT BREAST OF FEMALE, ESTROGEN RECEPTOR POSITIVE: ICD-10-CM

## 2025-08-11 DIAGNOSIS — C50.511 MALIGNANT NEOPLASM OF LOWER-OUTER QUADRANT OF RIGHT BREAST OF FEMALE, ESTROGEN RECEPTOR POSITIVE: ICD-10-CM

## 2025-08-13 ENCOUNTER — HOSPITAL ENCOUNTER (OUTPATIENT)
Dept: CARDIOLOGY | Facility: CLINIC | Age: 70
Discharge: HOME | End: 2025-08-13
Payer: COMMERCIAL

## 2025-08-13 DIAGNOSIS — I42.0 DILATED CARDIOMYOPATHY (MULTI): ICD-10-CM

## 2025-08-13 DIAGNOSIS — Z95.810 PRESENCE OF AUTOMATIC (IMPLANTABLE) CARDIAC DEFIBRILLATOR: ICD-10-CM

## 2025-08-13 DIAGNOSIS — Z95.810 PRESENCE OF AUTOMATIC (IMPLANTABLE) CARDIAC DEFIBRILLATOR: Primary | ICD-10-CM

## 2025-08-14 ENCOUNTER — INFUSION (OUTPATIENT)
Dept: HEMATOLOGY/ONCOLOGY | Facility: CLINIC | Age: 70
End: 2025-08-14
Payer: COMMERCIAL

## 2025-08-14 VITALS
RESPIRATION RATE: 18 BRPM | HEART RATE: 88 BPM | WEIGHT: 264.77 LBS | DIASTOLIC BLOOD PRESSURE: 75 MMHG | TEMPERATURE: 96.8 F | SYSTOLIC BLOOD PRESSURE: 114 MMHG | BODY MASS INDEX: 44.06 KG/M2

## 2025-08-14 DIAGNOSIS — Z17.0 MALIGNANT NEOPLASM OF LOWER-OUTER QUADRANT OF RIGHT BREAST OF FEMALE, ESTROGEN RECEPTOR POSITIVE: ICD-10-CM

## 2025-08-14 DIAGNOSIS — C50.511 MALIGNANT NEOPLASM OF LOWER-OUTER QUADRANT OF RIGHT BREAST OF FEMALE, ESTROGEN RECEPTOR POSITIVE: ICD-10-CM

## 2025-08-14 LAB
ALBUMIN SERPL BCP-MCNC: 4.1 G/DL (ref 3.4–5)
ALP SERPL-CCNC: 31 U/L (ref 33–136)
ALT SERPL W P-5'-P-CCNC: 11 U/L (ref 7–45)
ANION GAP SERPL CALC-SCNC: 13 MMOL/L (ref 10–20)
AST SERPL W P-5'-P-CCNC: 17 U/L (ref 9–39)
BASOPHILS # BLD AUTO: 0.02 X10*3/UL (ref 0–0.1)
BASOPHILS NFR BLD AUTO: 0.7 %
BILIRUB SERPL-MCNC: 1 MG/DL (ref 0–1.2)
BUN SERPL-MCNC: 30 MG/DL (ref 6–23)
CA-I BLD-SCNC: 1.21 MMOL/L (ref 1.1–1.33)
CALCIUM SERPL-MCNC: 9.4 MG/DL (ref 8.6–10.6)
CHLORIDE SERPL-SCNC: 105 MMOL/L (ref 98–107)
CO2 SERPL-SCNC: 27 MMOL/L (ref 21–32)
CREAT SERPL-MCNC: 1.98 MG/DL (ref 0.5–1.05)
EGFRCR SERPLBLD CKD-EPI 2021: 27 ML/MIN/1.73M*2
EOSINOPHIL # BLD AUTO: 0.07 X10*3/UL (ref 0–0.4)
EOSINOPHIL NFR BLD AUTO: 2.3 %
ERYTHROCYTE [DISTWIDTH] IN BLOOD BY AUTOMATED COUNT: 13.5 % (ref 11.5–14.5)
GLUCOSE SERPL-MCNC: 92 MG/DL (ref 74–99)
HCT VFR BLD AUTO: 38.9 % (ref 36–46)
HGB BLD-MCNC: 12.5 G/DL (ref 12–16)
IMM GRANULOCYTES # BLD AUTO: 0 X10*3/UL (ref 0–0.5)
IMM GRANULOCYTES NFR BLD AUTO: 0 % (ref 0–0.9)
LYMPHOCYTES # BLD AUTO: 0.92 X10*3/UL (ref 0.8–3)
LYMPHOCYTES NFR BLD AUTO: 30 %
MCH RBC QN AUTO: 29.6 PG (ref 26–34)
MCHC RBC AUTO-ENTMCNC: 32.1 G/DL (ref 32–36)
MCV RBC AUTO: 92 FL (ref 80–100)
MONOCYTES # BLD AUTO: 0.25 X10*3/UL (ref 0.05–0.8)
MONOCYTES NFR BLD AUTO: 8.1 %
NEUTROPHILS # BLD AUTO: 1.81 X10*3/UL (ref 1.6–5.5)
NEUTROPHILS NFR BLD AUTO: 58.9 %
NRBC BLD-RTO: ABNORMAL /100{WBCS}
PLATELET # BLD AUTO: 132 X10*3/UL (ref 150–450)
POTASSIUM SERPL-SCNC: 4.4 MMOL/L (ref 3.5–5.3)
PROT SERPL-MCNC: 6.4 G/DL (ref 6.4–8.2)
RBC # BLD AUTO: 4.23 X10*6/UL (ref 4–5.2)
SODIUM SERPL-SCNC: 141 MMOL/L (ref 136–145)
WBC # BLD AUTO: 3.1 X10*3/UL (ref 4.4–11.3)

## 2025-08-14 PROCEDURE — 36415 COLL VENOUS BLD VENIPUNCTURE: CPT

## 2025-08-14 PROCEDURE — 82330 ASSAY OF CALCIUM: CPT

## 2025-08-14 PROCEDURE — 85025 COMPLETE CBC W/AUTO DIFF WBC: CPT

## 2025-08-14 PROCEDURE — 80053 COMPREHEN METABOLIC PANEL: CPT

## 2025-08-14 ASSESSMENT — PAIN SCALES - GENERAL: PAINLEVEL_OUTOF10: 7

## 2025-08-15 ENCOUNTER — OFFICE VISIT (OUTPATIENT)
Dept: HEMATOLOGY/ONCOLOGY | Facility: CLINIC | Age: 70
End: 2025-08-15
Payer: COMMERCIAL

## 2025-08-15 ENCOUNTER — APPOINTMENT (OUTPATIENT)
Dept: HEMATOLOGY/ONCOLOGY | Facility: CLINIC | Age: 70
End: 2025-08-15
Payer: COMMERCIAL

## 2025-08-15 ENCOUNTER — INFUSION (OUTPATIENT)
Dept: HEMATOLOGY/ONCOLOGY | Facility: CLINIC | Age: 70
End: 2025-08-15
Payer: COMMERCIAL

## 2025-08-15 VITALS
OXYGEN SATURATION: 96 % | WEIGHT: 263.01 LBS | BODY MASS INDEX: 43.77 KG/M2 | DIASTOLIC BLOOD PRESSURE: 70 MMHG | TEMPERATURE: 98.1 F | HEART RATE: 81 BPM | SYSTOLIC BLOOD PRESSURE: 100 MMHG

## 2025-08-15 DIAGNOSIS — Z17.0 MALIGNANT NEOPLASM OF LOWER-OUTER QUADRANT OF RIGHT BREAST OF FEMALE, ESTROGEN RECEPTOR POSITIVE: ICD-10-CM

## 2025-08-15 DIAGNOSIS — C50.919 MALIGNANT NEOPLASM OF FEMALE BREAST, UNSPECIFIED ESTROGEN RECEPTOR STATUS, UNSPECIFIED LATERALITY, UNSPECIFIED SITE OF BREAST: Primary | ICD-10-CM

## 2025-08-15 DIAGNOSIS — C50.511 MALIGNANT NEOPLASM OF LOWER-OUTER QUADRANT OF RIGHT BREAST OF FEMALE, ESTROGEN RECEPTOR POSITIVE: ICD-10-CM

## 2025-08-15 PROCEDURE — 99214 OFFICE O/P EST MOD 30 MIN: CPT | Performed by: INTERNAL MEDICINE

## 2025-08-15 PROCEDURE — 1126F AMNT PAIN NOTED NONE PRSNT: CPT | Performed by: INTERNAL MEDICINE

## 2025-08-15 PROCEDURE — 2500000004 HC RX 250 GENERAL PHARMACY W/ HCPCS (ALT 636 FOR OP/ED): Mod: JZ,TB | Performed by: INTERNAL MEDICINE

## 2025-08-15 PROCEDURE — 96372 THER/PROPH/DIAG INJ SC/IM: CPT

## 2025-08-15 PROCEDURE — 3074F SYST BP LT 130 MM HG: CPT | Performed by: INTERNAL MEDICINE

## 2025-08-15 PROCEDURE — 3078F DIAST BP <80 MM HG: CPT | Performed by: INTERNAL MEDICINE

## 2025-08-15 PROCEDURE — 1159F MED LIST DOCD IN RCRD: CPT | Performed by: INTERNAL MEDICINE

## 2025-08-15 RX ORDER — DIPHENHYDRAMINE HYDROCHLORIDE 50 MG/ML
50 INJECTION, SOLUTION INTRAMUSCULAR; INTRAVENOUS AS NEEDED
Status: DISCONTINUED | OUTPATIENT
Start: 2025-08-15 | End: 2025-08-15 | Stop reason: HOSPADM

## 2025-08-15 RX ORDER — ALBUTEROL SULFATE 0.83 MG/ML
3 SOLUTION RESPIRATORY (INHALATION) AS NEEDED
Status: DISCONTINUED | OUTPATIENT
Start: 2025-08-15 | End: 2025-08-15 | Stop reason: HOSPADM

## 2025-08-15 RX ORDER — FAMOTIDINE 10 MG/ML
20 INJECTION, SOLUTION INTRAVENOUS ONCE AS NEEDED
OUTPATIENT
Start: 2026-02-05

## 2025-08-15 RX ORDER — ALBUTEROL SULFATE 0.83 MG/ML
3 SOLUTION RESPIRATORY (INHALATION) AS NEEDED
OUTPATIENT
Start: 2026-02-05

## 2025-08-15 RX ORDER — EPINEPHRINE 0.3 MG/.3ML
0.3 INJECTION SUBCUTANEOUS EVERY 5 MIN PRN
Status: DISCONTINUED | OUTPATIENT
Start: 2025-08-15 | End: 2025-08-15 | Stop reason: HOSPADM

## 2025-08-15 RX ORDER — FAMOTIDINE 10 MG/ML
20 INJECTION, SOLUTION INTRAVENOUS ONCE AS NEEDED
Status: DISCONTINUED | OUTPATIENT
Start: 2025-08-15 | End: 2025-08-15 | Stop reason: HOSPADM

## 2025-08-15 RX ORDER — DIPHENHYDRAMINE HYDROCHLORIDE 50 MG/ML
50 INJECTION, SOLUTION INTRAMUSCULAR; INTRAVENOUS AS NEEDED
OUTPATIENT
Start: 2026-02-05

## 2025-08-15 RX ORDER — EPINEPHRINE 0.3 MG/.3ML
0.3 INJECTION SUBCUTANEOUS EVERY 5 MIN PRN
OUTPATIENT
Start: 2026-02-05

## 2025-08-15 RX ADMIN — DENOSUMAB 60 MG: 60 INJECTION SUBCUTANEOUS at 10:35

## 2025-08-15 ASSESSMENT — PATIENT HEALTH QUESTIONNAIRE - PHQ9
SUM OF ALL RESPONSES TO PHQ9 QUESTIONS 1 & 2: 1
1. LITTLE INTEREST OR PLEASURE IN DOING THINGS: SEVERAL DAYS
2. FEELING DOWN, DEPRESSED OR HOPELESS: NOT AT ALL

## 2025-08-15 ASSESSMENT — PAIN SCALES - GENERAL: PAINLEVEL_OUTOF10: 0-NO PAIN

## 2025-08-20 ENCOUNTER — DOCUMENTATION (OUTPATIENT)
Dept: PRIMARY CARE | Facility: CLINIC | Age: 70
End: 2025-08-20
Payer: COMMERCIAL

## 2025-08-20 ENCOUNTER — TELEPHONE (OUTPATIENT)
Dept: PRIMARY CARE | Facility: CLINIC | Age: 70
End: 2025-08-20
Payer: COMMERCIAL

## 2025-08-25 ENCOUNTER — OFFICE VISIT (OUTPATIENT)
Dept: SURGICAL ONCOLOGY | Facility: CLINIC | Age: 70
End: 2025-08-25
Payer: COMMERCIAL

## 2025-08-25 VITALS
SYSTOLIC BLOOD PRESSURE: 114 MMHG | DIASTOLIC BLOOD PRESSURE: 81 MMHG | HEART RATE: 81 BPM | OXYGEN SATURATION: 99 % | TEMPERATURE: 96.6 F | BODY MASS INDEX: 44.16 KG/M2 | WEIGHT: 265.4 LBS

## 2025-08-25 DIAGNOSIS — Z17.0 MALIGNANT NEOPLASM OF LOWER-OUTER QUADRANT OF RIGHT BREAST OF FEMALE, ESTROGEN RECEPTOR POSITIVE: ICD-10-CM

## 2025-08-25 DIAGNOSIS — C50.511 MALIGNANT NEOPLASM OF LOWER-OUTER QUADRANT OF RIGHT BREAST OF FEMALE, ESTROGEN RECEPTOR POSITIVE: ICD-10-CM

## 2025-08-25 PROCEDURE — 99214 OFFICE O/P EST MOD 30 MIN: CPT | Performed by: SURGERY

## 2025-08-25 ASSESSMENT — PAIN SCALES - GENERAL: PAINLEVEL_OUTOF10: 8

## 2025-08-26 ENCOUNTER — APPOINTMENT (OUTPATIENT)
Dept: NEPHROLOGY | Facility: CLINIC | Age: 70
End: 2025-08-26
Payer: COMMERCIAL

## 2025-08-26 ENCOUNTER — DOCUMENTATION (OUTPATIENT)
Dept: PRIMARY CARE | Facility: CLINIC | Age: 70
End: 2025-08-26

## 2025-08-26 VITALS
BODY MASS INDEX: 44.07 KG/M2 | TEMPERATURE: 97.6 F | DIASTOLIC BLOOD PRESSURE: 72 MMHG | HEART RATE: 98 BPM | WEIGHT: 264.8 LBS | SYSTOLIC BLOOD PRESSURE: 106 MMHG

## 2025-08-26 DIAGNOSIS — Z00.00 ENCOUNTER FOR GENERAL ADULT MEDICAL EXAMINATION WITHOUT ABNORMAL FINDINGS: ICD-10-CM

## 2025-08-26 DIAGNOSIS — N18.4 STAGE 4 CHRONIC KIDNEY DISEASE (MULTI): ICD-10-CM

## 2025-08-26 RX ORDER — CALCITRIOL 0.25 UG/1
0.25 CAPSULE ORAL
Qty: 48 CAPSULE | Refills: 3 | Status: SHIPPED | OUTPATIENT
Start: 2025-08-26 | End: 2026-08-26

## 2025-08-27 DIAGNOSIS — Z00.00 ENCOUNTER FOR GENERAL ADULT MEDICAL EXAMINATION WITHOUT ABNORMAL FINDINGS: ICD-10-CM

## 2025-08-27 DIAGNOSIS — I42.9 CARDIOMYOPATHY, UNSPECIFIED TYPE (MULTI): ICD-10-CM

## 2025-08-27 DIAGNOSIS — N18.32 HYPERTENSIVE KIDNEY DISEASE WITH STAGE 3B CHRONIC KIDNEY DISEASE (MULTI): ICD-10-CM

## 2025-08-27 DIAGNOSIS — I12.9 HYPERTENSIVE KIDNEY DISEASE WITH STAGE 3B CHRONIC KIDNEY DISEASE (MULTI): ICD-10-CM

## 2025-08-31 ENCOUNTER — APPOINTMENT (OUTPATIENT)
Dept: RADIOLOGY | Facility: HOSPITAL | Age: 70
End: 2025-08-31
Payer: COMMERCIAL

## 2025-08-31 ENCOUNTER — HOSPITAL ENCOUNTER (EMERGENCY)
Facility: HOSPITAL | Age: 70
Discharge: HOME | End: 2025-09-01
Attending: EMERGENCY MEDICINE
Payer: COMMERCIAL

## 2025-08-31 ENCOUNTER — APPOINTMENT (OUTPATIENT)
Dept: CARDIOLOGY | Facility: HOSPITAL | Age: 70
End: 2025-08-31
Payer: COMMERCIAL

## 2025-08-31 DIAGNOSIS — M79.601 BILATERAL ARM PAIN: Primary | ICD-10-CM

## 2025-08-31 DIAGNOSIS — M79.602 BILATERAL ARM PAIN: Primary | ICD-10-CM

## 2025-08-31 PROCEDURE — 71045 X-RAY EXAM CHEST 1 VIEW: CPT

## 2025-08-31 PROCEDURE — 99285 EMERGENCY DEPT VISIT HI MDM: CPT | Performed by: EMERGENCY MEDICINE

## 2025-08-31 PROCEDURE — 93005 ELECTROCARDIOGRAM TRACING: CPT

## 2025-08-31 RX ORDER — ACETAMINOPHEN 325 MG/1
975 TABLET ORAL ONCE
Status: COMPLETED | OUTPATIENT
Start: 2025-08-31 | End: 2025-09-01

## 2025-08-31 ASSESSMENT — PAIN DESCRIPTION - FREQUENCY: FREQUENCY: INTERMITTENT

## 2025-08-31 ASSESSMENT — PAIN DESCRIPTION - PAIN TYPE: TYPE: ACUTE PAIN

## 2025-08-31 ASSESSMENT — PAIN DESCRIPTION - LOCATION: LOCATION: ARM

## 2025-08-31 ASSESSMENT — PAIN DESCRIPTION - DIRECTION: RADIATING_TOWARDS: LEFT SHOULDER

## 2025-08-31 ASSESSMENT — PAIN SCALES - GENERAL: PAINLEVEL_OUTOF10: 5 - MODERATE PAIN

## 2025-08-31 ASSESSMENT — PAIN DESCRIPTION - ORIENTATION: ORIENTATION: RIGHT;LEFT

## 2025-08-31 ASSESSMENT — PAIN - FUNCTIONAL ASSESSMENT: PAIN_FUNCTIONAL_ASSESSMENT: 0-10

## 2025-08-31 ASSESSMENT — PAIN DESCRIPTION - DESCRIPTORS: DESCRIPTORS: CRAMPING

## 2025-09-01 VITALS
HEART RATE: 85 BPM | TEMPERATURE: 97.7 F | OXYGEN SATURATION: 98 % | BODY MASS INDEX: 44.15 KG/M2 | DIASTOLIC BLOOD PRESSURE: 77 MMHG | HEIGHT: 65 IN | SYSTOLIC BLOOD PRESSURE: 112 MMHG | WEIGHT: 265 LBS | RESPIRATION RATE: 18 BRPM

## 2025-09-01 LAB
ALBUMIN SERPL BCP-MCNC: 4.2 G/DL (ref 3.4–5)
ALP SERPL-CCNC: 36 U/L (ref 33–136)
ALT SERPL W P-5'-P-CCNC: 8 U/L (ref 7–45)
ANION GAP SERPL CALC-SCNC: 10 MMOL/L (ref 10–20)
APTT PPP: 35 SECONDS (ref 26–36)
AST SERPL W P-5'-P-CCNC: 12 U/L (ref 9–39)
BASOPHILS # BLD AUTO: 0.01 X10*3/UL (ref 0–0.1)
BASOPHILS NFR BLD AUTO: 0.3 %
BILIRUB SERPL-MCNC: 0.9 MG/DL (ref 0–1.2)
BNP SERPL-MCNC: 403 PG/ML (ref 0–99)
BUN SERPL-MCNC: 27 MG/DL (ref 6–23)
CALCIUM SERPL-MCNC: 9.3 MG/DL (ref 8.6–10.3)
CARDIAC TROPONIN I PNL SERPL HS: 17 NG/L (ref 0–13)
CARDIAC TROPONIN I PNL SERPL HS: 17 NG/L (ref 0–13)
CHLORIDE SERPL-SCNC: 107 MMOL/L (ref 98–107)
CO2 SERPL-SCNC: 27 MMOL/L (ref 21–32)
CREAT SERPL-MCNC: 1.85 MG/DL (ref 0.5–1.05)
EGFRCR SERPLBLD CKD-EPI 2021: 29 ML/MIN/1.73M*2
EOSINOPHIL # BLD AUTO: 0.06 X10*3/UL (ref 0–0.4)
EOSINOPHIL NFR BLD AUTO: 1.6 %
ERYTHROCYTE [DISTWIDTH] IN BLOOD BY AUTOMATED COUNT: 13.5 % (ref 11.5–14.5)
GLUCOSE SERPL-MCNC: 81 MG/DL (ref 74–99)
HCT VFR BLD AUTO: 41.1 % (ref 36–46)
HGB BLD-MCNC: 13 G/DL (ref 12–16)
IMM GRANULOCYTES # BLD AUTO: 0 X10*3/UL (ref 0–0.5)
IMM GRANULOCYTES NFR BLD AUTO: 0 % (ref 0–0.9)
INR PPP: 2.1 (ref 0.9–1.1)
LYMPHOCYTES # BLD AUTO: 1.26 X10*3/UL (ref 0.8–3)
LYMPHOCYTES NFR BLD AUTO: 32.8 %
MAGNESIUM SERPL-MCNC: 2.37 MG/DL (ref 1.6–2.4)
MCH RBC QN AUTO: 29.9 PG (ref 26–34)
MCHC RBC AUTO-ENTMCNC: 31.6 G/DL (ref 32–36)
MCV RBC AUTO: 95 FL (ref 80–100)
MONOCYTES # BLD AUTO: 0.31 X10*3/UL (ref 0.05–0.8)
MONOCYTES NFR BLD AUTO: 8.1 %
NEUTROPHILS # BLD AUTO: 2.2 X10*3/UL (ref 1.6–5.5)
NEUTROPHILS NFR BLD AUTO: 57.2 %
NRBC BLD-RTO: 0 /100 WBCS (ref 0–0)
PLATELET # BLD AUTO: 147 X10*3/UL (ref 150–450)
POTASSIUM SERPL-SCNC: 3.9 MMOL/L (ref 3.5–5.3)
PROT SERPL-MCNC: 7 G/DL (ref 6.4–8.2)
PROTHROMBIN TIME: 23.7 SECONDS (ref 9.8–12.4)
RBC # BLD AUTO: 4.35 X10*6/UL (ref 4–5.2)
SARS-COV-2 RNA RESP QL NAA+PROBE: NOT DETECTED
SODIUM SERPL-SCNC: 140 MMOL/L (ref 136–145)
WBC # BLD AUTO: 3.8 X10*3/UL (ref 4.4–11.3)

## 2025-09-01 PROCEDURE — 36415 COLL VENOUS BLD VENIPUNCTURE: CPT | Performed by: EMERGENCY MEDICINE

## 2025-09-01 PROCEDURE — 2500000001 HC RX 250 WO HCPCS SELF ADMINISTERED DRUGS (ALT 637 FOR MEDICARE OP): Performed by: EMERGENCY MEDICINE

## 2025-09-01 PROCEDURE — 85025 COMPLETE CBC W/AUTO DIFF WBC: CPT | Performed by: EMERGENCY MEDICINE

## 2025-09-01 PROCEDURE — 87635 SARS-COV-2 COVID-19 AMP PRB: CPT | Performed by: EMERGENCY MEDICINE

## 2025-09-01 PROCEDURE — 84484 ASSAY OF TROPONIN QUANT: CPT | Performed by: EMERGENCY MEDICINE

## 2025-09-01 PROCEDURE — 83735 ASSAY OF MAGNESIUM: CPT | Performed by: EMERGENCY MEDICINE

## 2025-09-01 PROCEDURE — 85730 THROMBOPLASTIN TIME PARTIAL: CPT | Performed by: EMERGENCY MEDICINE

## 2025-09-01 PROCEDURE — 83880 ASSAY OF NATRIURETIC PEPTIDE: CPT | Performed by: EMERGENCY MEDICINE

## 2025-09-01 PROCEDURE — 80053 COMPREHEN METABOLIC PANEL: CPT | Performed by: EMERGENCY MEDICINE

## 2025-09-01 PROCEDURE — 85610 PROTHROMBIN TIME: CPT | Performed by: EMERGENCY MEDICINE

## 2025-09-01 RX ADMIN — ACETAMINOPHEN 975 MG: 325 TABLET ORAL at 00:33

## 2025-09-01 ASSESSMENT — PAIN SCALES - GENERAL: PAINLEVEL_OUTOF10: 0 - NO PAIN

## 2025-09-02 LAB
ATRIAL RATE: 32 BPM
Q ONSET: 219 MS
QRS COUNT: 13 BEATS
QRS DURATION: 100 MS
QT INTERVAL: 400 MS
QTC CALCULATION(BAZETT): 472 MS
QTC FREDERICIA: 447 MS
R AXIS: 4 DEGREES
T AXIS: 43 DEGREES
T OFFSET: 419 MS
VENTRICULAR RATE: 84 BPM

## 2025-09-06 ENCOUNTER — ANTICOAGULATION - WARFARIN VISIT (OUTPATIENT)
Dept: CARDIOLOGY | Facility: CLINIC | Age: 70
End: 2025-09-06
Payer: COMMERCIAL

## 2025-09-06 DIAGNOSIS — I48.19 PERSISTENT ATRIAL FIBRILLATION (MULTI): Primary | ICD-10-CM

## 2025-09-06 LAB
POC INR: 1.9 (ref 0.9–1.1)
POC PROTHROMBIN TIME: ABNORMAL (ref 9.3–12.5)

## 2025-09-06 PROCEDURE — 85610 PROTHROMBIN TIME: CPT | Mod: QW | Performed by: INTERNAL MEDICINE

## 2025-09-06 PROCEDURE — 99211 OFF/OP EST MAY X REQ PHY/QHP: CPT

## 2026-02-05 ENCOUNTER — APPOINTMENT (OUTPATIENT)
Dept: HEMATOLOGY/ONCOLOGY | Facility: CLINIC | Age: 71
End: 2026-02-05
Payer: COMMERCIAL

## 2026-03-17 ENCOUNTER — APPOINTMENT (OUTPATIENT)
Dept: NEPHROLOGY | Facility: CLINIC | Age: 71
End: 2026-03-17
Payer: COMMERCIAL

## (undated) DEVICE — CLIP, LIGATING, W/ADHESIVE PAD, MEDIUM, TITANIUM

## (undated) DEVICE — SUTURE, VICRYL, 2-0, 27 IN, SH, UNDYED

## (undated) DEVICE — SUTURE, VICRYL, 3-0, 27 IN, SH

## (undated) DEVICE — DRESSING, TRANSPARENT, TEGADERM, 4 X 4-3/4 IN, NO LABEL

## (undated) DEVICE — DRESSING, GAUZE, SPONGE, 12 PLY, CURITY, 4 X 4 IN, STERILE

## (undated) DEVICE — SPONGE, LAP, XRAY DECT, 18IN X 18IN, W/MASTER DMT, STERILE

## (undated) DEVICE — CATHETER, WEDGE PRESSURE, BALLOON, DOUBLE LUMEN, 5 FR, 110 CM

## (undated) DEVICE — GLOVE, SURGICAL, PROTEXIS PI BLUE W/NEUTHERA, 6.0, PF, LF

## (undated) DEVICE — SUTURE, MONOCRYL, 4-0, 18 IN, PS2, UNDYED

## (undated) DEVICE — PROBE COVER, INTRAOPERATIVE, 13 X 244CM (5 X 96IN)

## (undated) DEVICE — PHOTONBLADE, WITH ADAPTIVE SMOKE EVAC

## (undated) DEVICE — SPONGE, GAUZE, 12 PLY, 4 X 4, STERILE, DISP

## (undated) DEVICE — COVER, MAYO STAND, W/PAD, 23 IN, DISPOSABLE, PLASTIC, LF, STERILE

## (undated) DEVICE — DRESSING, SPONGE, GAUZE, CURITY, 4 X 4 IN, STERILE

## (undated) DEVICE — DRESSING, GAUZE, SUPER, KERLIX, 6 X 6.75 IN, BULK, NS

## (undated) DEVICE — Device

## (undated) DEVICE — STRIP, SKIN CLOSURE, STERI STRIP, REINFORCED, 0.5 X 4 IN

## (undated) DEVICE — ACCESS KIT, MINI MAK, 4 FR X 10CM, 0.018 X 40CM, NITINOL/PLATINUM, STD NEEDLE

## (undated) DEVICE — GLOVE, SURGICAL, PROTEXIS PI , 6.0, PF, LF

## (undated) DEVICE — DRESSING, TRANSPARENT, TEGADERM, 4 X 4-3/4 IN

## (undated) DEVICE — GUIDEWIRE, ANGLE TIP,  .035 DIA, 180 CM, 3 CM TIP"

## (undated) DEVICE — ENVELOPE, ANTIBACTERIAL, AIGIS RX TYRX, ABSORBABLE, LRG

## (undated) DEVICE — INTRODUCER SYSTEM, PRELUDE SNAP, SPLITTABLE HEMOSTATIC, 10 FR X 13 CM

## (undated) DEVICE — SUTURE, ETHILON, 3-0, 18 IN, PS1, BLACK

## (undated) DEVICE — SUTURE, MONOCRYL, 4-0, 27 IN, PS-2, UNDYED

## (undated) DEVICE — DRESSING, DRAIN SPONGE, EXCILON AMD, 4X4 IN, 6 PLY, ST

## (undated) DEVICE — INTRODUCER, GLIDESHEATH SLENDER A-KIT, 5FR 10CM

## (undated) DEVICE — GUIDEWIRE, WHISPER VIEW, HI-TORQUE, EDS CS-J, 0.014 X 190CM"

## (undated) DEVICE — DRESSING, ABDOMINAL PAD, CURITY, 7.5 X 8 IN

## (undated) DEVICE — CATHETER, THERMODILUTION, SWAN GANZ, HI-SHORE, COATED, W/GUIDEWIRE, 7 FR, 110 CM

## (undated) DEVICE — BANDAGE, ELASTIC, ACE, ACE, DOUBLE LENGTH, 6 X 550 IN, LF

## (undated) DEVICE — PACK, UNIVERSAL

## (undated) DEVICE — SLEEVE, VASO PRESS, CALF GARMENT, MEDIUM, GREEN

## (undated) DEVICE — HEMOSTAT, SURGICEL POWDER 3.0GRAMS

## (undated) DEVICE — WRENCH, HEX